# Patient Record
Sex: MALE | Race: WHITE | NOT HISPANIC OR LATINO | Employment: OTHER | URBAN - METROPOLITAN AREA
[De-identification: names, ages, dates, MRNs, and addresses within clinical notes are randomized per-mention and may not be internally consistent; named-entity substitution may affect disease eponyms.]

---

## 2017-03-10 ENCOUNTER — TELEPHONE (OUTPATIENT)
Dept: INTERNAL MEDICINE | Facility: CLINIC | Age: 52
End: 2017-03-10

## 2017-03-10 DIAGNOSIS — Z12.11 SPECIAL SCREENING FOR MALIGNANT NEOPLASMS, COLON: Primary | ICD-10-CM

## 2017-03-22 ENCOUNTER — OFFICE VISIT (OUTPATIENT)
Dept: DERMATOLOGY | Facility: CLINIC | Age: 52
End: 2017-03-22
Payer: COMMERCIAL

## 2017-03-22 DIAGNOSIS — L57.0 ACTINIC KERATOSIS: ICD-10-CM

## 2017-03-22 DIAGNOSIS — Q82.5 CONGENITAL NEVUS OF TRUNK: Chronic | ICD-10-CM

## 2017-03-22 DIAGNOSIS — D22.9 MULTIPLE NEVI: ICD-10-CM

## 2017-03-22 DIAGNOSIS — Z12.83 SKIN CANCER SCREENING: ICD-10-CM

## 2017-03-22 DIAGNOSIS — Q82.5 CONGENITAL NEVUS: ICD-10-CM

## 2017-03-22 DIAGNOSIS — R23.8 VENOUS LAKE: Chronic | ICD-10-CM

## 2017-03-22 DIAGNOSIS — L82.0 INFLAMED SEBORRHEIC KERATOSIS: ICD-10-CM

## 2017-03-22 DIAGNOSIS — D18.01 CHERRY ANGIOMA: Primary | ICD-10-CM

## 2017-03-22 DIAGNOSIS — D48.5 NEOPLASM OF UNCERTAIN BEHAVIOR OF SKIN OF TRUNK: ICD-10-CM

## 2017-03-22 PROCEDURE — 17000 DESTRUCT PREMALG LESION: CPT | Mod: 59 | Performed by: DERMATOLOGY

## 2017-03-22 PROCEDURE — 11300 SHAVE SKIN LESION 0.5 CM/<: CPT | Mod: 59 | Performed by: DERMATOLOGY

## 2017-03-22 PROCEDURE — 17003 DESTRUCT PREMALG LES 2-14: CPT | Performed by: DERMATOLOGY

## 2017-03-22 PROCEDURE — 17110 DESTRUCTION B9 LES UP TO 14: CPT | Performed by: DERMATOLOGY

## 2017-03-22 PROCEDURE — 99214 OFFICE O/P EST MOD 30 MIN: CPT | Mod: 25 | Performed by: DERMATOLOGY

## 2017-03-22 PROCEDURE — 88305 TISSUE EXAM BY PATHOLOGIST: CPT | Performed by: DERMATOLOGY

## 2017-03-22 RX ORDER — LIDOCAINE HYDROCHLORIDE AND EPINEPHRINE 10; 10 MG/ML; UG/ML
3 INJECTION, SOLUTION INFILTRATION; PERINEURAL ONCE
Qty: 3 ML | Refills: 0 | OUTPATIENT
Start: 2017-03-22 | End: 2017-03-22

## 2017-03-22 NOTE — MR AVS SNAPSHOT
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    Use of a very cold liquid, such as liquid nitrogen, to freeze and destroy abnormal skin cells that need to be removed    What should I expect?    Tenderness and redness    A small blister that might grow and fill with dark purple blood. There may be crusting.    More than one treatment may be needed if the lesions do not go away.    How do I care for the treated area?    Gently wash the area with your hands when bathing.    Use a thin layer of Vaseline to help with healing. You may use a Band-Aid.     The area should heal within 7-10 days and may leave behind a pink or lighter color.     Do not use an antibiotic or Neosporin ointment.     You may take acetaminophen (Tylenol) for pain.     Call your Doctor if you have:    Severe pain    Signs of infection (warmth, redness, cloudy yellow drainage, and or a bad smell)    Questions or concerns    Who should I call with questions?       [facility]: [phone]       [facility]: [phone]       For urgent needs outside of business hours call the [facility] at [phone]        and ask for the dermatology resident on call    Wound Care After a Biopsy    What is a skin biopsy?  A skin biopsy allows the doctor to examine a very small piece of tissue under the  microscope to determine the diagnosis and the best treatment for the skin condition. A local anesthetic (numbing medicine)  is injected with a very small needle into the skin area to be tested. A small piece of skin is taken from the area. Sometimes a suture (stitch) is used.     What are the risks of a skin biopsy?  I will experience scar, bleeding, swelling, pain, crusting and redness. I may experience incomplete removal or recurrence. Risks of this procedure are excessive bleeding, bruising, infection, nerve damage, numbness, thick (hypertrophic or keloidal) scar and non-diagnostic biopsy.    How should I care for my wound for the first 24 hours?    Keep the wound dry and covered for 24 hours    If it bleeds, hold direct pressure on the area for 15 minutes. If bleeding does not stop then go to the emergency room    Avoid strenuous exercise the first 1-2 days or as your doctor instructs you    How should I care for the wound after 24 hours?    After 24 hours, remove the bandage    You may bathe or shower as normal    If you had a scalp biopsy, you can shampoo as usual and can use shower water to clean the biopsy site daily    Clean the wound twice a day with gentle soap and water    Do not scrub, be gentle    Apply white petroleum/Vaseline after cleaning the wound with a cotton swab or a clean finger, and keep the site covered with a Bandaid /bandage. Bandages are not necessary with a scalp biopsy    If you are unable to cover the site with a Bandaid /bandage, re-apply ointment 2-3 times a day to keep the site moist. Moisture will help with healing    Avoid strenuous activity for first 1-2 days    Avoid lakes, rivers, pools, and oceans until the stitches are removed or the site is healed    How do I clean my wound?    Wash hands for 15 minutes with soap or use hand  before all wound care    Clean the wound with gentle soap and water    Apply white petroleum/Vaseline  to wound after it is clean    Replace  the Bandaid /bandage to keep the wound covered for the first few days or as instructed by your doctor    If you had a scalp biopsy, warm shower water to the area on a daily basis should suffice    What should I use to clean my wound?     Cotton-tipped applicators (Qtips )    White petroleum jelly (Vaseline ). Use a clean new container and use Q-tips to apply.    Bandaids   as needed    Gentle soap     How should I care for my wound long term?    Do not get your wound dirty    Keep up with wound care for one week or until the area is healed.    A small scab will form and fall off by itself when the area is completely healed. The area will be red and will become pink in color as it heals. Sun protection is very important for how your scar will turn out. Sunscreen with an SPF 30 or greater is recommended once the area is healed.    You should have some soreness but it should be mild and slowly go away over several days. Talk to your doctor about using tylenol for pain,    When should I call my doctor?  If you have increased:     Pain or swelling    Pus or drainage (clear or slightly yellow drainage is ok)    Temperature over 100F    Spreading redness or warmth around wound    When will I hear about my results?  The biopsy results can take 2-3 weeks to come back. The clinic will call you with the results, send you a [employer] message, or have you schedule a follow-up clinic or phone time to discuss the results. Contact our clinics if you do not hear from us in 3 weeks.     Who should I call with questions?    [facility]: [phone]     [facility]: [phone]    For urgent needs outside of business hours call the [facility] at [phone] and ask for the dermatology resident on call            Follow-ups after your visit        Who to contact     If you have questions or need follow up information about today's clinic visit or your schedule please  contact [facility] directly at [phone].  Normal or non-critical lab and imaging results will be communicated to you by MyChart, letter or phone within 4 business days after the clinic has received the results. If you do not hear from us within 7 days, please contact the clinic through MyChart or phone. If you have a critical or abnormal lab result, we will notify you by phone as soon as possible.  Submit refill requests through dough or call your pharmacy and they will forward the refill request to us. Please allow 3 business days for your refill to be completed.          Additional Information About Your Visit        dough Information     dough is an electronic gateway that provides easy, online access to your medical records. With dough, you can request a clinic appointment, read your test results, renew a prescription or communicate with your care team.     To sign up for dough visit the website at [email or web address]   You will be asked to enter the access code listed below, as well as some personal information. Please follow the directions to create your username and password.     Your access code is: [email or web address]  Expires: 2017  8:37 AM     Your access code will  in 90 days. If you need help or a new code, please contact your [facility] or call [phone] for assistance.        Care EveryWhere ID     This is your Care EveryWhere ID. This could be used by other organizations to access your [facility] medical records  [id number]         Blood Pressure from Last 3 Encounters:   16 118/84   02/24/15 129/70   14 118/86    Weight from Last 3 Encounters:   14 269 lb (122 kg)   10/01/12 292 lb (132.5 kg)   11 258 lb (117 kg)              We Performed the Following     DESTRUCT BENIGN LESION, UP TO 14     DESTRUCT PREMALIGNANT LESION, 2-14     DESTRUCT PREMALIGNANT LESION, FIRST     SHAV SKIN LESION  TRUNK/ARM/LEG <=0.5 CM     Surgical pathology exam          Today's Medication Changes          These changes are accurate as of: 3/22/17  8:37 AM.  If you have any questions, ask your nurse or doctor.               Start taking these medicines.        Dose/Directions    lidocaine 1% with EPINEPHrine 1:100,000 1 %-1:890555 injection   Used for:  Neoplasm of uncertain behavior of skin of trunk   Started by:  Rancho Ruano MD        Dose:  3 mL   Inject 3 mLs into the skin once for 1 dose   Quantity:  3 mL   Refills:  0            Where to get your medicines      Some of these will need a paper prescription and others can be bought over the counter.  Ask your nurse if you have questions.     You don't need a prescription for these medications     lidocaine 1% with EPINEPHrine 1:100,000 1 %-1:829079 injection                Primary Care Provider Office Phone # Fax #    [employer] [phone]       [facility] [address] [place] [address]        Thank you!     Thank you for choosing [facility]  for your care. Our goal is always to provide you with excellent care. Hearing back from our patients is one way we can continue to improve our services. Please take a few minutes to complete the written survey that you may receive in the mail after your visit with us. Thank you!             Your Updated Medication List - Protect others around you: Learn how to safely use, store and throw away your medicines at www.disposemymeds.org.          This list is accurate as of: 3/22/17  8:37 AM.  Always use your most recent med list.                   Brand Name Dispense Instructions for use    blood glucose monitoring test strip    no brand specified    1 Box    by In Vitro route. Test as directed       DIABETIC STERILE LANCETS device     1 Box    1 Device daily.       GLUCOMETER ELITE CLASSIC Kit     1 kit    1 Device daily.       ibuprofen 200 MG tablet    ADVIL/MOTRIN      Take 200 mg by mouth every 4 hours as needed.       lidocaine 1% with EPINEPHrine 1:100,000 1 %-1:366250 injection     3 mL    Inject 3 mLs into the skin once for 1 dose       metFORMIN 500 MG tablet    GLUCOPHAGE    180 tablet    Take 1 tablet (500 mg) by mouth 2 times daily (with meals)

## 2017-03-22 NOTE — NURSING NOTE
Dermatology Rooming Note    Luke Farias's goals for this visit include:   Chief Complaint   Patient presents with     Skin Check       Is a scribe okay for this visit:YES,     Are records needed for this visit(If yes, obtain release of information): No,      Vitals: There were no vitals taken for this visit.    Referring Provider:  Referred Self, MD  No address on file

## 2017-03-22 NOTE — LETTER
3/22/2017       RE: Luke Farias  [address] [address]     Dear Colleague,    Thank you for referring your patient, Luke Farias, to the [facility]. Please see a copy of my visit note below.    Derm problem list:  1. AK  2. 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 Venous lake on R chest  4. NUB L Flank, shave bx 3/22/17    Last FBSE: 3/22/17    Encounter date: 3/22/17    CHIEF COMPLAINT:     1.  Spot on the head.   2.  Spot on the left ear.   3.  Dark spot on the right chest.      HISTORY OF PRESENT ILLNESS:  Mr. Luke Farias is a 51-year-old healthy male with a past medical history of 1 mildly dysplastic nevus removed on the mid back by Dr. Noreen nelson in .  He reports the followin.  Spot on the head:  Lesion is intermittent and presented about 6 months ago.  It sometimes thickens up and then sometimes thin down.  It is rough.  It does not bleed.  It occasionally itches.   2.  Spot on the left ear.  Lesion has been around for about 6 months.  It is again rough.  It comes and goes.  He notes that sometimes it peels but it is asymptomatic.  It does not bleed spontaneously.  He notes that he does not sleep just on the left side, he goes back and forth between the left, on his back and on the right side.   3.  Spot on the right chest.  This lesion has been changing in history of present for the last 1-2 years.  It is asymptomatic and does not bleed spontaneously.        The patient has no other skin complaints.      PAST MEDICAL HISTORY:  Reviewed.  No history of skin cancer.        FAMILY HISTORY:  Family history of melanoma in his uncle.      SOCIAL HISTORY:  No tanning ramos use.  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      REVIEW OF SYSTEMS:   CONSTITUTIONAL:  No fevers or chills.   SKIN:  No other skin complaints.  No other lesions that are bleeding,  burning, ulcerating or not healing.      PHYSICAL EXAMINATION:   GENERAL:  Well-appearing gentleman, well-nourished, well-developed.   NEUROLOGIC:  Alert and oriented.   PSYCHIATRIC:  Pleasant affect.   SKIN:  Full skin examination of the head, neck, chest, back, abdomen, groin, buttocks all 4 extremities and nails reveals the followin.  Skin type 2.   2.  Scattered waxy, qsvfl-dl-fmlwkhlhy papule on examined surfaces.   3.  Scattered red, dome-shaped, flat papules on exam surfaces.   4.  On the left frontal scalp is a crusty stuck-on, waxy appearing papule that is elevated and red (site of concern.   5.  On the left superior helix is a red, gritty papule with an underlying thickened cartilage.   6.  Right chest with a venous lake.   7.  Numerous pigmented macules and papules.  Many are dark and there are a variety of sizes.  However, most lesions look similar to each other.   8.  On the left flank is a 4 mm pigmented macule with irregular butting out borders and some dark pigment globules on dermoscopy.   9.  On the right back is a half dollar sized congenital nevus.    10. There are 4 red, gritty papules on the scalp and the face.      ASSESSMENT AND PLAN:   1.  Actinic keratosis x4 on the scalp and face:  Cryo.   2.  Inflamed seborrheic keratoses on the left scalp:  Cryo.   3.  Benign neoplasms, including seborrheic keratoses and cherry angiomas:  No treatment necessary.   4.  Venous lake on the right chest:  No treatment necessary.   5.  Congenital nevus on the right upper back:  No treatment necessary.   6.  Multiple nevi.  There are greater than 100 nevi.  Most are similar to each other, except for 1 lesion that will be biopsied.   7.  Neoplasm of uncertain behavior on the left flank 4 mm in diameter.  Differential diagnosis includes a nevus a dysplastic nevus and melanoma.  Plan is for a shave removal.  8. Skin cancer screening, benign except for 1 lesion shave removed.     Cryotherapy procedure note:  After verbal consent and discussion of risks and benefits including but no limited to dyspigmentation/scar, blister, and pain, 5 (4 aks and 1 inflamed SK) were treated with 1-2mm freeze border for 2 cycles with liquid nitrogen. Post cryotherapy instructions were provided.     Shave removal:  After discussion of benefits and risks including but not limited to bleeding/bruising, pain/swelling, infection, scar, incomplete removal, nerve damage/numbness, recurrence, and non-diagnostic biopsy, written consent, verbal consent and photographs were obtained. Time-out was performed. The area was cleaned with isopropyl alcohol. 0.5mL of 1% lidocaine with epinephrine was injected to obtain adequate anesthesia of the lesion on the left flank. A shave biopsy was performed. Hemostasis was achieved with aluminium chloride. Vaseline and a sterile dressing were applied. The patient tolerated the procedure and no complications were noted. The patient was provided with verbal and written post care instructions.     The patient will return to clinic in 1 year for skin check or earlier for change in lesions or pending the biopsy results.      Care instructions for cryotherapy and skin biopsy has been provided in the after-visit summary.      Rancho Ruano MD, MS    Department of Dermatology  [facility]: Phone: [phone], Fax:[phone]  [facility]: Phone: [phone], Fax: [phone]             D: 2017 08:43   T: 2017 09:07   MT: BELKIS#[id number]      Name:     JAVIER GALVEZ   MRN:      [id number]        Account:      [id number]   :      1965           Visit Date:   2017      Document: [id number]

## 2017-03-22 NOTE — LETTER
3/22/2017      RE: Luek Farias  [address] [address] [address]       Derm problem list:  1. AK  2. 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 Venous lake on R chest  4. NUB L Flank, shave bx 3/22/17    Last FBSE: 3/22/17    Encounter date: 3/22/17    CHIEF COMPLAINT:     1.  Spot on the head.   2.  Spot on the left ear.   3.  Dark spot on the right chest.      HISTORY OF PRESENT ILLNESS:  Mr. Luke Farias is a 51-year-old healthy male with a past medical history of 1 mildly dysplastic nevus removed on the mid back by Dr. Noreen nelson in .  He reports the followin.  Spot on the head:  Lesion is intermittent and presented about 6 months ago.  It sometimes thickens up and then sometimes thin down.  It is rough.  It does not bleed.  It occasionally itches.   2.  Spot on the left ear.  Lesion has been around for about 6 months.  It is again rough.  It comes and goes.  He notes that sometimes it peels but it is asymptomatic.  It does not bleed spontaneously.  He notes that he does not sleep just on the left side, he goes back and forth between the left, on his back and on the right side.   3.  Spot on the right chest.  This lesion has been changing in history of present for the last 1-2 years.  It is asymptomatic and does not bleed spontaneously.        The patient has no other skin complaints.      PAST MEDICAL HISTORY:  Reviewed.  No history of skin cancer.        FAMILY HISTORY:  Family history of melanoma in his uncle.      SOCIAL HISTORY:  No tanning ramos use.  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      REVIEW OF SYSTEMS:   CONSTITUTIONAL:  No fevers or chills.   SKIN:  No other skin complaints.  No other lesions that are bleeding, burning, ulcerating or not healing.      PHYSICAL EXAMINATION:   GENERAL:  Well-appearing gentleman, well-nourished, well-developed.   NEUROLOGIC:  Alert and oriented.   PSYCHIATRIC:  Pleasant affect.   SKIN:   Full skin examination of the head, neck, chest, back, abdomen, groin, buttocks all 4 extremities and nails reveals the followin.  Skin type 2.   2.  Scattered waxy, ghlwn-oi-sjhkgplxy papule on examined surfaces.   3.  Scattered red, dome-shaped, flat papules on exam surfaces.   4.  On the left frontal scalp is a crusty stuck-on, waxy appearing papule that is elevated and red (site of concern.   5.  On the left superior helix is a red, gritty papule with an underlying thickened cartilage.   6.  Right chest with a venous lake.   7.  Numerous pigmented macules and papules.  Many are dark and there are a variety of sizes.  However, most lesions look similar to each other.   8.  On the left flank is a 4 mm pigmented macule with irregular butting out borders and some dark pigment globules on dermoscopy.   9.  On the right back is a half dollar sized congenital nevus.    10. There are 4 red, gritty papules on the scalp and the face.      ASSESSMENT AND PLAN:   1.  Actinic keratosis x4 on the scalp and face:  Cryo.   2.  Inflamed seborrheic keratoses on the left scalp:  Cryo.   3.  Benign neoplasms, including seborrheic keratoses and cherry angiomas:  No treatment necessary.   4.  Venous lake on the right chest:  No treatment necessary.   5.  Congenital nevus on the right upper back:  No treatment necessary.   6.  Multiple nevi.  There are greater than 100 nevi.  Most are similar to each other, except for 1 lesion that will be biopsied.   7.  Neoplasm of uncertain behavior on the left flank 4 mm in diameter.  Differential diagnosis includes a nevus a dysplastic nevus and melanoma.  Plan is for a shave removal.  8. Skin cancer screening, benign except for 1 lesion shave removed.     Cryotherapy procedure note: After verbal consent and discussion of risks and benefits including but no limited to dyspigmentation/scar, blister, and pain, 5 (4 aks and 1 inflamed SK) were treated with 1-2mm freeze border for 2 cycles with  liquid nitrogen. Post cryotherapy instructions were provided.     Shave removal:  After discussion of benefits and risks including but not limited to bleeding/bruising, pain/swelling, infection, scar, incomplete removal, nerve damage/numbness, recurrence, and non-diagnostic biopsy, written consent, verbal consent and photographs were obtained. Time-out was performed. The area was cleaned with isopropyl alcohol. 0.5mL of 1% lidocaine with epinephrine was injected to obtain adequate anesthesia of the lesion on the left flank. A shave biopsy was performed. Hemostasis was achieved with aluminium chloride. Vaseline and a sterile dressing were applied. The patient tolerated the procedure and no complications were noted. The patient was provided with verbal and written post care instructions.     The patient will return to clinic in 1 year for skin check or earlier for change in lesions or pending the biopsy results.      Care instructions for cryotherapy and skin biopsy has been provided in the after-visit summary.      Rancho Ruano MD, MS    Department of Dermatology  [facility]: Phone: [phone], Fax:[phone]  [facility]: Phone: [phone], Fax: [phone]             D: 2017 08:43   T: 2017 09:07   MT: BELKIS#[id number]      Name:     JAVIER GALVEZ   MRN:      [id number]        Account:      [id number]   :      1965           Visit Date:   2017      Document: [id number]

## 2017-03-22 NOTE — PATIENT INSTRUCTIONS
Cryotherapy    What is it?    Use of a very cold liquid, such as liquid nitrogen, to freeze and destroy abnormal skin cells that need to be removed    What should I expect?    Tenderness and redness    A small blister that might grow and fill with dark purple blood. There may be crusting.    More than one treatment may be needed if the lesions do not go away.    How do I care for the treated area?    Gently wash the area with your hands when bathing.    Use a thin layer of Vaseline to help with healing. You may use a Band-Aid.     The area should heal within 7-10 days and may leave behind a pink or lighter color.     Do not use an antibiotic or Neosporin ointment.     You may take acetaminophen (Tylenol) for pain.     Call your Doctor if you have:    Severe pain    Signs of infection (warmth, redness, cloudy yellow drainage, and or a bad smell)    Questions or concerns    Who should I call with questions?       [facility]: [phone]       [facility]: [phone]       For urgent needs outside of business hours call the [facility] at [phone]        and ask for the dermatology resident on call    Wound Care After a Biopsy    What is a skin biopsy?  A skin biopsy allows the doctor to examine a very small piece of tissue under the microscope to determine the diagnosis and the best treatment for the skin condition. A local anesthetic (numbing medicine)  is injected with a very small needle into the skin area to be tested. A small piece of skin is taken from the area. Sometimes a suture (stitch) is used.     What are the risks of a skin biopsy?  I will experience scar, bleeding, swelling, pain, crusting and redness. I may experience incomplete removal or recurrence. Risks of this procedure are excessive bleeding, bruising, infection, nerve damage, numbness, thick (hypertrophic or keloidal) scar and non-diagnostic biopsy.    How should I care  for my wound for the first 24 hours?    Keep the wound dry and covered for 24 hours    If it bleeds, hold direct pressure on the area for 15 minutes. If bleeding does not stop then go to the emergency room    Avoid strenuous exercise the first 1-2 days or as your doctor instructs you    How should I care for the wound after 24 hours?    After 24 hours, remove the bandage    You may bathe or shower as normal    If you had a scalp biopsy, you can shampoo as usual and can use shower water to clean the biopsy site daily    Clean the wound twice a day with gentle soap and water    Do not scrub, be gentle    Apply white petroleum/Vaseline after cleaning the wound with a cotton swab or a clean finger, and keep the site covered with a Bandaid /bandage. Bandages are not necessary with a scalp biopsy    If you are unable to cover the site with a Bandaid /bandage, re-apply ointment 2-3 times a day to keep the site moist. Moisture will help with healing    Avoid strenuous activity for first 1-2 days    Avoid lakes, rivers, pools, and oceans until the stitches are removed or the site is healed    How do I clean my wound?    Wash hands for 15 minutes with soap or use hand  before all wound care    Clean the wound with gentle soap and water    Apply white petroleum/Vaseline  to wound after it is clean    Replace the Bandaid /bandage to keep the wound covered for the first few days or as instructed by your doctor    If you had a scalp biopsy, warm shower water to the area on a daily basis should suffice    What should I use to clean my wound?     Cotton-tipped applicators (Qtips )    White petroleum jelly (Vaseline ). Use a clean new container and use Q-tips to apply.    Bandaids   as needed    Gentle soap     How should I care for my wound long term?    Do not get your wound dirty    Keep up with wound care for one week or until the area is healed.    A small scab will form and fall off by itself when the area is  completely healed. The area will be red and will become pink in color as it heals. Sun protection is very important for how your scar will turn out. Sunscreen with an SPF 30 or greater is recommended once the area is healed.    You should have some soreness but it should be mild and slowly go away over several days. Talk to your doctor about using tylenol for pain,    When should I call my doctor?  If you have increased:     Pain or swelling    Pus or drainage (clear or slightly yellow drainage is ok)    Temperature over 100F    Spreading redness or warmth around wound    When will I hear about my results?  The biopsy results can take 2-3 weeks to come back. The clinic will call you with the results, send you a Editlite message, or have you schedule a follow-up clinic or phone time to discuss the results. Contact our clinics if you do not hear from us in 3 weeks.     Who should I call with questions?    [facility]: [phone]     [facility]: [phone]    For urgent needs outside of business hours call the [facility] at [phone] and ask for the dermatology resident on call

## 2017-03-23 NOTE — PROGRESS NOTES
Derm problem list:  1. AK  2. 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 Venous lake on R chest  4. NUB L Flank, shave bx 3/22/17    Last FBSE: 3/22/17    Encounter date: 3/22/17    CHIEF COMPLAINT:     1.  Spot on the head.   2.  Spot on the left ear.   3.  Dark spot on the right chest.      HISTORY OF PRESENT ILLNESS:  Mr. Luke Farias is a 51-year-old healthy male with a past medical history of 1 mildly dysplastic nevus removed on the mid back by Dr. Noreen nelson in .  He reports the followin.  Spot on the head:  Lesion is intermittent and presented about 6 months ago.  It sometimes thickens up and then sometimes thin down.  It is rough.  It does not bleed.  It occasionally itches.   2.  Spot on the left ear.  Lesion has been around for about 6 months.  It is again rough.  It comes and goes.  He notes that sometimes it peels but it is asymptomatic.  It does not bleed spontaneously.  He notes that he does not sleep just on the left side, he goes back and forth between the left, on his back and on the right side.   3.  Spot on the right chest.  This lesion has been changing in history of present for the last 1-2 years.  It is asymptomatic and does not bleed spontaneously.        The patient has no other skin complaints.      PAST MEDICAL HISTORY:  Reviewed.  No history of skin cancer.        FAMILY HISTORY:  Family history of melanoma in his uncle.      SOCIAL HISTORY:  No tanning ramos use.  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      REVIEW OF SYSTEMS:   CONSTITUTIONAL:  No fevers or chills.   SKIN:  No other skin complaints.  No other lesions that are bleeding, burning, ulcerating or not healing.      PHYSICAL EXAMINATION:   GENERAL:  Well-appearing gentleman, well-nourished, well-developed.   NEUROLOGIC:  Alert and oriented.   PSYCHIATRIC:  Pleasant affect.   SKIN:  Full skin examination of the head, neck, chest, back, abdomen, groin, buttocks  all 4 extremities and nails reveals the followin.  Skin type 2.   2.  Scattered waxy, [id number]-[id number]-mbcxzuyyg papule on examined surfaces.   3.  Scattered red, dome-shaped, flat papules on exam surfaces.   4.  On the left frontal scalp is a crusty stuck-on, waxy appearing papule that is elevated and red (site of concern.   5.  On the left superior helix is a red, gritty papule with an underlying thickened cartilage.   6.  Right chest with a venous lake.   7.  Numerous pigmented macules and papules.  Many are dark and there are a variety of sizes.  However, most lesions look similar to each other.   8.  On the left flank is a 4 mm pigmented macule with irregular butting out borders and some dark pigment globules on dermoscopy.   9.  On the right back is a half dollar sized congenital nevus.    10. There are 4 red, gritty papules on the scalp and the face.      ASSESSMENT AND PLAN:   1.  Actinic keratosis x4 on the scalp and face:  Cryo.   2.  Inflamed seborrheic keratoses on the left scalp:  Cryo.   3.  Benign neoplasms, including seborrheic keratoses and cherry angiomas:  No treatment necessary.   4.  Venous lake on the right chest:  No treatment necessary.   5.  Congenital nevus on the right upper back:  No treatment necessary.   6.  Multiple nevi.  There are greater than 100 nevi.  Most are similar to each other, except for 1 lesion that will be biopsied.   7.  Neoplasm of uncertain behavior on the left flank 4 mm in diameter.  Differential diagnosis includes a nevus a dysplastic nevus and melanoma.  Plan is for a shave removal.  8. Skin cancer screening, benign except for 1 lesion shave removed.     Cryotherapy procedure note: After verbal consent and discussion of risks and benefits including but no limited to dyspigmentation/scar, blister, and pain, 5 (4 aks and 1 inflamed SK) were treated with 1-2mm freeze border for 2 cycles with liquid nitrogen. Post cryotherapy instructions were provided.     Shave  removal:  After discussion of benefits and risks including but not limited to bleeding/bruising, pain/swelling, infection, scar, incomplete removal, nerve damage/numbness, recurrence, and non-diagnostic biopsy, written consent, verbal consent and photographs were obtained. Time-out was performed. The area was cleaned with isopropyl alcohol. 0.5mL of 1% lidocaine with epinephrine was injected to obtain adequate anesthesia of the lesion on the left flank. A shave biopsy was performed. Hemostasis was achieved with aluminium chloride. Vaseline and a sterile dressing were applied. The patient tolerated the procedure and no complications were noted. The patient was provided with verbal and written post care instructions.     The patient will return to clinic in 1 year for skin check or earlier for change in lesions or pending the biopsy results.      Care instructions for cryotherapy and skin biopsy has been provided in the after-visit summary.      Rancho Ruano MD, MS    Department of Dermatology  [facility]: Phone: [phone], Fax:[phone]  [facility]: Phone: [phone], Fax: [phone]             D: 2017 08:43   T: 2017 09:07   MT: BELKIS#[id number]      Name:     JAVIER GALVEZ   MRN:      [id number]        Account:      [id number]   :      1965           Visit Date:   2017      Document: [id number]

## 2017-03-27 LAB — COPATH REPORT: NORMAL

## 2017-04-02 NOTE — PROGRESS NOTES
Please call patient and inform him that his left flank biopsy was a Moderately Dysplastic nevus, completely removed. There's nothing to truly worry about. I'd like to do another skin check in a year.    Rancho Ruano MD, MS    Department of Dermatology  [facility]: Phone: [phone], Fax:[phone]  [facility]: Phone: [phone], Fax: [phone]

## 2017-04-03 ENCOUNTER — TELEPHONE (OUTPATIENT)
Dept: DERMATOLOGY | Facility: CLINIC | Age: 52
End: 2017-04-03

## 2017-04-03 NOTE — TELEPHONE ENCOUNTER
"Writer called and informed patient of results below. Patient verbalized understanding and states that he will \"make sure to have another skin check\" in one year. No further questions or concerns per patient    Notes Recorded by Rancho Ruano MD on 4/2/2017 at 9:52 AM  Please call patient and inform him that his left flank biopsy was a Moderately Dysplastic nevus, completely removed. There's nothing to truly worry about. I'd like to do another skin check in a year.    Rancho Ruano MD, MS    Department of Dermatology  [facility]: Phone: [phone], Fax:[phone]  [facility]: Phone: [phone], Fax: [phone]    Vianney Carlisle LPN    "

## 2017-05-12 ENCOUNTER — HOSPITAL ENCOUNTER (OUTPATIENT)
Facility: AMBULATORY SURGERY CENTER | Age: 52
Discharge: HOME | End: 2017-05-12
Attending: INTERNAL MEDICINE | Admitting: INTERNAL MEDICINE
Payer: COMMERCIAL

## 2017-05-12 ENCOUNTER — SURGERY (OUTPATIENT)
Age: 52
End: 2017-05-12
Payer: COMMERCIAL

## 2017-05-12 ENCOUNTER — TELEPHONE (OUTPATIENT)
Dept: INTERNAL MEDICINE | Facility: CLINIC | Age: 52
End: 2017-05-12

## 2017-05-12 VITALS
RESPIRATION RATE: 18 BRPM | OXYGEN SATURATION: 97 % | DIASTOLIC BLOOD PRESSURE: 82 MMHG | TEMPERATURE: 97.6 F | SYSTOLIC BLOOD PRESSURE: 103 MMHG

## 2017-05-12 LAB — GLUCOSE BLDC GLUCOMTR-MCNC: 263 MG/DL (ref 70–99)

## 2017-05-12 PROCEDURE — G8918 PT W/O PREOP ORDER IV AB PRO: HCPCS

## 2017-05-12 PROCEDURE — 45378 DIAGNOSTIC COLONOSCOPY: CPT

## 2017-05-12 PROCEDURE — G8907 PT DOC NO EVENTS ON DISCHARG: HCPCS

## 2017-05-12 PROCEDURE — 99152 MOD SED SAME PHYS/QHP 5/>YRS: CPT | Performed by: INTERNAL MEDICINE

## 2017-05-12 PROCEDURE — 45378 DIAGNOSTIC COLONOSCOPY: CPT | Performed by: INTERNAL MEDICINE

## 2017-05-12 RX ORDER — FENTANYL CITRATE 50 UG/ML
INJECTION, SOLUTION INTRAMUSCULAR; INTRAVENOUS PRN
Status: DISCONTINUED | OUTPATIENT
Start: 2017-05-12 | End: 2017-05-12 | Stop reason: HOSPADM

## 2017-05-12 RX ORDER — LIDOCAINE 40 MG/G
CREAM TOPICAL
Status: DISCONTINUED | OUTPATIENT
Start: 2017-05-12 | End: 2017-05-13 | Stop reason: HOSPADM

## 2017-05-12 RX ORDER — ONDANSETRON 2 MG/ML
4 INJECTION INTRAMUSCULAR; INTRAVENOUS
Status: DISCONTINUED | OUTPATIENT
Start: 2017-05-12 | End: 2017-05-13 | Stop reason: HOSPADM

## 2017-05-12 RX ADMIN — FENTANYL CITRATE 100 MCG: 50 INJECTION, SOLUTION INTRAMUSCULAR; INTRAVENOUS at 08:13

## 2017-05-12 RX ADMIN — FENTANYL CITRATE 50 MCG: 50 INJECTION, SOLUTION INTRAMUSCULAR; INTRAVENOUS at 08:15

## 2017-05-12 NOTE — OR NURSING
Unable to remove cecal polyp due to poor prep. Pt instructed to return for colonoscopy at a later date.

## 2017-05-12 NOTE — TELEPHONE ENCOUNTER
Sent to [facility] [id number] to contact patient.  Per Family practice, patient wants [facility]

## 2017-05-12 NOTE — TELEPHONE ENCOUNTER
----- Message from [facility] Painting MD sent at 5/12/2017  2:11 PM CDT -----  Needs repeat colonoscopy and longer prep.

## 2017-05-23 ENCOUNTER — TELEPHONE (OUTPATIENT)
Dept: GASTROENTEROLOGY | Facility: CLINIC | Age: 52
End: 2017-05-23

## 2017-05-23 DIAGNOSIS — Z12.11 SCREENING FOR COLON CANCER: Primary | ICD-10-CM

## 2017-05-23 NOTE — TELEPHONE ENCOUNTER
----- Message from Alysha Robledo RN sent at 5/12/2017  9:47 AM CDT -----  Regarding: FW: Schedule repeat colonoscopy with me with extended prep      ----- Message -----     From: Krish Galloway MD     Sent: 5/12/2017   9:16 AM       To: Alysha Robledo RN  Subject: Schedule repeat colonoscopy with me with ext#    Alysha,    I just did a colonoscopy on this gentleman and he had a poor prep. He has diabetes so I think his colonic motility is decreased.    On Monday can you contact him to reschedule a colonoscopy with me in a couple months.    He will also need instructions for:  -7 days before procedure start a low residue diet  -2 days before procedure drink clear liquids only  -2 day before procedure do miralax gatorade  -1 day before procedure take 2L of golytely  -AM of procedure take remaining 2L of golytely    Thanks!  Krish

## 2017-05-23 NOTE — TELEPHONE ENCOUNTER
Nurse called patient and left a message on VM to return call to schedule.    Alysha Robledo RN, BSN  [facility]  GI/Gen Surg/Hepatology Care Coordinator

## 2017-05-25 NOTE — TELEPHONE ENCOUNTER
Nurse called patient today and left message to return call to schedule colonoscopy with Dr. Galloway.    Alysha Robledo RN, BSN  [facility]  GI/Gen Surg/Hepatology Care Coordinator

## 2017-05-31 LAB — COLONOSCOPY: NORMAL

## 2017-06-02 NOTE — TELEPHONE ENCOUNTER
The patient was contacted and informed of the need for a repeat colonoscopy. Patient declined scheduling at this time as he is unsure about his work schedule. Patient verbalized that he will call back to schedule.     Danika Hopkins CMA

## 2018-07-18 ENCOUNTER — APPOINTMENT (OUTPATIENT)
Dept: GENERAL RADIOLOGY | Facility: CLINIC | Age: 53
End: 2018-07-18
Attending: FAMILY MEDICINE
Payer: COMMERCIAL

## 2018-07-18 ENCOUNTER — OFFICE VISIT (OUTPATIENT)
Dept: FAMILY MEDICINE | Facility: CLINIC | Age: 53
End: 2018-07-18
Payer: COMMERCIAL

## 2018-07-18 ENCOUNTER — OFFICE VISIT (OUTPATIENT)
Dept: PODIATRY | Facility: CLINIC | Age: 53
End: 2018-07-18
Payer: COMMERCIAL

## 2018-07-18 ENCOUNTER — HOSPITAL ENCOUNTER (EMERGENCY)
Facility: CLINIC | Age: 53
Discharge: HOME | End: 2018-07-18
Attending: FAMILY MEDICINE | Admitting: FAMILY MEDICINE
Payer: COMMERCIAL

## 2018-07-18 VITALS
DIASTOLIC BLOOD PRESSURE: 82 MMHG | TEMPERATURE: 98.8 F | SYSTOLIC BLOOD PRESSURE: 124 MMHG | BODY MASS INDEX: 28.11 KG/M2 | HEIGHT: 74 IN | WEIGHT: 219 LBS

## 2018-07-18 VITALS
BODY MASS INDEX: 28.55 KG/M2 | OXYGEN SATURATION: 96 % | WEIGHT: 219.4 LBS | DIASTOLIC BLOOD PRESSURE: 78 MMHG | RESPIRATION RATE: 18 BRPM | HEART RATE: 88 BPM | SYSTOLIC BLOOD PRESSURE: 108 MMHG | TEMPERATURE: 97.8 F

## 2018-07-18 VITALS
OXYGEN SATURATION: 99 % | HEART RATE: 93 BPM | WEIGHT: 219 LBS | HEIGHT: 74 IN | TEMPERATURE: 97.9 F | RESPIRATION RATE: 14 BRPM | DIASTOLIC BLOOD PRESSURE: 94 MMHG | BODY MASS INDEX: 28.11 KG/M2 | SYSTOLIC BLOOD PRESSURE: 139 MMHG

## 2018-07-18 DIAGNOSIS — L03.116 CELLULITIS OF LEFT LOWER EXTREMITY: Primary | ICD-10-CM

## 2018-07-18 DIAGNOSIS — L02.619 CELLULITIS AND ABSCESS OF FOOT, EXCEPT TOES: ICD-10-CM

## 2018-07-18 DIAGNOSIS — L03.116 CELLULITIS OF LEFT FOOT: ICD-10-CM

## 2018-07-18 DIAGNOSIS — Z79.4 TYPE 2 DIABETES MELLITUS WITH HYPERGLYCEMIA, WITH LONG-TERM CURRENT USE OF INSULIN (H): ICD-10-CM

## 2018-07-18 DIAGNOSIS — E11.65 TYPE 2 DIABETES MELLITUS WITH HYPERGLYCEMIA, WITH LONG-TERM CURRENT USE OF INSULIN (H): ICD-10-CM

## 2018-07-18 DIAGNOSIS — L03.119 CELLULITIS AND ABSCESS OF FOOT, EXCEPT TOES: ICD-10-CM

## 2018-07-18 DIAGNOSIS — E11.65 TYPE 2 DIABETES MELLITUS WITH HYPERGLYCEMIA, WITHOUT LONG-TERM CURRENT USE OF INSULIN (H): ICD-10-CM

## 2018-07-18 DIAGNOSIS — R82.998 DARK URINE: ICD-10-CM

## 2018-07-18 LAB
ALBUMIN SERPL-MCNC: 3.3 G/DL (ref 3.4–5)
ALBUMIN UR-MCNC: NEGATIVE MG/DL
ALP SERPL-CCNC: 73 U/L (ref 40–150)
ALT SERPL W P-5'-P-CCNC: 17 U/L (ref 0–70)
ANION GAP SERPL CALCULATED.3IONS-SCNC: 8 MMOL/L (ref 3–14)
APPEARANCE UR: CLEAR
AST SERPL W P-5'-P-CCNC: 12 U/L (ref 0–45)
BASOPHILS # BLD AUTO: 0.1 10E9/L (ref 0–0.2)
BASOPHILS NFR BLD AUTO: 0.5 %
BILIRUB SERPL-MCNC: 0.6 MG/DL (ref 0.2–1.3)
BILIRUB UR QL STRIP: NEGATIVE
BUN SERPL-MCNC: 17 MG/DL (ref 7–30)
CALCIUM SERPL-MCNC: 9.3 MG/DL (ref 8.5–10.1)
CHLORIDE SERPL-SCNC: 97 MMOL/L (ref 94–109)
CO2 SERPL-SCNC: 29 MMOL/L (ref 20–32)
COLOR UR AUTO: YELLOW
CREAT SERPL-MCNC: 0.68 MG/DL (ref 0.66–1.25)
CRP SERPL-MCNC: 68 MG/L (ref 0–8)
DIFFERENTIAL METHOD BLD: NORMAL
EOSINOPHIL # BLD AUTO: 0.2 10E9/L (ref 0–0.7)
EOSINOPHIL NFR BLD AUTO: 2 %
ERYTHROCYTE [DISTWIDTH] IN BLOOD BY AUTOMATED COUNT: 13.1 % (ref 10–15)
ERYTHROCYTE [SEDIMENTATION RATE] IN BLOOD BY WESTERGREN METHOD: 30 MM/H (ref 0–20)
GFR SERPL CREATININE-BSD FRML MDRD: >90 ML/MIN/1.7M2
GLUCOSE SERPL-MCNC: 281 MG/DL (ref 70–99)
GLUCOSE UR STRIP-MCNC: 500 MG/DL
HBA1C MFR BLD: 11.3 % (ref 0–5.6)
HCT VFR BLD AUTO: 47.1 % (ref 40–53)
HGB BLD-MCNC: 16.8 G/DL (ref 13.3–17.7)
HGB UR QL STRIP: NEGATIVE
KETONES UR STRIP-MCNC: 15 MG/DL
LEUKOCYTE ESTERASE UR QL STRIP: NEGATIVE
LYMPHOCYTES # BLD AUTO: 1.6 10E9/L (ref 0.8–5.3)
LYMPHOCYTES NFR BLD AUTO: 14.7 %
MCH RBC QN AUTO: 28.6 PG (ref 26.5–33)
MCHC RBC AUTO-ENTMCNC: 35.7 G/DL (ref 31.5–36.5)
MCV RBC AUTO: 80 FL (ref 78–100)
MONOCYTES # BLD AUTO: 1.3 10E9/L (ref 0–1.3)
MONOCYTES NFR BLD AUTO: 12.4 %
NEUTROPHILS # BLD AUTO: 7.5 10E9/L (ref 1.6–8.3)
NEUTROPHILS NFR BLD AUTO: 70.4 %
NITRATE UR QL: NEGATIVE
PH UR STRIP: 6 PH (ref 5–7)
PLATELET # BLD AUTO: 281 10E9/L (ref 150–450)
POTASSIUM SERPL-SCNC: 4 MMOL/L (ref 3.4–5.3)
PROT SERPL-MCNC: 7.9 G/DL (ref 6.8–8.8)
RBC # BLD AUTO: 5.87 10E12/L (ref 4.4–5.9)
SODIUM SERPL-SCNC: 134 MMOL/L (ref 133–144)
SOURCE: ABNORMAL
SP GR UR STRIP: 1.02 (ref 1–1.03)
UROBILINOGEN UR STRIP-ACNC: >=8 EU/DL (ref 0.2–1)
WBC # BLD AUTO: 10.6 10E9/L (ref 4–11)

## 2018-07-18 PROCEDURE — 86140 C-REACTIVE PROTEIN: CPT | Performed by: FAMILY MEDICINE

## 2018-07-18 PROCEDURE — 81003 URINALYSIS AUTO W/O SCOPE: CPT | Performed by: PHYSICIAN ASSISTANT

## 2018-07-18 PROCEDURE — 85652 RBC SED RATE AUTOMATED: CPT | Performed by: FAMILY MEDICINE

## 2018-07-18 PROCEDURE — 83036 HEMOGLOBIN GLYCOSYLATED A1C: CPT | Performed by: PHYSICIAN ASSISTANT

## 2018-07-18 PROCEDURE — 36415 COLL VENOUS BLD VENIPUNCTURE: CPT | Performed by: PHYSICIAN ASSISTANT

## 2018-07-18 PROCEDURE — 99204 OFFICE O/P NEW MOD 45 MIN: CPT | Performed by: PODIATRIST

## 2018-07-18 PROCEDURE — 99284 EMERGENCY DEPT VISIT MOD MDM: CPT | Mod: Z6 | Performed by: FAMILY MEDICINE

## 2018-07-18 PROCEDURE — 73630 X-RAY EXAM OF FOOT: CPT | Mod: TC,LT

## 2018-07-18 PROCEDURE — 99284 EMERGENCY DEPT VISIT MOD MDM: CPT | Performed by: FAMILY MEDICINE

## 2018-07-18 PROCEDURE — 99214 OFFICE O/P EST MOD 30 MIN: CPT | Performed by: PHYSICIAN ASSISTANT

## 2018-07-18 PROCEDURE — 80053 COMPREHEN METABOLIC PANEL: CPT | Performed by: FAMILY MEDICINE

## 2018-07-18 PROCEDURE — 85025 COMPLETE CBC W/AUTO DIFF WBC: CPT | Performed by: PHYSICIAN ASSISTANT

## 2018-07-18 RX ORDER — SULFAMETHOXAZOLE/TRIMETHOPRIM 800-160 MG
TABLET ORAL 2 TIMES DAILY
COMMUNITY
Start: 2018-07-13 | End: 2018-07-20 | Stop reason: ALTCHOICE

## 2018-07-18 RX ORDER — CEPHALEXIN 500 MG/1
CAPSULE ORAL 3 TIMES DAILY
COMMUNITY
Start: 2018-07-13 | End: 2018-07-20 | Stop reason: ALTCHOICE

## 2018-07-18 RX ORDER — CLINDAMYCIN HCL 300 MG
300 CAPSULE ORAL 4 TIMES DAILY
Qty: 40 CAPSULE | Refills: 0 | Status: SHIPPED | OUTPATIENT
Start: 2018-07-18 | End: 2018-07-31

## 2018-07-18 RX ORDER — CIPROFLOXACIN 500 MG/1
500 TABLET, FILM COATED ORAL 2 TIMES DAILY
Qty: 20 TABLET | Refills: 0 | Status: SHIPPED | OUTPATIENT
Start: 2018-07-18 | End: 2018-07-31

## 2018-07-18 RX ORDER — CIPROFLOXACIN 500 MG/1
500 TABLET, FILM COATED ORAL ONCE
Status: DISCONTINUED | OUTPATIENT
Start: 2018-07-18 | End: 2018-07-18

## 2018-07-18 ASSESSMENT — PAIN SCALES - GENERAL
PAINLEVEL: MODERATE PAIN (5)
PAINLEVEL: MODERATE PAIN (5)

## 2018-07-18 ASSESSMENT — ENCOUNTER SYMPTOMS
WEAKNESS: 0
EYE REDNESS: 0
EYE PAIN: 0
WOUND: 1
FEVER: 0
LIGHT-HEADEDNESS: 0
DIZZINESS: 0
CHILLS: 0

## 2018-07-18 NOTE — ED TRIAGE NOTES
Pt here with infection to foot for the past week, has been on Sulfa and Bactrim for the past 5 days. It is some better, but still red and swollen. Sent from the clinic. Pt is a non compliant diabetic. Had his A1C returned today and it it 11.3

## 2018-07-18 NOTE — ED AVS SNAPSHOT
[facility] Emergency Department    [address] [place] [address]    Phone:  [phone]    Fax:  [phone]                                       Luke Farias   MRN: [id number]    Department:  [facility] Emergency Department   Date of Visit:  7/18/2018           Patient Information     Date Of Birth          1965        Your diagnoses for this visit were:     Cellulitis of left foot     Type 2 diabetes mellitus with hyperglycemia, with long-term current use of insulin (H)        You were seen by Nicholas, Brent TOVAR MD.      Follow-up Information     Follow up with Darryl Huff DPM Today.    Specialty:  Podiatry    Contact information:    [address]   [place] [address] [phone]          Follow up with [facility] Emergency Department.    Specialty:  EMERGENCY MEDICINE    Why:  If symptoms worsen    Contact information:    [address] [place] [address] [phone]    Additional information:    From [address]: Exit at [address] on south side of [place]. Turn right on [address]. Turn left at stoplight on [address]. [facility] will be in view two blocks ahead      Discharge References/Attachments     DIABETES: TREATING SEVERE FOOT INFECTIONS (ENGLISH)      Your next 10 appointments already scheduled     Jul 18, 2018  1:45 PM CDT   New Visit with Darryl Huff DPM   [facility]    [address] [place] [address] [phone]              24 Hour Appointment Hotline       To make an appointment at any [facility], call [phone] ([phone]). If you don't have a family doctor or clinic, we will help you find one. [facility] are conveniently located to serve the needs of you and your family.             Review of your medicines      START taking        Dose / Directions Last dose taken    ciprofloxacin 500 MG tablet   Commonly known  as:  CIPRO   Dose:  500 mg   Quantity:  20 tablet        Take 1 tablet (500 mg) by mouth 2 times daily for 10 days   Refills:  0          Our records show that you are taking the medicines listed below. If these are incorrect, please call your family doctor or clinic.        Dose / Directions Last dose taken    blood glucose monitoring test strip   Commonly known as:  no brand specified   Quantity:  1 Box        by In Vitro route. Test as directed   Refills:  12        cephALEXin 500 MG capsule   Commonly known as:  KEFLEX        3 times daily   Refills:  0        DIABETIC STERILE LANCETS device   Dose:  1 Device   Quantity:  1 Box        1 Device daily.   Refills:  12        GLUCOMETER ELITE CLASSIC Kit   Dose:  1 Device   Quantity:  1 kit        1 Device daily.   Refills:  0        ibuprofen 200 MG tablet   Commonly known as:  ADVIL/MOTRIN   Dose:  200 mg        Take 200 mg by mouth every 4 hours as needed.   Refills:  0        metFORMIN 500 MG tablet   Commonly known as:  GLUCOPHAGE   Dose:  500 mg   Quantity:  180 tablet        Take 1 tablet (500 mg) by mouth 2 times daily (with meals)   Refills:  3        sulfamethoxazole-trimethoprim 800-160 MG per tablet   Commonly known as:  BACTRIM DS/SEPTRA DS        2 times daily   Refills:  0                Prescriptions were sent or printed at these locations (1 Prescription)                   [facility] [place], [place] - [address]    [address] , [place] [address]    Telephone:  [phone]   Fax:  [phone]   Hours:                  E-Prescribed (1 of 1)         ciprofloxacin (CIPRO) 500 MG tablet                Procedures and tests performed during your visit     CRP inflammation    Comprehensive metabolic panel    Erythrocyte sedimentation rate auto    IV access    XR Foot Left G/E 3 Views      Orders Needing Specimen Collection     None      Pending Results     No orders found from 7/16/2018 to 7/19/2018.            Pending Culture  Results     No orders found from 7/16/2018 to 7/19/2018.            Pending Results Instructions     If you had any lab results that were not finalized at the time of your Discharge, you can call the ED Lab Result RN at [phone]. You will be contacted by this team for any positive Lab results or changes in treatment. The nurses are available 7 days a week from 10A to 6:30P.  You can leave a message 24 hours per day and they will return your call.        Thank you for choosing [facility]       Thank you for choosing [facility] for your care. Our goal is always to provide you with excellent care. Hearing back from our patients is one way we can continue to improve our services. Please take a few minutes to complete the written survey that you may receive in the mail after you visit with us. Thank you!        Care EveryWhere ID     This is your Care EveryWhere ID. This could be used by other organizations to access your [facility] medical records  [id number]        Equal Access to Services     ILANA ALANIZ : Benjamín Dorsey, juan gar, teresa murphy, duldey jade. So [facility] [phone].    ATENCIÓN: Si habla español, tiene a barksdale disposición servicios gratuitos de asistencia lingüística. Llame al [phone].    We comply with applicable federal civil rights laws and [place] laws. We do not discriminate on the basis of race, color, national origin, age, disability, sex, sexual orientation, or gender identity.            After Visit Summary       This is your record. Keep this with you and show to your community pharmacist(s) and doctor(s) at your next visit.

## 2018-07-18 NOTE — MR AVS SNAPSHOT
After Visit Summary   7/18/2018    Luke Farias    MRN: [id number]           Patient Information     Date Of Birth          1965        Visit Information        Provider Department      7/18/2018 8:20 AM Bella Zaldivar PA-C [facility]        Today's Diagnoses     Cellulitis of left lower extremity    -  1    Type 2 diabetes mellitus with hyperglycemia, without long-term current use of insulin (H)        Dark urine           Follow-ups after your visit        Who to contact     If you have questions or need follow up information about today's clinic visit or your schedule please contact [facility] directly at [phone].  Normal or non-critical lab and imaging results will be communicated to you by MyChart, letter or phone within 4 business days after the clinic has received the results. If you do not hear from us within 7 days, please contact the clinic through MyChart or phone. If you have a critical or abnormal lab result, we will notify you by phone as soon as possible.  Submit refill requests through [employer] or call your pharmacy and they will forward the refill request to us. Please allow 3 business days for your refill to be completed.          Additional Information About Your Visit        Care EveryWhere ID     This is your Care EveryWhere ID. This could be used by other organizations to access your [place] medical records  [id number]        Your Vitals Were     Pulse Temperature Respirations Pulse Oximetry BMI (Body Mass Index)       88 97.8  F (36.6  C) (Temporal) 18 96% 28.55 kg/m2        Blood Pressure from Last 3 Encounters:   07/18/18 (!) 139/94   07/18/18 108/78   05/12/17 103/82    Weight from Last 3 Encounters:   07/18/18 219 lb (99.3 kg)   07/18/18 219 lb 6.4 oz (99.5 kg)   06/18/14 269 lb (122 kg)              We Performed the Following     CBC with platelets and differential     Hemoglobin A1c     UA reflex to Microscopic        Primary  Care Provider Office Phone # Fax #    [facility] [phone]       [address] [place] [address]        Equal Access to Services     VICKYTITO KATTY : Benjamín jose j abbott margeo Sotrinaali, waaxda luqadaha, qaybta kaalmada katherine, dudley sylvester ernestinecameron mojica laarnoldonils jade. So [facility] [phone].    ATENCIÓN: Si habla español, tiene a barksdale disposición servicios gratuitos de asistencia lingüística. Llame al [phone].    We comply with applicable federal civil rights laws and [place] laws. We do not discriminate on the basis of race, color, national origin, age, disability, sex, sexual orientation, or gender identity.            Thank you!     Thank you for choosing [facility]  for your care. Our goal is always to provide you with excellent care. Hearing back from our patients is one way we can continue to improve our services. Please take a few minutes to complete the written survey that you may receive in the mail after your visit with us. Thank you!             Your Updated Medication List - Protect others around you: Learn how to safely use, store and throw away your medicines at www.disposemymeds.org.          This list is accurate as of 7/18/18 11:43 AM.  Always use your most recent med list.                   Brand Name Dispense Instructions for use Diagnosis    blood glucose monitoring test strip    no brand specified    1 Box    by In Vitro route. Test as directed    Type 2 diabetes, HbA1c goal < 7% (H)       cephALEXin 500 MG capsule    KEFLEX     3 times daily        DIABETIC STERILE LANCETS device     1 Box    1 Device daily.    Type 2 diabetes, HbA1c goal < 7% (H)       GLUCOMETER ELITE CLASSIC Kit     1 kit    1 Device daily.    Type 2 diabetes, HbA1c goal < 7% (H)       ibuprofen 200 MG tablet    ADVIL/MOTRIN     Take 200 mg by mouth every 4 hours as needed.        metFORMIN 500 MG tablet    GLUCOPHAGE    180 tablet    Take 1 tablet (500 mg) by mouth 2 times daily  (with meals)    Type 2 diabetes, HbA1c goal < 7% (H)       sulfamethoxazole-trimethoprim 800-160 MG per tablet    BACTRIM DS/SEPTRA DS     2 times daily

## 2018-07-18 NOTE — MR AVS SNAPSHOT
After Visit Summary   7/18/2018    Luke Farias    MRN: [id number]           Patient Information     Date Of Birth          1965        Visit Information        Provider Department      7/18/2018 1:45 PM Darryl Huff DPM [facility]        Today's Diagnoses     Uncontrolled type 2 diabetes mellitus with other skin complication, without long-term current use of insulin (H)    -  1      Care Instructions    DIABETES AND YOUR FEET    What effect does diabetes have on the feet?  Diabetes can result in several problems in the feet including contractures of the tendons leading to deformities and reduced function of the bones, skin ulcers or open sores on pressure points or prominent deformities, reduced sensation, reduced blood flow and thus reduced oxygen and immune cells to the tiny vessels in our feet. This all leads to higher risk of hospitalization, infections, and amputations.     What is neuropathy?  Neuropathy is a term used to describe a loss of nerve function.  Patients with diabetes are at risk of developing neuropathy if their sugars continue to run high and are above the normal value of 140.  The elevated blood sugar in the body enters the nerves causing it to swell and impair nerve function.  The higher the blood sugar and the longer it is elevated, the more damage is done to nerves.  This damage is permanent and irreversible.  These damaged sensory nerves can then cause reduced feeling or cause pain.  Damaged motor nerves can reduce blood flow and white blood cells into into your foot, skin and bones reducing your ability to heal a small problem. And neuropathy can cause tendons to become unbalanced and contribute to the formation of deformity and contractures in our feet. Often times, neuropathy can be prevented by controlling your blood sugar.  Your risk of developing neuropathy goes up dramatically as your hemoglobin A1C raises above 7.5.      How do I know if  "I have neuropathy?  When a person develops neuropathy, they usually begin to feel numbness or tingling in their feet and sometimes in their legs.  Other symptoms may include painful burning or hot feet, tingling, electrical sensations or feeling like insects or ants are crawling on your feet or legs.  If blood sugar remains above 140  for long periods of time, neuropathy can also occur in the hands.  When a person loses their \"protective threshold\" or ability to detect a 5.07 [place] monofilament is when they have elevated risk for developing foot deformity, contractures, foot infections, amputations, Charcot arthropathy, or other complications. Keep your hemoglobin A1C below 7.5 to reduce this risk.    What is vascular disease?  Peripheral vascular disease is a term used to describe a loss or decrease in circulation (blood flow).  There is a problem in getting blood, immune cells, and oxygen to areas that need it.  Similar to neuropathy, sugars can build up in the walls of the arteries (blood vessels) and cause them to become swollen, thickened and hardened.  This decreases the amount of blood that can go to an area that needs it.  Though this is common in the legs of diabetic patients, it can also affect other arteries (blood vessels) in the body such as in the heart, kidney, eyes, and the blood flow into bones.  It is often seen first in the small vessels of her body notably our feet and toes.    How do I know if I have vascular disease?  In the legs, vascular disease usually results in cramping.  Patients who develop leg cramps after walking the same distance every time (i.e. One block, half a mile, ect.) need to let their doctors know so that their circulation may be checked.  Cramps causing severe pain in the feet and/or legs while sleeping and the cramps go away when you stand or hang your legs off the side of the bed, may also be a sign of poor blood circulation.  Occasional cramping in cold weather " or on rare occasions with activity may not be due to poor circulation, but you should inform your doctor.    How can these problems be prevented?  The key to prevention is good blood sugar control all day every day.  Inadequate blood sugar control is the most common way patients experience these problems. Reducing, controlling and measuring your daily consumption of sugar or carbohydrates is essential to understanding and managing diabetes.  Physical activity (exercise) is a very good way to help decrease your blood sugars.  Exercise can lower your blood sugar, blood pressure, and cholesterol.  It also reduces your risk for heart disease and stroke, relieves stress, and strengthens your heart, muscles and bones. Physical activity also increases your balance and reduces development of contractures and foot deformities over time. In addition, regular activity helps insulin work better, improves your blood circulation, and keeps your joints flexible.  If you're trying to lose weight, a combination of exercise and wise food choices can help you reach your target weight and maintain it.  Activity and exercise alone can not make up for poor diet choices, eating too much, or eating too many sugars or carbohydrates.  Ask your doctor for help when you are not meeting your blood sugar goals. Changes or increases in medication are powerful tools in reducing your blood sugar.    Know your blood sugar and hemoglobin A1C trend.  Upon first diagnosis or during acute illness, checking your blood sugar 4 times a day can help you understand how your diet, activity, and lifestyle affect your blood sugar.  Monitoring your hemoglobin A1C can help you understand how well you are managing blood sugar over the long run.  Your hemoglobin A1C tells you what your blood sugar averages all day, every day, over the past 90 days.       To experience the lowest risk of complications associated with diabetes such as neuropathy, loss of blood flow,  bone or joint infection, charcot arthropathy, or amputation, the American Diabetes Association recommends a target hemoglobin A1C of less than 7.0%, while the American Association of Clinical Endocrinologists' recommendation is 6.5% or less.  Both organizations advise that the goals be individualized based on patient factors such as other health conditions, history of hypoglycemia, education, and life expectancy.  A patients risk of experiencing complications associated with diabetes is only slightly elevated with a hemoglobin A1C above 6.0.  However, this risk goes up exponentially when the hemoglobin A1C is above 7.5.  The longer the hemoglobin A1C is elevated, the more risk that patient will experience in their lifetime. The damage that occurs to nerves, blood vessels, tendons, bones and body organs, while their hemoglobin A1C is elevated is mostly irreversible and worsens with each additional time period of elevated hemoglobin A1C.     You must understand and manage your disease.  Your health insurance or medical team cannot manage this disease for you.  When you take responsibility for understanding and managing your disease, you can expect to experience fewer problems associated with diabetes in your lifetime.  You will  Also experience a higher quality of life and health and reduced cost of health care.              Diabetic Foot Care Recommendations  The following are recommendations for avoiding serious foot problems or injury    DO'S  1. Be aware of your hemoglobin A1C and continue to follow up with your medical team for adjustments in your lifestyle and medication until your reach your A1C goal.  Keep this below 7.5 to reduce your risk of developing complications associated with diabetes.    2.  Wash your feet with lukewarm water and a mild soap and then dry them thoroughly, especially between the toes.  Gently floss your towel or washcloth between each toe at every bath.  Soaking your feet in water  cannot clean dead skin, debris, and bacteria from your feet and is not necessary.   3. Examine your feet daily looking for cuts, corns, blisters, cracks, ect..., especially after wearing new shoes or increased or changed activities.  Make sure to look between your toes.  If you cannot see the bottom of your feet, set a mirror on the floor and hold your foot over it, or ask a family member to examine your feet for you daily.  Contact your doctor immediately if new problems are noted or if sores are not healing.  4.  Immediately apply moisturizer cream such as Cetaphil to the tops and bottoms of your feet, avoiding areas between the toes.  Apply sunscreen or cover your feet if they will be exposed to extended sunlight.  5.Use clean comfortable shoes.  Socks should not have thick seams or cut off the circulation around the leg.  Break in new shoes slowly and rotate with older shoes until broken in.  Check the inside of your shoes with your hand to look for areas of irritation or objects that may have fallen into your shoes.    6. Keep slippers by the side of your bed for use during the night.  7. Shoes should be fitted by a professional and should not cause areas of irritation.  Check your feet regularly when wearing a new pair of shoes and replace them as needed.  8.  Talk to your doctor about proper exercise.  Exercise and stretching stimulate blood flow to your feet and maintain proper glucose levels.  Use it or lose it!  9.  Monitor your blood glucose level and your hemoglobin A1C.  Notify your doctor immediately if your blood sugar is abnormally high or low.  10.  Cut your nails straight across, but then gently round any sharp edges with a nail file.  If you have neuropathy, peripheral vascular disease or cannot see that well to trim your own toenails, see a medical professional for care.    DONT'S  1.  Do not soak your feet if you have an open sore or your provider has informed you that you have neuropathy or  loss of protective threshold.  Use only lukewarm water and always check the temperature with your hand as hot water can easily burn your feet.    2.  Never use a hot water bottle or heating pad on your feet.  Also do not apply hot or cold compresses to your feet.  With decreased sensation, you could burn or freeze your feet.  Do not rest your feet near a heat source such as a heater or heat register.    3.  Do not apply any of these to your feet:    - over the counter medicine for corns or warts    -  Harsh chemicals like boric acid    -  Do not self-treat corns, cuts, blisters or infections.  Always consult your doctor.   4.  Do not wear sandals, slippers or walk barefoot, especially on harsh surfaces.  5.  If you smoke, stop!!!                  Follow-ups after your visit        Your next 10 appointments already scheduled     Jul 20, 2018  2:15 PM CDT   [facility] Rancho [facility]ting [facility]    [address] [place] [id number] [phone]           Bring a current list of meds and any records pertaining to this visit. For Physicals, please bring immunization records and any forms needing to be filled out. Please arrive 10 minutes early to complete paperwork.              Who to contact     If you have questions or need follow up information about today's clinic visit or your schedule please contact [facility] directly at [phone].  Normal or non-critical lab and imaging results will be communicated to you by MyChart, letter or phone within 4 business days after the clinic has received the results. If you do not hear from us within 7 days, please contact the clinic through MyChart or phone. If you have a critical or abnormal lab result, we will notify you by phone as soon as possible.  Submit refill requests through [employer] or call your pharmacy and they will forward the refill request to us. Please allow 3 business days for  "your refill to be completed.          Additional Information About Your Visit        Care EveryWhere ID     This is your Care EveryWhere ID. This could be used by other organizations to access your [facility] medical records  [id number]        Your Vitals Were     Temperature Height BMI (Body Mass Index)             98.8  F (37.1  C) (Temporal) 6' 2\" (1.88 m) 28.12 kg/m2          Blood Pressure from Last 3 Encounters:   07/18/18 124/82   07/18/18 (!) 139/94   07/18/18 108/78    Weight from Last 3 Encounters:   07/18/18 219 lb (99.3 kg)   07/18/18 219 lb (99.3 kg)   07/18/18 219 lb 6.4 oz (99.5 kg)              Today, you had the following     No orders found for display         Today's Medication Changes          These changes are accurate as of 7/18/18  4:53 PM.  If you have any questions, ask your nurse or doctor.               Start taking these medicines.        Dose/Directions    clindamycin 300 MG capsule   Commonly known as:  CLEOCIN   Used for:  Uncontrolled type 2 diabetes mellitus with other skin complication, without long-term current use of insulin (H)   Started by:  Darryl Huff DPM        Dose:  300 mg   Take 1 capsule (300 mg) by mouth 4 times daily   Quantity:  40 capsule   Refills:  0            Where to get your medicines      Some of these will need a paper prescription and others can be bought over the counter.  Ask your nurse if you have questions.     Bring a paper prescription for each of these medications     clindamycin 300 MG capsule                Primary Care Provider Office Phone # Fax #    Rancho Painting [phone]        [facility] [address]        Equal Access to Services     [facility] AH: Hadii jose j Dorsey, wacasey gar, qaybta kaaldudley garcia. So [facility] [phone].    ATENCIÓN: Si habla español, tiene a [place]dale disposición servicios gratuitos de asistencia lingüística. Llame al " [phone].    We comply with applicable federal civil rights laws and [place] laws. We do not discriminate on the basis of race, color, national origin, age, disability, sex, sexual orientation, or gender identity.            Thank you!     Thank you for choosing [facility]  for your care. Our goal is always to provide you with excellent care. Hearing back from our patients is one way we can continue to improve our services. Please take a few minutes to complete the written survey that you may receive in the mail after your visit with us. Thank you!             Your Updated Medication List - Protect others around you: Learn how to safely use, store and throw away your medicines at www.disposemymeds.org.          This list is accurate as of 7/18/18  4:53 PM.  Always use your most recent med list.                   Brand Name Dispense Instructions for use Diagnosis    blood glucose monitoring test strip    no brand specified    1 Box    by In Vitro route. Test as directed    Type 2 diabetes, HbA1c goal < 7% (H)       cephALEXin 500 MG capsule    KEFLEX     3 times daily        ciprofloxacin 500 MG tablet    CIPRO    20 tablet    Take 1 tablet (500 mg) by mouth 2 times daily for 10 days        clindamycin 300 MG capsule    CLEOCIN    40 capsule    Take 1 capsule (300 mg) by mouth 4 times daily    Uncontrolled type 2 diabetes mellitus with other skin complication, without long-term current use of insulin (H)       DIABETIC STERILE LANCETS device     1 Box    1 Device daily.    Type 2 diabetes, HbA1c goal < 7% (H)       GLUCOMETER ELITE CLASSIC Kit     1 kit    1 Device daily.    Type 2 diabetes, HbA1c goal < 7% (H)       ibuprofen 200 MG tablet    ADVIL/MOTRIN     Take 200 mg by mouth every 4 hours as needed.        metFORMIN 500 MG tablet    GLUCOPHAGE    180 tablet    Take 1 tablet (500 mg) by mouth 2 times daily (with meals)    Type 2 diabetes, HbA1c goal < 7% (H)        sulfamethoxazole-trimethoprim 800-160 MG per tablet    BACTRIM DS/SEPTRA DS     2 times daily           0

## 2018-07-18 NOTE — PROGRESS NOTES
"  SUBJECTIVE:   Luke Farias is a 52 year old male who presents to clinic today for the following health issues:      Musculoskeletal Problem     History of Present Illness     Diabetes:     Frequency of checking blood sugars::  Rarely    Diabetic concerns::  None    Hypoglycemia symptoms::  None    Paraesthesia present::  YES    Eye Exam in the last year::  NO    Diabetes Management Resources    Diet:  Diabetic  Frequency of exercise:  2-3 days/week  Duration of exercise:  15-30 minutes  Taking medications regularly:  Yes  Additional concerns today:  No    ED/UC Followup:    Facility:  [facility]   Date of visit: 7/13/2018  Reason for visit: cellulitis of left foot   Current Status: Patient's left foot shows edema , redness , and hurts to walk      At 1:00pm last Thursday- 7 [place] and had left foot pain, pain wiyh walking. Left [facility] and saw urgent care [facility] the next day.   Left foot was swollen. Had blister on bottom of foot from bad pair of shoes. He picked at and opened up the blister and tore into the skin, bled some. Told at urgent care where it may have started.   Foot has become more red.   Swelling into toes. Pain radiating into medial calf.   More red than it has been.  Over past weekend felt chilled one night. No fever  At urgent care started on cephalexin and bactrim for 7 days. Has take 5 full doses.   Not markedly better.   Had xrays at urgent care- no infection in bone or fracture.    Diabetes- checks blood sugars - not at all. Last time 6 months ago.   Dx - 6-7 yrs ago. Not on meds. Last visit 2015.   \"Not compliant with my diabetes\"    Problem list and histories reviewed & adjusted, as indicated.  Additional history: as documented    Patient Active Problem List   Diagnosis     Chronic rhinitis     Type 2 diabetes mellitus with hyperglycemia (H)     Hyperlipidemia LDL goal <100     Morbid obesity, unspecified obesity type (H)     Venous lake on Right chest "     Congenital nevus of Right upper back     Past Surgical History:   Procedure Laterality Date     COLONOSCOPY WITH CO2 INSUFFLATION N/A 5/12/2017    Procedure: COLONOSCOPY WITH CO2 INSUFFLATION;  Dr. Rancho Painting/BMI: 30.8/Special screening for malignant neoplasms, colon/colonoscopy/[facility]:  [phone];  Surgeon: Krish Galloway MD;  Location:  OR       Social History   Substance Use Topics     Smoking status: Never Smoker     Smokeless tobacco: Never Used     Alcohol use Yes      Comment: 1-2 beers a week / occ      Family History   Problem Relation Age of Onset     HEART DISEASE Mother      Diabetes Father          Current Outpatient Prescriptions   Medication Sig Dispense Refill     Blood Glucose Monitoring Suppl (GLUCOMETER ELITE CLASSIC) KIT 1 Device daily. 1 kit 0     DIABETIC STERILE LANCETS device 1 Device daily. 1 Box 12     glucose blood VI test strips (ASCENSIA AUTODISC VI,ONE TOUCH ULTRA TEST VI) strip by In Vitro route. Test as directed 1 Box 12     ibuprofen (ADVIL,MOTRIN) 200 MG tablet Take 200 mg by mouth every 4 hours as needed.       metFORMIN (GLUCOPHAGE) 500 MG tablet Take 1 tablet (500 mg) by mouth 2 times daily (with meals) 180 tablet 3     sulfamethoxazole-trimethoprim (BACTRIM DS/SEPTRA DS) 800-160 MG per tablet        cephALEXin (KEFLEX) 500 MG capsule        Allergies   Allergen Reactions     Seasonal Allergies      BP Readings from Last 3 Encounters:   07/18/18 108/78   05/12/17 103/82   04/23/16 118/84    Wt Readings from Last 3 Encounters:   07/18/18 219 lb 6.4 oz (99.5 kg)   06/18/14 269 lb (122 kg)   10/01/12 292 lb (132.5 kg)                  Labs reviewed in [facility]    ROS:  As above.  Denies headache, malaise, URI sx.   Has been thirsty and drinking more water. Urine has appeared darker.     OBJECTIVE:     /78  Pulse 88  Temp 97.8  F (36.6  C) (Temporal)  Resp 18  Wt 219 lb 6.4 oz (99.5 kg)  SpO2 96%  BMI 28.55 kg/m2  Body mass index is  28.55 kg/(m^2).  GENERAL: well appearing, alert and no distress, nontoxic  MSK:  LEFT lower extremity: erythema dorsum of mid- left foot extending to toes 3-5, warmth, tenderness w/palpation of this area. Plantar surface: ball of foot central area of callus layers of tissue peeled-unroofed; erythema, yellow, purplish discoloration deep into tissue. White maceration between toes. Swelling of midfoot foot into toes and and involving ankle/lower leg.   Tender to palpation medial lower leg. No posterior calf tenderness. Pulses palpable.   Sensation intact.     Diagnostic Test Results:  Results for orders placed or performed in visit on 07/18/18 (from the past 24 hour(s))   CBC with platelets and differential   Result Value Ref Range    WBC 10.6 4.0 - 11.0 10e9/L    RBC Count 5.87 4.4 - 5.9 10e12/L    Hemoglobin 16.8 13.3 - 17.7 g/dL    Hematocrit 47.1 40.0 - 53.0 %    MCV 80 78 - 100 fl    MCH 28.6 26.5 - 33.0 pg    MCHC 35.7 31.5 - 36.5 g/dL    RDW 13.1 10.0 - 15.0 %    Platelet Count 281 150 - 450 10e9/L    Diff Method Automated Method     % Neutrophils 70.4 %    % Lymphocytes 14.7 %    % Monocytes 12.4 %    % Eosinophils 2.0 %    % Basophils 0.5 %    Absolute Neutrophil 7.5 1.6 - 8.3 10e9/L    Absolute Lymphocytes 1.6 0.8 - 5.3 10e9/L    Absolute Monocytes 1.3 0.0 - 1.3 10e9/L    Absolute Eosinophils 0.2 0.0 - 0.7 10e9/L    Absolute Basophils 0.1 0.0 - 0.2 10e9/L   Hemoglobin A1c   Result Value Ref Range    Hemoglobin A1C 11.3 (H) 0 - 5.6 %   UA reflex to Microscopic   Result Value Ref Range    Color Urine Yellow     Appearance Urine Clear     Glucose Urine 500 (A) NEG^Negative mg/dL    Bilirubin Urine Negative NEG^Negative    Ketones Urine 15 (A) NEG^Negative mg/dL    Specific Gravity Urine 1.020 1.003 - 1.035    Blood Urine Negative NEG^Negative    pH Urine 6.0 5.0 - 7.0 pH    Protein Albumin Urine Negative NEG^Negative mg/dL    Urobilinogen Urine >=8.0 0.2 - 1.0 EU/dL    Nitrite Urine Negative NEG^Negative     Leukocyte Esterase Urine Negative NEG^Negative    Source Midstream Urine        ASSESSMENT/PLAN:     1. Cellulitis of left lower extremity  Cellulitis in setting of uncontrolled diabetes.   Pt not having improvement with keflex and bactrim   Afebrile and normal white count, but worsening redness and pain into medial calf with swelling of lower leg.   May need imaging beyond xray or surgical consult. Discussed w/.   Pt advised ER evaluation- pt preference [facility].   - CBC with platelets and differential    2. Type 2 diabetes mellitus with hyperglycemia, without long-term current use of insulin (H)  Discussed with pt his uncontrolled diabetes: Contributing to poor healing. Course/progression of disease. Risks of uncontrolled diabetes. Strongly advised need to see PCP for diabetes visit. Pt stated understanding.    Pt given copy of labs.   - Hemoglobin A1c  - UA reflex to Microscopic    3. Dark urine  Findings as above.   - UA reflex to Microscopic    Follow Up: For worsening or changing symptoms, non-improvement as expected/discussed, questions regarding your medications or treatment plan patient was instructed to contact the clinic. Discussed parameters for follow up and included in After Visit Summary given to patient.      Bella Zaldivar PA-C  [facility]

## 2018-07-18 NOTE — LETTER
7/18/2018         RE: Luke Farias  [address]  [place] [address]        Dear Colleague,    Thank you for referring your patient, Luke Farias, to the [facility]. Please see a copy of my visit note below.    HPI:  Luke Farias is a 52 year old male who is seen in consultation at the request of ED Dept - Brent Peterson MD.    Pt presents for eval of:   (Onset, Location, L/R, Character, Treatments, Injury if yes)    XR Left foot today, 7/18/2018  Labs today, 7/18/2018    1. DM Type 2     2. 7/7/2018 peeled away blood blister on bottom of left foot.   7/10/2018 plantar ball of foot pain.  7/13/2018 [facility] started   Keflex 500mg 1 tablet 3 times daily and  Bactrim DS/Septra -160mg, 1 tablet 2 times daily  Some improvement. No drainage  Redness, swelling, dull ache,  Intermittent, sharp, numbness, pain 5    He admits he has very poor control of DM but has recently lost 100lbs.  He admits not taking his oral medication metformin much over the last months.  Owner of [employer].  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  Patient denies any documented fever or chills.  His appetite remains normal.    Weight management plan: Patient was referred to their PCP to discuss a diet and exercise plan.     Review of Systems:  Patient denies fever, chills, rash, wound, stiffness, limping, numbness, weakness, heart burn, blood in stool, chest pain with activity, calf pain when walking, shortness of breath with activity, chronic cough, easy bleeding/bruising, swelling of ankles, excessive thirst, fatigue, depression, anxiety.  Patient admits only to symptoms noted in history.     Patient Active Problem List   Diagnosis     Chronic rhinitis     Type 2 diabetes mellitus with hyperglycemia (H)     Hyperlipidemia LDL goal <100     Morbid obesity, unspecified obesity type (H)     Venous lake on Right chest     Congenital nevus  of Right upper back     PAST MEDICAL HISTORY:   Past Medical History:   Diagnosis Date     Diabetes (H)     type 2     Seasonal allergies     Allergy inj as a child     PAST SURGICAL HISTORY:   Past Surgical History:   Procedure Laterality Date     COLONOSCOPY WITH CO2 INSUFFLATION N/A 5/12/2017    Procedure: COLONOSCOPY WITH CO2 INSUFFLATION;  Dr. Rancho Painting/BMI: 30.8/Special screening for malignant neoplasms, colon/colonoscopy/[facility]:  [phone];  Surgeon: Krish Galloway MD;  Location:  OR     MEDICATIONS:   Current Outpatient Prescriptions:      Blood Glucose Monitoring Suppl (GLUCOMETER ELITE CLASSIC) KIT, 1 Device daily., Disp: 1 kit, Rfl: 0     cephALEXin (KEFLEX) 500 MG capsule, 3 times daily , Disp: , Rfl:      clindamycin (CLEOCIN) 300 MG capsule, Take 1 capsule (300 mg) by mouth 4 times daily, Disp: 40 capsule, Rfl: 0     DIABETIC STERILE LANCETS device, 1 Device daily., Disp: 1 Box, Rfl: 12     glucose blood VI test strips (ASCENSIA AUTODISC VI,ONE TOUCH ULTRA TEST VI) strip, by In Vitro route. Test as directed, Disp: 1 Box, Rfl: 12     ibuprofen (ADVIL,MOTRIN) 200 MG tablet, Take 200 mg by mouth every 4 hours as needed., Disp: , Rfl:      metFORMIN (GLUCOPHAGE) 500 MG tablet, Take 1 tablet (500 mg) by mouth 2 times daily (with meals), Disp: 180 tablet, Rfl: 3     sulfamethoxazole-trimethoprim (BACTRIM DS/SEPTRA DS) 800-160 MG per tablet, 2 times daily , Disp: , Rfl:      ciprofloxacin (CIPRO) 500 MG tablet, Take 1 tablet (500 mg) by mouth 2 times daily for 10 days, Disp: 20 tablet, Rfl: 0  ALLERGIES:    Allergies   Allergen Reactions     Seasonal Allergies      SOCIAL HISTORY:   Social History     Social History     Marital status:      Spouse name: N/A     Number of children: N/A     Years of education: N/A     Occupational History     [employer]     Social History Main Topics     Smoking status: Never Smoker     Smokeless tobacco: Never Used      "Alcohol use Yes      Comment: 1-2 beers a week / occ      Drug use: No     Sexual activity: Yes     Partners: Female     Other Topics Concern     Not on file     Social History Narrative     FAMILY HISTORY:   Family History   Problem Relation Age of Onset     HEART DISEASE Mother      Diabetes Father      EXAM:Vitals: /82 (BP Location: Left arm, Cuff Size: Adult Regular)  Temp 98.8  F (37.1  C) (Temporal)  Ht 6' 2\" (1.88 m)  Wt 219 lb (99.3 kg)  BMI 28.12 kg/m2  BMI= Body mass index is 28.12 kg/(m^2).    General appearance: Patient is alert and fully cooperative with history & exam.  No sign of distress is noted during the visit.     Psychiatric: Affect is pleasant & appropriate.  Patient appears motivated to improve health.     Respiratory: Breathing is regular & unlabored while sitting.     HEENT: Hearing is intact to spoken word.  Speech is clear.  No gross evidence of visual impairment that would impact ambulation.     Vascular: DP 2/4 & PT 2/4 left & right.  CFT immediate, positive diminished digital hair growth bilateral.  Temperature is warm to warm proximal to distal however there is a very subtle increased temperature dorsal and plantar left foot with local erythema cellulitis for approximately 4-5 cm about the second through fourth toes dorsal and plantar.  No palpable fluctuance is noted.  No limited range of motion.  The outer layers of epidermis have sloughed from the skin from a previous blister from slip on shoes however no active bleeding or penetration of the skin is noted.  No laceration or puncture wounds noted.  No interdigital maceration.     Neurologic: Normal plantar response bilateral.  Diminished protective threshold plus 5/10 applications of a 5.07 monofilament.  Protective threshold is nearly absent about the toes but intact about the dorsal and plantar midfoot.  Pt admits burning and paraesthesias about the feet and toes with palpation.     Dermatologic: Mildly diminished " texture turgor tone about the integument consistent with autonomic neuropathy.     Musculoskeletal: Patient is ambulatory without assistive device or brace.  Adequate range of motion throughout the ankle subtalar mid tarsal metatarsal phalangeal joints.    Hemoglobin A1C (%)   Date Value   07/18/2018 11.3 (H)   10/01/2012 8.2 (H)   06/08/2011 6.3 (H)     Creatinine (mg/dL)   Date Value   07/18/2018 0.68   10/01/2012 0.83   01/21/2011 0.76        ASSESSMENT:       ICD-10-CM    1. Uncontrolled type 2 diabetes mellitus with other skin complication, without long-term current use of insulin (H) E11.628 clindamycin (CLEOCIN) 300 MG capsule    E11.65    2. Cellulitis and abscess of foot, except toes L03.119     L02.619         PLAN:    7/18/2018  Patient reports in my clinic from the emergency department.  Broad spectrum abx coverage for 10 days, to cover DM foot infections and uncontrolled DM.  If develops temp of 101 then go to ED and start broad spectrum IV vanc zosyn and obtain same day MRI for consideration of incision and drainage or debridement.  If this does not respond then get MRI for possible eval for deep abscess to drain  Return to PCP for rx of DM med and possible insulin.   Follow-up with me in 10-14 days.    Patient understands that this could develop a deep abscess and worsened in the next few days and this would be a medical emergency that he should report to the emergency department for.  However labs documented normal WBC and has no documented temperature therefore he was not admitted today in the emergency department.    Will attempt to discuss with PCP for acute management of blood glucose and more aggressive monitoring in next 30 days to improve education, compliance and outcome.  Patient admits that he is motivated to modify his lifestyle and medication use to manage diabetes.    We also discussed diabetic risk factors and provided written foot care instructions.  In addition to the above history  and physical exam, approximately 40 minutes of time was spent with the patient, greater than 50% directly with the patient, counseling, educating, motivational interviewing and coordinating care in regards to the above noted multiple diagnoses.       Darryl Huff DPM      Again, thank you for allowing me to participate in the care of your patient.        Sincerely,        Darryl Huff DPM

## 2018-07-18 NOTE — ED PROVIDER NOTES
History     Chief Complaint   Patient presents with     Wound Infection     HPI  Luke Farias is a 52 year old male who presents to emergency room with left foot redness swelling and pain. Patient recalls a blister on his left foot about a week ago. He healed the skin of the blister off accidentally tearing some healthy skin and bleeding. Shortly after that he noticed redness swelling and pain. He was seen at a local clinic and started on cephalexin and Septra. He has been on these antibiotics for 5 days. He thinks the swelling and pain is better but the redness and warmth is worse. He was in for a recheck at the clinic. His white count was 10.6 and his hemoglobin A1c was 11. He was sent to the ER for further evaluation and care. Patient states he feels okay. Denies any fever or chills nausea or vomiting. Admits he takes poor control of his diabetes. His wife is a nurse and is present.    Problem List:    Patient Active Problem List    Diagnosis Date Noted     Venous lake on Right chest 03/22/2017     Priority: Medium     Congenital nevus of Right upper back 03/22/2017     Priority: Medium     Morbid obesity, unspecified obesity type (H) 11/01/2015     Priority: Medium     Hyperlipidemia LDL goal <100 01/25/2011     Priority: Medium     Type 2 diabetes mellitus with hyperglycemia (H) 01/21/2011     Priority: Medium     Chronic rhinitis 03/20/2008     Priority: Medium        Past Medical History:    Past Medical History:   Diagnosis Date     Diabetes (H)      Seasonal allergies        Past Surgical History:    Past Surgical History:   Procedure Laterality Date     COLONOSCOPY WITH CO2 INSUFFLATION N/A 5/12/2017    Procedure: COLONOSCOPY WITH CO2 INSUFFLATION;  Dr. Rancho Painting/BMI: 30.8/Special screening for malignant neoplasms, colon/colonoscopy/[facility]:  [phone];  Surgeon: Krish Galloway MD;  Location:  OR       Family History:    Family History   Problem Relation Age of  "Onset     HEART DISEASE Mother      Diabetes Father        Social History:  Marital Status:   [2]  Social History   Substance Use Topics     Smoking status: Never Smoker     Smokeless tobacco: Never Used     Alcohol use Yes      Comment: 1-2 beers a week / occ         Medications:      cephALEXin (KEFLEX) 500 MG capsule   ciprofloxacin (CIPRO) 500 MG tablet   metFORMIN (GLUCOPHAGE) 500 MG tablet   sulfamethoxazole-trimethoprim (BACTRIM DS/SEPTRA DS) 800-160 MG per tablet   Blood Glucose Monitoring Suppl (GLUCOMETER ELITE CLASSIC) KIT   DIABETIC STERILE LANCETS device   glucose blood VI test strips (ASCENSIA AUTODISC VI,ONE TOUCH ULTRA TEST VI) strip   ibuprofen (ADVIL,MOTRIN) 200 MG tablet         Review of Systems   Constitutional: Negative for chills and fever.   HENT: Negative for congestion.    Eyes: Negative for pain and redness.   Skin: Positive for wound.   Neurological: Negative for dizziness, weakness and light-headedness.   All other systems reviewed and are negative.      Physical Exam   BP: (!) 139/94  Pulse: 93  Temp: 97.9  F (36.6  C)  Resp: 14  Height: 188 cm (6' 2\")  Weight: 99.3 kg (219 lb)  SpO2: 99 %      Physical Exam   Constitutional: He is oriented to person, place, and time. He appears well-developed and well-nourished. No distress.   HENT:   Head: Normocephalic.   Right Ear: External ear normal.   Left Ear: External ear normal.   Nose: Nose normal.   Mouth/Throat: Oropharynx is clear and moist.   Eyes: Conjunctivae are normal.   Neck: Normal range of motion. Neck supple.   Cardiovascular: Normal rate.    Pulmonary/Chest: Effort normal.   Musculoskeletal:        Left foot: There is tenderness and swelling. There is no bony tenderness, no crepitus and no deformity.        Feet:    There is a large callus at the third MTP. There are several areas of blistering that have been peeled off. There is no open wound. There is no bleeding or purulent drainage. There is minimal to no redness on " the plantar surface. The dorsum of the foot has erythema and warmth. It seems to emanate from the third MTP all across the dorsum. There is no ascending red streaks. The left foot ankle and calf are swollen as compared to the right. Most of the swelling is to the distal foot where the infection is. Pedal pulses present and capillary refill is immediate.   Neurological: He is alert and oriented to person, place, and time.   Skin: Skin is warm and dry.   Psychiatric: He has a normal mood and affect. His behavior is normal.   Nursing note and vitals reviewed.      ED Course     ED Course     Procedures               Critical Care time:  none     Results for orders placed or performed during the hospital encounter of 07/18/18 (from the past 48 hour(s))   XR Foot Left G/E 3 Views    Narrative    LEFT FOOT THREE OR MORE VIEWS   7/18/2018 10:56 AM     HISTORY: Diabetic with foot infection under 3rd metatarsophalangeal.     COMPARISON: None.    FINDINGS: No fracture, periosteal reaction, or erosion is identified.  Mild soft tissue swelling is present surrounding the forefoot. No soft  tissue gas or foreign body is identified.      Impression    IMPRESSION: No x-ray evidence of osteomyelitis.    SHITAL MCKEON MD   Erythrocyte sedimentation rate auto   Result Value Ref Range    Sed Rate 30 (H) 0 - 20 mm/h   CRP inflammation   Result Value Ref Range    CRP Inflammation 68.0 (H) 0.0 - 8.0 mg/L   Comprehensive metabolic panel   Result Value Ref Range    Sodium 134 133 - 144 mmol/L    Potassium 4.0 3.4 - 5.3 mmol/L    Chloride 97 94 - 109 mmol/L    Carbon Dioxide 29 20 - 32 mmol/L    Anion Gap 8 3 - 14 mmol/L    Glucose 281 (H) 70 - 99 mg/dL    Urea Nitrogen 17 7 - 30 mg/dL    Creatinine 0.68 0.66 - 1.25 mg/dL    GFR Estimate >90 >60 mL/min/1.7m2    GFR Estimate If Black >90 >60 mL/min/1.7m2    Calcium 9.3 8.5 - 10.1 mg/dL    Bilirubin Total 0.6 0.2 - 1.3 mg/dL    Albumin 3.3 (L) 3.4 - 5.0 g/dL    Protein Total 7.9 6.8 - 8.8  g/dL    Alkaline Phosphatase 73 40 - 150 U/L    ALT 17 0 - 70 U/L    AST 12 0 - 45 U/L                 Results for orders placed or performed during the hospital encounter of 07/18/18 (from the past 24 hour(s))   XR Foot Left G/E 3 Views    Narrative    LEFT FOOT THREE OR MORE VIEWS   7/18/2018 10:56 AM     HISTORY: Diabetic with foot infection under 3rd metatarsophalangeal.     COMPARISON: None.    FINDINGS: No fracture, periosteal reaction, or erosion is identified.  Mild soft tissue swelling is present surrounding the forefoot. No soft  tissue gas or foreign body is identified.      Impression    IMPRESSION: No x-ray evidence of osteomyelitis.    SHITAL MCKEON MD   Erythrocyte sedimentation rate auto   Result Value Ref Range    Sed Rate 30 (H) 0 - 20 mm/h   CRP inflammation   Result Value Ref Range    CRP Inflammation 68.0 (H) 0.0 - 8.0 mg/L   Comprehensive metabolic panel   Result Value Ref Range    Sodium 134 133 - 144 mmol/L    Potassium 4.0 3.4 - 5.3 mmol/L    Chloride 97 94 - 109 mmol/L    Carbon Dioxide 29 20 - 32 mmol/L    Anion Gap 8 3 - 14 mmol/L    Glucose 281 (H) 70 - 99 mg/dL    Urea Nitrogen 17 7 - 30 mg/dL    Creatinine 0.68 0.66 - 1.25 mg/dL    GFR Estimate >90 >60 mL/min/1.7m2    GFR Estimate If Black >90 >60 mL/min/1.7m2    Calcium 9.3 8.5 - 10.1 mg/dL    Bilirubin Total 0.6 0.2 - 1.3 mg/dL    Albumin 3.3 (L) 3.4 - 5.0 g/dL    Protein Total 7.9 6.8 - 8.8 g/dL    Alkaline Phosphatase 73 40 - 150 U/L    ALT 17 0 - 70 U/L    AST 12 0 - 45 U/L       Medications - No data to display    Assessments & Plan (with Medical Decision Making)   MDM--52-year-old type II diabetic who has poor control of his diabetes with an elevated hemoglobin A1c. He has had a left foot infection and has been on Septra and cephalexin for 5 days now. He and his wife have seen little improvement. Evaluation here shows no evidence of osteomyelitis. X-ray normal. Sedimentation rate only 30. CRP elevated at 68 as expected.  Glucose 281. Renal function good. I discussed a variety of options for the patient. Dr. Tan our podiatrist will actually see him this afternoon. I will defer antibiotic changes to his expertise. This all discussed with the patient is comfortable with this treatment plan. Will discharge him from the ER and he will drive to the clinic.  I have reviewed the nursing notes.    I have reviewed the findings, diagnosis, plan and need for follow up with the patient.           Final diagnoses:   Cellulitis of left foot   Type 2 diabetes mellitus with hyperglycemia, with long-term current use of insulin (H)       7/18/2018   [facility] EMERGENCY DEPARTMENT     Nicholas, Brent TOVAR MD  07/18/18 [id number]

## 2018-07-18 NOTE — ED AVS SNAPSHOT
[facility] Emergency Department    [address] [place] [address]    Phone:  [phone]    Fax:  [phone]                                       Luke Farias   MRN: [id number]    Department:  [facility] Emergency Department   Date of Visit:  7/18/2018           After Visit Summary Signature Page     I have received my discharge instructions, and my questions have been answered. I have discussed any challenges I see with this plan with the nurse or doctor.    ..........................................................................................................................................  Patient/Patient Representative Signature      ..........................................................................................................................................  Patient Representative Print Name and Relationship to Patient    ..................................................               ................................................  Date                                            Time    ..........................................................................................................................................  Reviewed by Signature/Title    ...................................................              ..............................................  Date                                                            Time

## 2018-07-18 NOTE — PATIENT INSTRUCTIONS
"DIABETES AND YOUR FEET    What effect does diabetes have on the feet?  Diabetes can result in several problems in the feet including contractures of the tendons leading to deformities and reduced function of the bones, skin ulcers or open sores on pressure points or prominent deformities, reduced sensation, reduced blood flow and thus reduced oxygen and immune cells to the tiny vessels in our feet. This all leads to higher risk of hospitalization, infections, and amputations.     What is neuropathy?  Neuropathy is a term used to describe a loss of nerve function.  Patients with diabetes are at risk of developing neuropathy if their sugars continue to run high and are above the normal value of 140.  The elevated blood sugar in the body enters the nerves causing it to swell and impair nerve function.  The higher the blood sugar and the longer it is elevated, the more damage is done to nerves.  This damage is permanent and irreversible.  These damaged sensory nerves can then cause reduced feeling or cause pain.  Damaged motor nerves can reduce blood flow and white blood cells into into your foot, skin and bones reducing your ability to heal a small problem. And neuropathy can cause tendons to become unbalanced and contribute to the formation of deformity and contractures in our feet. Often times, neuropathy can be prevented by controlling your blood sugar.  Your risk of developing neuropathy goes up dramatically as your hemoglobin A1C raises above 7.5.      How do I know if I have neuropathy?  When a person develops neuropathy, they usually begin to feel numbness or tingling in their feet and sometimes in their legs.  Other symptoms may include painful burning or hot feet, tingling, electrical sensations or feeling like insects or ants are crawling on your feet or legs.  If blood sugar remains above 140  for long periods of time, neuropathy can also occur in the hands.  When a person loses their \"protective threshold\" " or ability to detect a 5.07 Stephens Laura monofilament is when they have elevated risk for developing foot deformity, contractures, foot infections, amputations, Charcot arthropathy, or other complications. Keep your hemoglobin A1C below 7.5 to reduce this risk.    What is vascular disease?  Peripheral vascular disease is a term used to describe a loss or decrease in circulation (blood flow).  There is a problem in getting blood, immune cells, and oxygen to areas that need it.  Similar to neuropathy, sugars can build up in the walls of the arteries (blood vessels) and cause them to become swollen, thickened and hardened.  This decreases the amount of blood that can go to an area that needs it.  Though this is common in the legs of diabetic patients, it can also affect other arteries (blood vessels) in the body such as in the heart, kidney, eyes, and the blood flow into bones.  It is often seen first in the small vessels of her body notably our feet and toes.    How do I know if I have vascular disease?  In the legs, vascular disease usually results in cramping.  Patients who develop leg cramps after walking the same distance every time (i.e. One block, half a mile, ect.) need to let their doctors know so that their circulation may be checked.  Cramps causing severe pain in the feet and/or legs while sleeping and the cramps go away when you stand or hang your legs off the side of the bed, may also be a sign of poor blood circulation.  Occasional cramping in cold weather or on rare occasions with activity may not be due to poor circulation, but you should inform your doctor.    How can these problems be prevented?  The key to prevention is good blood sugar control all day every day.  Inadequate blood sugar control is the most common way patients experience these problems. Reducing, controlling and measuring your daily consumption of sugar or carbohydrates is essential to understanding and managing diabetes.   Physical activity (exercise) is a very good way to help decrease your blood sugars.  Exercise can lower your blood sugar, blood pressure, and cholesterol.  It also reduces your risk for heart disease and stroke, relieves stress, and strengthens your heart, muscles and bones. Physical activity also increases your balance and reduces development of contractures and foot deformities over time. In addition, regular activity helps insulin work better, improves your blood circulation, and keeps your joints flexible.  If you're trying to lose weight, a combination of exercise and wise food choices can help you reach your target weight and maintain it.  Activity and exercise alone can not make up for poor diet choices, eating too much, or eating too many sugars or carbohydrates.  Ask your doctor for help when you are not meeting your blood sugar goals. Changes or increases in medication are powerful tools in reducing your blood sugar.    Know your blood sugar and hemoglobin A1C trend.  Upon first diagnosis or during acute illness, checking your blood sugar 4 times a day can help you understand how your diet, activity, and lifestyle affect your blood sugar.  Monitoring your hemoglobin A1C can help you understand how well you are managing blood sugar over the long run.  Your hemoglobin A1C tells you what your blood sugar averages all day, every day, over the past 90 days.       To experience the lowest risk of complications associated with diabetes such as neuropathy, loss of blood flow, bone or joint infection, charcot arthropathy, or amputation, the American Diabetes Association recommends a target hemoglobin A1C of less than 7.0%, while the American Association of Clinical Endocrinologists' recommendation is 6.5% or less.  Both organizations advise that the goals be individualized based on patient factors such as other health conditions, history of hypoglycemia, education, and life expectancy.  A patients risk of  experiencing complications associated with diabetes is only slightly elevated with a hemoglobin A1C above 6.0.  However, this risk goes up exponentially when the hemoglobin A1C is above 7.5.  The longer the hemoglobin A1C is elevated, the more risk that patient will experience in their lifetime. The damage that occurs to nerves, blood vessels, tendons, bones and body organs, while their hemoglobin A1C is elevated is mostly irreversible and worsens with each additional time period of elevated hemoglobin A1C.     You must understand and manage your disease.  Your health insurance or medical team cannot manage this disease for you.  When you take responsibility for understanding and managing your disease, you can expect to experience fewer problems associated with diabetes in your lifetime.  You will  Also experience a higher quality of life and health and reduced cost of health care.              Diabetic Foot Care Recommendations  The following are recommendations for avoiding serious foot problems or injury    DO'S  1. Be aware of your hemoglobin A1C and continue to follow up with your medical team for adjustments in your lifestyle and medication until your reach your A1C goal.  Keep this below 7.5 to reduce your risk of developing complications associated with diabetes.    2.  Wash your feet with lukewarm water and a mild soap and then dry them thoroughly, especially between the toes.  Gently floss your towel or washcloth between each toe at every bath.  Soaking your feet in water cannot clean dead skin, debris, and bacteria from your feet and is not necessary.   3. Examine your feet daily looking for cuts, corns, blisters, cracks, ect..., especially after wearing new shoes or increased or changed activities.  Make sure to look between your toes.  If you cannot see the bottom of your feet, set a mirror on the floor and hold your foot over it, or ask a family member to examine your feet for you daily.  Contact your  doctor immediately if new problems are noted or if sores are not healing.  4.  Immediately apply moisturizer cream such as Cetaphil to the tops and bottoms of your feet, avoiding areas between the toes.  Apply sunscreen or cover your feet if they will be exposed to extended sunlight.  5.Use clean comfortable shoes.  Socks should not have thick seams or cut off the circulation around the leg.  Break in new shoes slowly and rotate with older shoes until broken in.  Check the inside of your shoes with your hand to look for areas of irritation or objects that may have fallen into your shoes.    6. Keep slippers by the side of your bed for use during the night.  7. Shoes should be fitted by a professional and should not cause areas of irritation.  Check your feet regularly when wearing a new pair of shoes and replace them as needed.  8.  Talk to your doctor about proper exercise.  Exercise and stretching stimulate blood flow to your feet and maintain proper glucose levels.  Use it or lose it!  9.  Monitor your blood glucose level and your hemoglobin A1C.  Notify your doctor immediately if your blood sugar is abnormally high or low.  10.  Cut your nails straight across, but then gently round any sharp edges with a nail file.  If you have neuropathy, peripheral vascular disease or cannot see that well to trim your own toenails, see a medical professional for care.    DONT'S  1.  Do not soak your feet if you have an open sore or your provider has informed you that you have neuropathy or loss of protective threshold.  Use only lukewarm water and always check the temperature with your hand as hot water can easily burn your feet.    2.  Never use a hot water bottle or heating pad on your feet.  Also do not apply hot or cold compresses to your feet.  With decreased sensation, you could burn or freeze your feet.  Do not rest your feet near a heat source such as a heater or heat register.    3.  Do not apply any of these to your  feet:    - over the counter medicine for corns or warts    -  Harsh chemicals like boric acid    -  Do not self-treat corns, cuts, blisters or infections.  Always consult your doctor.   4.  Do not wear sandals, slippers or walk barefoot, especially on harsh surfaces.  5.  If you smoke, stop!!!

## 2018-07-18 NOTE — PROGRESS NOTES
HPI:  Luke Farias is a 52 year old male who is seen in consultation at the request of ED Dept - Brent Peterson MD.    Pt presents for eval of:   (Onset, Location, L/R, Character, Treatments, Injury if yes)    XR Left foot today, 7/18/2018  Labs today, 7/18/2018    1. DM Type 2     2. 7/7/2018 peeled away blood blister on bottom of left foot.   7/10/2018 plantar ball of foot pain.  7/13/2018 [facility] started   Keflex 500mg 1 tablet 3 times daily and  Bactrim DS/Septra -160mg, 1 tablet 2 times daily  Some improvement. No drainage  Redness, swelling, dull ache,  Intermittent, sharp, numbness, pain 5    He admits he has very poor control of DM but has recently lost 100lbs.  He admits not taking his oral medication metformin much over the last months.  Owner of [employer].  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  Patient denies any documented fever or chills.  His appetite remains normal.    Weight management plan: Patient was referred to their PCP to discuss a diet and exercise plan.     Review of Systems:  Patient denies fever, chills, rash, wound, stiffness, limping, numbness, weakness, heart burn, blood in stool, chest pain with activity, calf pain when walking, shortness of breath with activity, chronic cough, easy bleeding/bruising, swelling of ankles, excessive thirst, fatigue, depression, anxiety.  Patient admits only to symptoms noted in history.     Patient Active Problem List   Diagnosis     Chronic rhinitis     Type 2 diabetes mellitus with hyperglycemia (H)     Hyperlipidemia LDL goal <100     Morbid obesity, unspecified obesity type (H)     Venous lake on Right chest     Congenital nevus of Right upper back     PAST MEDICAL HISTORY:   Past Medical History:   Diagnosis Date     Diabetes (H)     type 2     Seasonal allergies     Allergy inj as a child     PAST SURGICAL HISTORY:   Past Surgical History:   Procedure Laterality  Date     COLONOSCOPY WITH CO2 INSUFFLATION N/A 5/12/2017    Procedure: COLONOSCOPY WITH CO2 INSUFFLATION;  Dr. Rancho Painting/BMI: 30.8/Special screening for malignant neoplasms, colon/colonoscopy/[facility]:  [phone];  Surgeon: Krish Galloway MD;  Location: [facility] OR     MEDICATIONS:   Current Outpatient Prescriptions:      Blood Glucose Monitoring Suppl (GLUCOMETER ELITE CLASSIC) KIT, 1 Device daily., Disp: 1 kit, Rfl: 0     cephALEXin (KEFLEX) 500 MG capsule, 3 times daily , Disp: , Rfl:      clindamycin (CLEOCIN) 300 MG capsule, Take 1 capsule (300 mg) by mouth 4 times daily, Disp: 40 capsule, Rfl: 0     DIABETIC STERILE LANCETS device, 1 Device daily., Disp: 1 Box, Rfl: 12     glucose blood VI test strips (ASCENSIA AUTODISC VI,ONE TOUCH ULTRA TEST VI) strip, by In Vitro route. Test as directed, Disp: 1 Box, Rfl: 12     ibuprofen (ADVIL,MOTRIN) 200 MG tablet, Take 200 mg by mouth every 4 hours as needed., Disp: , Rfl:      metFORMIN (GLUCOPHAGE) 500 MG tablet, Take 1 tablet (500 mg) by mouth 2 times daily (with meals), Disp: 180 tablet, Rfl: 3     sulfamethoxazole-trimethoprim (BACTRIM DS/SEPTRA DS) 800-160 MG per tablet, 2 times daily , Disp: , Rfl:      ciprofloxacin (CIPRO) 500 MG tablet, Take 1 tablet (500 mg) by mouth 2 times daily for 10 days, Disp: 20 tablet, Rfl: 0  ALLERGIES:    Allergies   Allergen Reactions     Seasonal Allergies      SOCIAL HISTORY:   Social History     Social History     Marital status:      Spouse name: N/A     Number of children: N/A     Years of education: N/A     Occupational History     Salesmen [employer]     Social History Main Topics     Smoking status: Never Smoker     Smokeless tobacco: Never Used     Alcohol use Yes      Comment: 1-2 beers a week / occ      Drug use: No     Sexual activity: Yes     Partners: Female     Other Topics Concern     Not on file     Social History Narrative     FAMILY HISTORY:   Family History   Problem Relation  "Age of Onset     HEART DISEASE Mother      Diabetes Father      EXAM:Vitals: /82 (BP Location: Left arm, Cuff Size: Adult Regular)  Temp 98.8  F (37.1  C) (Temporal)  Ht 6' 2\" (1.88 m)  Wt 219 lb (99.3 kg)  BMI 28.12 kg/m2  BMI= Body mass index is 28.12 kg/(m^2).    General appearance: Patient is alert and fully cooperative with history & exam.  No sign of distress is noted during the visit.     Psychiatric: Affect is pleasant & appropriate.  Patient appears motivated to improve health.     Respiratory: Breathing is regular & unlabored while sitting.     HEENT: Hearing is intact to spoken word.  Speech is clear.  No gross evidence of visual impairment that would impact ambulation.     Vascular: DP 2/4 & PT 2/4 left & right.  CFT immediate, positive diminished digital hair growth bilateral.  Temperature is warm to warm proximal to distal however there is a very subtle increased temperature dorsal and plantar left foot with local erythema cellulitis for approximately 4-5 cm about the second through fourth toes dorsal and plantar.  No palpable fluctuance is noted.  No limited range of motion.  The outer layers of epidermis have sloughed from the skin from a previous blister from slip on shoes however no active bleeding or penetration of the skin is noted.  No laceration or puncture wounds noted.  No interdigital maceration.     Neurologic: Normal plantar response bilateral.  Diminished protective threshold plus 5/10 applications of a 5.07 monofilament.  Protective threshold is nearly absent about the toes but intact about the dorsal and plantar midfoot.  Pt admits burning and paraesthesias about the feet and toes with palpation.     Dermatologic: Mildly diminished texture turgor tone about the integument consistent with autonomic neuropathy.     Musculoskeletal: Patient is ambulatory without assistive device or brace.  Adequate range of motion throughout the ankle subtalar mid tarsal metatarsal phalangeal " joints.    Hemoglobin A1C (%)   Date Value   07/18/2018 11.3 (H)   10/01/2012 8.2 (H)   06/08/2011 6.3 (H)     Creatinine (mg/dL)   Date Value   07/18/2018 0.68   10/01/2012 0.83   01/21/2011 0.76        ASSESSMENT:       ICD-10-CM    1. Uncontrolled type 2 diabetes mellitus with other skin complication, without long-term current use of insulin (H) E11.628 clindamycin (CLEOCIN) 300 MG capsule    E11.65    2. Cellulitis and abscess of foot, except toes L03.119     L02.619         PLAN:    7/18/2018  Patient reports in my clinic from the emergency department.  Broad spectrum abx coverage for 10 days, to cover DM foot infections and uncontrolled DM.  If develops temp of 101 then go to ED and start broad spectrum IV vanc zosyn and obtain same day MRI for consideration of incision and drainage or debridement.  If this does not respond then get MRI for possible eval for deep abscess to drain  Return to PCP for rx of DM med and possible insulin.   Follow-up with me in 10-14 days.    Patient understands that this could develop a deep abscess and worsened in the next few days and this would be a medical emergency that he should report to the emergency department for.  However labs documented normal WBC and has no documented temperature therefore he was not admitted today in the emergency department.    Will attempt to discuss with PCP for acute management of blood glucose and more aggressive monitoring in next 30 days to improve education, compliance and outcome.  Patient admits that he is motivated to modify his lifestyle and medication use to manage diabetes.    Made appointment with PCP within 1 week.    We also discussed diabetic risk factors and provided written foot care instructions.  In addition to the above history and physical exam, approximately 40 minutes of time was spent with the patient, greater than 50% directly with the patient, counseling, educating, motivational interviewing and coordinating care in  regards to the above noted multiple diagnoses.       Darryl Huff DPM

## 2018-07-20 ENCOUNTER — OFFICE VISIT (OUTPATIENT)
Dept: INTERNAL MEDICINE | Facility: CLINIC | Age: 53
End: 2018-07-20
Payer: COMMERCIAL

## 2018-07-20 VITALS
HEART RATE: 96 BPM | WEIGHT: 215 LBS | BODY MASS INDEX: 27.6 KG/M2 | SYSTOLIC BLOOD PRESSURE: 110 MMHG | RESPIRATION RATE: 16 BRPM | DIASTOLIC BLOOD PRESSURE: 74 MMHG | OXYGEN SATURATION: 97 % | TEMPERATURE: 98.5 F

## 2018-07-20 DIAGNOSIS — E11.628 TYPE 2 DIABETES MELLITUS WITH DIABETIC FOOT INFECTION (H): ICD-10-CM

## 2018-07-20 DIAGNOSIS — E11.65 TYPE 2 DIABETES MELLITUS WITH HYPERGLYCEMIA, WITHOUT LONG-TERM CURRENT USE OF INSULIN (H): Primary | ICD-10-CM

## 2018-07-20 DIAGNOSIS — L08.9 TYPE 2 DIABETES MELLITUS WITH DIABETIC FOOT INFECTION (H): ICD-10-CM

## 2018-07-20 PROCEDURE — 99214 OFFICE O/P EST MOD 30 MIN: CPT | Performed by: INTERNAL MEDICINE

## 2018-07-20 RX ORDER — METFORMIN HCL 500 MG
1000 TABLET, EXTENDED RELEASE 24 HR ORAL 2 TIMES DAILY WITH MEALS
Qty: 360 TABLET | Refills: 1 | Status: SHIPPED | OUTPATIENT
Start: 2018-07-20 | End: 2018-10-17

## 2018-07-20 ASSESSMENT — PAIN SCALES - GENERAL: PAINLEVEL: MODERATE PAIN (4)

## 2018-07-20 NOTE — PROGRESS NOTES
SUBJECTIVE:   Luke Farias is a 52 year old male who presents to clinic today for the following health issues:    ED/UC Followup:    Facility:  [facility]  Date of visit: 7/18/18  Reason for visit: Cellulitis left foot, Diabetes  Current Status: Follow up     Last week left foot starting hurting and got on medication   Then this Tuesday was here at the ER and then podiatry.      Diabetes hasn't been controlled. a1c was 11.3.  Wasn't on any medications, eating sugar and drinking pop.  Got some metformin and back on it for 4 days.      Past Medical History:   Diagnosis Date     Diabetes (H)     type 2     Seasonal allergies     Allergy inj as a child     Current Outpatient Prescriptions   Medication     blood glucose monitoring (NO BRAND SPECIFIED) test strip     ciprofloxacin (CIPRO) 500 MG tablet     clindamycin (CLEOCIN) 300 MG capsule     ibuprofen (ADVIL,MOTRIN) 200 MG tablet     metFORMIN (GLUCOPHAGE-XR) 500 MG 24 hr tablet     Blood Glucose Monitoring Suppl (GLUCOMETER ELITE CLASSIC) KIT     DIABETIC STERILE LANCETS device     glucose blood VI test strips (ASCENSIA AUTODISC VI,ONE TOUCH ULTRA TEST VI) strip     [DISCONTINUED] metFORMIN (GLUCOPHAGE) 500 MG tablet     No current facility-administered medications for this visit.      Physical Exam  /74  Pulse 96  Temp 98.5  F (36.9  C) (Temporal)  Resp 16  Wt 215 lb (97.5 kg)  SpO2 97%  BMI 27.6 kg/m2  General Appearance-healthy, alert, no distress  Left foot with a foot infection around the second and third toe, erythema, swelling, some emaciated tissue but no ulceration seen    ASSESSMENT:  This is a type II diabetic who is well controlled a few years ago when I saw him.  He has been lost to follow-up and is now been taking care of his diabetes, A1c came back at 11.3.  Unfortunately he developed a left foot infection.  With this infection he is at high risk for Charcot joint or amputation.  I stressed to him highly that he needs to get his  sugars down in the next day to really treat this infection is already on Cipro and clindamycin.  We will increase his metformin to 1500 mg daily extended release to try to avoid diarrhea.  He will eat better.  I also told he needs to elevate his foot all day long that the swelling will also cause him problems.  He will try to do this.  Tried to stress that he needs to take care of this foot or he can lose it with amputation.  In the he will let me know in 3 days what his sugars are.  He should be checking his sugars 4 times a day.  If his sugars are still high on Monday over the 150 range we will add another medication of glipizide if they continue to be high we will add insulin for him.    I spent greater than 50% of this 30 minute visit in counseling and coordination of care of diabetes.      Electronically signed by Rancho Painting MD

## 2018-07-20 NOTE — MR AVS SNAPSHOT
After Visit Summary   7/20/2018    Luke Farias    MRN: [id number]           Patient Information     Date Of Birth          1965        Visit Information        Provider Department      7/20/2018 2:15 PM Rancho Painting MD [facility]        Today's Diagnoses     Type 2 diabetes mellitus with hyperglycemia, without long-term current use of insulin (H)    -  1    Type 2 diabetes mellitus with diabetic foot infection (H)           Follow-ups after your visit        Additional Services     DIABETES EDUCATOR REFERRAL       DIABETES SELF MANAGEMENT TRAINING (DSMT)      Your provider has referred you to Diabetes Education: FMG: Diabetes Education - All [facility] [phone]   [email or web address]     If an urgent visit is needed or A1C is above 12, Care Team to call the Diabetes  Education Team at [phone] or send an In Basket message to the Diabetes Education Pool (P DIAB ED-PATIENT CARE).    A  will call you to make your appointment. If it has been more than 3 business days since your referral was placed, please call the above phone number to schedule.    Type of training and number of hours: Previous Diagnosis: Follow-up DSMT - 2 hours.    Diabetes Type: Type 2 - On Oral Medication   Medicare covers: 10 hours of initial DSMT in 12 month period from the time of first visit, plus 2 hours of follow-up DSMT annually, and additional hours as requested for insulin training.         Diabetes Co-Morbidities: none               A1C Goal:  <8.0       A1C is: Lab Results       Component                Value               Date                       A1C                      11.3                07/18/2018              MEDICAL NUTRITION THERAPY (MNT) for Diabetes    Medical Nutrition Therapy with a Registered Dietitian can be provided in coordination with Diabetes Self-Management Training to assist in achieving optimal diabetes  management.    MNT Type and Hours: Do not initiate MNT at this time.                       Medicare will cover: 3 hours initial MNT in 12 month period after first visit, plus 2 hours of follow-up MNT annually        Diabetes Education Topics: Comprehensive Knowledge Assessment and Instruction and Knowledge: Healthy Eating, Being Active and Monitoring Blood Sugar    Special Educational Needs Requiring Individual DSMT:\    Please be aware that coverage of these services is subject to the terms and limitations of your health insurance plan.  Call member services at your health plan to determine Diabetes Self-Management Training (Codes  and ) and Medical Nutrition Therapy (Codes 21708 and 95735) benefits and ask which blood glucose monitor brands are covered by your plan.  Please bring the following with you to your appointment:    (1)  List of current medications   (2)  List of Blood Glucose Monitor brands that are covered by your insurance plan  (3)  Blood Glucose Monitor and log book  (4)   Food records for the 3 days prior to your visit    The Certified Diabetes Educator may make diabetes medication adjustments per the CDE Protocol and Collaborative Practice Agreement.                  Your next 10 appointments already scheduled     Jul 31, 2018 11:00 AM CDT   Return Visit with Darryl Huff DPM   [facility]    [address] [facility] [address] [phone]              Who to contact     If you have questions or need follow up information about today's clinic visit or your schedule please contact [facility] directly at [phone].  Normal or non-critical lab and imaging results will be communicated to you by MyChart, letter or phone within 4 business days after the clinic has received the results. If you do not hear from us within 7 days, please contact the clinic through MyChart or phone. If you have a critical or abnormal lab  result, we will notify you by phone as soon as possible.  Submit refill requests through [employer] or call your pharmacy and they will forward the refill request to us. Please allow 3 business days for your refill to be completed.          Additional Information About Your Visit        Care EveryWhere ID     This is your Care EveryWhere ID. This could be used by other organizations to access your [facility] medical records  [id number]        Your Vitals Were     Pulse Temperature Respirations Pulse Oximetry BMI (Body Mass Index)       96 98.5  F (36.9  C) (Temporal) 16 97% 27.6 kg/m2        Blood Pressure from Last 3 Encounters:   07/20/18 110/74   07/18/18 124/82   07/18/18 (!) 139/94    Weight from Last 3 Encounters:   07/20/18 215 lb (97.5 kg)   07/18/18 219 lb (99.3 kg)   07/18/18 219 lb (99.3 kg)              We Performed the Following     DIABETES EDUCATOR REFERRAL          Today's Medication Changes          These changes are accurate as of 7/20/18  3:10 PM.  If you have any questions, ask your nurse or doctor.               Start taking these medicines.        Dose/Directions    metFORMIN 500 MG 24 hr tablet   Commonly known as:  GLUCOPHAGE-XR   Used for:  Type 2 diabetes mellitus with hyperglycemia, without long-term current use of insulin (H)   Replaces:  metFORMIN 500 MG tablet   Started by:  Rancho Painting MD        Dose:  1000 mg   Take 2 tablets (1,000 mg) by mouth 2 times daily (with meals)   Quantity:  360 tablet   Refills:  1         These medicines have changed or have updated prescriptions.        Dose/Directions    * blood glucose monitoring test strip   Commonly known as:  no brand specified   This may have changed:  Another medication with the same name was added. Make sure you understand how and when to take each.   Used for:  Type 2 diabetes, HbA1c goal < 7% (H)   Changed by:  Rancho Painting MD        by In Vitro route. Test as directed   Quantity:  1 Box   Refills:  12       * blood glucose  monitoring test strip   Commonly known as:  no brand specified   This may have changed:  You were already taking a medication with the same name, and this prescription was added. Make sure you understand how and when to take each.   Used for:  Type 2 diabetes mellitus with hyperglycemia, without long-term current use of insulin (H)   Changed by:  Rancho Painting MD        Use to test blood sugars 4 times daily or as directed due to infection and hyperglycemia   Quantity:  200 strip   Refills:  1       * Notice:  This list has 2 medication(s) that are the same as other medications prescribed for you. Read the directions carefully, and ask your doctor or other care provider to review them with you.      Stop taking these medicines if you haven't already. Please contact your care team if you have questions.     cephALEXin 500 MG capsule   Commonly known as:  KEFLEX   Stopped by:  Rancho Painting MD           metFORMIN 500 MG tablet   Commonly known as:  GLUCOPHAGE   Replaced by:  metFORMIN 500 MG 24 hr tablet   Stopped by:  Rancho Painting MD           sulfamethoxazole-trimethoprim 800-160 MG per tablet   Commonly known as:  BACTRIM DS/SEPTRA DS   Stopped by:  Rancho Painting MD                Where to get your medicines      These medications were sent to [address] [address]     Phone:  [phone]     blood glucose monitoring test strip    metFORMIN 500 MG 24 hr tablet                Primary Care Provider Office Phone # Fax #    Rancho Painting [phone]       [address] [facility] [address]        Equal Access to Services     [facility] AH: Hadii jose j abbott hadasho Soomaali, waaxda luqadaha, qaybta kaalmada adeegyada, dudley jade. So [facility] [phone].    ATENCIÓN: Si habla español, tiene a barksdale disposición servicios gratuitos de asistencia lingüística. Llame al [phone].    We  comply with applicable federal civil rights laws and [place] laws. We do not discriminate on the basis of race, color, national origin, age, disability, sex, sexual orientation, or gender identity.            Thank you!     Thank you for choosing [facility]  for your care. Our goal is always to provide you with excellent care. Hearing back from our patients is one way we can continue to improve our services. Please take a few minutes to complete the written survey that you may receive in the mail after your visit with us. Thank you!             Your Updated Medication List - Protect others around you: Learn how to safely use, store and throw away your medicines at www.disposemymeds.org.          This list is accurate as of 7/20/18  3:10 PM.  Always use your most recent med list.                   Brand Name Dispense Instructions for use Diagnosis    * blood glucose monitoring test strip    no brand specified    1 Box    by In Vitro route. Test as directed    Type 2 diabetes, HbA1c goal < 7% (H)       * blood glucose monitoring test strip    no brand specified    200 strip    Use to test blood sugars 4 times daily or as directed due to infection and hyperglycemia    Type 2 diabetes mellitus with hyperglycemia, without long-term current use of insulin (H)       ciprofloxacin 500 MG tablet    CIPRO    20 tablet    Take 1 tablet (500 mg) by mouth 2 times daily for 10 days        clindamycin 300 MG capsule    CLEOCIN    40 capsule    Take 1 capsule (300 mg) by mouth 4 times daily    Uncontrolled type 2 diabetes mellitus with other skin complication, without long-term current use of insulin (H)       DIABETIC STERILE LANCETS device     1 Box    1 Device daily.    Type 2 diabetes, HbA1c goal < 7% (H)       GLUCOMETER ELITE CLASSIC Kit     1 kit    1 Device daily.    Type 2 diabetes, HbA1c goal < 7% (H)       ibuprofen 200 MG tablet    ADVIL/MOTRIN     Take 200 mg by mouth every 4 hours as needed.         metFORMIN 500 MG 24 hr tablet    GLUCOPHAGE-XR    360 tablet    Take 2 tablets (1,000 mg) by mouth 2 times daily (with meals)    Type 2 diabetes mellitus with hyperglycemia, without long-term current use of insulin (H)       * Notice:  This list has 2 medication(s) that are the same as other medications prescribed for you. Read the directions carefully, and ask your doctor or other care provider to review them with you.

## 2018-07-23 ENCOUNTER — MYC MEDICAL ADVICE (OUTPATIENT)
Dept: INTERNAL MEDICINE | Facility: CLINIC | Age: 53
End: 2018-07-23

## 2018-07-23 DIAGNOSIS — E11.65 TYPE 2 DIABETES MELLITUS WITH HYPERGLYCEMIA, WITHOUT LONG-TERM CURRENT USE OF INSULIN (H): Primary | ICD-10-CM

## 2018-07-23 RX ORDER — GLIPIZIDE 5 MG/1
5 TABLET, FILM COATED, EXTENDED RELEASE ORAL DAILY
Qty: 30 TABLET | Refills: 1 | Status: SHIPPED | OUTPATIENT
Start: 2018-07-23 | End: 2018-09-15

## 2018-07-24 ENCOUNTER — MYC MEDICAL ADVICE (OUTPATIENT)
Dept: INTERNAL MEDICINE | Facility: CLINIC | Age: 53
End: 2018-07-24

## 2018-07-26 ENCOUNTER — MYC MEDICAL ADVICE (OUTPATIENT)
Dept: INTERNAL MEDICINE | Facility: CLINIC | Age: 53
End: 2018-07-26

## 2018-07-27 ENCOUNTER — MYC MEDICAL ADVICE (OUTPATIENT)
Dept: INTERNAL MEDICINE | Facility: CLINIC | Age: 53
End: 2018-07-27

## 2018-07-31 ENCOUNTER — OFFICE VISIT (OUTPATIENT)
Dept: PODIATRY | Facility: OTHER | Age: 53
End: 2018-07-31
Payer: COMMERCIAL

## 2018-07-31 VITALS
BODY MASS INDEX: 27.98 KG/M2 | TEMPERATURE: 97.9 F | DIASTOLIC BLOOD PRESSURE: 78 MMHG | HEIGHT: 74 IN | WEIGHT: 218 LBS | SYSTOLIC BLOOD PRESSURE: 108 MMHG

## 2018-07-31 DIAGNOSIS — L02.619 CELLULITIS AND ABSCESS OF FOOT, EXCEPT TOES: Primary | ICD-10-CM

## 2018-07-31 DIAGNOSIS — L03.119 CELLULITIS AND ABSCESS OF FOOT, EXCEPT TOES: Primary | ICD-10-CM

## 2018-07-31 PROCEDURE — 99213 OFFICE O/P EST LOW 20 MIN: CPT | Performed by: PODIATRIST

## 2018-07-31 RX ORDER — CLINDAMYCIN HCL 300 MG
300 CAPSULE ORAL 4 TIMES DAILY
Qty: 40 CAPSULE | Refills: 0 | Status: SHIPPED | OUTPATIENT
Start: 2018-07-31 | End: 2018-10-15

## 2018-07-31 RX ORDER — CIPROFLOXACIN 500 MG/1
500 TABLET, FILM COATED ORAL 2 TIMES DAILY
Qty: 20 TABLET | Refills: 0 | Status: SHIPPED | OUTPATIENT
Start: 2018-07-31 | End: 2018-10-15

## 2018-07-31 ASSESSMENT — PAIN SCALES - GENERAL: PAINLEVEL: NO PAIN (1)

## 2018-07-31 NOTE — MR AVS SNAPSHOT
After Visit Summary   7/31/2018    Luke Farias    MRN: [id number]           Patient Information     Date Of Birth          1965        Visit Information        Provider Department      7/31/2018 11:00 AM Darryl Huff DPM [facility]        Today's Diagnoses     Cellulitis and abscess of foot, except toes    -  1    Uncontrolled type 2 diabetes mellitus with other skin complication, without long-term current use of insulin (H)          Care Instructions    Follow-up with me in about 3 weeks.          Follow-ups after your visit        Who to contact     If you have questions or need follow up information about today's clinic visit or your schedule please contact [facility] directly at [phone].  Normal or non-critical lab and imaging results will be communicated to you by [employer], letter or phone within 4 business days after the clinic has received the results. If you do not hear from us within 7 days, please contact the clinic through [employer] or phone. If you have a critical or abnormal lab result, we will notify you by phone as soon as possible.  Submit refill requests through [employer] or call your pharmacy and they will forward the refill request to us. Please allow 3 business days for your refill to be completed.          Additional Information About Your Visit        MyChart Information     [employer] gives you secure access to your electronic health record. If you see a primary care provider, you can also send messages to your care team and make appointments. If you have questions, please call your primary care clinic.  If you do not have a primary care provider, please call [phone] and they will assist you.        Care EveryWhere ID     This is your Care EveryWhere ID. This could be used by other organizations to access your [place] medical records  [id number]        Your Vitals Were     Temperature Height BMI (Body Mass Index)              "97.9  F (36.6  C) (Temporal) 6' 2\" (1.88 m) 27.99 kg/m2          Blood Pressure from Last 3 Encounters:   07/31/18 108/78   07/20/18 110/74   07/18/18 124/82    Weight from Last 3 Encounters:   07/31/18 218 lb (98.9 kg)   07/20/18 215 lb (97.5 kg)   07/18/18 219 lb (99.3 kg)              Today, you had the following     No orders found for display         Today's Medication Changes          These changes are accurate as of 7/31/18 11:35 AM.  If you have any questions, ask your nurse or doctor.               Start taking these medicines.        Dose/Directions    ciprofloxacin 500 MG tablet   Commonly known as:  CIPRO   Used for:  Uncontrolled type 2 diabetes mellitus with other skin complication, without long-term current use of insulin (H)   Started by:  Darryl Huff DPM        Dose:  500 mg   Take 1 tablet (500 mg) by mouth 2 times daily   Quantity:  20 tablet   Refills:  0            Where to get your medicines      These medications were sent to [address] [address]     Phone:  [phone]     ciprofloxacin 500 MG tablet    clindamycin 300 MG capsule                Primary Care Provider Office Phone # Fax #    Rancho Painting [phone]       [address] [facility] [address]        Equal Access to Services     [facility] AH: Hadii jose j abbott hadasho Sobrock, waaxda luqadaha, qaybta kaalmada talhayabrad, dudley herrera . So [facility] [phone].    ATENCIÓN: Si habla español, tiene a barksdale disposición servicios gratuitos de asistencia lingüística. Llame al [phone].    We comply with applicable federal civil rights laws and [place] laws. We do not discriminate on the basis of race, color, national origin, age, disability, sex, sexual orientation, or gender identity.            Thank you!     Thank you for choosing [facility]  for your care. Our goal is always to provide " you with excellent care. Hearing back from our patients is one way we can continue to improve our services. Please take a few minutes to complete the written survey that you may receive in the mail after your visit with us. Thank you!             Your Updated Medication List - Protect others around you: Learn how to safely use, store and throw away your medicines at www.disposemymeds.org.          This list is accurate as of 7/31/18 11:35 AM.  Always use your most recent med list.                   Brand Name Dispense Instructions for use Diagnosis    * blood glucose monitoring test strip    no brand specified    1 Box    by In Vitro route. Test as directed    Type 2 diabetes, HbA1c goal < 7% (H)       * blood glucose monitoring test strip    no brand specified    200 strip    Use to test blood sugars 4 times daily or as directed due to infection and hyperglycemia    Type 2 diabetes mellitus with hyperglycemia, without long-term current use of insulin (H)       ciprofloxacin 500 MG tablet    CIPRO    20 tablet    Take 1 tablet (500 mg) by mouth 2 times daily    Uncontrolled type 2 diabetes mellitus with other skin complication, without long-term current use of insulin (H)       clindamycin 300 MG capsule    CLEOCIN    40 capsule    Take 1 capsule (300 mg) by mouth 4 times daily    Uncontrolled type 2 diabetes mellitus with other skin complication, without long-term current use of insulin (H)       DIABETIC STERILE LANCETS device     1 Box    1 Device daily.    Type 2 diabetes, HbA1c goal < 7% (H)       glipiZIDE 5 MG 24 hr tablet    glipiZIDE XL    30 tablet    Take 1 tablet (5 mg) by mouth daily    Type 2 diabetes mellitus with hyperglycemia, without long-term current use of insulin (H)       GLUCOMETER ELITE CLASSIC Kit     1 kit    1 Device daily.    Type 2 diabetes, HbA1c goal < 7% (H)       ibuprofen 200 MG tablet    ADVIL/MOTRIN     Take 200 mg by mouth every 4 hours as needed.        metFORMIN 500 MG 24 hr  tablet    GLUCOPHAGE-XR    360 tablet    Take 2 tablets (1,000 mg) by mouth 2 times daily (with meals)    Type 2 diabetes mellitus with hyperglycemia, without long-term current use of insulin (H)       * Notice:  This list has 2 medication(s) that are the same as other medications prescribed for you. Read the directions carefully, and ask your doctor or other care provider to review them with you.

## 2018-07-31 NOTE — LETTER
7/31/2018         RE: Luke Farias  [address]  [place] [address]        Dear Colleague,    Thank you for referring your patient, Luke Farias, to the [facility]. Please see a copy of my visit note below.    Chief Complaint   Patient presents with     WOUND CARE     improvement, pain 1, no drainage - plantar Left foot wound, onset 7/7/2018; LOV 7/18/2018     Diabetes     type 2       Weight management plan: Patient was referred to their PCP to discuss a diet and exercise plan.     HPI:  Luke Farias is a 52 year old male who is seen in consultation at the request of ED Dept - Brent Nicholas, MD.    Pt presents for eval of:   (Onset, Location, L/R, Character, Treatments, Injury if yes)    XR Left foot today, 7/18/2018  Labs today, 7/18/2018    1. DM Type 2     2. 7/7/2018 peeled away blood blister on bottom of left foot.   7/10/2018 plantar ball of foot pain.  7/13/2018 [place] [facility] started   Keflex 500mg 1 tablet 3 times daily and  Bactrim DS/Septra -160mg, 1 tablet 2 times daily  Some improvement. No drainage  Redness, swelling, dull ache,  Intermittent, sharp, numbness, pain 5    He admits he has very poor control of DM but has recently lost 100lbs.  He admits not taking his oral medication metformin much over the last months.  Owner of [employer].  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  Patient denies any documented fever or chills.  His appetite remains normal.    Review of Systems:  Patient denies fever, chills, rash, wound, stiffness, limping, numbness, weakness, heart burn, blood in stool, chest pain with activity, calf pain when walking, shortness of breath with activity, chronic cough, easy bleeding/bruising, swelling of ankles, excessive thirst, fatigue, depression, anxiety.  Patient admits only to symptoms noted in history.     Patient Active Problem List   Diagnosis     Chronic rhinitis     Type  2 diabetes mellitus with hyperglycemia (H)     Hyperlipidemia LDL goal <100     Morbid obesity, unspecified obesity type (H)     Venous lake on Right chest     Congenital nevus of Right upper back     PAST MEDICAL HISTORY:   Past Medical History:   Diagnosis Date     Diabetes (H)     type 2     Seasonal allergies     Allergy inj as a child     PAST SURGICAL HISTORY:   Past Surgical History:   Procedure Laterality Date     COLONOSCOPY WITH CO2 INSUFFLATION N/A 5/12/2017    Procedure: COLONOSCOPY WITH CO2 INSUFFLATION;  Dr. Rancho Painting/BMI: 30.8/Special screening for malignant neoplasms, colon/colonoscopy/[facility]:  [phone];  Surgeon: Krish Galloway MD;  Location:  OR     MEDICATIONS:   Current Outpatient Prescriptions:      blood glucose monitoring (NO BRAND SPECIFIED) test strip, Use to test blood sugars 4 times daily or as directed due to infection and hyperglycemia, Disp: 200 strip, Rfl: 1     Blood Glucose Monitoring Suppl (GLUCOMETER ELITE CLASSIC) KIT, 1 Device daily., Disp: 1 kit, Rfl: 0     ciprofloxacin (CIPRO) 500 MG tablet, Take 1 tablet (500 mg) by mouth 2 times daily, Disp: 20 tablet, Rfl: 0     clindamycin (CLEOCIN) 300 MG capsule, Take 1 capsule (300 mg) by mouth 4 times daily, Disp: 40 capsule, Rfl: 0     DIABETIC STERILE LANCETS device, 1 Device daily., Disp: 1 Box, Rfl: 12     glipiZIDE (GLIPIZIDE XL) 5 MG 24 hr tablet, Take 1 tablet (5 mg) by mouth daily, Disp: 30 tablet, Rfl: 1     glucose blood VI test strips (ASCENSIA AUTODISC VI,ONE TOUCH ULTRA TEST VI) strip, by In Vitro route. Test as directed, Disp: 1 Box, Rfl: 12     ibuprofen (ADVIL,MOTRIN) 200 MG tablet, Take 200 mg by mouth every 4 hours as needed., Disp: , Rfl:      metFORMIN (GLUCOPHAGE-XR) 500 MG 24 hr tablet, Take 2 tablets (1,000 mg) by mouth 2 times daily (with meals), Disp: 360 tablet, Rfl: 1  ALLERGIES:    Allergies   Allergen Reactions     Seasonal Allergies      SOCIAL HISTORY:   Social  "History     Social History     Marital status:      Spouse name: N/A     Number of children: N/A     Years of education: N/A     Occupational History     Salesmen [employer]     Social History Main Topics     Smoking status: Never Smoker     Smokeless tobacco: Never Used     Alcohol use Yes      Comment: 1-2 beers a week / occ      Drug use: No     Sexual activity: Yes     Partners: Female     Other Topics Concern     Not on file     Social History Narrative     FAMILY HISTORY:   Family History   Problem Relation Age of Onset     HEART DISEASE Mother      Diabetes Father      EXAM:Vitals: /78 (BP Location: Right arm, Cuff Size: Adult Regular)  Temp 97.9  F (36.6  C) (Temporal)  Ht 6' 2\" (1.88 m)  Wt 218 lb (98.9 kg)  BMI 27.99 kg/m2  BMI= Body mass index is 27.99 kg/(m^2).    General appearance: Patient is alert and fully cooperative with history & exam.  No sign of distress is noted during the visit.     Psychiatric: Affect is pleasant & appropriate.  Patient appears motivated to improve health.     Respiratory: Breathing is regular & unlabored while sitting.     HEENT: Hearing is intact to spoken word.  Speech is clear.  No gross evidence of visual impairment that would impact ambulation.     Vascular: DP 2/4 & PT 2/4 left & right.  CFT immediate, positive diminished digital hair growth bilateral.  Temperature is warm to warm proximal to distal however there is a very subtle increased temperature dorsal and plantar left foot with local erythema cellulitis for approximately 4-5 cm about the second through fourth toes dorsal and plantar.  No palpable fluctuance is noted.  No limited range of motion.  The outer layers of epidermis have sloughed from the skin from a previous blister from slip on shoes however no active bleeding or penetration of the skin is noted.  No laceration or puncture wounds noted.  No interdigital maceration.     Neurologic: Normal plantar response bilateral.  Diminished " protective threshold plus 5/10 applications of a 5.07 monofilament.  Protective threshold is nearly absent about the toes but intact about the dorsal and plantar midfoot.  Pt admits burning and paraesthesias about the feet and toes with palpation.     Dermatologic: Mildly diminished texture turgor tone about the integument consistent with autonomic neuropathy.  Erythema about the dorsal foot is resolved however there is still subtle erythema about the second and third toes and clearly some heat about the second and third toes.  80% of the edema is resolved.  Shaggy hyperkeratosis noted interdigitally and plantarly is nearly resolved.  No open ulceration or fissure that is still draining today even after debridement of previous hyperkeratosis.  No palpable fluctuance or abscess and near full range of motion about the metatarsal phalangeal joints.     Musculoskeletal: Patient is ambulatory without assistive device or brace.  Adequate range of motion throughout the ankle subtalar mid tarsal metatarsal phalangeal joints.    Hemoglobin A1C (%)   Date Value   07/18/2018 11.3 (H)   10/01/2012 8.2 (H)   06/08/2011 6.3 (H)     Creatinine (mg/dL)   Date Value   07/18/2018 0.68   10/01/2012 0.83   01/21/2011 0.76        ASSESSMENT:       ICD-10-CM    1. Cellulitis and abscess of foot, except toes L03.119     L02.619    2. Uncontrolled type 2 diabetes mellitus with other skin complication, without long-term current use of insulin (H) E11.628 ciprofloxacin (CIPRO) 500 MG tablet    E11.65 clindamycin (CLEOCIN) 300 MG capsule        PLAN:    7/18/2018  Patient reports in my clinic from the emergency department.  Broad spectrum abx coverage for 10 days, to cover DM foot infections and uncontrolled DM.  If develops temp of 101 then go to ED and start broad spectrum IV vanc zosyn and obtain same day MRI for consideration of incision and drainage or debridement.  If this does not respond then get MRI for possible eval for deep abscess  to drain  Return to PCP for rx of DM med and possible insulin.   Follow-up with me in 10-14 days.    Patient understands that this could develop a deep abscess and worsened in the next few days and this would be a medical emergency that he should report to the emergency department for.  However labs documented normal WBC and has no documented temperature therefore he was not admitted today in the emergency department.    Will attempt to discuss with PCP for acute management of blood glucose and more aggressive monitoring in next 30 days to improve education, compliance and outcome.  Patient admits that he is motivated to modify his lifestyle and medication use to manage diabetes.    Made appointment with PCP within 1 week.    We also discussed diabetic risk factors and provided written foot care instructions.  In addition to the above history and physical exam, approximately 40 minutes of time was spent with the patient, greater than 50% directly with the patient, counseling, educating, motivational interviewing and coordinating care in regards to the above noted multiple diagnoses.     7/31/2018  since last visit:  BG range   upon arising, usu below 120.    increased metformin and added glipizide.     Done with abx a couple days and toe remains red and hot.     Refilled abx with still red hot toe but edema resolved.  This appears to be 70% resolved but no known deep abscess or point of drainage that can be cultured.  Therefore resume another course of broad-spectrum antibiotics Cipro Clinda.  Will consider advanced imaging if this worsens or unresolved, RTC 2-3 weeks.    Increased medication is much appreciated.  Education and motivation is much improved since last visit.  He is able to teach back his current risk factors and appropriate A1c goal.  He is able to identify barriers to control and has modified them.  He is following closely with PCP with daily blood glucose and has follow-up scheduled.  He is  able to repeat the value of following up with his IM provider over the next several months to continue to monitor closely and titrate adjustments to medication and lifestyle.  Patient remains motivated today.    Darryl Huff DPM    Again, thank you for allowing me to participate in the care of your patient.        Sincerely,        Darryl Huff DPM

## 2018-07-31 NOTE — PROGRESS NOTES
Chief Complaint   Patient presents with     WOUND CARE     improvement, pain 1, no drainage - plantar Left foot wound, onset 7/7/2018; LOV 7/18/2018     Diabetes     type 2       Weight management plan: Patient was referred to their PCP to discuss a diet and exercise plan.     HPI:  Luke Farias is a 52 year old male who is seen in consultation at the request of ED Dept - Brent Nicholas, MD.    Pt presents for eval of:   (Onset, Location, L/R, Character, Treatments, Injury if yes)    XR Left foot today, 7/18/2018  Labs today, 7/18/2018    1. DM Type 2     2. 7/7/2018 peeled away blood blister on bottom of left foot.   7/10/2018 plantar ball of foot pain.  7/13/2018 [place] [facility] started   Keflex 500mg 1 tablet 3 times daily and  Bactrim DS/Septra -160mg, 1 tablet 2 times daily  Some improvement. No drainage  Redness, swelling, dull ache,  Intermittent, sharp, numbness, pain 5    He admits he has very poor control of DM but has recently lost 100lbs.  He admits not taking his oral medication metformin much over the last months.  Owner of [employer].  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  Patient denies any documented fever or chills.  His appetite remains normal.    Review of Systems:  Patient denies fever, chills, rash, wound, stiffness, limping, numbness, weakness, heart burn, blood in stool, chest pain with activity, calf pain when walking, shortness of breath with activity, chronic cough, easy bleeding/bruising, swelling of ankles, excessive thirst, fatigue, depression, anxiety.  Patient admits only to symptoms noted in history.     Patient Active Problem List   Diagnosis     Chronic rhinitis     Type 2 diabetes mellitus with hyperglycemia (H)     Hyperlipidemia LDL goal <100     Morbid obesity, unspecified obesity type (H)     Venous lake on Right chest     Congenital nevus of Right upper back     PAST MEDICAL HISTORY:   Past Medical  History:   Diagnosis Date     Diabetes (H)     type 2     Seasonal allergies     Allergy inj as a child     PAST SURGICAL HISTORY:   Past Surgical History:   Procedure Laterality Date     COLONOSCOPY WITH CO2 INSUFFLATION N/A 5/12/2017    Procedure: COLONOSCOPY WITH CO2 INSUFFLATION;  Dr. Rancho Painting/BMI: 30.8/Special screening for malignant neoplasms, colon/colonoscopy/[facility]:  [phone];  Surgeon: Krish Galloway MD;  Location: [facility] OR     MEDICATIONS:   Current Outpatient Prescriptions:      blood glucose monitoring (NO BRAND SPECIFIED) test strip, Use to test blood sugars 4 times daily or as directed due to infection and hyperglycemia, Disp: 200 strip, Rfl: 1     Blood Glucose Monitoring Suppl (GLUCOMETER ELITE CLASSIC) KIT, 1 Device daily., Disp: 1 kit, Rfl: 0     ciprofloxacin (CIPRO) 500 MG tablet, Take 1 tablet (500 mg) by mouth 2 times daily, Disp: 20 tablet, Rfl: 0     clindamycin (CLEOCIN) 300 MG capsule, Take 1 capsule (300 mg) by mouth 4 times daily, Disp: 40 capsule, Rfl: 0     DIABETIC STERILE LANCETS device, 1 Device daily., Disp: 1 Box, Rfl: 12     glipiZIDE (GLIPIZIDE XL) 5 MG 24 hr tablet, Take 1 tablet (5 mg) by mouth daily, Disp: 30 tablet, Rfl: 1     glucose blood VI test strips (ASCENSIA AUTODISC VI,ONE TOUCH ULTRA TEST VI) strip, by In Vitro route. 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[employer]     Social History Main Topics     Smoking status: Never  "Smoker     Smokeless tobacco: Never Used     Alcohol use Yes      Comment: 1-2 beers a week / occ      Drug use: No     Sexual activity: Yes     Partners: Female     Other Topics Concern     Not on file     Social History Narrative     FAMILY HISTORY:   Family History   Problem Relation Age of Onset     HEART DISEASE Mother      Diabetes Father      EXAM:Vitals: /78 (BP Location: Right arm, Cuff Size: Adult Regular)  Temp 97.9  F (36.6  C) (Temporal)  Ht 6' 2\" (1.88 m)  Wt 218 lb (98.9 kg)  BMI 27.99 kg/m2  BMI= Body mass index is 27.99 kg/(m^2).    General appearance: Patient is alert and fully cooperative with history & exam.  No sign of distress is noted during the visit.     Psychiatric: Affect is pleasant & appropriate.  Patient appears motivated to improve health.     Respiratory: Breathing is regular & unlabored while sitting.     HEENT: Hearing is intact to spoken word.  Speech is clear.  No gross evidence of visual impairment that would impact ambulation.     Vascular: DP 2/4 & PT 2/4 left & right.  CFT immediate, positive diminished digital hair growth bilateral.  Temperature is warm to warm proximal to distal however there is a very subtle increased temperature dorsal and plantar left foot with local erythema cellulitis for approximately 4-5 cm about the second through fourth toes dorsal and plantar.  No palpable fluctuance is noted.  No limited range of motion.  The outer layers of epidermis have sloughed from the skin from a previous blister from slip on shoes however no active bleeding or penetration of the skin is noted.  No laceration or puncture wounds noted.  No interdigital maceration.     Neurologic: Normal plantar response bilateral.  Diminished protective threshold plus 5/10 applications of a 5.07 monofilament.  Protective threshold is nearly absent about the toes but intact about the dorsal and plantar midfoot.  Pt admits burning and paraesthesias about the feet and toes with " palpation.     Dermatologic: Mildly diminished texture turgor tone about the integument consistent with autonomic neuropathy.  Erythema about the dorsal foot is resolved however there is still subtle erythema about the second and third toes and clearly some heat about the second and third toes.  80% of the edema is resolved.  Shaggy hyperkeratosis noted interdigitally and plantarly is nearly resolved.  No open ulceration or fissure that is still draining today even after debridement of previous hyperkeratosis.  No palpable fluctuance or abscess and near full range of motion about the metatarsal phalangeal joints.     Musculoskeletal: Patient is ambulatory without assistive device or brace.  Adequate range of motion throughout the ankle subtalar mid tarsal metatarsal phalangeal joints.    Hemoglobin A1C (%)   Date Value   07/18/2018 11.3 (H)   10/01/2012 8.2 (H)   06/08/2011 6.3 (H)     Creatinine (mg/dL)   Date Value   07/18/2018 0.68   10/01/2012 0.83   01/21/2011 0.76        ASSESSMENT:       ICD-10-CM    1. Cellulitis and abscess of foot, except toes L03.119     L02.619    2. Uncontrolled type 2 diabetes mellitus with other skin complication, without long-term current use of insulin (H) E11.628 ciprofloxacin (CIPRO) 500 MG tablet    E11.65 clindamycin (CLEOCIN) 300 MG capsule        PLAN:    7/18/2018  Patient reports in my clinic from the emergency department.  Broad spectrum abx coverage for 10 days, to cover DM foot infections and uncontrolled DM.  If develops temp of 101 then go to ED and start broad spectrum IV vanc zosyn and obtain same day MRI for consideration of incision and drainage or debridement.  If this does not respond then get MRI for possible eval for deep abscess to drain  Return to PCP for rx of DM med and possible insulin.   Follow-up with me in 10-14 days.    Patient understands that this could develop a deep abscess and worsened in the next few days and this would be a medical emergency that  he should report to the emergency department for.  However labs documented normal WBC and has no documented temperature therefore he was not admitted today in the emergency department.    Will attempt to discuss with PCP for acute management of blood glucose and more aggressive monitoring in next 30 days to improve education, compliance and outcome.  Patient admits that he is motivated to modify his lifestyle and medication use to manage diabetes.    Made appointment with PCP within 1 week.    We also discussed diabetic risk factors and provided written foot care instructions.  In addition to the above history and physical exam, approximately 40 minutes of time was spent with the patient, greater than 50% directly with the patient, counseling, educating, motivational interviewing and coordinating care in regards to the above noted multiple diagnoses.     7/31/2018  since last visit:  BG range   upon arising, usu below 120.    increased metformin and added glipizide.     Done with abx a couple days and toe remains red and hot.     Refilled abx with still red hot toe but edema resolved.  This appears to be 70% resolved but no known deep abscess or point of drainage that can be cultured.  Therefore resume another course of broad-spectrum antibiotics Cipro Clinda.  Will consider advanced imaging if this worsens or unresolved, RTC 2-3 weeks.    Increased medication is much appreciated.  Education and motivation is much improved since last visit.  He is able to teach back his current risk factors and appropriate A1c goal.  He is able to identify barriers to control and has modified them.  He is following closely with PCP with daily blood glucose and has follow-up scheduled.  He is able to repeat the value of following up with his IM provider over the next several months to continue to monitor closely and titrate adjustments to medication and lifestyle.  Patient remains motivated today.    Darryl Huff DPM

## 2018-08-20 ENCOUNTER — OFFICE VISIT (OUTPATIENT)
Dept: PODIATRY | Facility: CLINIC | Age: 53
End: 2018-08-20
Payer: COMMERCIAL

## 2018-08-20 VITALS
WEIGHT: 224 LBS | TEMPERATURE: 97.1 F | SYSTOLIC BLOOD PRESSURE: 104 MMHG | BODY MASS INDEX: 28.75 KG/M2 | HEIGHT: 74 IN | DIASTOLIC BLOOD PRESSURE: 76 MMHG

## 2018-08-20 DIAGNOSIS — L03.119 CELLULITIS AND ABSCESS OF FOOT, EXCEPT TOES: ICD-10-CM

## 2018-08-20 DIAGNOSIS — L02.619 CELLULITIS AND ABSCESS OF FOOT, EXCEPT TOES: ICD-10-CM

## 2018-08-20 PROCEDURE — 99213 OFFICE O/P EST LOW 20 MIN: CPT | Performed by: PODIATRIST

## 2018-08-20 ASSESSMENT — PAIN SCALES - GENERAL: PAINLEVEL: NO PAIN (0)

## 2018-08-20 NOTE — MR AVS SNAPSHOT
After Visit Summary   8/20/2018    Luke Farias    MRN: [id number]           Patient Information     Date Of Birth          1965        Visit Information        Provider Department      8/20/2018 7:30 AM Darryl Huff DPM [facility]        Today's Diagnoses     Uncontrolled type 2 diabetes mellitus with other skin complication, without long-term current use of insulin (H)    -  1    Cellulitis and abscess of foot, except toes          Care Instructions    Follow-up in 2 months          Follow-ups after your visit        Your next 10 appointments already scheduled     Oct 15, 2018  7:30 AM CDT   Return Visit with Darryl Huff DPM   [facility]    [address] [facility] [address] [phone]              Who to contact     If you have questions or need follow up information about today's clinic visit or your schedule please contact [facility] directly at [phone].  Normal or non-critical lab and imaging results will be communicated to you by appCREARhart, letter or phone within 4 business days after the clinic has received the results. If you do not hear from us within 7 days, please contact the clinic through RooTt or phone. If you have a critical or abnormal lab result, we will notify you by phone as soon as possible.  Submit refill requests through Bloglovin or call your pharmacy and they will forward the refill request to us. Please allow 3 business days for your refill to be completed.          Additional Information About Your Visit        MyChart Information     Bloglovin gives you secure access to your electronic health record. If you see a primary care provider, you can also send messages to your care team and make appointments. If you have questions, please call your primary care clinic.  If you do not have a primary care provider, please call [phone] and they will assist you.       "  Care EveryWhere ID     This is your Care EveryWhere ID. This could be used by other organizations to access your [place] medical records  [id number]        Your Vitals Were     Temperature Height BMI (Body Mass Index)             97.1  F (36.2  C) (Temporal) 6' 2\" (1.88 m) 28.76 kg/m2          Blood Pressure from Last 3 Encounters:   08/20/18 104/76   07/31/18 108/78   07/20/18 110/74    Weight from Last 3 Encounters:   08/20/18 224 lb (101.6 kg)   07/31/18 218 lb (98.9 kg)   07/20/18 215 lb (97.5 kg)              Today, you had the following     No orders found for display       Primary Care Provider Office Phone # Fax #    [facility] [phone]        [facility] [address]        Equal Access to Services     [facility] : Hadii jose j abbott hadasho Sobrock, waaxda luqadaha, qaybta kaalmada adeegyada, dudley jj haynikki herrera . So [facility] [phone].    ATENCIÓN: Si habla español, tiene a barksdale disposición servicios gratuitos de asistencia lingüística. Cristina al [phone].    We comply with applicable federal civil rights laws and [place] laws. We do not discriminate on the basis of race, color, national origin, age, disability, sex, sexual orientation, or gender identity.            Thank you!     Thank you for choosing [facility]  for your care. Our goal is always to provide you with excellent care. Hearing back from our patients is one way we can continue to improve our services. Please take a few minutes to complete the written survey that you may receive in the mail after your visit with us. Thank you!             Your Updated Medication List - Protect others around you: Learn how to safely use, store and throw away your medicines at www.disposemymeds.org.          This list is accurate as of 8/20/18  8:02 AM.  Always use your most recent med list.                   Brand Name Dispense Instructions for use Diagnosis    * blood glucose " monitoring test strip    no brand specified    1 Box    by In Vitro route. Test as directed    Type 2 diabetes, HbA1c goal < 7% (H)       * blood glucose monitoring test strip    no brand specified    200 strip    Use to test blood sugars 4 times daily or as directed due to infection and hyperglycemia    Type 2 diabetes mellitus with hyperglycemia, without long-term current use of insulin (H)       ciprofloxacin 500 MG tablet    CIPRO    20 tablet    Take 1 tablet (500 mg) by mouth 2 times daily    Uncontrolled type 2 diabetes mellitus with other skin complication, without long-term current use of insulin (H)       clindamycin 300 MG capsule    CLEOCIN    40 capsule    Take 1 capsule (300 mg) by mouth 4 times daily    Uncontrolled type 2 diabetes mellitus with other skin complication, without long-term current use of insulin (H)       DIABETIC STERILE LANCETS device     1 Box    1 Device daily.    Type 2 diabetes, HbA1c goal < 7% (H)       glipiZIDE 5 MG 24 hr tablet    glipiZIDE XL    30 tablet    Take 1 tablet (5 mg) by mouth daily    Type 2 diabetes mellitus with hyperglycemia, without long-term current use of insulin (H)       GLUCOMETER ELITE CLASSIC Kit     1 kit    1 Device daily.    Type 2 diabetes, HbA1c goal < 7% (H)       ibuprofen 200 MG tablet    ADVIL/MOTRIN     Take 200 mg by mouth every 4 hours as needed.        metFORMIN 500 MG 24 hr tablet    GLUCOPHAGE-XR    360 tablet    Take 2 tablets (1,000 mg) by mouth 2 times daily (with meals)    Type 2 diabetes mellitus with hyperglycemia, without long-term current use of insulin (H)       * Notice:  This list has 2 medication(s) that are the same as other medications prescribed for you. Read the directions carefully, and ask your doctor or other care provider to review them with you.

## 2018-08-20 NOTE — PROGRESS NOTES
"Chief Complaint   Patient presents with     WOUND CARE     (6w2d) improvement, no pain, no drainage, completed abx - plantar Left foot wound, onset 7/7/2018; LOV 7/31/2018     Diabetes     type 2       Weight management plan: Patient was referred to their PCP to discuss a diet and exercise plan.     HPI:  Luke Farias is a 52 year old male who is seen in consultation at the request of ED Dept - Brent Peterson MD.    Pt presents for eval of:   (Onset, Location, L/R, Character, Treatments, Injury if yes)    XR Left foot today, 7/18/2018  Labs today, 7/18/2018    1. DM Type 2     2. 7/7/2018 peeled away blood blister on bottom of left foot.   7/10/2018 plantar ball of foot pain.  7/13/2018 [facility] started   Keflex 500mg 1 tablet 3 times daily and  Bactrim DS/Septra -160mg, 1 tablet 2 times daily  Some improvement. No drainage  Redness, swelling, dull ache,  Intermittent, sharp, numbness, pain 5    He admits he has very poor control of DM but has recently lost 100lbs.  He admits not taking his oral medication metformin much over the last months.  Owner of [employer].  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  Patient denies any documented fever or chills.  His appetite remains normal.    EXAM:Vitals: /76 (BP Location: Left arm, Cuff Size: Adult Regular)  Temp 97.1  F (36.2  C) (Temporal)  Ht 6' 2\" (1.88 m)  Wt 224 lb (101.6 kg)  BMI 28.76 kg/m2  BMI= Body mass index is 28.76 kg/(m^2).    General appearance: Patient is alert and fully cooperative with history & exam.  No sign of distress is noted during the visit.     Psychiatric: Affect is pleasant & appropriate.  Patient appears motivated to improve health.     Respiratory: Breathing is regular & unlabored while sitting.     HEENT: Hearing is intact to spoken word.  Speech is clear.  No gross evidence of visual impairment that would impact ambulation.     Vascular: DP 2/4 & PT " 2/4 left & right.  CFT immediate, positive diminished digital hair growth bilateral.  Temperature is warm to warm proximal to distal however there is a very subtle increased temperature dorsal and plantar left foot with local erythema cellulitis for approximately 4-5 cm about the second through fourth toes dorsal and plantar.  No palpable fluctuance is noted.  No limited range of motion.  The outer layers of epidermis have sloughed from the skin from a previous blister from slip on shoes however no active bleeding or penetration of the skin is noted.  No laceration or puncture wounds noted.  No interdigital maceration.     Neurologic: Normal plantar response bilateral.  Diminished protective threshold plus 5/10 applications of a 5.07 monofilament.  Protective threshold is nearly absent about the toes but intact about the dorsal and plantar midfoot.  Pt admits burning and paraesthesias about the feet and toes with palpation.     Dermatologic: Mildly diminished texture turgor tone about the integument consistent with autonomic neuropathy.  Erythema about the dorsal foot is resolved however there is still subtle erythema about the second and third toes and clearly some heat about the second and third toes.  80% of the edema is resolved.  Shaggy hyperkeratosis noted interdigitally and plantarly is nearly resolved.  No open ulceration or fissure that is still draining today even after debridement of previous hyperkeratosis.  No palpable fluctuance or abscess and near full range of motion about the metatarsal phalangeal joints.     Musculoskeletal: Patient is ambulatory without assistive device or brace.  Adequate range of motion throughout the ankle subtalar mid tarsal metatarsal phalangeal joints.      Hemoglobin A1C (%)   Date Value   07/18/2018 11.3 (H)   10/01/2012 8.2 (H)   06/08/2011 6.3 (H)     Creatinine (mg/dL)   Date Value   07/18/2018 0.68   10/01/2012 0.83   01/21/2011 0.76        ASSESSMENT:       ICD-10-CM     1. Uncontrolled type 2 diabetes mellitus with other skin complication, without long-term current use of insulin (H) E11.628     E11.65    2. Cellulitis and abscess of foot, except toes L03.119     L02.619         PLAN:    7/18/2018  Patient reports in my clinic from the emergency department.  Broad spectrum abx coverage for 10 days, to cover DM foot infections and uncontrolled DM.  If develops temp of 101 then go to ED and start broad spectrum IV vanc zosyn and obtain same day MRI for consideration of incision and drainage or debridement.  If this does not respond then get MRI for possible eval for deep abscess to drain  Return to PCP for rx of DM med and possible insulin.   Follow-up with me in 10-14 days.    Patient understands that this could develop a deep abscess and worsened in the next few days and this would be a medical emergency that he should report to the emergency department for.  However labs documented normal WBC and has no documented temperature therefore he was not admitted today in the emergency department.    Will attempt to discuss with PCP for acute management of blood glucose and more aggressive monitoring in next 30 days to improve education, compliance and outcome.  Patient admits that he is motivated to modify his lifestyle and medication use to manage diabetes.    Made appointment with PCP within 1 week.    We also discussed diabetic risk factors and provided written foot care instructions.  In addition to the above history and physical exam, approximately 40 minutes of time was spent with the patient, greater than 50% directly with the patient, counseling, educating, motivational interviewing and coordinating care in regards to the above noted multiple diagnoses.     7/31/2018  since last visit:  BG range   upon arising, usu below 120.    increased metformin and added glipizide.     Done with abx a couple days and toe remains red and hot.     Refilled abx with still red hot toe but  edema resolved.  This appears to be 70% resolved but no known deep abscess or point of drainage that can be cultured.  Therefore resume another course of broad-spectrum antibiotics Cipro Clinda.  Will consider advanced imaging if this worsens or unresolved, RTC 2-3 weeks.    Increased medication is much appreciated.  Education and motivation is much improved since last visit.  He is able to teach back his current risk factors and appropriate A1c goal.  He is able to identify barriers to control and has modified them.  He is following closely with PCP with daily blood glucose and has follow-up scheduled.  He is able to repeat the value of following up with his IM provider over the next several months to continue to monitor closely and titrate adjustments to medication and lifestyle.  Patient remains motivated today.    8/20/2018  The left third toe remains slightly edematous but no heat or erythema no pain and normal function is noted.  No fissuring or wounds at this time.  It appears to be resolved and his blood sugars remain stable.  He does not yet have an appointment with PCP but plans to follow-up to repeat A1c 90 days after it was last done.  Motivational interviewing today regarding the importance of this and what could be at risk for him.  Follow-up with me in 2 months or about the same time.    If she calls anytime in the next days or weeks with any red hot swollen or draining the left foot I would recommend and order MRI with contrast of the left foot then follow-up in clinic.       Darryl Huff DPM

## 2018-09-15 DIAGNOSIS — E11.65 TYPE 2 DIABETES MELLITUS WITH HYPERGLYCEMIA, WITHOUT LONG-TERM CURRENT USE OF INSULIN (H): ICD-10-CM

## 2018-09-18 ENCOUNTER — MYC REFILL (OUTPATIENT)
Dept: INTERNAL MEDICINE | Facility: CLINIC | Age: 53
End: 2018-09-18

## 2018-09-18 DIAGNOSIS — E11.65 TYPE 2 DIABETES MELLITUS WITH HYPERGLYCEMIA, WITHOUT LONG-TERM CURRENT USE OF INSULIN (H): ICD-10-CM

## 2018-09-18 RX ORDER — GLIPIZIDE 5 MG/1
TABLET, FILM COATED, EXTENDED RELEASE ORAL
Qty: 30 TABLET | Refills: 0 | Status: SHIPPED | OUTPATIENT
Start: 2018-09-18 | End: 2018-10-15

## 2018-09-18 NOTE — TELEPHONE ENCOUNTER
Requested Prescriptions   Pending Prescriptions Disp Refills     glipiZIDE (GLUCOTROL XL) 5 MG 24 hr tablet [Pharmacy Med Name: GLIPIZIDE ER 5MG TABLETS] 30 tablet 0    Last Written Prescription Date:  7/23/2018  Last Fill Quantity: 30,  # refills: 1   Last office visit: 7/20/2018 with prescribing provider:  Dr. Painting   Future Office Visit:   Next 5 appointments (look out 90 days)     Oct 15, 2018  7:30 AM CDT   Return Visit with Darryl Huff DPM   [address] [address]   [phone]            Oct 15, 2018  9:00 AM CDT   MyChart Long with Rancho Painting MD   [address] [address]   [phone]                  Sig: TAKE 1 TABLET BY MOUTH DAILY    Sulfonylurea Agents Failed    9/15/2018 10:22 AM       Failed - Patient has documented LDL within the past 12 mos.    Recent Labs   Lab Test  10/01/12   0950   LDL  80            Failed - Patient has had a Microalbumin in the past 12 mos.    Recent Labs   Lab Test  06/08/11   1143   MICROL  6   UMALCR  3.55            Passed - Blood pressure less than 140/90 in past 6 months    BP Readings from Last 3 Encounters:   08/20/18 104/76   07/31/18 108/78   07/20/18 110/74                Passed - Patient has documented A1c within the specified period of time.    If HgbA1C is 8 or greater, it needs to be on file within the past 3 months.  If less than 8, must be on file within the past 6 months.     Recent Labs   Lab Test  07/18/18   0900   A1C  11.3*            Passed - Patient is age 18 or older       Passed - Patient has a recent creatinine (normal) within the past 12 mos.    Recent Labs   Lab Test  07/18/18   1105   CR  0.68            Passed - Recent (6 mo) or future (30 days) visit within the authorizing provider's specialty    Patient had office visit in the last 6 months or has a visit in the next 30  "days with authorizing provider or within the authorizing provider's specialty.  See \"Patient Info\" tab in inbasket, or \"Choose Columns\" in Meds & Orders section of the refill encounter.              "

## 2018-09-18 NOTE — TELEPHONE ENCOUNTER
Routing refill request to provider for review/approval because:  Labs out of range:  A1C 11.8  Labs not current:  FLP, microalbumin   Radha Hernandez, RN, BSN

## 2018-09-18 NOTE — TELEPHONE ENCOUNTER
Message from Propagenixhart:  Original authorizing provider: MD Luke Mckenzie would like a refill of the following medications:  glipiZIDE (GLIPIZIDE XL) 5 MG 24 hr tablet [Rancho Painting MD]    Preferred pharmacy: [facility] [address] [address] [address]    Comment:

## 2018-09-18 NOTE — TELEPHONE ENCOUNTER
Requested Prescriptions   Pending Prescriptions Disp Refills     glipiZIDE (GLIPIZIDE XL) 5 MG 24 hr tablet 30 tablet 1    Last Written Prescription Date:  7/23/18  Last Fill Quantity: 30,  # refills: 1   Last office visit: 7/20/2018 with prescribing provider:  7/20/18   Future Office Visit:   Next 5 appointments (look out 90 days)     Oct 15, 2018  7:30 AM CDT   [facility] Visit with Darryl Huff DPM   [facility]    [address] [place] [address]   [phone]            Oct 15, 2018  9:00 AM CDT   Yolandat Long with Rancho Painting MD   [facility] ([address] [address]   [phone]                  Sig: Take 1 tablet (5 mg) by mouth daily    Sulfonylurea Agents Failed    9/18/2018  8:52 AM       Failed - Patient has documented LDL within the past 12 mos.    Recent Labs   Lab Test  10/01/12   0950   LDL  80            Failed - Patient has had a Microalbumin in the past 12 mos.    Recent Labs   Lab Test  06/08/11   1143   MICROL  6   UMALCR  3.55            Passed - Blood pressure less than 140/90 in past 6 months    BP Readings from Last 3 Encounters:   08/20/18 104/76   07/31/18 108/78   07/20/18 110/74                Passed - Patient has documented A1c within the specified period of time.    If HgbA1C is 8 or greater, it needs to be on file within the past 3 months.  If less than 8, must be on file within the past 6 months.     Recent Labs   Lab Test  07/18/18   0900   A1C  11.3*            Passed - Patient is age 18 or older       Passed - Patient has a recent creatinine (normal) within the past 12 mos.    Recent Labs   Lab Test  07/18/18   1105   CR  0.68            Passed - Recent (6 mo) or future (30 days) visit within the authorizing provider's specialty    Patient had office visit in the last 6 months or has a visit in the next 30 days with authorizing provider or within the  "authorizing provider's specialty.  See \"Patient Info\" tab in inbasket, or \"Choose Columns\" in Meds & Orders section of the refill encounter.              "

## 2018-09-20 RX ORDER — GLIPIZIDE 5 MG/1
5 TABLET, FILM COATED, EXTENDED RELEASE ORAL DAILY
Qty: 30 TABLET | Refills: 1 | Status: SHIPPED | OUTPATIENT
Start: 2018-09-20 | End: 2018-10-17

## 2018-09-20 NOTE — TELEPHONE ENCOUNTER
Routing refill request to provider for review/approval because:  Labs out of range:  A1C  Labs not current:  LDL, Micro albumin    Lab Results   Component Value Date    A1C 11.3 07/18/2018    A1C 8.2 10/01/2012    A1C 6.3 06/08/2011     LDL Cholesterol Calculated   Date Value Ref Range Status   10/01/2012 80 0 - 129 mg/dL Final     Comment:     LDL Cholesterol is the primary guide to therapy: LDL-cholesterol goal in high   risk patients is <100 mg/dL and in very high risk patients is <70 mg/dL.     Lab Results   Component Value Date    MICROL 6 06/08/2011     Moon Hawkins RN

## 2018-09-26 ENCOUNTER — ALLIED HEALTH/NURSE VISIT (OUTPATIENT)
Dept: EDUCATION SERVICES | Facility: CLINIC | Age: 53
End: 2018-09-26
Payer: COMMERCIAL

## 2018-09-26 DIAGNOSIS — E11.65 TYPE 2 DIABETES MELLITUS WITH HYPERGLYCEMIA, WITHOUT LONG-TERM CURRENT USE OF INSULIN (H): Primary | ICD-10-CM

## 2018-09-26 PROCEDURE — G0108 DIAB MANAGE TRN  PER INDIV: HCPCS

## 2018-09-26 NOTE — PROGRESS NOTES
"Diabetes Self-Management Education & Support    Diabetes Education Self Management & Training    SUBJECTIVE/OBJECTIVE:  Diabetes education in the past 24mo: No  Diabetes type: Type 2  Disease course: Stable  Diabetes management related comments/concerns: Food Choices  Cultural Influences/Ethnic Background:  American      Diabetes Symptoms & Complications  Blurred vision: No  Fatigue: Yes  Foot ulcerations: No  Polydipsia: No  Polyphagia: No  Polyuria: No  Visual change: No  Weakness: Yes  Weight loss: No  Slow healing wounds: Yes  Weight trend: Stable  Autonomic neuropathy: No  CVA: No  Heart disease: No  Nephropathy: No  Peripheral neuropathy: Yes  Peripheral Vascular Disease: No  Retinopathy: No  Sexual dysfunction: No    Patient Problem List and Family Medical History reviewed for relevant medical history, current medical status, and diabetes risk factors.    Vitals:  There were no vitals taken for this visit.  Estimated body mass index is 28.76 kg/(m^2) as calculated from the following:    Height as of 8/20/18: 1.88 m (6' 2\").    Weight as of 8/20/18: 101.6 kg (224 lb).   Last 3 BP:   BP Readings from Last 3 Encounters:   08/20/18 104/76   07/31/18 108/78   07/20/18 110/74       History   Smoking Status     Never Smoker   Smokeless Tobacco     Never Used       Labs:  Lab Results   Component Value Date    A1C 11.3 07/18/2018     Lab Results   Component Value Date     07/18/2018     Lab Results   Component Value Date    LDL 80 10/01/2012     HDL Cholesterol   Date Value Ref Range Status   10/01/2012 33 (L) 40 - 110 mg/dL Final   ]  GFR Estimate   Date Value Ref Range Status   07/18/2018 >90 >60 mL/min/1.7m2 Final     Comment:     Non  GFR Calc     GFR Estimate If Black   Date Value Ref Range Status   07/18/2018 >90 >60 mL/min/1.7m2 Final     Comment:      GFR Calc     Lab Results   Component Value Date    CR 0.68 07/18/2018     No results found for: MICROALBUMIN    Healthy " Eating  Cultural/Yazidism diet restrictions?: (P) No  Meal planning: (P) Avoiding sweets, Carbohydrate counting  Meals include: (P) Breakfast, Lunch, Dinner, Snacks  Beverages: (P) Water, Milk  Has patient met with a dietitian in the past?: (P) No    Being Active  How intense was your typical exercise? : (P) Moderate (like brisk walking)  Barrier to exercise: (P) None, Time    Monitoring  Blood Glucose Meter: (P) Accu-check diaz  Home Glucose (Sugar) Monitoring: (P) 1-2 times per day  Blood glucose trend: (P) Fluctuating minimally  Low Glucose Range (mg/dL): (P)   High Glucose Range (mg/dL): (P) 140-180  Overall Range (mg/dL): (P) 110-130      Taking Medications  Diabetes Medication(s)     Biguanides Sig    metFORMIN (GLUCOPHAGE-XR) 500 MG 24 hr tablet Take 2 tablets (1,000 mg) by mouth 2 times daily (with meals)    Sulfonylureas Sig    glipiZIDE (GLIPIZIDE XL) 5 MG 24 hr tablet Take 1 tablet (5 mg) by mouth daily    glipiZIDE (GLUCOTROL XL) 5 MG 24 hr tablet TAKE 1 TABLET BY MOUTH DAILY          Current Treatments: (P) Oral Agent (monotherapy)    Problem Solving  Hypoglycemia Frequency: (P) Never  Hypoglycemia Treatment: (P) Glucose (tablets or gel), Juice  Patient carries a carbohydrate source: (P) Yes  Medical alert: (P) No  Severe weather/disaster plan for diabetes management?: (P) No  DKA prevention plan?: (P) No  Sick day plan for diabetes management?: (P) No          Reducing Risks  CAD Risks: (P) Family history  Has dilated eye exam at least once a year?: (P) No  Sees dentist every 6 months?: (P) Yes  Sees podiatrist (foot doctor)?: (P) Yes    Healthy Coping  Informal Support system:: (P) Spouse  Difficulty affording diabetes management supplies?: (P) No  Patient Activation Measure Survey Score:  JAYLEN Score (Last Two) 1/21/2011   JAYLEN Raw Score 46   Activation Score 75.3   JAYLEN Level 4       ASSESSMENT:  Diagnosed 2011. Has cut out 3 20-oz bottles Pepsi/day + sweets. Now has Pepsi only once a week.  Blood sugars have come down to target most of time.       Patient's most recent   Lab Results   Component Value Date    A1C 11.3 07/18/2018    is not meeting goal of <7.0    INTERVENTION:   Diabetes knowledge and skills assessment:     Patient is knowledgeable in diabetes management concepts related to: Healthy Eating, Being Active, Monitoring, Taking Medication, Problem Solving and Healthy Coping    Patient needs further education on the following diabetes management concepts: Healthy Eating, Monitoring, Problem Solving and Reducing Risks    Based on learning assessment above, most appropriate setting for further diabetes education would be: Individual setting.    Education provided today on:  AADE Self-Care Behaviors:  Healthy Eating: carbohydrate counting, portion control, plate planning method and label reading  Monitoring: individual blood glucose targets, frequency of monitoring and CGM option  Problem Solving: low blood glucose - causes, signs/symptoms, treatment and prevention, carrying a carbohydrate source at all times and when to call health care provider  Reducing Risks: A1C - goals, relating to blood glucose levels, how often to check    Opportunities for ongoing education and support in diabetes-self management were discussed.    Pt verbalized understanding of concepts discussed and recommendations provided today.       Education Materials Provided:  Sue Taking Charge of Your Diabetes Book, BG Log Sheet and PLanning HEalthy Meals    PLAN:  No new goals. Recommend patient watch for lows and consider CGM.   See Follow-Up section for recommended follow-up.    Suma Wooten RDN, NENITA, CDE    Time Spent: 40 minutes  Encounter Type: Individual    Any diabetes medication dose changes were made via the CDE Protocol and Collaborative Practice Agreement with the patient's primary care provider. A copy of this encounter was shared with the provider.

## 2018-09-26 NOTE — MR AVS SNAPSHOT
After Visit Summary   9/26/2018    Luke Farias    MRN: [id number]           Patient Information     Date Of Birth          1965        Visit Information        Provider Department      9/26/2018 2:00 PM [facility] DIABETIC ED RESOURCE [facility]        Today's Diagnoses     Type 2 diabetes mellitus with hyperglycemia, without long-term current use of insulin (H)    -  1       Follow-ups after your visit        Follow-up notes from your care team     Return if symptoms worsen or fail to improve, for Annual Diabetes Education Review.      Your next 10 appointments already scheduled     Oct 15, 2018  7:30 AM CDT   [facility]    [address] [place] [address] [id number] [phone]            Oct 15, 2018  9:00 AM CDT   MyCpatriciat Long with Rancho Painting MD   [facility]    [address] [place] [address] [id number] [phone]              Who to contact     If you have questions or need follow up information about today's clinic visit or your schedule please contact [facility] directly at [phone].  Normal or non-critical lab and imaging results will be communicated to you by MyChart, letter or phone within 4 business days after the clinic has received the results. If you do not hear from us within 7 days, please contact the clinic through [facility] or phone. If you have a critical or abnormal lab result, we will notify you by phone as soon as possible.  Submit refill requests through Goodreads or call your pharmacy and they will forward the refill request to us. Please allow 3 business days for your refill to be completed.          Additional Information About Your Visit        MyChart Information     Goodreads gives you secure access to your electronic health record. If you see a primary care provider, you can  also send messages to your care team and make appointments. If you have questions, please call your primary care clinic.  If you do not have a primary care provider, please call [phone] and they will assist you.        Care EveryWhere ID     This is your Care EveryWhere ID. This could be used by other organizations to access your [place] medical records  [id number]         Blood Pressure from Last 3 Encounters:   08/20/18 104/76   07/31/18 108/78   07/20/18 110/74    Weight from Last 3 Encounters:   08/20/18 101.6 kg (224 lb)   07/31/18 98.9 kg (218 lb)   07/20/18 97.5 kg (215 lb)              We Performed the Following     DIABETES EDUCATION - Individual  []        Primary Care Provider Office Phone # Fax #    Rancho Painting [phone]       [address] [facility] [address]        Equal Access to Services     ILANA ALANIZ : Hadii aad ku hadasho Soomaali, waaxda luqadaha, qaybta kaalmada adeegyada, waxay chemain jasonn talha herrera . So [facility] [phone].    ATENCIÓN: Si habla español, tiene a barksdale disposición servicios gratuitos de asistencia lingüística. Llame al [phone].    We comply with applicable federal civil rights laws and [place] laws. We do not discriminate on the basis of race, color, national origin, age, disability, sex, sexual orientation, or gender identity.            Thank you!     Thank you for choosing [facility] [place]  for your care. Our goal is always to provide you with excellent care. Hearing back from our patients is one way we can continue to improve our services. Please take a few minutes to complete the written survey that you may receive in the mail after your visit with us. Thank you!             Your Updated Medication List - Protect others around you: Learn how to safely use, store and throw away your medicines at www.disposemymeds.org.          This list is accurate as of 9/26/18  4:16 PM.  Always use your most  recent med list.                   Brand Name Dispense Instructions for use Diagnosis    * blood glucose monitoring test strip    no brand specified    1 Box    by In Vitro route. Test as directed    Type 2 diabetes, HbA1c goal < 7% (H)       * blood glucose monitoring test strip    no brand specified    200 strip    Use to test blood sugars 4 times daily or as directed due to infection and hyperglycemia    Type 2 diabetes mellitus with hyperglycemia, without long-term current use of insulin (H)       ciprofloxacin 500 MG tablet    CIPRO    20 tablet    Take 1 tablet (500 mg) by mouth 2 times daily    Uncontrolled type 2 diabetes mellitus with other skin complication, without long-term current use of insulin (H)       clindamycin 300 MG capsule    CLEOCIN    40 capsule    Take 1 capsule (300 mg) by mouth 4 times daily    Uncontrolled type 2 diabetes mellitus with other skin complication, without long-term current use of insulin (H)       DIABETIC STERILE LANCETS device     1 Box    1 Device daily.    Type 2 diabetes, HbA1c goal < 7% (H)       * glipiZIDE 5 MG 24 hr tablet    GLUCOTROL XL    30 tablet    TAKE 1 TABLET BY MOUTH DAILY    Type 2 diabetes mellitus with hyperglycemia, without long-term current use of insulin (H)       * glipiZIDE 5 MG 24 hr tablet    glipiZIDE XL    30 tablet    Take 1 tablet (5 mg) by mouth daily    Type 2 diabetes mellitus with hyperglycemia, without long-term current use of insulin (H)       GLUCOMETER ELITE CLASSIC Kit     1 kit    1 Device daily.    Type 2 diabetes, HbA1c goal < 7% (H)       ibuprofen 200 MG tablet    ADVIL/MOTRIN     Take 200 mg by mouth every 4 hours as needed.        metFORMIN 500 MG 24 hr tablet    GLUCOPHAGE-XR    360 tablet    Take 2 tablets (1,000 mg) by mouth 2 times daily (with meals)    Type 2 diabetes mellitus with hyperglycemia, without long-term current use of insulin (H)       * Notice:  This list has 4 medication(s) that are the same as other  medications prescribed for you. Read the directions carefully, and ask your doctor or other care provider to review them with you.

## 2018-10-15 ENCOUNTER — OFFICE VISIT (OUTPATIENT)
Dept: INTERNAL MEDICINE | Facility: CLINIC | Age: 53
End: 2018-10-15
Payer: COMMERCIAL

## 2018-10-15 VITALS
DIASTOLIC BLOOD PRESSURE: 84 MMHG | OXYGEN SATURATION: 98 % | BODY MASS INDEX: 29.27 KG/M2 | SYSTOLIC BLOOD PRESSURE: 114 MMHG | RESPIRATION RATE: 16 BRPM | TEMPERATURE: 97.1 F | HEART RATE: 88 BPM | WEIGHT: 228 LBS

## 2018-10-15 DIAGNOSIS — E11.65 TYPE 2 DIABETES MELLITUS WITH HYPERGLYCEMIA, WITHOUT LONG-TERM CURRENT USE OF INSULIN (H): Primary | ICD-10-CM

## 2018-10-15 DIAGNOSIS — Z12.11 SPECIAL SCREENING FOR MALIGNANT NEOPLASM OF COLON: ICD-10-CM

## 2018-10-15 DIAGNOSIS — Z23 NEED FOR PROPHYLACTIC VACCINATION AND INOCULATION AGAINST INFLUENZA: ICD-10-CM

## 2018-10-15 PROCEDURE — 90471 IMMUNIZATION ADMIN: CPT | Performed by: INTERNAL MEDICINE

## 2018-10-15 PROCEDURE — 90682 RIV4 VACC RECOMBINANT DNA IM: CPT | Performed by: INTERNAL MEDICINE

## 2018-10-15 PROCEDURE — 99214 OFFICE O/P EST MOD 30 MIN: CPT | Mod: 25 | Performed by: INTERNAL MEDICINE

## 2018-10-15 ASSESSMENT — PAIN SCALES - GENERAL: PAINLEVEL: NO PAIN (0)

## 2018-10-15 NOTE — MR AVS SNAPSHOT
After Visit Summary   10/15/2018    Luke Farias    MRN: [id number]           Patient Information     Date Of Birth          1965        Visit Information        Provider Department      10/15/2018 9:00 AM Rancho Painting MD [facility]         Follow-ups after your visit        Your next 10 appointments already scheduled     Oct 15, 2018  9:00 AM CDT   Hector Long with Rancho Painting MD   [facility]    [address] [place] [address] [phone] [phone]              Who to contact     If you have questions or need follow up information about today's clinic visit or your schedule please contact [facility] directly at [phone].  Normal or non-critical lab and imaging results will be communicated to you by MyChart, letter or phone within 4 business days after the clinic has received the results. If you do not hear from us within 7 days, please contact the clinic through [facility] or phone. If you have a critical or abnormal lab result, we will notify you by phone as soon as possible.  Submit refill requests through [employer] or call your pharmacy and they will forward the refill request to us. Please allow 3 business days for your refill to be completed.          Additional Information About Your Visit        MyChart Information     [employer]Live gives you secure access to your electronic health record. If you see a primary care provider, you can also send messages to your care team and make appointments. If you have questions, please call your primary care clinic.  If you do not have a primary care provider, please call [phone] and they will assist you.        Care EveryWhere ID     This is your Care EveryWhere ID. This could be used by other organizations to access your [place] medical records  [id number]        Your Vitals Were     Pulse Temperature Respirations Pulse Oximetry BMI (Body Mass Index)       88  97.1  F (36.2  C) (Temporal) 16 98% 29.27 kg/m2        Blood Pressure from Last 3 Encounters:   10/15/18 114/84   08/20/18 104/76   07/31/18 108/78    Weight from Last 3 Encounters:   10/15/18 228 lb (103.4 kg)   08/20/18 224 lb (101.6 kg)   07/31/18 218 lb (98.9 kg)              Today, you had the following     No orders found for display       Primary Care Provider Office Phone # Fax #    Rancho Painting [phone] [address] [facility] [address]        Equal Access to Services     TITO ALANIZ : Hadii jose j abbott hadasho Soomaali, waaxda luqadaha, qaybta kaalmada adeegyada, dudley herrera . So [facility] [phone].    ATENCIÓN: Si habla español, tiene a barksdale disposición servicios gratuitos de asistencia lingüística. Llame al [phone].    We comply with applicable federal civil rights laws and [place] laws. We do not discriminate on the basis of race, color, national origin, age, disability, sex, sexual orientation, or gender identity.            Thank you!     Thank you for choosing [facility]  for your care. Our goal is always to provide you with excellent care. Hearing back from our patients is one way we can continue to improve our services. Please take a few minutes to complete the written survey that you may receive in the mail after your visit with us. Thank you!             Your Updated Medication List - Protect others around you: Learn how to safely use, store and throw away your medicines at www.disposemymeds.org.          This list is accurate as of 10/15/18  8:53 AM.  Always use your most recent med list.                   Brand Name Dispense Instructions for use Diagnosis    * blood glucose monitoring test strip    no brand specified    1 Box    by In Vitro route. Test as directed    Type 2 diabetes, HbA1c goal < 7% (H)       * blood glucose monitoring test strip    no brand specified    200 strip    Use to test blood sugars 4 times  daily or as directed due to infection and hyperglycemia    Type 2 diabetes mellitus with hyperglycemia, without long-term current use of insulin (H)       DIABETIC STERILE LANCETS device     1 Box    1 Device daily.    Type 2 diabetes, HbA1c goal < 7% (H)       glipiZIDE 5 MG 24 hr tablet    glipiZIDE XL    30 tablet    Take 1 tablet (5 mg) by mouth daily    Type 2 diabetes mellitus with hyperglycemia, without long-term current use of insulin (H)       GLUCOMETER ELITE CLASSIC Kit     1 kit    1 Device daily.    Type 2 diabetes, HbA1c goal < 7% (H)       ibuprofen 200 MG tablet    ADVIL/MOTRIN     Take 200 mg by mouth every 4 hours as needed.        metFORMIN 500 MG 24 hr tablet    GLUCOPHAGE-XR    360 tablet    Take 2 tablets (1,000 mg) by mouth 2 times daily (with meals)    Type 2 diabetes mellitus with hyperglycemia, without long-term current use of insulin (H)       * Notice:  This list has 2 medication(s) that are the same as other medications prescribed for you. Read the directions carefully, and ask your doctor or other care provider to review them with you.

## 2018-10-15 NOTE — PROGRESS NOTES
SUBJECTIVE:   Luke Farias is a 52 year old male who presents to clinic today for the following health issues:    Chief Complaint   Patient presents with     Diabetes     follow up       Foot is good, healed up now.  Toes are slightly swollen still.  No pain, some nerve irritation and numbness.      Taking metformin 500mg AM and 1000mg PM  Glipizide 5mg daily.      Sugar today was 135, had some chips last night. Usually lower sugar then 135, occasional highs.       Past Medical History:   Diagnosis Date     Diabetes (H)     type 2     Seasonal allergies     Allergy inj as a child     Current Outpatient Prescriptions   Medication     glipiZIDE (GLIPIZIDE XL) 5 MG 24 hr tablet     metFORMIN (GLUCOPHAGE-XR) 500 MG 24 hr tablet     blood glucose monitoring (NO BRAND SPECIFIED) test strip     Blood Glucose Monitoring Suppl (GLUCOMETER ELITE CLASSIC) KIT     DIABETIC STERILE LANCETS device     glucose blood VI test strips (ASCENSIA AUTODISC VI,ONE TOUCH ULTRA TEST VI) strip     ibuprofen (ADVIL,MOTRIN) 200 MG tablet     [DISCONTINUED] glipiZIDE (GLUCOTROL XL) 5 MG 24 hr tablet     No current facility-administered medications for this visit.      Physical Exam  /84  Pulse 88  Temp 97.1  F (36.2  C) (Temporal)  Resp 16  Wt 228 lb (103.4 kg)  SpO2 98%  BMI 29.27 kg/m2  General Appearance-healthy, alert, no distress    ASSESSMENT:  52-year-old gentleman who has a history of type 2 diabetes which is uncontrolled with an A1c of 11.3 in July.  He did develop a large foot ulceration thankfully he got better with podiatry, antibiotics, and much better glucose control.  He has been on Metformin extended release 500 in the morning thousand in the evening, and glipizide 5 mg extended release in the morning.  His sugars are better found in the 130 range still may need to get better.  He is got a little bit of numbness and neuropathy.  We will check his A1c has been 3 months.  We will also wait to do this when he is  fasting to check his fasting cholesterol.  He has a family history of heart disease and may need a statin.  Hopefully his A1c is down in the low 7-6 range.    His family history of colon cancer in his father at 65 he does need a colonoscopy last year he had issues with the prep not being complete we will have him do a 2-day prep and get this done as soon as possible.    I spent greater than 50% of this 35 minute visit in counseling and coordination of care of diabetes.    Electronically signed by Rancho Painting MD

## 2018-10-15 NOTE — PROGRESS NOTES
Screening Questionnaire for Adult Immunization    Are you sick today?   No   Do you have allergies to medications, food, a vaccine component or latex?   No   Have you ever had a serious reaction after receiving a vaccination?   No   Do you have a long-term health problem with heart disease, lung disease, asthma, kidney disease, metabolic disease (e.g. diabetes), anemia, or other blood disorder?   Yes   Do you have cancer, leukemia, HIV/AIDS, or any other immune system problem?   No   In the past 3 months, have you taken medications that affect  your immune system, such as prednisone, other steroids, or anticancer drugs; drugs for the treatment of rheumatoid arthritis, Crohn s disease, or psoriasis; or have you had radiation treatments?   No   Have you had a seizure, or a brain or other nervous system problem?   No   During the past year, have you received a transfusion of blood or blood     products, or been given immune (gamma) globulin or antiviral drug?   No   For women: Are you pregnant or is there a chance you could become        pregnant during the next month?   No   Have you received any vaccinations in the past 4 weeks?   No     Immunization questionnaire was positive for at least one answer.  Notified Yes.        Per orders of Dr. Rancho Painting, injection of Flublok given by Latonia Rasmussen. Patient instructed to remain in clinic for 15 minutes afterwards, and to report any adverse reaction to me immediately.       Screening performed by Laotnia Rasmussen on 10/15/2018 at 9:42 AM.      Injectable Influenza Immunization Documentation    1.  Is the person to be vaccinated sick today?   No    2. Does the person to be vaccinated have an allergy to a component   of the vaccine?   No  Egg Allergy Algorithm Link    3. Has the person to be vaccinated ever had a serious reaction   to influenza vaccine in the past?   No    4. Has the person to be vaccinated ever had Guillain-Barré syndrome?   No    Form completed by  Latonia Rasmussen MA

## 2018-10-17 ENCOUNTER — MYC MEDICAL ADVICE (OUTPATIENT)
Dept: INTERNAL MEDICINE | Facility: CLINIC | Age: 53
End: 2018-10-17

## 2018-10-17 ENCOUNTER — MYC REFILL (OUTPATIENT)
Dept: INTERNAL MEDICINE | Facility: CLINIC | Age: 53
End: 2018-10-17

## 2018-10-17 DIAGNOSIS — E11.65 TYPE 2 DIABETES MELLITUS WITH HYPERGLYCEMIA, WITHOUT LONG-TERM CURRENT USE OF INSULIN (H): ICD-10-CM

## 2018-10-17 NOTE — TELEPHONE ENCOUNTER
Message from [email or web address]:  Original authorizing provider: MD Luke Mckenzie would like a refill of the following medications:  metFORMIN (GLUCOPHAGE-XR) 500 MG 24 hr tablet [Rancho Painting MD]  glipiZIDE (GLIPIZIDE XL) 5 MG 24 hr tablet [Rancho Painting MD]    Preferred pharmacy: [facility] [address] [address]    Comment:

## 2018-10-18 RX ORDER — METFORMIN HCL 500 MG
1000 TABLET, EXTENDED RELEASE 24 HR ORAL 2 TIMES DAILY WITH MEALS
Qty: 360 TABLET | Refills: 1 | Status: SHIPPED | OUTPATIENT
Start: 2018-10-18 | End: 2019-02-17

## 2018-10-18 RX ORDER — GLIPIZIDE 5 MG/1
5 TABLET, FILM COATED, EXTENDED RELEASE ORAL DAILY
Qty: 30 TABLET | Refills: 1 | Status: SHIPPED | OUTPATIENT
Start: 2018-10-18 | End: 2019-01-01

## 2018-10-18 NOTE — TELEPHONE ENCOUNTER
"Glipizide Last Written Prescription Date:  9/20/18  Last Fill Quantity: 30,  # refills: 1   Last office visit: 10/15/2018 with prescribing provider:  Rancho Painting   Future Office Visit:      Metformin Last Written Prescription Date:  7/20/18  Last Fill Quantity: 360,  # refills: 1   Last office visit: 10/15/2018 with prescribing provider:  Rancho Painting   Future Office Visit:      Requested Prescriptions   Pending Prescriptions Disp Refills     metFORMIN (GLUCOPHAGE-XR) 500 MG 24 hr tablet 360 tablet 1     Sig: Take 2 tablets (1,000 mg) by mouth 2 times daily (with meals)    Biguanide Agents Failed    10/17/2018 10:15 AM       Failed - Patient has documented LDL within the past 12 mos.    Recent Labs   Lab Test  10/01/12   0950   LDL  80            Failed - Patient has had a Microalbumin in the past 15 mos.    Recent Labs   Lab Test  06/08/11   1143   MICROL  6   UMALCR  3.55            Failed - Patient has documented A1c within the specified period of time.    If HgbA1C is 8 or greater, it needs to be on file within the past 3 months.  If less than 8, must be on file within the past 6 months.     Recent Labs   Lab Test  07/18/18   0900   A1C  11.3*            Passed - Blood pressure less than 140/90 in past 6 months    BP Readings from Last 3 Encounters:   10/15/18 114/84   08/20/18 104/76   07/31/18 108/78                Passed - Patient is age 10 or older       Passed - Patient's CR is NOT>1.4 OR Patient's EGFR is NOT<45 within past 12 mos.    Recent Labs   Lab Test  07/18/18   1105   GFRESTIMATED  >90   GFRESTBLACK  >90       Recent Labs   Lab Test  07/18/18   1105   CR  0.68            Passed - Patient does NOT have a diagnosis of CHF.       Passed - Recent (6 mo) or future (30 days) visit within the authorizing provider's specialty    Patient had office visit in the last 6 months or has a visit in the next 30 days with authorizing provider or within the authorizing provider's specialty.  See \"Patient Info\" tab in " "inbasket, or \"Choose Columns\" in Meds & Orders section of the refill encounter.            glipiZIDE (GLIPIZIDE XL) 5 MG 24 hr tablet 30 tablet 1     Sig: Take 1 tablet (5 mg) by mouth daily    Sulfonylurea Agents Failed    10/17/2018 10:15 AM       Failed - Patient has documented LDL within the past 12 mos.    Recent Labs   Lab Test  10/01/12   0950   LDL  80            Failed - Patient has had a Microalbumin in the past 12 mos.    Recent Labs   Lab Test  06/08/11   1143   MICROL  6   UMALCR  3.55            Failed - Patient has documented A1c within the specified period of time.    If HgbA1C is 8 or greater, it needs to be on file within the past 3 months.  If less than 8, must be on file within the past 6 months.     Recent Labs   Lab Test  07/18/18   0900   A1C  11.3*            Passed - Blood pressure less than 140/90 in past 6 months    BP Readings from Last 3 Encounters:   10/15/18 114/84   08/20/18 104/76   07/31/18 108/78                Passed - Patient is age 18 or older       Passed - Patient has a recent creatinine (normal) within the past 12 mos.    Recent Labs   Lab Test  07/18/18   1105   CR  0.68            Passed - Recent (6 mo) or future (30 days) visit within the authorizing provider's specialty    Patient had office visit in the last 6 months or has a visit in the next 30 days with authorizing provider or within the authorizing provider's specialty.  See \"Patient Info\" tab in inbasket, or \"Choose Columns\" in Meds & Orders section of the refill encounter.            Metformin Routing refill request to provider for review/approval because:  Labs out of range:  A1C  Labs not current:  LDL, Micralbumin     Glipizide Routing refill request to provider for review/approval because:  Labs out of range: A1C  Labs not current:   LDL, Microalbumin    Moon Hawkins RN              "

## 2018-11-02 DIAGNOSIS — E11.65 TYPE 2 DIABETES MELLITUS WITH HYPERGLYCEMIA, WITHOUT LONG-TERM CURRENT USE OF INSULIN (H): ICD-10-CM

## 2018-11-02 LAB
ALBUMIN SERPL-MCNC: 4 G/DL (ref 3.4–5)
ALP SERPL-CCNC: 46 U/L (ref 40–150)
ALT SERPL W P-5'-P-CCNC: 35 U/L (ref 0–70)
ANION GAP SERPL CALCULATED.3IONS-SCNC: 3 MMOL/L (ref 3–14)
AST SERPL W P-5'-P-CCNC: 18 U/L (ref 0–45)
BILIRUB SERPL-MCNC: 0.7 MG/DL (ref 0.2–1.3)
BUN SERPL-MCNC: 11 MG/DL (ref 7–30)
CALCIUM SERPL-MCNC: 9.1 MG/DL (ref 8.5–10.1)
CHLORIDE SERPL-SCNC: 103 MMOL/L (ref 94–109)
CO2 SERPL-SCNC: 33 MMOL/L (ref 20–32)
CREAT SERPL-MCNC: 0.72 MG/DL (ref 0.66–1.25)
GFR SERPL CREATININE-BSD FRML MDRD: >90 ML/MIN/1.7M2
GLUCOSE SERPL-MCNC: 158 MG/DL (ref 70–99)
HBA1C MFR BLD: 6.1 % (ref 0–5.6)
POTASSIUM SERPL-SCNC: 4.2 MMOL/L (ref 3.4–5.3)
PROT SERPL-MCNC: 7.5 G/DL (ref 6.8–8.8)
SODIUM SERPL-SCNC: 139 MMOL/L (ref 133–144)

## 2018-11-02 PROCEDURE — 36415 COLL VENOUS BLD VENIPUNCTURE: CPT | Performed by: FAMILY MEDICINE

## 2018-11-02 PROCEDURE — 80053 COMPREHEN METABOLIC PANEL: CPT | Performed by: FAMILY MEDICINE

## 2018-11-02 PROCEDURE — 83036 HEMOGLOBIN GLYCOSYLATED A1C: CPT | Performed by: FAMILY MEDICINE

## 2019-01-01 ENCOUNTER — MYC REFILL (OUTPATIENT)
Dept: INTERNAL MEDICINE | Facility: CLINIC | Age: 54
End: 2019-01-01

## 2019-01-01 DIAGNOSIS — E11.65 TYPE 2 DIABETES MELLITUS WITH HYPERGLYCEMIA, WITHOUT LONG-TERM CURRENT USE OF INSULIN (H): ICD-10-CM

## 2019-01-02 ENCOUNTER — TELEPHONE (OUTPATIENT)
Dept: SURGERY | Facility: CLINIC | Age: 54
End: 2019-01-02

## 2019-01-02 RX ORDER — GLIPIZIDE 5 MG/1
5 TABLET, FILM COATED, EXTENDED RELEASE ORAL DAILY
Qty: 30 TABLET | Refills: 1 | Status: SHIPPED | OUTPATIENT
Start: 2019-01-02 | End: 2019-06-19

## 2019-01-02 NOTE — TELEPHONE ENCOUNTER
Location: [facility]  The name of the procedure: Colonoscopy  Provider scheduled with: Dr. Templeton  Date 01/21/2019, Time 10:45am  [facility]: [facility], and Fax #: [phone]  Prep Questions:

## 2019-01-02 NOTE — TELEPHONE ENCOUNTER
"Last Written Prescription Date:  10/18/18  Last Fill Quantity: 30,  # refills: 1   Last office visit: 10/15/2018 with prescribing provider:  Sanchez Rasmussen   Future Office Visit:      Requested Prescriptions   Pending Prescriptions Disp Refills     glipiZIDE (GLIPIZIDE XL) 5 MG 24 hr tablet 30 tablet 1     Sig: Take 1 tablet (5 mg) by mouth daily    Sulfonylurea Agents Failed - 1/2/2019  8:15 AM       Failed - Patient has documented LDL within the past 12 mos.    Recent Labs   Lab Test 10/01/12  0950   LDL 80            Failed - Patient has had a Microalbumin in the past 12 mos.    Recent Labs   Lab Test 06/08/11  1143   MICROL 6   UMALCR 3.55            Passed - Blood pressure less than 140/90 in past 6 months    BP Readings from Last 3 Encounters:   10/15/18 114/84   08/20/18 104/76   07/31/18 108/78                Passed - Patient has documented A1c within the specified period of time.    If HgbA1C is 8 or greater, it needs to be on file within the past 3 months.  If less than 8, must be on file within the past 6 months.     Recent Labs   Lab Test 11/02/18  0839   A1C 6.1*            Passed - Patient is age 18 or older       Passed - Patient has a recent creatinine (normal) within the past 12 mos.    Recent Labs   Lab Test 11/02/18  0839   CR 0.72            Passed - Recent (6 mo) or future (30 days) visit within the authorizing provider's specialty    Patient had office visit in the last 6 months or has a visit in the next 30 days with authorizing provider or within the authorizing provider's specialty.  See \"Patient Info\" tab in inbasket, or \"Choose Columns\" in Meds & Orders section of the refill encounter.            Routing refill request to provider for review/approval because:  Labs not current:  LDL, Micro albumin     Moon Hawkins RN         "

## 2019-01-14 ASSESSMENT — MIFFLIN-ST. JEOR: SCORE: 2001.58

## 2019-01-21 ENCOUNTER — HOSPITAL ENCOUNTER (OUTPATIENT)
Facility: AMBULATORY SURGERY CENTER | Age: 54
Discharge: HOME | End: 2019-01-21
Attending: SURGERY | Admitting: SURGERY
Payer: COMMERCIAL

## 2019-01-21 VITALS
WEIGHT: 235 LBS | DIASTOLIC BLOOD PRESSURE: 81 MMHG | HEIGHT: 75 IN | SYSTOLIC BLOOD PRESSURE: 120 MMHG | RESPIRATION RATE: 16 BRPM | TEMPERATURE: 97.7 F | OXYGEN SATURATION: 96 % | BODY MASS INDEX: 29.22 KG/M2 | HEART RATE: 83 BPM

## 2019-01-21 LAB — GLUCOSE BLDC GLUCOMTR-MCNC: 143 MG/DL (ref 70–99)

## 2019-01-21 PROCEDURE — 88305 TISSUE EXAM BY PATHOLOGIST: CPT | Performed by: PATHOLOGY

## 2019-01-21 PROCEDURE — 45385 COLONOSCOPY W/LESION REMOVAL: CPT | Mod: PT | Performed by: SURGERY

## 2019-01-21 PROCEDURE — 45385 COLONOSCOPY W/LESION REMOVAL: CPT

## 2019-01-21 PROCEDURE — 45381 COLONOSCOPY SUBMUCOUS NJX: CPT | Mod: PT | Performed by: SURGERY

## 2019-01-21 PROCEDURE — 45381 COLONOSCOPY SUBMUCOUS NJX: CPT

## 2019-01-21 PROCEDURE — 99152 MOD SED SAME PHYS/QHP 5/>YRS: CPT | Mod: 59 | Performed by: SURGERY

## 2019-01-21 PROCEDURE — G8907 PT DOC NO EVENTS ON DISCHARG: HCPCS

## 2019-01-21 PROCEDURE — G8918 PT W/O PREOP ORDER IV AB PRO: HCPCS

## 2019-01-21 PROCEDURE — 99153 MOD SED SAME PHYS/QHP EA: CPT | Mod: 59 | Performed by: SURGERY

## 2019-01-21 RX ORDER — FENTANYL CITRATE 50 UG/ML
INJECTION, SOLUTION INTRAMUSCULAR; INTRAVENOUS PRN
Status: DISCONTINUED | OUTPATIENT
Start: 2019-01-21 | End: 2019-01-21 | Stop reason: HOSPADM

## 2019-01-21 RX ORDER — LIDOCAINE 40 MG/G
CREAM TOPICAL
Status: DISCONTINUED | OUTPATIENT
Start: 2019-01-21 | End: 2019-01-22 | Stop reason: HOSPADM

## 2019-01-21 NOTE — LETTER
[facility]           [address] [place] [address]           Tel [phone]  Luke NATALIE Farias  [address]  [place] [address]      April 4, 2019    Dear Luke,  This letter is to inform you of the results of your pathology report on your colonoscopy.  You need to be set up for another colonoscopy.   If you have questions please feel free to call my assistant  At [phone] .    Your pathology report was:  Showed an advanced Adenomatous polyp.   No cancer.  But we know that I may not have removed it all and your prep was poor, so will redo the scope in 2 months from last colonoscopy with MAC.  This is a benign (not cancerous) growth; however these can become cancer over time. This polyp is usually removed completely at the time of the biopsy. Because it is an Adenomatous polyp you do have a slight higher risk for colon cancer. This is why you will need a repeat colonoscopy in approximately 2 months after your last colonoscopy.  So you are past due now.    If you do have further questions please don t hesitate to call my assistant at  .  We do not have someone answering the phone all the time at my assistants number so if leave a message may take a day or so to get back to you.  So if more urgent then call the below number.    To make an appointment call [phone]: .  Please make sure you follow up for another colonoscopy.   Sincerely,     Bartolome Templeton M.D.    ___

## 2019-01-21 NOTE — LETTER
[facility]           [address]           [place] [address]           Tel [phone]  Lukedee Farias  [address] [address]      January 24, 2019    Dear Luke,  This letter is to inform you of the results of your pathology report on your colonoscopy.  If you have questions please feel free to call my assistant  At [phone] .    Your pathology report was:    Showed an advanced Adenomatous polyp.   No cancer.  But we know that I may not have removed it all and your prep was poor, so will redo the scope in 2 months with MAC.  This is a benign (not cancerous) growth; however these can become cancer over time. This polyp is usually removed completely at the time of the biopsy. Because it is an Adenomatous polyp you do have a slight higher risk for colon cancer. This is why you will need a repeat colonoscopy in approximately 2 months.  If you do have further questions please don t hesitate to call my assistant at  .  We do not have someone answering the phone all the time at my assistants number so if leave a message may take a day or so to get back to you.  So if more urgent then call the below number.    To make an appointment call [phone]: .   Sincerely,     Bartolome Templeton M.D.  ___

## 2019-01-21 NOTE — DISCHARGE INSTRUCTIONS
I took out multiple large polyps and it was difficult as your prep was poor.  I was able to remove 3 completley, but the one at the mid ascending colon was hard to see if completely removed.  I did tattoo  just proximal to that spot so will need to look at this area again to make sure it is gone.  Your polyps were large so there is a risk of cancer in them. I would like to see you in clinic to go over the polyps and to discuss the next step with you.  So will need to see you back sooner due to your prep and not being able to make sure there was no other  polyps.    For your next colonoscopy two days and then the day before you take the regular prep,  I would recommend that you take magnesium citrate one bottle and drink it cold.  Take 1/2 the bottle and then 3 hours later drink the rest.  Drink plenty of water or clear juice with it as it will steal fluids from you.  You can also take some milk of magnesia 1-2 table spoons several hours before taking the magnesium citrate.  You will usually have results in 4-6 hours so do not plan on going anywhere for 8  Or more hours after starting the prep.  Then take the regular prep and follow the instructions.  Make sure you stay on clear liquids after you take the magnesium citrate and until after your colonoscopy.  If we do not find any polyps next time we may be able to go 10 years before your next colonoscopy.   So make sure you do your prep well.  I also think you would benefit from MAC  You will benefit from MAC.  Make sure your doctor orders it with MAC.  This is just added to the orders and is just typed into the area where they ask questions about your taking blood thinners.  This is administered by anesthesia to make you more comfortable than I can do safely during your colonoscopy.  This is done with anesthesia.   You have a lot of sharp turns in your colon and ansesthesia may be able to keep you more comfortable.  You will need and History and physical for this.   Just remind your primary doctor about this when ordering your next colonoscopy.  Next colonoscopy will probably be in 2-3 months.  Please do not allow a year to go by to do this.     Thanks  Bartolome Templeton MD

## 2019-01-23 LAB — COPATH REPORT: NORMAL

## 2019-01-24 DIAGNOSIS — Z86.0100 HISTORY OF COLONIC POLYPS: Primary | ICD-10-CM

## 2019-01-24 LAB — COLONOSCOPY: NORMAL

## 2019-02-17 ENCOUNTER — MYC REFILL (OUTPATIENT)
Dept: INTERNAL MEDICINE | Facility: CLINIC | Age: 54
End: 2019-02-17

## 2019-02-17 DIAGNOSIS — E11.65 TYPE 2 DIABETES MELLITUS WITH HYPERGLYCEMIA, WITHOUT LONG-TERM CURRENT USE OF INSULIN (H): ICD-10-CM

## 2019-02-19 RX ORDER — METFORMIN HCL 500 MG
1000 TABLET, EXTENDED RELEASE 24 HR ORAL 2 TIMES DAILY WITH MEALS
Qty: 360 TABLET | Refills: 1 | Status: SHIPPED | OUTPATIENT
Start: 2019-02-19 | End: 2020-01-14

## 2019-02-19 NOTE — TELEPHONE ENCOUNTER
I spoke with patient and information was given. [place] and will call and schedule when he returns.  Thank you,  Monica Mcgowan   for [facility]

## 2019-02-19 NOTE — TELEPHONE ENCOUNTER
"Last Written Prescription Date:  10/18/18  Last Fill Quantity: 360,  # refills: 1   Last office visit: 10/15/2018 with prescribing provider:  Sanchez Rasmussen   Future Office Visit:      Requested Prescriptions   Pending Prescriptions Disp Refills     metFORMIN (GLUCOPHAGE-XR) 500 MG 24 hr tablet 360 tablet 1     Sig: Take 2 tablets (1,000 mg) by mouth 2 times daily (with meals)    Biguanide Agents Failed - 2/18/2019  8:18 AM       Failed - Patient has documented LDL within the past 12 mos.    Recent Labs   Lab Test 10/01/12  0950   LDL 80            Failed - Patient has had a Microalbumin in the past 15 mos.    Recent Labs   Lab Test 06/08/11  1143   MICROL 6   UMALCR 3.55            Passed - Blood pressure less than 140/90 in past 6 months    BP Readings from Last 3 Encounters:   01/21/19 120/81   10/15/18 114/84   08/20/18 104/76                Passed - Patient is age 10 or older       Passed - Patient has documented A1c within the specified period of time.    If HgbA1C is 8 or greater, it needs to be on file within the past 3 months.  If less than 8, must be on file within the past 6 months.     Recent Labs   Lab Test 11/02/18  0839   A1C 6.1*            Passed - Patient's CR is NOT>1.4 OR Patient's EGFR is NOT<45 within past 12 mos.    Recent Labs   Lab Test 11/02/18  0839   GFRESTIMATED >90   GFRESTBLACK >90       Recent Labs   Lab Test 11/02/18  0839   CR 0.72            Passed - Patient does NOT have a diagnosis of CHF.       Passed - Medication is active on med list       Passed - Recent (6 mo) or future (30 days) visit within the authorizing provider's specialty    Patient had office visit in the last 6 months or has a visit in the next 30 days with authorizing provider or within the authorizing provider's specialty.  See \"Patient Info\" tab in inbasket, or \"Choose Columns\" in Meds & Orders section of the refill encounter.            Routing refill request to provider for review/approval because:  Labs out of " range:  A1C  Labs not current:  Microalbumin, LDL    Routing to scheduling to contact pt for lab visit.  Pt should be fasting for LDL.     Moon Hawkins RN on 2/18/2019 at 6:59 PM

## 2019-05-31 ENCOUNTER — OFFICE VISIT (OUTPATIENT)
Dept: URGENT CARE | Facility: RETAIL CLINIC | Age: 54
End: 2019-05-31
Payer: COMMERCIAL

## 2019-05-31 VITALS — HEART RATE: 80 BPM | DIASTOLIC BLOOD PRESSURE: 82 MMHG | SYSTOLIC BLOOD PRESSURE: 132 MMHG | TEMPERATURE: 98.6 F

## 2019-05-31 DIAGNOSIS — L03.114 CELLULITIS OF HAND, LEFT: Primary | ICD-10-CM

## 2019-05-31 PROCEDURE — 99213 OFFICE O/P EST LOW 20 MIN: CPT | Performed by: PHYSICIAN ASSISTANT

## 2019-05-31 RX ORDER — DOXYCYCLINE 100 MG/1
100 CAPSULE ORAL 2 TIMES DAILY
Qty: 20 CAPSULE | Refills: 0 | Status: SHIPPED | OUTPATIENT
Start: 2019-05-31 | End: 2019-06-10

## 2019-05-31 NOTE — PROGRESS NOTES
SUBJECTIVE:  Luke Farias is a 53 year old male with history of DM who presents to the clinic today for a rash. He had some cuts on left hand from work. Owns a lillie business. He says the healing is not happening. Noticed some heat with some redness and swelling.   Onset of rash was 3 day(s) ago.   Rash is sudden onset and worsening.  Location of the rash: hand left.  Quality/symptoms of rash: painful and red   Symptoms are moderate and rash seems to be worsening.  Previous history of a similar rash? Yes: on foot.   Recent exposure history: none known    Associated symptoms include: nothing. But glucoses running high; this am fasting at 190. He does not take metformin regularly due to upset stomach. Takes 5 mg of glipizide daily.     Past Medical History:   Diagnosis Date     Diabetes (H)     type 2     Seasonal allergies     Allergy inj as a child     Current Outpatient Medications   Medication Sig Dispense Refill     glipiZIDE (GLIPIZIDE XL) 5 MG 24 hr tablet Take 1 tablet (5 mg) by mouth daily 30 tablet 1     metFORMIN (GLUCOPHAGE-XR) 500 MG 24 hr tablet Take 2 tablets (1,000 mg) by mouth 2 times daily (with meals) 360 tablet 1     blood glucose monitoring (NO BRAND SPECIFIED) test strip Use to test blood sugars 4 times daily or as directed due to infection and hyperglycemia 200 strip 1     Blood Glucose Monitoring Suppl (GLUCOMETER ELITE CLASSIC) KIT 1 Device daily. 1 kit 0     DIABETIC STERILE LANCETS device 1 Device daily. 1 Box 12     glucose blood VI test strips (ASCENSIA AUTODISC VI,ONE TOUCH ULTRA TEST VI) strip by In Vitro route. Test as directed 1 Box 12     ibuprofen (ADVIL,MOTRIN) 200 MG tablet Take 200 mg by mouth every 4 hours as needed.       STATIN NOT PRESCRIBED, INTENTIONAL, Please choose reason not prescribed, below       Social History     Tobacco Use     Smoking status: Never Smoker     Smokeless tobacco: Never Used   Substance Use Topics     Alcohol use: Yes     Comment: 1-2 beers a week  / occ        ROS:  CONSTITUTIONAL:NEGATIVE for fever, chills, change in weight  INTEGUMENTARY/SKIN: rash hands left    EXAM:   /82 (BP Location: Left arm)   Pulse 80   Temp 98.6  F (37  C) (Oral)   GENERAL: alert, no acute distress.  SKIN: Rash description:    Distribution: localized  Location: hand left dorsum with skin scap at left 5th knuckle and proximal to scab at mid hand moderate erythema and mild edema.   Color: red,  Lesion type: macular, confluent with warmth, tenderness and swelling  GENERAL APPEARANCE: healthy, alert and no distress    ASSESSMENT:    1. Cellulitis of hand, left    - doxycycline hyclate (VIBRAMYCIN) 100 MG capsule; Take 1 capsule (100 mg) by mouth 2 times daily for 10 days  Dispense: 20 capsule; Refill: 0      PLAN: he will try to see if he can tolerate metformin and will take daily.   1) See today's orders.  2) Follow-up with the ED if not improving or worsening over the next 48 hrs.

## 2019-06-19 ENCOUNTER — MYC REFILL (OUTPATIENT)
Dept: INTERNAL MEDICINE | Facility: CLINIC | Age: 54
End: 2019-06-19

## 2019-06-19 DIAGNOSIS — E11.65 TYPE 2 DIABETES MELLITUS WITH HYPERGLYCEMIA, WITHOUT LONG-TERM CURRENT USE OF INSULIN (H): ICD-10-CM

## 2019-06-19 RX ORDER — METFORMIN HCL 500 MG
1000 TABLET, EXTENDED RELEASE 24 HR ORAL 2 TIMES DAILY WITH MEALS
Qty: 360 TABLET | Refills: 1 | Status: CANCELLED | OUTPATIENT
Start: 2019-06-19

## 2019-06-20 RX ORDER — GLIPIZIDE 5 MG/1
5 TABLET, FILM COATED, EXTENDED RELEASE ORAL DAILY
Qty: 30 TABLET | Refills: 0 | Status: SHIPPED | OUTPATIENT
Start: 2019-06-20 | End: 2021-04-21

## 2019-06-20 NOTE — TELEPHONE ENCOUNTER
"Requested Prescriptions   Pending Prescriptions Disp Refills     glipiZIDE (GLIPIZIDE XL) 5 MG 24 hr tablet 30 tablet 1     Sig: Take 1 tablet (5 mg) by mouth daily   Last Written Prescription Date:  1/2/2019  Last Fill Quantity: 30,  # refills: 1   Last office visit: 10/15/2018 with prescribing provider:  Mert   Future Office Visit:        Sulfonylurea Agents Failed - 6/19/2019  8:07 AM        Failed - Patient has documented LDL within the past 12 mos.     Recent Labs   Lab Test 10/01/12  0950   LDL 80           Failed - Patient has had a Microalbumin in the past 15 mos.     Recent Labs   Lab Test 06/08/11  1143   MICROL 6   UMALCR 3.55           Failed - Patient has documented A1c within the specified period of time.     If HgbA1C is 8 or greater, it needs to be on file within the past 3 months.  If less than 8, must be on file within the past 6 months.     Recent Labs   Lab Test 11/02/18  0839   A1C 6.1*           Failed - Recent (6 mo) or future (30 days) visit within the authorizing provider's specialty     Patient had office visit in the last 6 months or has a visit in the next 30 days with authorizing provider or within the authorizing provider's specialty.  See \"Patient Info\" tab in inbasket, or \"Choose Columns\" in Meds & Orders section of the refill encounter.            Passed - Blood pressure less than 140/90 in past 6 months     BP Readings from Last 3 Encounters:   05/31/19 132/82   01/21/19 120/81   10/15/18 114/84           Passed - Medication is active on med list        Passed - Patient is age 18 or older        Passed - Patient has a recent creatinine (normal) within the past 12 mos.     Recent Labs   Lab Test 11/02/18  0839   CR 0.72         Routing refill request to provider for review/approval because:  Labs not current:  A1C, LDL, Micoralbumin    T'd up 1 month for provider review.    Isatu Jones RN            "

## 2019-08-16 ENCOUNTER — ANESTHESIA EVENT (OUTPATIENT)
Dept: SURGERY | Facility: AMBULATORY SURGERY CENTER | Age: 54
End: 2019-08-16

## 2019-08-16 RX ORDER — FENTANYL CITRATE 50 UG/ML
25-50 INJECTION, SOLUTION INTRAMUSCULAR; INTRAVENOUS
Status: CANCELLED | OUTPATIENT
Start: 2019-08-16

## 2019-08-16 RX ORDER — MEPERIDINE HYDROCHLORIDE 25 MG/ML
12.5 INJECTION INTRAMUSCULAR; INTRAVENOUS; SUBCUTANEOUS
Status: CANCELLED | OUTPATIENT
Start: 2019-08-16

## 2019-08-16 RX ORDER — HYDROMORPHONE HYDROCHLORIDE 1 MG/ML
.3-.5 INJECTION, SOLUTION INTRAMUSCULAR; INTRAVENOUS; SUBCUTANEOUS EVERY 10 MIN PRN
Status: CANCELLED | OUTPATIENT
Start: 2019-08-16

## 2019-08-16 RX ORDER — ONDANSETRON 2 MG/ML
4 INJECTION INTRAMUSCULAR; INTRAVENOUS EVERY 30 MIN PRN
Status: CANCELLED | OUTPATIENT
Start: 2019-08-16

## 2019-08-16 RX ORDER — KETOROLAC TROMETHAMINE 30 MG/ML
30 INJECTION, SOLUTION INTRAMUSCULAR; INTRAVENOUS EVERY 6 HOURS PRN
Status: CANCELLED | OUTPATIENT
Start: 2019-08-16 | End: 2019-08-21

## 2019-08-16 RX ORDER — ALBUTEROL SULFATE 0.83 MG/ML
2.5 SOLUTION RESPIRATORY (INHALATION) EVERY 4 HOURS PRN
Status: CANCELLED | OUTPATIENT
Start: 2019-08-16

## 2019-08-16 RX ORDER — OXYCODONE HYDROCHLORIDE 5 MG/1
5-10 TABLET ORAL EVERY 4 HOURS PRN
Status: CANCELLED | OUTPATIENT
Start: 2019-08-16

## 2019-08-16 RX ORDER — SODIUM CHLORIDE, SODIUM LACTATE, POTASSIUM CHLORIDE, CALCIUM CHLORIDE 600; 310; 30; 20 MG/100ML; MG/100ML; MG/100ML; MG/100ML
INJECTION, SOLUTION INTRAVENOUS CONTINUOUS
Status: CANCELLED | OUTPATIENT
Start: 2019-08-16

## 2019-08-16 RX ORDER — DEXAMETHASONE SODIUM PHOSPHATE 4 MG/ML
4 INJECTION, SOLUTION INTRA-ARTICULAR; INTRALESIONAL; INTRAMUSCULAR; INTRAVENOUS; SOFT TISSUE EVERY 10 MIN PRN
Status: CANCELLED | OUTPATIENT
Start: 2019-08-16

## 2019-08-16 RX ORDER — NALOXONE HYDROCHLORIDE 0.4 MG/ML
.1-.4 INJECTION, SOLUTION INTRAMUSCULAR; INTRAVENOUS; SUBCUTANEOUS
Status: CANCELLED | OUTPATIENT
Start: 2019-08-16 | End: 2019-08-17

## 2019-08-16 RX ORDER — ONDANSETRON 4 MG/1
4 TABLET, ORALLY DISINTEGRATING ORAL EVERY 30 MIN PRN
Status: CANCELLED | OUTPATIENT
Start: 2019-08-16

## 2019-08-19 ENCOUNTER — HOSPITAL ENCOUNTER (OUTPATIENT)
Facility: AMBULATORY SURGERY CENTER | Age: 54
Discharge: HOME | End: 2019-08-19
Attending: SURGERY | Admitting: SURGERY
Payer: COMMERCIAL

## 2019-08-19 ENCOUNTER — ANESTHESIA (OUTPATIENT)
Dept: SURGERY | Facility: AMBULATORY SURGERY CENTER | Age: 54
End: 2019-08-19
Payer: COMMERCIAL

## 2019-08-19 VITALS — HEART RATE: 72 BPM

## 2019-08-19 VITALS
OXYGEN SATURATION: 98 % | TEMPERATURE: 96.9 F | DIASTOLIC BLOOD PRESSURE: 80 MMHG | SYSTOLIC BLOOD PRESSURE: 111 MMHG | RESPIRATION RATE: 16 BRPM

## 2019-08-19 LAB
COLONOSCOPY: NORMAL
GLUCOSE BLDC GLUCOMTR-MCNC: 142 MG/DL (ref 70–99)

## 2019-08-19 PROCEDURE — 45380 COLONOSCOPY AND BIOPSY: CPT | Mod: XS

## 2019-08-19 PROCEDURE — 45385 COLONOSCOPY W/LESION REMOVAL: CPT | Mod: PT | Performed by: SURGERY

## 2019-08-19 PROCEDURE — 45385 COLONOSCOPY W/LESION REMOVAL: CPT

## 2019-08-19 PROCEDURE — G8918 PT W/O PREOP ORDER IV AB PRO: HCPCS

## 2019-08-19 PROCEDURE — 88305 TISSUE EXAM BY PATHOLOGIST: CPT | Performed by: SURGERY

## 2019-08-19 PROCEDURE — 45380 COLONOSCOPY AND BIOPSY: CPT | Mod: 59 | Performed by: SURGERY

## 2019-08-19 PROCEDURE — G8907 PT DOC NO EVENTS ON DISCHARG: HCPCS

## 2019-08-19 RX ORDER — PROPOFOL 10 MG/ML
INJECTION, EMULSION INTRAVENOUS PRN
Status: DISCONTINUED | OUTPATIENT
Start: 2019-08-19 | End: 2019-08-19

## 2019-08-19 RX ORDER — SODIUM CHLORIDE, SODIUM LACTATE, POTASSIUM CHLORIDE, CALCIUM CHLORIDE 600; 310; 30; 20 MG/100ML; MG/100ML; MG/100ML; MG/100ML
INJECTION, SOLUTION INTRAVENOUS CONTINUOUS
Status: DISCONTINUED | OUTPATIENT
Start: 2019-08-19 | End: 2019-08-20 | Stop reason: HOSPADM

## 2019-08-19 RX ORDER — LIDOCAINE 40 MG/G
CREAM TOPICAL
Status: DISCONTINUED | OUTPATIENT
Start: 2019-08-19 | End: 2019-08-20 | Stop reason: HOSPADM

## 2019-08-19 RX ORDER — LIDOCAINE HYDROCHLORIDE 20 MG/ML
INJECTION, SOLUTION INFILTRATION; PERINEURAL PRN
Status: DISCONTINUED | OUTPATIENT
Start: 2019-08-19 | End: 2019-08-19

## 2019-08-19 RX ORDER — PROPOFOL 10 MG/ML
INJECTION, EMULSION INTRAVENOUS CONTINUOUS PRN
Status: DISCONTINUED | OUTPATIENT
Start: 2019-08-19 | End: 2019-08-19

## 2019-08-19 RX ADMIN — PROPOFOL 120 MG: 10 INJECTION, EMULSION INTRAVENOUS at 07:36

## 2019-08-19 RX ADMIN — SODIUM CHLORIDE, SODIUM LACTATE, POTASSIUM CHLORIDE, CALCIUM CHLORIDE: 600; 310; 30; 20 INJECTION, SOLUTION INTRAVENOUS at 07:29

## 2019-08-19 RX ADMIN — PROPOFOL 150 MCG/KG/MIN: 10 INJECTION, EMULSION INTRAVENOUS at 07:36

## 2019-08-19 RX ADMIN — LIDOCAINE HYDROCHLORIDE 60 MG: 20 INJECTION, SOLUTION INFILTRATION; PERINEURAL at 07:36

## 2019-08-19 NOTE — ANESTHESIA CARE TRANSFER NOTE
Patient: Luke Farias    Procedure(s):  COLONOSCOPY, WITH CO2 INSUFFLATION  Colonoscopy, Flexible, With Lesion Removal Using Snare  Colonoscopy, With Polypectomy And Biopsy    Diagnosis: Colon/MAC/History of colonic polyps Pharmacy- [facility] from previous colonoscopy patient needs extra prep to be better cleared up.  Diagnosis Additional Information: No value filed.    Anesthesia Type:   MAC     Note:  Airway :Room Air  Patient transferred to:Phase II  Comments: To Phase II. Report to RN.  VSS Resp status stable.Handoff Report: Identifed the Patient, Identified the Reponsible Provider, Reviewed the pertinent medical history, Discussed the surgical course, Reviewed Intra-OP anesthesia mangement and issues during anesthesia, Set expectations for post-procedure period and Allowed opportunity for questions and acknowledgement of understanding      Vitals: (Last set prior to Anesthesia Care Transfer)    CRNA VITALS  8/19/2019 0753 - 8/19/2019 0824      8/19/2019             Pulse:  72    SpO2:  97 %                Electronically Signed By: STEVENSON Laguna CRNA  August 19, 2019  8:24 AM

## 2019-08-19 NOTE — H&P
Patient is here for colonoscopy     4 Point review of systems all others are negative.   /87   Temp 97.8  F (36.6  C) (Temporal)   Resp 16   SpO2 97%     Past Medical History:   Diagnosis Date     Diabetes (H)     type 2     Seasonal allergies     Allergy inj as a child       Past Surgical History:   Procedure Laterality Date     COLONOSCOPY WITH CO2 INSUFFLATION N/A 5/12/2017    Procedure: COLONOSCOPY WITH CO2 INSUFFLATION;  Dr. Rancho Painting/BMI: 30.8/Special screening for malignant neoplasms, colon/colonoscopy/[facility]:  [phone];  Surgeon: Krish Galloway MD;  Location: [facility] OR     COLONOSCOPY WITH CO2 INSUFFLATION N/A 1/21/2019    Procedure: COLONOSCOPY WITH CO2 INSUFFLATION;  Surgeon: Bartolome Templeton MD;  Location: [facility] OR       Social History     Socioeconomic History     Marital status:      Spouse name: Not on file     Number of children: Not on file     Years of education: Not on file     Highest education level: Not on file   Occupational History     Occupation: Salesmen     Employer: [employer]   Social Needs     Financial resource strain: Not on file     Food insecurity:     Worry: Not on file     Inability: Not on file     Transportation needs:     Medical: Not on file     Non-medical: Not on file   Tobacco Use     Smoking status: Never Smoker     Smokeless tobacco: Never Used   Substance and Sexual Activity     Alcohol use: Yes     Comment: 1-2 beers a week / occ      Drug use: No     Sexual activity: Yes     Partners: Female   Lifestyle     Physical activity:     Days per week: Not on file     Minutes per session: Not on file     Stress: Not on file   Relationships     Social connections:     Talks on phone: Not on file     Gets together: Not on file     Attends Restoration service: Not on file     Active member of club or organization: Not on file     Attends meetings of clubs or organizations: Not on file     Relationship status: Not on file      Intimate partner violence:     Fear of current or ex partner: Not on file     Emotionally abused: Not on file     Physically abused: Not on file     Forced sexual activity: Not on file   Other Topics Concern     Parent/sibling w/ CABG, MI or angioplasty before 65F 55M? Not Asked   Social History Narrative     Not on file       Current Outpatient Medications   Medication Sig Dispense Refill     glipiZIDE (GLIPIZIDE XL) 5 MG 24 hr tablet Take 1 tablet (5 mg) by mouth daily 30 tablet 0     metFORMIN (GLUCOPHAGE-XR) 500 MG 24 hr tablet Take 2 tablets (1,000 mg) by mouth 2 times daily (with meals) 360 tablet 1     blood glucose monitoring (NO BRAND SPECIFIED) test strip Use to test blood sugars 4 times daily or as directed due to infection and hyperglycemia 200 strip 1     Blood Glucose Monitoring Suppl (GLUCOMETER ELITE CLASSIC) KIT 1 Device daily. 1 kit 0     DIABETIC STERILE LANCETS device 1 Device daily. 1 Box 12     glucose blood VI test strips (ASCENSIA AUTODISC VI,ONE TOUCH ULTRA TEST VI) strip by In Vitro route. Test as directed 1 Box 12     ibuprofen (ADVIL,MOTRIN) 200 MG tablet Take 200 mg by mouth every 4 hours as needed.       STATIN NOT PRESCRIBED, INTENTIONAL, Please choose reason not prescribed, below         Above was reviewed  Physical exam: /87   Temp 97.8  F (36.6  C) (Temporal)   Resp 16   SpO2 97%     Patient is alert and orientated.   Head eyes, nose and mouth within normal limits.    Lungs are clear to auscultation  Heart is regular rate and rhythm with no murmur or thrills noted.    Abdomen is abdomen is soft without significant tenderness, masses, organomegaly or guarding  bowel sounds are positive and no caput medusa noted.    Skin was warm and pink  Normal Affect      Assessment: here for colonosocpy   Plan to do colonoscopy with MAC.  Bartolome Templeton MD

## 2019-08-19 NOTE — LETTER
[facility]           [address] [facility]           [place] [address]           Tel [phone]  Luke NATALIE Farias  [address] [address]      August 22, 2019    Dear Luke,  This letter is to inform you of the results of your pathology report on your colonoscopy.  If you have questions please feel free to call my assistant  At [phone] .    Your pathology report was:  Showed an Adenomatous polyp. This is a benign (not cancerous) growth; however these can become cancer over time. This polyp is usually removed completely at the time of the biopsy. Because it is an Adenomatous polyp you do have a slight higher risk for colon cancer. This is why you will need a repeat colonoscopy in approximately 1 year.  If you do have further questions please don t hesitate to call my assistant at  .  We do not have someone answering the phone all the time at my assistants number so if leave a message may take a day or so to get back to you.  So if more urgent then call the below number.    To make an appointment call [phone]: .   Sincerely,     Bartolome Templeton M.D.  ___                  
24-Dec-2017 00:48

## 2019-08-19 NOTE — ANESTHESIA POSTPROCEDURE EVALUATION
Anesthesia POST Procedure Evaluation    Patient: Luke Farias   MRN:     [id number] Gender:   male   Age:    53 year old :      1965        Preoperative Diagnosis: Colon/MAC/History of colonic polyps [facility] from previous colonoscopy patient needs extra prep to be better cleared up.   Procedure(s):  COLONOSCOPY, WITH CO2 INSUFFLATION  Colonoscopy, Flexible, With Lesion Removal Using Snare  Colonoscopy, With Polypectomy And Biopsy   Postop Comments: No value filed.       Anesthesia Type:  Not documented  MAC    Reportable Event: NO     PAIN: Uncomplicated   Sign Out status: Comfortable, Well controlled pain     PONV: No PONV   Sign Out status:  No Nausea or Vomiting     Neuro/Psych: Uneventful perioperative course   Sign Out Status: Preoperative baseline; Age appropriate mentation     Airway/Resp.: Uneventful perioperative course   Sign Out Status: Non labored breathing, age appropriate RR; Resp. Status within EXPECTED Parameters     CV: Uneventful perioperative course   Sign Out status: Appropriate BP and perfusion indices; Appropriate HR/Rhythm     Disposition:   Sign Out in:  PACU  Disposition:  Phase II; Home  Recovery Course: Uneventful  Follow-Up: Not required     Comments/Narrative:  Patient doing well post-operatively.  No significant issues.  Hemodynamically stable, pain well controlled, nausea well controlled.  Stable for discharge from the PACU             Last Anesthesia Record Vitals:  CRNA VITALS  2019 0753 - 2019 0853      2019             Pulse:  72    SpO2:  97 %          Last PACU Vitals:  Vitals Value Taken Time   BP 97/75 2019  8:28 AM   Temp 36.1  C (96.9  F) 2019  8:28 AM   Pulse 72 2019  8:20 AM   Resp 16 2019  8:28 AM   SpO2 95 % 2019  8:28 AM   Temp src Skin 2019  8:15 AM   NIBP 90/68 2019  8:20 AM   Pulse 72 2019  8:22 AM   SpO2 97 % 2019  8:22 AM   Resp     Temp 36  C (96.8  F) 2019  8:15 AM   Ht  Rate 76 8/19/2019  8:14 AM   Temp 2           Electronically Signed By: Manuel Hoover MD, August 19, 2019, 11:42 AM

## 2019-08-19 NOTE — ANESTHESIA PREPROCEDURE EVALUATION
Anesthesia Pre-Procedure Evaluation    Patient: uLke Farias   MRN:     [id number] Gender:   male   Age:    53 year old :      1965        Preoperative Diagnosis: Colon/MAC/History of colonic polyps Pharmacy- [facility] from previous colonoscopy patient needs extra prep to be better cleared up.   Procedure(s):  COLONOSCOPY, WITH CO2 INSUFFLATION     Past Medical History:   Diagnosis Date     Diabetes (H)     type 2     Seasonal allergies     Allergy inj as a child      Past Surgical History:   Procedure Laterality Date     COLONOSCOPY WITH CO2 INSUFFLATION N/A 2017    Procedure: COLONOSCOPY WITH CO2 INSUFFLATION;  Dr. Rancho Painting/BMI: 30.8/Special screening for malignant neoplasms, colon/colonoscopy/[facility]:  [phone];  Surgeon: Krish Galloway MD;  Location: [facility] OR     COLONOSCOPY WITH CO2 INSUFFLATION N/A 2019    Procedure: COLONOSCOPY WITH CO2 INSUFFLATION;  Surgeon: Bartolome Templeton MD;  Location: [facility] OR          Anesthesia Evaluation     .             ROS/MED HX    ENT/Pulmonary:     (+)allergic rhinitis, , . .    Neurologic:  - neg neurologic ROS     Cardiovascular:     (+) Dyslipidemia, ----. : . . . :. .       METS/Exercise Tolerance:  4 - Raking leaves, gardening   Hematologic:  - neg hematologic  ROS       Musculoskeletal:         GI/Hepatic:  - neg GI/hepatic ROS       Renal/Genitourinary:  - ROS Renal section negative       Endo:     (+) type II DM Obesity, .      Psychiatric:         Infectious Disease:  - neg infectious disease ROS       Malignancy:      - no malignancy   Other:    - neg other ROS                     PHYSICAL EXAM:   Mental Status/Neuro: A/A/O   Airway: Facies: Feasible  Mallampati: III  Mouth/Opening: Full  TM distance: > 6 cm  Neck ROM: Full   Respiratory: Auscultation: CTAB     Resp. Rate: Normal     Resp. Effort: Normal      CV: Rhythm: Regular  Rate: Age appropriate  Heart: Normal Sounds  Edema: None   Comments:   "    Dental: Normal Dentition                LABS:  CBC:   Lab Results   Component Value Date    WBC 10.6 07/18/2018    HGB 16.8 07/18/2018    HCT 47.1 07/18/2018     07/18/2018     BMP:   Lab Results   Component Value Date     11/02/2018     07/18/2018    POTASSIUM 4.2 11/02/2018    POTASSIUM 4.0 07/18/2018    CHLORIDE 103 11/02/2018    CHLORIDE 97 07/18/2018    CO2 33 (H) 11/02/2018    CO2 29 07/18/2018    BUN 11 11/02/2018    BUN 17 07/18/2018    CR 0.72 11/02/2018    CR 0.68 07/18/2018     (H) 11/02/2018     (H) 07/18/2018     COAGS: No results found for: PTT, INR, FIBR  POC:   Lab Results   Component Value Date     (H) 01/21/2019     OTHER:   Lab Results   Component Value Date    A1C 6.1 (H) 11/02/2018    EARNEST 9.1 11/02/2018    ALBUMIN 4.0 11/02/2018    PROTTOTAL 7.5 11/02/2018    ALT 35 11/02/2018    AST 18 11/02/2018    ALKPHOS 46 11/02/2018    BILITOTAL 0.7 11/02/2018    TSH 4.83 10/01/2012    T4 0.79 09/28/2011    CRP 68.0 (H) 07/18/2018    SED 30 (H) 07/18/2018        Preop Vitals    BP Readings from Last 3 Encounters:   05/31/19 132/82   01/21/19 120/81   10/15/18 114/84    Pulse Readings from Last 3 Encounters:   05/31/19 80   01/21/19 83   10/15/18 88      Resp Readings from Last 3 Encounters:   01/21/19 16   10/15/18 16   07/20/18 16    SpO2 Readings from Last 3 Encounters:   01/21/19 96%   10/15/18 98%   07/20/18 97%      Temp Readings from Last 1 Encounters:   05/31/19 37  C (98.6  F) (Oral)    Ht Readings from Last 1 Encounters:   01/14/19 1.905 m (6' 3\")      Wt Readings from Last 1 Encounters:   01/14/19 106.6 kg (235 lb)    Estimated body mass index is 29.37 kg/m  as calculated from the following:    Height as of 1/14/19: 1.905 m (6' 3\").    Weight as of 1/14/19: 106.6 kg (235 lb).     LDA:        Assessment:   ASA SCORE: 2    H&P: History and physical reviewed and following examination; no interval change.    NPO Status: NPO Appropriate     Plan:   Anes. " Type:  MAC   Pre-Medication: None   Induction:  IV (Standard)   Airway: Native Airway   Access/Monitoring: PIV   Maintenance: Propofol Sedation     Postop Plan:   Postop Pain: None  Postop Sedation/Airway: Not planned     PONV Management:    Prevention: Ondansetron, Propofol     CONSENT: Direct conversation   Plan and risks discussed with: Patient   Blood Products: Consent Deferred (Minimal Blood Loss)                   Manuel Hoover MD

## 2019-08-21 LAB — COPATH REPORT: NORMAL

## 2019-12-26 ENCOUNTER — OFFICE VISIT (OUTPATIENT)
Dept: URGENT CARE | Facility: RETAIL CLINIC | Age: 54
End: 2019-12-26
Payer: COMMERCIAL

## 2019-12-26 VITALS
SYSTOLIC BLOOD PRESSURE: 151 MMHG | HEART RATE: 85 BPM | TEMPERATURE: 98.5 F | DIASTOLIC BLOOD PRESSURE: 90 MMHG | OXYGEN SATURATION: 97 %

## 2019-12-26 DIAGNOSIS — L02.619 CELLULITIS AND ABSCESS OF FOOT EXCLUDING TOE: Primary | ICD-10-CM

## 2019-12-26 DIAGNOSIS — L03.119 CELLULITIS AND ABSCESS OF FOOT EXCLUDING TOE: Primary | ICD-10-CM

## 2019-12-26 PROCEDURE — 99213 OFFICE O/P EST LOW 20 MIN: CPT | Performed by: PHYSICIAN ASSISTANT

## 2019-12-26 RX ORDER — SULFAMETHOXAZOLE/TRIMETHOPRIM 800-160 MG
1 TABLET ORAL 2 TIMES DAILY
Qty: 14 TABLET | Refills: 0 | Status: SHIPPED | OUTPATIENT
Start: 2019-12-26 | End: 2020-01-14

## 2019-12-26 RX ORDER — CEPHALEXIN 500 MG/1
500 CAPSULE ORAL 3 TIMES DAILY
Qty: 21 CAPSULE | Refills: 0 | Status: SHIPPED | OUTPATIENT
Start: 2019-12-26 | End: 2020-01-14

## 2019-12-26 ASSESSMENT — ENCOUNTER SYMPTOMS
FEVER: 0
ARTHRALGIAS: 0
WOUND: 1
ADENOPATHY: 0
COUGH: 0
WHEEZING: 0
MYALGIAS: 0
CHILLS: 0
EYE REDNESS: 0
EYE DISCHARGE: 0
JOINT SWELLING: 0
FATIGUE: 0
EYE ITCHING: 0
SHORTNESS OF BREATH: 0
DIAPHORESIS: 0
HEADACHES: 0

## 2019-12-26 NOTE — PROGRESS NOTES
Chief Complaint   Patient presents with     Infection     Foot R     SUBJECTIVE:  Luke Farias is a 54 year old male who presents to the clinic today for an infected wound on his foot. He had a similar issue about 18 months ago and was initially treated with Bactrim and Keflex with some improvement but followed up with Podiatry and was changed to Cipro and Clindamycin which resulted in resolution of symptoms.  Onset- symptoms were noticed last night.  Location: bottom of right foot, plantar surface with a crack, redness and swelling on dorsal surface  Quality/symptoms: red and tender.   Associated symptoms include: nothing. Denies fever.  Symptoms are moderate and seem to be worsening.  Previous history of a similar lesion? Yes: about 18 months ago.  Treatment measures tried include: none  Recent exposure history: none known  Diabetic and had decreased sensation in feet.    Past Medical History:   Diagnosis Date     Diabetes (H)     type 2     Seasonal allergies     Allergy inj as a child     blood glucose monitoring (NO BRAND SPECIFIED) test strip, Use to test blood sugars 4 times daily or as directed due to infection and hyperglycemia  Blood Glucose Monitoring Suppl (GLUCOMETER ELITE CLASSIC) KIT, 1 Device daily.  glipiZIDE (GLIPIZIDE XL) 5 MG 24 hr tablet, Take 1 tablet (5 mg) by mouth daily  glipiZIDE (GLUCOTROL XL) 5 MG 24 hr tablet, TAKE 1 TABLET(5 MG) BY MOUTH DAILY  glucose blood VI test strips (ASCENSIA AUTODISC VI,ONE TOUCH ULTRA TEST VI) strip, by In Vitro route. Test as directed  metFORMIN (GLUCOPHAGE-XR) 500 MG 24 hr tablet, Take 2 tablets (1,000 mg) by mouth 2 times daily (with meals)  DIABETIC STERILE LANCETS device, 1 Device daily.  ibuprofen (ADVIL,MOTRIN) 200 MG tablet, Take 200 mg by mouth every 4 hours as needed.  STATIN NOT PRESCRIBED, INTENTIONAL,, Please choose reason not prescribed, below    No current facility-administered medications on file prior to visit.     Social History     Tobacco  Use     Smoking status: Never Smoker     Smokeless tobacco: Never Used   Substance Use Topics     Alcohol use: Yes     Comment: 1-2 beers a week / occ      Allergies   Allergen Reactions     Seasonal Allergies      Review of Systems   Constitutional: Negative for chills, diaphoresis, fatigue and fever.   Eyes: Negative for discharge, redness and itching.   Respiratory: Negative for cough, shortness of breath and wheezing.    Musculoskeletal: Negative for arthralgias, joint swelling and myalgias.   Skin: Positive for wound. Negative for pallor and rash.   Neurological: Negative for headaches.   Hematological: Negative for adenopathy.     EXAM:   BP (!) 151/90   Pulse 85   Temp 98.5  F (36.9  C) (Temporal)   SpO2 97%   GENERAL APPEARANCE: healthy, alert and no distress  EYES: EOMI,  PERRL, conjunctiva clear  NECK: supple, non-tender to palpation, no adenopathy noted  RESP: lungs clear to auscultation - no rales, rhonchi or wheezes  CV: regular rates and rhythm, normal S1 S2, no murmur noted  SKIN: Right foot with dry calloused skin on plantar surface, a fissure is noted between the 2-3 metatarsals about 1 inch in length. Dorsal surface is erythematous and swollen, warm to the touch. No streaking.    ASSESSMENT:    ICD-10-CM    1. Cellulitis and abscess of foot excluding toe L03.119 sulfamethoxazole-trimethoprim (BACTRIM DS/SEPTRA DS) 800-160 MG tablet    L02.619 cephALEXin (KEFLEX) 500 MG capsule     PLAN:  Patient Instructions   Keflex 500 mg 3 times daily for 7 days.  Bactrim twice daily for 7 days.    Tylenol as needed for pain- may take 2-3 tablets up to 3 times daily with food.  Apply a warm wet compress or soak in warm water with epsom salt for 10 minutes, 3-4 times a day to help area to heal.  Apply a thin layer of antibiotic ointment and cover with a bandage.  Elevate leg as able to reduce swelling.    Monitor for signs of infection including fever, thick yellow/white discharge, a hard or soft lump under  the skin or increasing pain, redness, warmth and/or swelling. If you have any of these or persistent symptoms that are not healing, follow up with your primary care provider.  Follow up with primary care provider with any problems, questions or concerns or if symptoms worsen or fail to improve in the next 3-4 days.     Follow up with primary care provider with any problems, questions or concerns or if symptoms worsen or fail to improve. Patient agreed to plan and verbalized understanding.    Nayla Verdugo PA-C  [facility]

## 2019-12-26 NOTE — PATIENT INSTRUCTIONS
Keflex 500 mg 3 times daily for 7 days.  Bactrim twice daily for 7 days.    Tylenol as needed for pain- may take 2-3 tablets up to 3 times daily with food.  Apply a warm wet compress or soak in warm water with epsom salt for 10 minutes, 3-4 times a day to help area to heal.  Apply a thin layer of antibiotic ointment and cover with a bandage.  Elevate leg as able to reduce swelling.    Monitor for signs of infection including fever, thick yellow/white discharge, a hard or soft lump under the skin or increasing pain, redness, warmth and/or swelling. If you have any of these or persistent symptoms that are not healing, follow up with your primary care provider.  Follow up with primary care provider with any problems, questions or concerns or if symptoms worsen or fail to improve in the next 3-4 days.

## 2020-01-06 ENCOUNTER — ANCILLARY PROCEDURE (OUTPATIENT)
Dept: GENERAL RADIOLOGY | Facility: CLINIC | Age: 55
End: 2020-01-06
Attending: PODIATRIST
Payer: COMMERCIAL

## 2020-01-06 ENCOUNTER — OFFICE VISIT (OUTPATIENT)
Dept: PODIATRY | Facility: CLINIC | Age: 55
End: 2020-01-06
Payer: COMMERCIAL

## 2020-01-06 VITALS
SYSTOLIC BLOOD PRESSURE: 124 MMHG | DIASTOLIC BLOOD PRESSURE: 82 MMHG | HEIGHT: 73 IN | WEIGHT: 238 LBS | BODY MASS INDEX: 31.54 KG/M2 | TEMPERATURE: 97.4 F

## 2020-01-06 DIAGNOSIS — L03.119 CELLULITIS AND ABSCESS OF FOOT, EXCEPT TOES: Primary | ICD-10-CM

## 2020-01-06 DIAGNOSIS — L02.619 CELLULITIS AND ABSCESS OF FOOT, EXCEPT TOES: Primary | ICD-10-CM

## 2020-01-06 DIAGNOSIS — E11.621 DIABETIC ULCER OF RIGHT MIDFOOT ASSOCIATED WITH TYPE 2 DIABETES MELLITUS, LIMITED TO BREAKDOWN OF SKIN (H): ICD-10-CM

## 2020-01-06 DIAGNOSIS — L03.119 CELLULITIS AND ABSCESS OF FOOT, EXCEPT TOES: ICD-10-CM

## 2020-01-06 DIAGNOSIS — L97.411 DIABETIC ULCER OF RIGHT MIDFOOT ASSOCIATED WITH TYPE 2 DIABETES MELLITUS, LIMITED TO BREAKDOWN OF SKIN (H): ICD-10-CM

## 2020-01-06 DIAGNOSIS — L02.619 CELLULITIS AND ABSCESS OF FOOT, EXCEPT TOES: ICD-10-CM

## 2020-01-06 DIAGNOSIS — L20.82 FLEXURAL ECZEMA: ICD-10-CM

## 2020-01-06 PROCEDURE — 11720 DEBRIDE NAIL 1-5: CPT | Mod: 59 | Performed by: PODIATRIST

## 2020-01-06 PROCEDURE — 99214 OFFICE O/P EST MOD 30 MIN: CPT | Mod: 25 | Performed by: PODIATRIST

## 2020-01-06 PROCEDURE — 11042 DBRDMT SUBQ TIS 1ST 20SQCM/<: CPT | Performed by: PODIATRIST

## 2020-01-06 PROCEDURE — 73630 X-RAY EXAM OF FOOT: CPT | Mod: TC

## 2020-01-06 RX ORDER — CEPHALEXIN 500 MG/1
500 CAPSULE ORAL 4 TIMES DAILY
Qty: 40 CAPSULE | Refills: 0 | Status: SHIPPED | OUTPATIENT
Start: 2020-01-06 | End: 2020-01-14

## 2020-01-06 RX ORDER — CLOBETASOL PROPIONATE 0.5 MG/G
OINTMENT TOPICAL
Qty: 30 G | Refills: 2 | Status: SHIPPED | OUTPATIENT
Start: 2020-01-06 | End: 2023-03-08

## 2020-01-06 ASSESSMENT — PAIN SCALES - GENERAL: PAINLEVEL: NO PAIN (0)

## 2020-01-06 ASSESSMENT — MIFFLIN-ST. JEOR: SCORE: 1985.18

## 2020-01-06 NOTE — LETTER
1/6/2020         RE: Luke Farias  [address] [address]        Dear Colleague,    Thank you for referring your patient, Luke Farias, to the [facility]. Please see a copy of my visit note below.    HPI:  Luke Farias is a 54 year old male who is seen in consultation at the request of Gabi Verdugo PA-C.    Pt presents for eval of:   (Onset, Location, L/R, Character, Treatments, Injury if yes)    XR Right foot today 1/6/2020    DM Type 2    LOV 8/20/2018 - Plantar Left foot wound     Onset 12/25/2019, drainage plantar Right foot 4th metatarsal head, redness, swelling, dull ache, pain 1. Seen by [facility]. Improvement with finishing abx. No drainage or pain today, redness and minimal swelling.   12/26/2019 - Keflex 500 mg, 1 capsule 3 times daily for 7 days and   Bactrim DS/Septra 800-160 mg, 1 tablet 2 times daily for 7 days.    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      Weight management plan: Patient was referred to their PCP to discuss a diet and exercise plan.       Review of Systems:  Patient denies fever, chills, rash, wound, stiffness, limping, numbness, weakness, heart burn, blood in stool, chest pain with activity, calf pain when walking, shortness of breath with activity, chronic cough, easy bleeding/bruising, swelling of ankles, excessive thirst, fatigue, depression, anxiety.  Patient admits only to symptoms noted in history.     Patient Active Problem List   Diagnosis     Chronic rhinitis     Type 2 diabetes mellitus with hyperglycemia (H)     Hyperlipidemia LDL goal <100     Morbid obesity, unspecified obesity type (H)     Venous lake on Right chest     Congenital nevus of Right upper back     PAST MEDICAL HISTORY:   Past Medical History:   Diagnosis Date     Diabetes (H)     type 2     Seasonal allergies     Allergy inj as a child     PAST SURGICAL HISTORY:   Past Surgical History:   Procedure Laterality Date     COLONOSCOPY N/A 8/19/2019     Procedure: Colonoscopy, With Polypectomy And Biopsy;  Surgeon: Bartolome Templeton MD;  Location: [facility] OR     COLONOSCOPY WITH CO2 INSUFFLATION N/A 5/12/2017    Procedure: COLONOSCOPY WITH CO2 INSUFFLATION;  Dr. Rancho Painting/BMI: 30.8/Special screening for malignant neoplasms, colon/colonoscopy/[facility]:  [phone];  Surgeon: Krish Galolway MD;  Location: [facility] OR     COLONOSCOPY WITH CO2 INSUFFLATION N/A 1/21/2019    Procedure: COLONOSCOPY WITH CO2 INSUFFLATION;  Surgeon: Bartolome Templeton MD;  Location: [facility] OR     COLONOSCOPY WITH CO2 INSUFFLATION N/A 8/19/2019    Procedure: COLONOSCOPY, WITH CO2 INSUFFLATION;  Surgeon: Bartolome Templeton MD;  Location: [facility] OR     MEDICATIONS:   Current Outpatient Medications:      blood glucose monitoring (NO BRAND SPECIFIED) test strip, Use to test blood sugars 4 times daily or as directed due to infection and hyperglycemia, Disp: 200 strip, Rfl: 1     Blood Glucose Monitoring Suppl (GLUCOMETER ELITE CLASSIC) KIT, 1 Device daily., Disp: 1 kit, Rfl: 0     cephALEXin (KEFLEX) 500 MG capsule, Take 1 capsule (500 mg) by mouth 4 times daily, Disp: 40 capsule, Rfl: 0     clobetasol (TEMOVATE) 0.05 % external ointment, Apply sparingly to affected area twice daily for 14 days.  Then off for 14 days.  Do not apply to face or mucous membranes., Disp: 30 g, Rfl: 2     DIABETIC STERILE LANCETS device, 1 Device daily., Disp: 1 Box, Rfl: 12     glipiZIDE (GLIPIZIDE XL) 5 MG 24 hr tablet, Take 1 tablet (5 mg) by mouth daily, Disp: 30 tablet, Rfl: 0     glipiZIDE (GLUCOTROL XL) 5 MG 24 hr tablet, TAKE 1 TABLET(5 MG) BY MOUTH DAILY, Disp: 30 tablet, Rfl: 0     glucose blood VI test strips (ASCENSIA AUTODISC VI,ONE TOUCH ULTRA TEST VI) strip, by In Vitro route. Test as directed, Disp: 1 Box, Rfl: 12     ibuprofen (ADVIL,MOTRIN) 200 MG tablet, Take 200 mg by mouth every 4 hours as needed., Disp: , Rfl:      metFORMIN (GLUCOPHAGE-XR) 500 MG 24 hr tablet,  Take 2 tablets (1,000 mg) by mouth 2 times daily (with meals), Disp: 360 tablet, Rfl: 1     STATIN NOT PRESCRIBED, INTENTIONAL,, Please choose reason not prescribed, below, Disp: , Rfl:   ALLERGIES:    Allergies   Allergen Reactions     Seasonal Allergies      SOCIAL HISTORY:   Social History     Socioeconomic History     Marital status:      Spouse name: Not on file     Number of children: Not on file     Years of education: Not on file     Highest education level: Not on file   Occupational History     Occupation: [employer]     Employer: [employer]   Social Needs     Financial resource strain: Not on file     Food insecurity:     Worry: Not on file     Inability: Not on file     Transportation needs:     Medical: Not on file     Non-medical: Not on file   Tobacco Use     Smoking status: Never Smoker     Smokeless tobacco: Never Used   Substance and Sexual Activity     Alcohol use: Yes     Comment: 1-2 beers a week / occ      Drug use: No     Sexual activity: Yes     Partners: Female   Lifestyle     Physical activity:     Days per week: Not on file     Minutes per session: Not on file     Stress: Not on file   Relationships     Social connections:     Talks on phone: Not on file     Gets together: Not on file     Attends Anabaptist service: Not on file     Active member of club or organization: Not on file     Attends meetings of clubs or organizations: Not on file     Relationship status: Not on file     Intimate partner violence:     Fear of current or ex partner: Not on file     Emotionally abused: Not on file     Physically abused: Not on file     Forced sexual activity: Not on file   Other Topics Concern     Parent/sibling w/ CABG, MI or angioplasty before 65F 55M? Not Asked   Social History Narrative     Not on file     FAMILY HISTORY:   Family History   Problem Relation Age of Onset     Heart Disease Mother      Diabetes Father      EXAM:Vitals: /82 (BP Location: Left arm, Cuff Size: Adult Large)   " Temp 97.4  F (36.3  C) (Temporal)   Ht 1.865 m (6' 1.43\")   Wt 108 kg (238 lb)   BMI 31.04 kg/m     BMI= Body mass index is 31.04 kg/m .    General appearance: Patient is alert and fully cooperative with history & exam.  No sign of distress is noted during the visit.     Psychiatric: Affect is pleasant & appropriate.  Patient appears motivated to improve health.     Respiratory: Breathing is regular & unlabored while sitting.     HEENT: Hearing is intact to spoken word.  Speech is clear.  No gross evidence of visual impairment that would impact ambulation.     Vascular: DP 1/4 & PT 1/4 left & right.  CFT delayed with dependent rubor noted about the digits.  Diminished hair growth distal to mid tibia and no hair about the foot and toes.  Temperature changes noted, warm to cool proximal to distal.  Hemosiderin pigmentation noted with multiple varicosities legs and feet bilateral. Generalized edema bilateral legs and feet.  Pt denies claudication history.     Neurologic: Normal plantar response bilateral.  Diminished protective threshold plus 7/10 applications of a 5.07 monofilament.  Pt admits paraesthesias about the feet and toes with palpation.     Dermatologic: Mildly diminished texture turgor tone about the integument.  There is a subtle rubor in a moccasin type fashion across the plantar foot consistent with flexural eczema.  Dry excoriated epidermis noted on the plantar feet but no interdigital changes.  On the distal aspect of both hallux nails and hallux IPJ and distal nail fold there is hyperkeratosis however this has fissured on the right foot a subtle fissure is noted on the plantar lateral hyperkeratosis of the fifth metatarsal head as well left foot.  Upon full-thickness debridement to a bleeding base the future extends through the dermis into the subcutaneous tissue but not tendon muscle or bone.  Both hallux nails are very dystrophic and bulky.     Musculoskeletal: Patient is ambulatory without " assistive device or brace.  There is semi reducible contracture of the lesser digits.    Hemoglobin A1C (%)   Date Value   11/02/2018 6.1 (H)   07/18/2018 11.3 (H)   10/01/2012 8.2 (H)   06/08/2011 6.3 (H)     Creatinine (mg/dL)   Date Value   11/02/2018 0.72   07/18/2018 0.68   10/01/2012 0.83   01/21/2011 0.76       ASSESSMENT:       ICD-10-CM    1. Cellulitis and abscess of foot, except toes L03.119 XR Foot Right G/E 3 Views    L02.619 cephALEXin (KEFLEX) 500 MG capsule     clobetasol (TEMOVATE) 0.05 % external ointment   2. Flexural eczema L20.82    3. Diabetic ulcer of right midfoot associated with type 2 diabetes mellitus, limited to breakdown of skin (H) E11.621 cephALEXin (KEFLEX) 500 MG capsule    L97.411 clobetasol (TEMOVATE) 0.05 % external ointment        PLAN:    1/6/2020  Both hallux nails were debrided  I sharply debrided the ulceration to a bleeding base and a sterile dressing applied.  This was done with a 15 blade scalpel to subcutaneous tissue.  Low up in 2 weeks to confirm this is resolved.  Dispensed Rx for topical steroid to be utilized for 10 days.  Written instructions regarding utilizing barrier cream daily.  Discussed difference between lotion cream and ointment.  Must utilize barrier cream daily permanently forever not a temporary treatment.  Discussed etiology and treatment options regarding eczema    We discussed risk factors and preventive measures.    We discussed appropriate hygiene, shoe gear, daily foot exam, and reinforced management of weight, diet, activity goals and HA1C goal for diabetic patients.    Dispensed written foot care instructions.    All questions were answered to their satisfaction.        Darryl Huff DPM      Again, thank you for allowing me to participate in the care of your patient.        Sincerely,        Darryl Huff DPM

## 2020-01-06 NOTE — PROGRESS NOTES
HPI:  Luke Farias is a 54 year old male who is seen in consultation at the request of Gabi Verdugo PA-C.    Pt presents for eval of:   (Onset, Location, L/R, Character, Treatments, Injury if yes)    XR Right foot today 1/6/2020    DM Type 2    LOV 8/20/2018 - Plantar Left foot wound     Onset 12/25/2019, drainage plantar Right foot 4th metatarsal head, redness, swelling, dull ache, pain 1. Seen by [facility]. Improvement with finishing abx. No drainage or pain today, redness and minimal swelling.   12/26/2019 - Keflex 500 mg, 1 capsule 3 times daily for 7 days and   Bactrim DS/Septra 800-160 mg, 1 tablet 2 times daily for 7 days.    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      Weight management plan: Patient was referred to their PCP to discuss a diet and exercise plan.       Review of Systems:  Patient denies fever, chills, rash, wound, stiffness, limping, numbness, weakness, heart burn, blood in stool, chest pain with activity, calf pain when walking, shortness of breath with activity, chronic cough, easy bleeding/bruising, swelling of ankles, excessive thirst, fatigue, depression, anxiety.  Patient admits only to symptoms noted in history.     Patient Active Problem List   Diagnosis     Chronic rhinitis     Type 2 diabetes mellitus with hyperglycemia (H)     Hyperlipidemia LDL goal <100     Morbid obesity, unspecified obesity type (H)     Venous lake on Right chest     Congenital nevus of Right upper back     PAST MEDICAL HISTORY:   Past Medical History:   Diagnosis Date     Diabetes (H)     type 2     Seasonal allergies     Allergy inj as a child     PAST SURGICAL HISTORY:   Past Surgical History:   Procedure Laterality Date     COLONOSCOPY N/A 8/19/2019    Procedure: Colonoscopy, With Polypectomy And Biopsy;  Surgeon: Bartolome Templeton MD;  Location: [facility] OR     COLONOSCOPY WITH CO2 INSUFFLATION N/A 5/12/2017    Procedure: COLONOSCOPY WITH CO2 INSUFFLATION;  Dr. Rancho Painting/BMI:  30.8/Special screening for malignant neoplasms, colon/colonoscopy/[facility]:  [phone];  Surgeon: Krish Galloway MD;  Location: [facility] OR     COLONOSCOPY WITH CO2 INSUFFLATION N/A 1/21/2019    Procedure: COLONOSCOPY WITH CO2 INSUFFLATION;  Surgeon: Bartolome Templeton MD;  Location: [facility]     COLONOSCOPY WITH CO2 INSUFFLATION N/A 8/19/2019    Procedure: COLONOSCOPY, WITH CO2 INSUFFLATION;  Surgeon: Bartolome Templeton MD;  Location: [facility] OR     MEDICATIONS:   Current Outpatient Medications:      blood glucose monitoring (NO BRAND SPECIFIED) test strip, Use to test blood sugars 4 times daily or as directed due to infection and hyperglycemia, Disp: 200 strip, Rfl: 1     Blood Glucose Monitoring Suppl (GLUCOMETER ELITE CLASSIC) KIT, 1 Device daily., Disp: 1 kit, Rfl: 0     cephALEXin (KEFLEX) 500 MG capsule, Take 1 capsule (500 mg) by mouth 4 times daily, Disp: 40 capsule, Rfl: 0     clobetasol (TEMOVATE) 0.05 % external ointment, Apply sparingly to affected area twice daily for 14 days.  Then off for 14 days.  Do not apply to face or mucous membranes., Disp: 30 g, Rfl: 2     DIABETIC STERILE LANCETS device, 1 Device daily., Disp: 1 Box, Rfl: 12     glipiZIDE (GLIPIZIDE XL) 5 MG 24 hr tablet, Take 1 tablet (5 mg) by mouth daily, Disp: 30 tablet, Rfl: 0     glipiZIDE (GLUCOTROL XL) 5 MG 24 hr tablet, TAKE 1 TABLET(5 MG) BY MOUTH DAILY, Disp: 30 tablet, Rfl: 0     glucose blood VI test strips (ASCENSIA AUTODISC VI,ONE TOUCH ULTRA TEST VI) strip, by In Vitro route. Test as directed, Disp: 1 Box, Rfl: 12     ibuprofen (ADVIL,MOTRIN) 200 MG tablet, Take 200 mg by mouth every 4 hours as needed., Disp: , Rfl:      metFORMIN (GLUCOPHAGE-XR) 500 MG 24 hr tablet, Take 2 tablets (1,000 mg) by mouth 2 times daily (with meals), Disp: 360 tablet, Rfl: 1     STATIN NOT PRESCRIBED, INTENTIONAL,, Please choose reason not prescribed, below, Disp: , Rfl:   ALLERGIES:    Allergies   Allergen Reactions  "    Seasonal Allergies      SOCIAL HISTORY:   Social History     Socioeconomic History     Marital status:      Spouse name: Not on file     Number of children: Not on file     Years of education: Not on file     Highest education level: Not on file   Occupational History     Occupation: Salesmen     Employer: [employer]   Social Needs     Financial resource strain: Not on file     Food insecurity:     Worry: Not on file     Inability: Not on file     Transportation needs:     Medical: Not on file     Non-medical: Not on file   Tobacco Use     Smoking status: Never Smoker     Smokeless tobacco: Never Used   Substance and Sexual Activity     Alcohol use: Yes     Comment: 1-2 beers a week / occ      Drug use: No     Sexual activity: Yes     Partners: Female   Lifestyle     Physical activity:     Days per week: Not on file     Minutes per session: Not on file     Stress: Not on file   Relationships     Social connections:     Talks on phone: Not on file     Gets together: Not on file     Attends Jain service: Not on file     Active member of club or organization: Not on file     Attends meetings of clubs or organizations: Not on file     Relationship status: Not on file     Intimate partner violence:     Fear of current or ex partner: Not on file     Emotionally abused: Not on file     Physically abused: Not on file     Forced sexual activity: Not on file   Other Topics Concern     Parent/sibling w/ CABG, MI or angioplasty before 65F 55M? Not Asked   Social History Narrative     Not on file     FAMILY HISTORY:   Family History   Problem Relation Age of Onset     Heart Disease Mother      Diabetes Father      EXAM:Vitals: /82 (BP Location: Left arm, Cuff Size: Adult Large)   Temp 97.4  F (36.3  C) (Temporal)   Ht 1.865 m (6' 1.43\")   Wt 108 kg (238 lb)   BMI 31.04 kg/m    BMI= Body mass index is 31.04 kg/m .    General appearance: Patient is alert and fully cooperative with history & exam.  No sign " of distress is noted during the visit.     Psychiatric: Affect is pleasant & appropriate.  Patient appears motivated to improve health.     Respiratory: Breathing is regular & unlabored while sitting.     HEENT: Hearing is intact to spoken word.  Speech is clear.  No gross evidence of visual impairment that would impact ambulation.     Vascular: DP 1/4 & PT 1/4 left & right.  CFT delayed with dependent rubor noted about the digits.  Diminished hair growth distal to mid tibia and no hair about the foot and toes.  Temperature changes noted, warm to cool proximal to distal.  Hemosiderin pigmentation noted with multiple varicosities legs and feet bilateral. Generalized edema bilateral legs and feet.  Pt denies claudication history.     Neurologic: Normal plantar response bilateral.  Diminished protective threshold plus 7/10 applications of a 5.07 monofilament.  Pt admits paraesthesias about the feet and toes with palpation.     Dermatologic: Mildly diminished texture turgor tone about the integument.  There is a subtle rubor in a moccasin type fashion across the plantar foot consistent with flexural eczema.  Dry excoriated epidermis noted on the plantar feet but no interdigital changes.  On the distal aspect of both hallux nails and hallux IPJ and distal nail fold there is hyperkeratosis however this has fissured on the right foot a subtle fissure is noted on the plantar lateral hyperkeratosis of the fifth metatarsal head as well left foot.  Upon full-thickness debridement to a bleeding base the future extends through the dermis into the subcutaneous tissue but not tendon muscle or bone.  Both hallux nails are very dystrophic and bulky.     Musculoskeletal: Patient is ambulatory without assistive device or brace.  There is semi reducible contracture of the lesser digits.    Hemoglobin A1C (%)   Date Value   11/02/2018 6.1 (H)   07/18/2018 11.3 (H)   10/01/2012 8.2 (H)   06/08/2011 6.3 (H)     Creatinine (mg/dL)   Date  Value   11/02/2018 0.72   07/18/2018 0.68   10/01/2012 0.83   01/21/2011 0.76       ASSESSMENT:       ICD-10-CM    1. Cellulitis and abscess of foot, except toes L03.119 XR Foot Right G/E 3 Views    L02.619 cephALEXin (KEFLEX) 500 MG capsule     clobetasol (TEMOVATE) 0.05 % external ointment   2. Flexural eczema L20.82    3. Diabetic ulcer of right midfoot associated with type 2 diabetes mellitus, limited to breakdown of skin (H) E11.621 cephALEXin (KEFLEX) 500 MG capsule    L97.411 clobetasol (TEMOVATE) 0.05 % external ointment        PLAN:    1/6/2020  Both hallux nails were debrided  I sharply debrided the ulceration to a bleeding base and a sterile dressing applied.  This was done with a 15 blade scalpel to subcutaneous tissue.  Low up in 2 weeks to confirm this is resolved.  Dispensed Rx for topical steroid to be utilized for 10 days.  Written instructions regarding utilizing barrier cream daily.  Discussed difference between lotion cream and ointment.  Must utilize barrier cream daily permanently forever not a temporary treatment.  Discussed etiology and treatment options regarding eczema    We discussed risk factors and preventive measures.    We discussed appropriate hygiene, shoe gear, daily foot exam, and reinforced management of weight, diet, activity goals and HA1C goal for diabetic patients.    Dispensed written foot care instructions.    All questions were answered to their satisfaction.        Darryl Huff DPM

## 2020-01-14 ENCOUNTER — OFFICE VISIT (OUTPATIENT)
Dept: INTERNAL MEDICINE | Facility: CLINIC | Age: 55
End: 2020-01-14
Payer: COMMERCIAL

## 2020-01-14 VITALS
HEART RATE: 90 BPM | OXYGEN SATURATION: 94 % | BODY MASS INDEX: 31.43 KG/M2 | SYSTOLIC BLOOD PRESSURE: 128 MMHG | WEIGHT: 241 LBS | DIASTOLIC BLOOD PRESSURE: 84 MMHG | RESPIRATION RATE: 20 BRPM | TEMPERATURE: 97.8 F

## 2020-01-14 DIAGNOSIS — E11.65 TYPE 2 DIABETES MELLITUS WITH HYPERGLYCEMIA, WITHOUT LONG-TERM CURRENT USE OF INSULIN (H): ICD-10-CM

## 2020-01-14 DIAGNOSIS — R03.0 ELEVATED BLOOD PRESSURE READING WITHOUT DIAGNOSIS OF HYPERTENSION: ICD-10-CM

## 2020-01-14 DIAGNOSIS — Z23 NEED FOR PROPHYLACTIC VACCINATION AND INOCULATION AGAINST INFLUENZA: Primary | ICD-10-CM

## 2020-01-14 LAB
CHOLEST SERPL-MCNC: 135 MG/DL
CREAT UR-MCNC: 118 MG/DL
HBA1C MFR BLD: 10.4 % (ref 0–5.6)
HDLC SERPL-MCNC: 34 MG/DL
LDLC SERPL CALC-MCNC: 60 MG/DL
MICROALBUMIN UR-MCNC: 16 MG/L
MICROALBUMIN/CREAT UR: 13.39 MG/G CR (ref 0–17)
NONHDLC SERPL-MCNC: 101 MG/DL
TRIGL SERPL-MCNC: 206 MG/DL

## 2020-01-14 PROCEDURE — 90471 IMMUNIZATION ADMIN: CPT | Performed by: INTERNAL MEDICINE

## 2020-01-14 PROCEDURE — 90682 RIV4 VACC RECOMBINANT DNA IM: CPT | Performed by: INTERNAL MEDICINE

## 2020-01-14 PROCEDURE — 83036 HEMOGLOBIN GLYCOSYLATED A1C: CPT | Performed by: INTERNAL MEDICINE

## 2020-01-14 PROCEDURE — 99214 OFFICE O/P EST MOD 30 MIN: CPT | Mod: 25 | Performed by: INTERNAL MEDICINE

## 2020-01-14 PROCEDURE — 36415 COLL VENOUS BLD VENIPUNCTURE: CPT | Performed by: INTERNAL MEDICINE

## 2020-01-14 PROCEDURE — 82043 UR ALBUMIN QUANTITATIVE: CPT | Performed by: INTERNAL MEDICINE

## 2020-01-14 PROCEDURE — 80061 LIPID PANEL: CPT | Performed by: INTERNAL MEDICINE

## 2020-01-14 RX ORDER — METFORMIN HCL 500 MG
1000 TABLET, EXTENDED RELEASE 24 HR ORAL 2 TIMES DAILY WITH MEALS
Qty: 360 TABLET | Refills: 1 | Status: SHIPPED | OUTPATIENT
Start: 2020-01-14 | End: 2020-07-30

## 2020-01-14 ASSESSMENT — PAIN SCALES - GENERAL: PAINLEVEL: NO PAIN (0)

## 2020-01-14 NOTE — PROGRESS NOTES
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  155/94 there   BP at urgent care was 151/90  Saw podiatry had a foot infection and getting better now. /82    hgba1c of 10.4    He has been on metformin and glipizide, out of glipizide for a month.     Past Medical History:   Diagnosis Date     Diabetes (H)     type 2     Seasonal allergies     Allergy inj as a child     Current Outpatient Medications   Medication     clobetasol (TEMOVATE) 0.05 % external ointment     DIABETIC STERILE LANCETS device     glucose blood VI test strips (ASCENSIA AUTODISC VI,ONE TOUCH ULTRA TEST VI) strip     metFORMIN (GLUCOPHAGE-XR) 500 MG 24 hr tablet     blood glucose monitoring (NO BRAND SPECIFIED) test strip     Blood Glucose Monitoring Suppl (GLUCOMETER ELITE CLASSIC) KIT     glipiZIDE (GLIPIZIDE XL) 5 MG 24 hr tablet     No current facility-administered medications for this visit.      Social History     Tobacco Use     Smoking status: Never Smoker     Smokeless tobacco: Never Used   Substance Use Topics     Alcohol use: Yes     Comment: 1-2 beers a week / occ      Drug use: No     Review of Systems  Constitutional-Weight gain.  Cardiac-No chest pain or palpitations.  Respiratory-No cough, sob, or hemoptysis.  GI-No nausea, vomitting, diarrhea, constipation, or blood in the stool.  Endocrine-not checking sugars.    Physical Exam  /84   Pulse 90   Temp 97.8  F (36.6  C) (Temporal)   Resp 20   Wt 109.3 kg (241 lb)   SpO2 94%   BMI 31.43 kg/m    General Appearance-healthy, alert, no distress  Cardiac-regular rate and rhythm  with normal S1, S2 ; no murmur, rub or gallops  Lungs-clear to auscultation  Extremities-no peripheral edema, peripheral pulses  normal    ASSESSMENT:  54-year-old gentleman who has a history of type 2 diabetes.  He was first diagnosed in July 2018.  He did well metformin and glipizide get his A1c down to 6.1.  Unfortunately is not been in for his diabetes for over a year.  He comes in with a foot ulceration last week that is getting better now.  His A1c today is 10.4.  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    He will exercise try to lose weight, check his sugars and take metformin but add Jardiance instead of glipizide.  We will repeat his A1c in 2 to 3 weeks hoping that he will get under 10 so we can get his 's license back.  Otherwise repeat in 3 months for a true A1c.    Blood pressure is normal today.  He can watch his blood pressure at home and should be fine for his DOT.    Hyperlipidemia his cholesterol is very well controlled with a total cholesterol of 135.  I would hold off on adding a statin right now.    Follow-up in 4 months.    Electronically signed by Rancho Painting MD

## 2020-02-23 ENCOUNTER — HEALTH MAINTENANCE LETTER (OUTPATIENT)
Age: 55
End: 2020-02-23

## 2020-05-25 DIAGNOSIS — E11.65 TYPE 2 DIABETES MELLITUS WITH HYPERGLYCEMIA, WITHOUT LONG-TERM CURRENT USE OF INSULIN (H): ICD-10-CM

## 2020-05-27 RX ORDER — EMPAGLIFLOZIN 10 MG/1
TABLET, FILM COATED ORAL
Qty: 30 TABLET | Refills: 0 | Status: SHIPPED | OUTPATIENT
Start: 2020-05-27 | End: 2020-06-30

## 2020-05-27 NOTE — TELEPHONE ENCOUNTER
Pending Prescriptions:                       Disp   Refills    JARDIANCE 10 MG TABS tablet [Pharmacy Med *30 tab*0        Sig: TAKE 1 TABLET(10 MG) BY MOUTH DAILY      Routing refill request to provider for review/approval because:  Labs out of range:  A1C  Labs not current:  Potassium, creatinine.      Last Written Prescription Date:  01/2020  Last Fill Quantity: 30,  # refills: 3   Last office visit: 1/14/2020 with prescribing provider:  0   Future Office Visit:          Radha Jeronimo RN BSN

## 2020-06-29 DIAGNOSIS — E11.65 TYPE 2 DIABETES MELLITUS WITH HYPERGLYCEMIA, WITHOUT LONG-TERM CURRENT USE OF INSULIN (H): ICD-10-CM

## 2020-06-29 NOTE — TELEPHONE ENCOUNTER
Routing to schedulers to set up [facility] appointment for patient for diabetes recheck.  Please also ask patient how much medication she has left and schedule appropriately.   If patient needs a refill before their appointment, please route to RN for completion of refill.  Upon routing message, write WHEN appointment is scheduled and if they have enough medication to last to appointment.  Thank you!

## 2020-06-29 NOTE — LETTER
[facility]  [address] [address]  Phone: [phone]        June 30, 2020      Luke ESTRADA Jarrett                                                                                                                                [address] [address]            Dear Mr. Farias,    We are concerned about your health care.  We recently provided you with a medication refill.  Many medications require routine follow-up with your Doctor.      At this time we ask that: You schedule a routine office visit with your physician to follow your care.     Your prescription: No further refills will be given until your follow up care is completed.      Thank you,      [facility]

## 2020-06-30 RX ORDER — EMPAGLIFLOZIN 10 MG/1
TABLET, FILM COATED ORAL
Qty: 30 TABLET | Refills: 0 | Status: SHIPPED | OUTPATIENT
Start: 2020-06-30 | End: 2020-07-30

## 2020-07-29 DIAGNOSIS — E11.65 TYPE 2 DIABETES MELLITUS WITH HYPERGLYCEMIA, WITHOUT LONG-TERM CURRENT USE OF INSULIN (H): ICD-10-CM

## 2020-07-29 NOTE — LETTER
[place] [id number] [address]  Phone: [phone]        July 31, 2020      Luke Farias                                                                                                                                [address] [address]            Dear Mr. Farias,    We are concerned about your health care.  We recently provided you with a medication refill.  Many medications require routine follow-up with your Doctor.      At this time we ask that: You schedule a routine office visit with your physician to follow your care.     Your prescription: Has been refilled for 1 month so you may have time for the above noted follow-up.      Thank you,      Rancho aPinting MD

## 2020-07-30 RX ORDER — EMPAGLIFLOZIN 10 MG/1
TABLET, FILM COATED ORAL
Qty: 30 TABLET | Refills: 2 | Status: SHIPPED | OUTPATIENT
Start: 2020-07-30 | End: 2020-11-30

## 2020-07-30 RX ORDER — METFORMIN HCL 500 MG
TABLET, EXTENDED RELEASE 24 HR ORAL
Qty: 360 TABLET | Refills: 0 | Status: SHIPPED | OUTPATIENT
Start: 2020-07-30 | End: 2020-10-27

## 2020-07-30 NOTE — TELEPHONE ENCOUNTER
Jardiance and Metformin Prescription approved per [facility] Refill Protocol. Pt needs to be scheduled for a diabetes follow-up appt with Dr. Painting in clinic. Will forward to the schedulers to set this up. ...................HECTOR Mendez

## 2020-10-15 ENCOUNTER — TELEPHONE (OUTPATIENT)
Dept: INTERNAL MEDICINE | Facility: CLINIC | Age: 55
End: 2020-10-15

## 2020-10-15 DIAGNOSIS — R05.9 COUGH: Primary | ICD-10-CM

## 2020-10-15 DIAGNOSIS — Z20.822 EXPOSURE TO COVID-19 VIRUS: ICD-10-CM

## 2020-10-15 NOTE — TELEPHONE ENCOUNTER
Wife calls requesting Covid testing for patient.  Wife received positive Covid results today.  Patient is starting to show symptoms of cough, unknown fever at this time.  Wife is concerned as patient is diabetic.     PCP is currently out of office, will route to covering provider for Covid Order if appropriate.     Kristy Deshpande RN

## 2020-10-16 DIAGNOSIS — Z20.822 EXPOSURE TO COVID-19 VIRUS: ICD-10-CM

## 2020-10-16 DIAGNOSIS — R05.9 COUGH: ICD-10-CM

## 2020-10-16 PROCEDURE — U0003 INFECTIOUS AGENT DETECTION BY NUCLEIC ACID (DNA OR RNA); SEVERE ACUTE RESPIRATORY SYNDROME CORONAVIRUS 2 (SARS-COV-2) (CORONAVIRUS DISEASE [COVID-19]), AMPLIFIED PROBE TECHNIQUE, MAKING USE OF HIGH THROUGHPUT TECHNOLOGIES AS DESCRIBED BY CMS-2020-01-R: HCPCS | Mod: 90 | Performed by: PATHOLOGY

## 2020-10-16 PROCEDURE — 99000 SPECIMEN HANDLING OFFICE-LAB: CPT | Performed by: PATHOLOGY

## 2020-10-17 LAB
SARS-COV-2 RNA SPEC QL NAA+PROBE: ABNORMAL
SPECIMEN SOURCE: ABNORMAL

## 2020-10-27 DIAGNOSIS — E11.65 TYPE 2 DIABETES MELLITUS WITH HYPERGLYCEMIA, WITHOUT LONG-TERM CURRENT USE OF INSULIN (H): ICD-10-CM

## 2020-10-27 RX ORDER — METFORMIN HCL 500 MG
TABLET, EXTENDED RELEASE 24 HR ORAL
Qty: 360 TABLET | Refills: 0 | Status: SHIPPED | OUTPATIENT
Start: 2020-10-27 | End: 2021-01-26

## 2020-10-27 NOTE — TELEPHONE ENCOUNTER
"Routing refill request to provider for review/approval because:  Labs not current:  A1C  T'dup for 3 months per provider request for review      Requested Prescriptions   Pending Prescriptions Disp Refills     metFORMIN (GLUCOPHAGE-XR) 500 MG 24 hr tablet 360 tablet 0     Sig: TAKE 2 TABLETS(1000 MG) BY MOUTH TWICE DAILY WITH MEALS   Last Written Prescription Date:  7/30/2020  Last Fill Quantity: 360,  # refills: 0   Last office visit: 1/14/2020 with prescribing provider:     Future Office Visit:      Biguanide Agents Failed - 10/27/2020 10:23 AM        Failed - Patient has documented A1c within the specified period of time.     If HgbA1C is 8 or greater, it needs to be on file within the past 3 months.  If less than 8, must be on file within the past 6 months.     Recent Labs   Lab Test 01/14/20  0957   A1C 10.4*           Failed - Patient's CR is NOT>1.4 OR Patient's EGFR is NOT<45 within past 12 mos.     Recent Labs   Lab Test 11/02/18  0839   GFRESTIMATED >90   GFRESTBLACK >90     Recent Labs   Lab Test 11/02/18  0839   CR 0.72           Failed - Recent (6 mo) or future (30 days) visit within the authorizing provider's specialty     Patient had office visit in the last 6 months or has a visit in the next 30 days with authorizing provider or within the authorizing provider's specialty.  See \"Patient Info\" tab in inbasket, or \"Choose Columns\" in Meds & Orders section of the refill encounter.            Passed - Patient is age 10 or older        Passed - Patient does NOT have a diagnosis of CHF.        Passed - Medication is active on med list         Isatu Jones RN      "

## 2020-11-30 DIAGNOSIS — E11.65 TYPE 2 DIABETES MELLITUS WITH HYPERGLYCEMIA, WITHOUT LONG-TERM CURRENT USE OF INSULIN (H): ICD-10-CM

## 2020-12-01 NOTE — TELEPHONE ENCOUNTER
Routing to schedulers to set up F2F appointment for patient for yearly.    Routing refill request to provider for review/approval because:  Labs not current:  (2018) GFR, Cr, K  A1c  Lab Results   Component Value Date    A1C 10.4 01/14/2020    A1C 6.1 11/02/2018    A1C 11.3 07/18/2018    A1C 8.2 10/01/2012    A1C 6.3 06/08/2011     Last Written Prescription Date:  7/30/2020  Last Fill Quantity: 30,  # refills: 2   Last office visit: 1/14/2020 with prescribing provider:     Future Office Visit:      NOY OneillN, RN

## 2020-12-06 ENCOUNTER — HEALTH MAINTENANCE LETTER (OUTPATIENT)
Age: 55
End: 2020-12-06

## 2020-12-26 DIAGNOSIS — E11.65 TYPE 2 DIABETES MELLITUS WITH HYPERGLYCEMIA, WITHOUT LONG-TERM CURRENT USE OF INSULIN (H): ICD-10-CM

## 2020-12-28 RX ORDER — EMPAGLIFLOZIN 10 MG/1
TABLET, FILM COATED ORAL
Qty: 30 TABLET | Refills: 0 | Status: SHIPPED | OUTPATIENT
Start: 2020-12-28 | End: 2021-01-26

## 2020-12-28 NOTE — TELEPHONE ENCOUNTER
Routing refill request to provider for review/approval because:  Silva given x1 and patient did not follow up, please advise  Patient needs to be seen because:  Overdue for diabetic follow up    NOY WhitmanN, RN  [facility]

## 2021-01-25 DIAGNOSIS — E11.65 TYPE 2 DIABETES MELLITUS WITH HYPERGLYCEMIA, WITHOUT LONG-TERM CURRENT USE OF INSULIN (H): ICD-10-CM

## 2021-01-26 RX ORDER — METFORMIN HCL 500 MG
TABLET, EXTENDED RELEASE 24 HR ORAL
Qty: 360 TABLET | Refills: 0 | Status: SHIPPED | OUTPATIENT
Start: 2021-01-26 | End: 2021-04-21

## 2021-01-26 RX ORDER — EMPAGLIFLOZIN 10 MG/1
TABLET, FILM COATED ORAL
Qty: 30 TABLET | Refills: 0 | Status: SHIPPED | OUTPATIENT
Start: 2021-01-26 | End: 2021-02-25

## 2021-01-26 NOTE — TELEPHONE ENCOUNTER
Routing refill request to provider for review/approval because:  Labs not current:  Creatinine, A1C  Patient needs to be seen because it has been more than 1 year since last office visit.    Kristy Deshpande RN

## 2021-02-24 DIAGNOSIS — E11.65 TYPE 2 DIABETES MELLITUS WITH HYPERGLYCEMIA, WITHOUT LONG-TERM CURRENT USE OF INSULIN (H): ICD-10-CM

## 2021-02-25 RX ORDER — EMPAGLIFLOZIN 10 MG/1
TABLET, FILM COATED ORAL
Qty: 30 TABLET | Refills: 0 | Status: SHIPPED | OUTPATIENT
Start: 2021-02-25 | End: 2021-03-26

## 2021-02-25 NOTE — TELEPHONE ENCOUNTER
Routing refill request to provider for review/approval because:  Patient needs to be seen because it has been more than 1 year since last office visit.  Tessa Rasmussen RN

## 2021-03-26 ENCOUNTER — TELEPHONE (OUTPATIENT)
Dept: INTERNAL MEDICINE | Facility: CLINIC | Age: 56
End: 2021-03-26

## 2021-03-26 DIAGNOSIS — E11.65 TYPE 2 DIABETES MELLITUS WITH HYPERGLYCEMIA, WITHOUT LONG-TERM CURRENT USE OF INSULIN (H): ICD-10-CM

## 2021-03-26 RX ORDER — EMPAGLIFLOZIN 10 MG/1
TABLET, FILM COATED ORAL
Qty: 14 TABLET | Refills: 0 | Status: SHIPPED | OUTPATIENT
Start: 2021-03-26 | End: 2021-03-26

## 2021-03-26 NOTE — TELEPHONE ENCOUNTER
Routing refill request to provider for review/approval because:  Labs not current:  A1C, GFR, Potassium  Patient needs to be seen because it has been more than 1 year since last office visit.    Kristy Deshpande RN

## 2021-03-26 NOTE — TELEPHONE ENCOUNTER
Pharmacy is request ok to change Jardiance 14 tablets to 21 tablets because he has a coupon for 21 tablets.

## 2021-04-08 ENCOUNTER — MYC MEDICAL ADVICE (OUTPATIENT)
Dept: INTERNAL MEDICINE | Facility: CLINIC | Age: 56
End: 2021-04-08

## 2021-04-08 NOTE — TELEPHONE ENCOUNTER
My Chart message sent to patient.  Will forward to  Dr. Painting to review and then may close unless he has recommendation for the patient...............HECTOR Mendez

## 2021-04-11 ENCOUNTER — HEALTH MAINTENANCE LETTER (OUTPATIENT)
Age: 56
End: 2021-04-11

## 2021-04-21 ENCOUNTER — OFFICE VISIT (OUTPATIENT)
Dept: INTERNAL MEDICINE | Facility: CLINIC | Age: 56
End: 2021-04-21
Payer: COMMERCIAL

## 2021-04-21 VITALS
HEIGHT: 73 IN | TEMPERATURE: 97.6 F | DIASTOLIC BLOOD PRESSURE: 76 MMHG | WEIGHT: 230 LBS | SYSTOLIC BLOOD PRESSURE: 110 MMHG | HEART RATE: 84 BPM | OXYGEN SATURATION: 98 % | BODY MASS INDEX: 30.48 KG/M2 | RESPIRATION RATE: 16 BRPM

## 2021-04-21 DIAGNOSIS — Z80.0 FAMILY HISTORY OF COLON CANCER: ICD-10-CM

## 2021-04-21 DIAGNOSIS — Z12.5 SCREENING FOR PROSTATE CANCER: ICD-10-CM

## 2021-04-21 DIAGNOSIS — Z00.00 ENCOUNTER FOR ROUTINE ADULT HEALTH EXAMINATION WITHOUT ABNORMAL FINDINGS: Primary | ICD-10-CM

## 2021-04-21 DIAGNOSIS — E66.01 MORBID OBESITY, UNSPECIFIED OBESITY TYPE (H): ICD-10-CM

## 2021-04-21 DIAGNOSIS — E11.65 TYPE 2 DIABETES MELLITUS WITH HYPERGLYCEMIA, WITHOUT LONG-TERM CURRENT USE OF INSULIN (H): ICD-10-CM

## 2021-04-21 LAB
ALBUMIN SERPL-MCNC: 4.1 G/DL (ref 3.4–5)
ALP SERPL-CCNC: 56 U/L (ref 40–150)
ALT SERPL W P-5'-P-CCNC: 23 U/L (ref 0–70)
ANION GAP SERPL CALCULATED.3IONS-SCNC: 6 MMOL/L (ref 3–14)
AST SERPL W P-5'-P-CCNC: 11 U/L (ref 0–45)
BILIRUB SERPL-MCNC: 0.7 MG/DL (ref 0.2–1.3)
BUN SERPL-MCNC: 13 MG/DL (ref 7–30)
CALCIUM SERPL-MCNC: 8.7 MG/DL (ref 8.5–10.1)
CHLORIDE SERPL-SCNC: 105 MMOL/L (ref 94–109)
CHOLEST SERPL-MCNC: 169 MG/DL
CO2 SERPL-SCNC: 28 MMOL/L (ref 20–32)
CREAT SERPL-MCNC: 0.7 MG/DL (ref 0.66–1.25)
CREAT UR-MCNC: 76 MG/DL
GFR SERPL CREATININE-BSD FRML MDRD: >90 ML/MIN/{1.73_M2}
GLUCOSE SERPL-MCNC: 157 MG/DL (ref 70–99)
HBA1C MFR BLD: 8.9 % (ref 0–5.6)
HDLC SERPL-MCNC: 39 MG/DL
LDLC SERPL CALC-MCNC: 68 MG/DL
MICROALBUMIN UR-MCNC: 8 MG/L
MICROALBUMIN/CREAT UR: 10.1 MG/G CR (ref 0–17)
NONHDLC SERPL-MCNC: 130 MG/DL
POTASSIUM SERPL-SCNC: 4.1 MMOL/L (ref 3.4–5.3)
PROT SERPL-MCNC: 7.3 G/DL (ref 6.8–8.8)
PSA SERPL-ACNC: 0.8 UG/L (ref 0–4)
SODIUM SERPL-SCNC: 139 MMOL/L (ref 133–144)
TRIGL SERPL-MCNC: 311 MG/DL

## 2021-04-21 PROCEDURE — 99213 OFFICE O/P EST LOW 20 MIN: CPT | Mod: 25 | Performed by: INTERNAL MEDICINE

## 2021-04-21 PROCEDURE — G0103 PSA SCREENING: HCPCS | Performed by: INTERNAL MEDICINE

## 2021-04-21 PROCEDURE — 80053 COMPREHEN METABOLIC PANEL: CPT | Performed by: INTERNAL MEDICINE

## 2021-04-21 PROCEDURE — 36415 COLL VENOUS BLD VENIPUNCTURE: CPT | Performed by: INTERNAL MEDICINE

## 2021-04-21 PROCEDURE — 99396 PREV VISIT EST AGE 40-64: CPT | Performed by: INTERNAL MEDICINE

## 2021-04-21 PROCEDURE — 82043 UR ALBUMIN QUANTITATIVE: CPT | Performed by: INTERNAL MEDICINE

## 2021-04-21 PROCEDURE — 83036 HEMOGLOBIN GLYCOSYLATED A1C: CPT | Performed by: INTERNAL MEDICINE

## 2021-04-21 PROCEDURE — 80061 LIPID PANEL: CPT | Performed by: INTERNAL MEDICINE

## 2021-04-21 RX ORDER — METFORMIN HCL 500 MG
TABLET, EXTENDED RELEASE 24 HR ORAL
Qty: 360 TABLET | Refills: 3 | Status: SHIPPED | OUTPATIENT
Start: 2021-04-21 | End: 2022-06-20

## 2021-04-21 RX ORDER — ATORVASTATIN CALCIUM 20 MG/1
20 TABLET, FILM COATED ORAL DAILY
Qty: 90 TABLET | Refills: 3 | Status: SHIPPED | OUTPATIENT
Start: 2021-04-21 | End: 2022-06-20

## 2021-04-21 ASSESSMENT — ENCOUNTER SYMPTOMS
HEADACHES: 0
ARTHRALGIAS: 0
SHORTNESS OF BREATH: 0
ABDOMINAL PAIN: 0
NERVOUS/ANXIOUS: 0
PARESTHESIAS: 0
WEAKNESS: 0
EYE PAIN: 0
CHILLS: 0
NAUSEA: 0
CONSTIPATION: 0
HEARTBURN: 0
FREQUENCY: 0
MYALGIAS: 0
HEMATURIA: 0
SORE THROAT: 0
JOINT SWELLING: 0
FEVER: 0
DYSURIA: 0
DIZZINESS: 0
PALPITATIONS: 0
DIARRHEA: 0
COUGH: 0
HEMATOCHEZIA: 0

## 2021-04-21 ASSESSMENT — MIFFLIN-ST. JEOR: SCORE: 1937.15

## 2021-04-21 ASSESSMENT — PAIN SCALES - GENERAL: PAINLEVEL: NO PAIN (0)

## 2021-04-21 NOTE — PROGRESS NOTES
SUBJECTIVE:   CC: Luke Farias is an 55 year old male who presents for preventative health visit.     Patient has been advised of split billing requirements and indicates understanding: Yes  Healthy Habits:     Getting at least 3 servings of Calcium per day:  Yes    Bi-annual eye exam:  NO    Dental care twice a year:  Yes    Sleep apnea or symptoms of sleep apnea:  None    Diet:  Diabetic    Frequency of exercise:  2-3 days/week    Duration of exercise:  15-30 minutes    Taking medications regularly:  Yes    Medication side effects:  None    PHQ-2 Total Score: 0    Additional concerns today:  No    Had covid infection in October, working to get his vaccine.      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 Watches his diet and then not as much. Cut out soda.  Exercising more, pickle ball.      Not always checking sugars.  Needs new strips and lancets.         Today's PHQ-2 Score:   PHQ-2 ( 1999 Pfizer) 4/21/2021   Q1: Little interest or pleasure in doing things 0   Q2: Feeling down, depressed or hopeless 0   PHQ-2 Score 0   Q1: Little interest or pleasure in doing things Not at all   Q2: Feeling down, depressed or hopeless Not at all   PHQ-2 Score 0       Abuse: Current or Past(Physical, Sexual or Emotional)- No  Do you feel safe in your environment? Yes    Have you ever done Advance Care Planning? (For example, a Health Directive, POLST, or a discussion with a medical provider or your loved ones about your wishes): No, advance care planning information given to patient to review.  Advanced care planning was discussed at today's visit.    Social History     Tobacco Use     Smoking status: Never Smoker     Smokeless tobacco: Never Used   Substance Use Topics     Alcohol use: Yes     Comment: 1-2 beers a week / occ      If you drink alcohol do you typically have >3 drinks per day or >7 drinks per week? No    Alcohol Use 4/21/2021   Prescreen: >3 drinks/day or >7 drinks/week? No       Last PSA: No results found for: PSA    Reviewed  "orders with patient. Reviewed health maintenance and updated orders accordingly - Yes  Lab work is in process    Reviewed and updated as needed this visit by clinical staff  Tobacco  Allergies  Meds              Reviewed and updated as needed this visit by Provider                    Review of Systems   Constitutional: Negative for chills and fever.   HENT: Negative for congestion, ear pain, hearing loss and sore throat.    Eyes: Negative for pain and visual disturbance.   Respiratory: Negative for cough and shortness of breath.    Cardiovascular: Negative for chest pain, palpitations and peripheral edema.   Gastrointestinal: Negative for abdominal pain, constipation, diarrhea, heartburn, hematochezia and nausea.   Genitourinary: Negative for discharge, dysuria, frequency, genital sores, hematuria, impotence and urgency.   Musculoskeletal: Negative for arthralgias, joint swelling and myalgias.   Skin: Negative for rash.   Neurological: Negative for dizziness, weakness, headaches and paresthesias.   Psychiatric/Behavioral: Negative for mood changes. The patient is not nervous/anxious.        OBJECTIVE:   /76   Pulse 84   Temp 97.6  F (36.4  C) (Temporal)   Resp 16   Ht 1.854 m (6' 1\")   Wt 104.3 kg (230 lb)   SpO2 98%   BMI 30.34 kg/m      Physical Exam  GENERAL: healthy, alert and no distress  EYES: Eyes grossly normal to inspection, PERRL and conjunctivae and sclerae normal  HENT: ear canals and TM's normal, nose and mouth without ulcers or lesions  NECK: no adenopathy, no asymmetry, masses, or scars and thyroid normal to palpation  RESP: lungs clear to auscultation - no rales, rhonchi or wheezes  CV: regular rate and rhythm, normal S1 S2, no S3 or S4, no murmur, click or rub, no peripheral edema and peripheral pulses strong  ABDOMEN: soft, nontender, no hepatosplenomegaly, no masses and bowel sounds normal  MS: no gross musculoskeletal defects noted, no edema  SKIN: no suspicious lesions or " rashes  NEURO: Normal strength and tone, mentation intact and speech normal  PSYCH: mentation appears normal, affect normal/bright    Diagnostic Test Results:  Labs reviewed in Epic    ASSESSMENT/PLAN:       ICD-10-CM    1. Encounter for routine adult health examination without abnormal findings  Z00.00    2. Screening for prostate cancer  Z12.5 PSA, screen   3. Type 2 diabetes mellitus with hyperglycemia, without long-term current use of insulin (H)  E11.65 Comprehensive metabolic panel     Hemoglobin A1c     Lipid Profile     Albumin Random Urine Quantitative with Creat Ratio     blood glucose (NO BRAND SPECIFIED) test strip     metFORMIN (GLUCOPHAGE-XR) 500 MG 24 hr tablet     atorvastatin (LIPITOR) 20 MG tablet     empagliflozin (JARDIANCE) 25 MG TABS tablet   4. Family history of colon cancer  Z80.0 GASTROENTEROLOGY ADULT REF PROCEDURE ONLY     Patient here for yearly physical.  He has a family history of colon cancer needs a colonoscopy set up.  His tetanus shot is due next year  We will try to get him a Covid vaccine as he was assigned up to get the [employer] but now needs changes that is on hold.    Diabetes is a separate entity is not been seen for a while his last A1c was 10.  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  He does need to get his eye exam done.  Is taking Metformin.  Not on glipizide at this time taking Jardiance 10 mg.  He feels he can get better if he does more with his diet.  I would increase his Jardiance from 10 to 25 mg.  Check an A1c today needs to repeat an A1c in 3 months.  He has not had good follow-up we stressed this to him.    He is on aspirin.  We discussed being on a statin due to his diabetes and will start him on atorvastatin 20 mg a day.  Patient has been advised of split billing requirements and indicates understanding: Yes  COUNSELING:   Reviewed preventive health counseling, as reflected in patient instructions       Regular exercise       Healthy diet/nutrition    Estimated body  "mass index is 30.34 kg/m  as calculated from the following:    Height as of this encounter: 1.854 m (6' 1\").    Weight as of this encounter: 104.3 kg (230 lb).     Weight management plan: Discussed healthy diet and exercise guidelines    He reports that he has never smoked. He has never used smokeless tobacco.      Counseling Resources:  ATP IV Guidelines  Pooled Cohorts Equation Calculator  FRAX Risk Assessment  ICSI Preventive Guidelines  Dietary Guidelines for Americans, 2010  USDA's MyPlate  ASA Prophylaxis  Lung CA Screening    Rancho Painting MD  [employer]  "

## 2021-04-22 ENCOUNTER — IMMUNIZATION (OUTPATIENT)
Dept: FAMILY MEDICINE | Facility: CLINIC | Age: 56
End: 2021-04-22
Payer: COMMERCIAL

## 2021-04-22 PROCEDURE — 0011A PR COVID VAC MODERNA 100 MCG/0.5 ML IM: CPT

## 2021-04-22 PROCEDURE — 91301 PR COVID VAC MODERNA 100 MCG/0.5 ML IM: CPT

## 2021-04-23 ENCOUNTER — TELEPHONE (OUTPATIENT)
Dept: INTERNAL MEDICINE | Facility: CLINIC | Age: 56
End: 2021-04-23

## 2021-04-23 NOTE — TELEPHONE ENCOUNTER
Prior Authorization Retail Medication Request    Medication/Dose: empagliflozin (JARDIANCE) 25 MG TABS tablet  ICD code (if different than what is on RX):    Previously Tried and Failed:    Rationale:      Insurance Name:  [employer]  Insurance ID:  [id number]       Pharmacy Information (if different than what is on RX)  Name:    Phone:

## 2021-04-27 DIAGNOSIS — E11.65 TYPE 2 DIABETES MELLITUS WITH HYPERGLYCEMIA, WITHOUT LONG-TERM CURRENT USE OF INSULIN (H): ICD-10-CM

## 2021-04-27 NOTE — TELEPHONE ENCOUNTER
Central Prior Authorization Team  Phone: [phone]    PA Initiation    Medication: empagliflozin (JARDIANCE) 25 MG TABS tablet  Insurance Company: [employer] - Phone [phone] Fax [phone]  Pharmacy Filling the Rx: [id number] - [place], [place] - [address]  Filling Pharmacy Phone: [phone]  Filling Pharmacy Fax:    Start Date: 4/27/2021

## 2021-04-27 NOTE — TELEPHONE ENCOUNTER
No pa needed- this medication is covered without needing a pa and the pharmacy already received a paid claim.

## 2021-04-27 NOTE — TELEPHONE ENCOUNTER
Prescription was sent 4/21/2021 for #200 with 1 refill.  Pharmacy notified via E-Prescribe refusal.     NOY WhitmanN, RN  [facility]

## 2021-04-29 ENCOUNTER — TELEPHONE (OUTPATIENT)
Dept: INTERNAL MEDICINE | Facility: CLINIC | Age: 56
End: 2021-04-29

## 2021-04-29 DIAGNOSIS — E11.65 TYPE 2 DIABETES MELLITUS WITH HYPERGLYCEMIA, WITHOUT LONG-TERM CURRENT USE OF INSULIN (H): Primary | ICD-10-CM

## 2021-04-29 NOTE — TELEPHONE ENCOUNTER
pharmacy called stated that patients insurance will cover the freestyle nini kit     [place] one time  and sensors 14 so every 14 days needs fill.    Most popular one is libre2 kit.    Pharmacy did run insurance claim and is actually cheaper for the patient.     Tamanna NOWAK

## 2021-04-30 NOTE — TELEPHONE ENCOUNTER
Routing to RN to pend correct order.     Brittani Salter CMA ([facility]) 4/30/2021

## 2021-05-20 ENCOUNTER — IMMUNIZATION (OUTPATIENT)
Dept: FAMILY MEDICINE | Facility: CLINIC | Age: 56
End: 2021-05-20
Attending: FAMILY MEDICINE
Payer: COMMERCIAL

## 2021-05-20 PROCEDURE — 0012A PR COVID VAC MODERNA 100 MCG/0.5 ML IM: CPT

## 2021-05-20 PROCEDURE — 91301 PR COVID VAC MODERNA 100 MCG/0.5 ML IM: CPT

## 2021-07-29 ENCOUNTER — OFFICE VISIT (OUTPATIENT)
Dept: PODIATRY | Facility: CLINIC | Age: 56
End: 2021-07-29
Payer: COMMERCIAL

## 2021-07-29 ENCOUNTER — ANCILLARY PROCEDURE (OUTPATIENT)
Dept: GENERAL RADIOLOGY | Facility: CLINIC | Age: 56
End: 2021-07-29
Attending: PODIATRIST
Payer: COMMERCIAL

## 2021-07-29 VITALS
BODY MASS INDEX: 29.77 KG/M2 | WEIGHT: 232 LBS | DIASTOLIC BLOOD PRESSURE: 79 MMHG | HEIGHT: 74 IN | SYSTOLIC BLOOD PRESSURE: 123 MMHG | HEART RATE: 84 BPM

## 2021-07-29 DIAGNOSIS — S90.31XA TRAUMATIC ECCHYMOSIS OF FOOT, RIGHT, INITIAL ENCOUNTER: Primary | ICD-10-CM

## 2021-07-29 DIAGNOSIS — S90.31XA TRAUMATIC ECCHYMOSIS OF FOOT, RIGHT, INITIAL ENCOUNTER: ICD-10-CM

## 2021-07-29 DIAGNOSIS — E11.65 TYPE 2 DIABETES MELLITUS WITH HYPERGLYCEMIA, WITHOUT LONG-TERM CURRENT USE OF INSULIN (H): ICD-10-CM

## 2021-07-29 PROCEDURE — 73630 X-RAY EXAM OF FOOT: CPT | Mod: RT | Performed by: RADIOLOGY

## 2021-07-29 PROCEDURE — 99203 OFFICE O/P NEW LOW 30 MIN: CPT | Performed by: PODIATRIST

## 2021-07-29 ASSESSMENT — MIFFLIN-ST. JEOR: SCORE: 1962.1

## 2021-07-29 NOTE — PROGRESS NOTES
S:  Patient complains of swelling and bruising right fifth toe.  This started about a week ago.  The bruising has now gone over to his fourth third and second toes at the MTPJ's.  Never had any pain.  Denies any blunt trauma.  He was on vacation at the time.  He was in [place] and it was very hot and humid.  He went golfing a number of times and was on his feet more.  He has diabetes with peripheral neuropathy.  Does have a history of foot wound.  Denies erythema ecchymosis fever chills blistering or drainage    ROS:  A 10-point review of systems was performed and is positive for that noted in the HPI and as seen above.  All other areas are negative.          Allergies   Allergen Reactions     Seasonal Allergies        Current Outpatient Medications   Medication Sig Dispense Refill     metFORMIN (GLUCOPHAGE-XR) 500 MG 24 hr tablet TAKE 2 TABLETS(1000 MG) BY MOUTH TWICE DAILY WITH MEALS 360 tablet 3     aspirin (ASA) 81 MG tablet Take 1 tablet (81 mg) by mouth daily       atorvastatin (LIPITOR) 20 MG tablet Take 1 tablet (20 mg) by mouth daily 90 tablet 3     blood glucose (NO BRAND SPECIFIED) test strip Use to test blood sugars 4 times daily or as directed due to infection and hyperglycemia 200 strip 1     Blood Glucose Monitoring Suppl (GLUCOMETER ELITE CLASSIC) KIT 1 Device daily. 1 kit 0     clobetasol (TEMOVATE) 0.05 % external ointment Apply sparingly to affected area twice daily for 14 days.  Then off for 14 days.  Do not apply to face or mucous membranes. (Patient not taking: Reported on 4/21/2021) 30 g 2     Continuous Blood Gluc Sensor (FREESTYLE PIERRE 2 SENSOR) MISC 1 each every 14 days 1 each every 14 days. Change every 14 days. 2 each 5     DIABETIC STERILE LANCETS device 1 Device daily. 1 Box 12     empagliflozin (JARDIANCE) 25 MG TABS tablet Take 1 tablet (25 mg) by mouth daily 90 tablet 3     glucose blood VI test strips (ASCENSIA AUTODISC VI,ONE TOUCH ULTRA TEST VI) strip by In Vitro route.  "Test as directed 1 Box 12       Patient Active Problem List   Diagnosis     Chronic rhinitis     Type 2 diabetes mellitus with hyperglycemia (H)     Hyperlipidemia LDL goal <100     Morbid obesity, unspecified obesity type (H)     Venous lake on Right chest     Congenital nevus of Right upper back       Past Medical History:   Diagnosis Date     Diabetes (H)     type 2     Seasonal allergies     Allergy inj as a child       Past Surgical History:   Procedure Laterality Date     COLONOSCOPY N/A 8/19/2019    Procedure: Colonoscopy, With Polypectomy And Biopsy;  Surgeon: Bartolome Templeton MD;  Location: [facility] OR     COLONOSCOPY WITH CO2 INSUFFLATION N/A 5/12/2017    Procedure: COLONOSCOPY WITH CO2 INSUFFLATION;  Dr. Rancho Painting/BMI: 30.8/Special screening for malignant neoplasms, colon/colonoscopy/[facility]:  [phone];  Surgeon: Krish Galloway MD;  Location: [facility] OR     COLONOSCOPY WITH CO2 INSUFFLATION N/A 1/21/2019    Procedure: COLONOSCOPY WITH CO2 INSUFFLATION;  Surgeon: Bartolome Templeton MD;  Location: [facility] OR     COLONOSCOPY WITH CO2 INSUFFLATION N/A 8/19/2019    Procedure: COLONOSCOPY, WITH CO2 INSUFFLATION;  Surgeon: Bartolome Templeton MD;  Location: [facility] OR       Family History   Problem Relation Age of Onset     Heart Disease Mother      Diabetes Father        Social History     Tobacco Use     Smoking status: Never Smoker     Smokeless tobacco: Never Used   Substance Use Topics     Alcohol use: Yes     Comment: 1-2 beers a week / occ          Exam:    Vitals: /79   Pulse 84   Ht 1.88 m (6' 2\")   Wt 105.2 kg (232 lb)   BMI 29.79 kg/m    BMI: Body mass index is 29.79 kg/m .  Height: 6' 2\"    Constitutional/ general:  Pt is in no apparent distress, appears well-nourished.  Cooperative with history and physical exam.     Psych:  The patient answered questions appropriately.  Normal affect.  Seems to have reasonable expectations, in terms of treatment.     Eyes:  " Visual scanning/ tracking without deficit.     Ears:  Response to auditory stimuli is normal.  negative hearing aid devices.  Auricles in proper alignment.     Lymphatic:  Popliteal lymph nodes not enlarged.     Lungs:  Non labored breathing, non labored speech. No cough.  No audible wheezing. Even, quiet breathing.       Vascular:  positive pedal pulses bilaterally for both the DP and PT arteries.  CFT < 3 sec.  positive ankle edema.  positive pedal hair growth.    Neuro:  Alert and oriented x 3. Coordinated gait.  Light touch sensation is intact to the L4, L5, S1 distributions. No obvious deficits.  No evidence of neurological-based weakness, spasticity, or contracture in the lower extremities.  Monofilament absent to midfoot bilaterally    Derm: Normal texture and turgor.  No erythema, ecchymosis, or cyanosis.      Musculoskeletal:     Patient is ambulatory without an assistive device or brace.     Normal arch with weightbearing.  No forefoot or rear foot deformities noted.  MS 5/5 all compartments.  Normal ROM all fore foot and rearfoot joints.  No equinus.  right fifth toe is edematous.  No ecchymosis here.  No erythema.  Extensor flexor tendons intact.  Ecchymosis noted on the third fourth and second MTPJ's and appears to be resolving.  There is no edema here.  No crepitus or pain with palpation anywhere.    Radiographic Exam:  X-Ray Findings:  I personally reviewed the films.  Unremarkable    A:   Contusion right foot    P: Xrays 3-views of affected foot.  Discussed with patient that he has lost his protective sensation in his feet.  Discussed with the hot humid weather this could cause increased swelling in his feet and if he is walking a lot could cause a contusion.  He will make sure shoes are wide enough.  He will watch this and give it time to resolve.  Discussed that he is lost his protective sensation in his feet.  Will observe his feet daily for any signs of infection and we discussed what to look  for.  If he sees us he will call someone right away.  Discussed importance of good blood sugar control at all times.  Return to clinic prn.    Chris Caruso, GHANSHYAM, FACFAS

## 2021-07-29 NOTE — LETTER
7/29/2021         RE: Luke Farias  [id number] [id number]        Dear Colleague,    Thank you for referring your patient, Luke Farias, to the [facility]. Please see a copy of my visit note below.    S:  Patient complains of swelling and bruising right fifth toe.  This started about a week ago.  The bruising has now gone over to his fourth third and second toes at the MTPJ's.  Never had any pain.  Denies any blunt trauma.  He was on vacation at the time.  He was in [place] and it was very hot and humid.  He went golfing a number of times and was on his feet more.  He has diabetes with peripheral neuropathy.  Does have a history of foot wound.  Denies erythema ecchymosis fever chills blistering or drainage    ROS:  A 10-point review of systems was performed and is positive for that noted in the HPI and as seen above.  All other areas are negative.          Allergies   Allergen Reactions     Seasonal Allergies        Current Outpatient Medications   Medication Sig Dispense Refill     metFORMIN (GLUCOPHAGE-XR) 500 MG 24 hr tablet TAKE 2 TABLETS(1000 MG) BY MOUTH TWICE DAILY WITH MEALS 360 tablet 3     aspirin (ASA) 81 MG tablet Take 1 tablet (81 mg) by mouth daily       atorvastatin (LIPITOR) 20 MG tablet Take 1 tablet (20 mg) by mouth daily 90 tablet 3     blood glucose (NO BRAND SPECIFIED) test strip Use to test blood sugars 4 times daily or as directed due to infection and hyperglycemia 200 strip 1     Blood Glucose Monitoring Suppl (GLUCOMETER ELITE CLASSIC) KIT 1 Device daily. 1 kit 0     clobetasol (TEMOVATE) 0.05 % external ointment Apply sparingly to affected area twice daily for 14 days.  Then off for 14 days.  Do not apply to face or mucous membranes. (Patient not taking: Reported on 4/21/2021) 30 g 2     Continuous Blood Gluc Sensor (FREESTYLE PIERRE 2 SENSOR) MISC 1 each every 14 days 1 each every 14 days. Change every 14 days. 2 each 5     DIABETIC  "STERILE LANCETS device 1 Device daily. 1 Box 12     empagliflozin (JARDIANCE) 25 MG TABS tablet Take 1 tablet (25 mg) by mouth daily 90 tablet 3     glucose blood VI test strips (ASCENSIA AUTODISC VI,ONE TOUCH ULTRA TEST VI) strip by In Vitro route. Test as directed 1 Box 12       Patient Active Problem List   Diagnosis     Chronic rhinitis     Type 2 diabetes mellitus with hyperglycemia (H)     Hyperlipidemia LDL goal <100     Morbid obesity, unspecified obesity type (H)     Venous lake on Right chest     Congenital nevus of Right upper back       Past Medical History:   Diagnosis Date     Diabetes (H)     type 2     Seasonal allergies     Allergy inj as a child       Past Surgical History:   Procedure Laterality Date     COLONOSCOPY N/A 8/19/2019    Procedure: Colonoscopy, With Polypectomy And Biopsy;  Surgeon: Bartolome Templeton MD;  Location: [facility] OR     COLONOSCOPY WITH CO2 INSUFFLATION N/A 5/12/2017    Procedure: COLONOSCOPY WITH CO2 INSUFFLATION;  Dr. Rancho Painting/BMI: 30.8/Special screening for malignant neoplasms, colon/colonoscopy/[facility]:  [phone];  Surgeon: Krish Galloway MD;  Location: [facility] OR     COLONOSCOPY WITH CO2 INSUFFLATION N/A 1/21/2019    Procedure: COLONOSCOPY WITH CO2 INSUFFLATION;  Surgeon: Bartolome Templeton MD;  [facility]: [facility] OR     COLONOSCOPY WITH CO2 INSUFFLATION N/A 8/19/2019    Procedure: COLONOSCOPY, WITH CO2 INSUFFLATION;  Surgeon: Bartolome Templeton MD;  Location: [facility] OR       Family History   Problem Relation Age of Onset     Heart Disease Mother      Diabetes Father        Social History     Tobacco Use     Smoking status: Never Smoker     Smokeless tobacco: Never Used   Substance Use Topics     Alcohol use: Yes     Comment: 1-2 beers a week / occ          Exam:    Vitals: /79   Pulse 84   Ht 1.88 m (6' 2\")   Wt 105.2 kg (232 lb)   BMI 29.79 kg/m    BMI: Body mass index is 29.79 kg/m .  Height: 6' 2\"    Constitutional/ " general:  Pt is in no apparent distress, appears well-nourished.  Cooperative with history and physical exam.     Psych:  The patient answered questions appropriately.  Normal affect.  Seems to have reasonable expectations, in terms of treatment.     Eyes:  Visual scanning/ tracking without deficit.     Ears:  Response to auditory stimuli is normal.  negative hearing aid devices.  Auricles in proper alignment.     Lymphatic:  Popliteal lymph nodes not enlarged.     Lungs:  Non labored breathing, non labored speech. No cough.  No audible wheezing. Even, quiet breathing.       Vascular:  positive pedal pulses bilaterally for both the DP and PT arteries.  CFT < 3 sec.  positive ankle edema.  positive pedal hair growth.    Neuro:  Alert and oriented x 3. Coordinated gait.  Light touch sensation is intact to the L4, L5, S1 distributions. No obvious deficits.  No evidence of neurological-based weakness, spasticity, or contracture in the lower extremities.  Monofilament absent to midfoot bilaterally    Derm: Normal texture and turgor.  No erythema, ecchymosis, or cyanosis.      Musculoskeletal:     Patient is ambulatory without an assistive device or brace.     Normal arch with weightbearing.  No forefoot or rear foot deformities noted.  MS 5/5 all compartments.  Normal ROM all fore foot and rearfoot joints.  No equinus.  right fifth toe is edematous.  No ecchymosis here.  No erythema.  Extensor flexor tendons intact.  Ecchymosis noted on the third fourth and second MTPJ's and appears to be resolving.  There is no edema here.  No crepitus or pain with palpation anywhere.    Radiographic Exam:  X-Ray Findings:  I personally reviewed the films.  Unremarkable    A:   Contusion right foot    P: Xrays 3-views of affected foot.  Discussed with patient that he has lost his protective sensation in his feet.  Discussed with the hot humid weather this could cause increased swelling in his feet and if he is walking a lot could cause  a contusion.  He will make sure shoes are wide enough.  He will watch this and give it time to resolve.  Discussed that he is lost his protective sensation in his feet.  Will observe his feet daily for any signs of infection and we discussed what to look for.  If he sees us he will call someone right away.  Discussed importance of good blood sugar control at all times.  Return to clinic prn.    Chris Caruso DPM, FACFAS             Again, thank you for allowing me to participate in the care of your patient.        Sincerely,        Chris Caruso DPM

## 2021-09-25 ENCOUNTER — HEALTH MAINTENANCE LETTER (OUTPATIENT)
Age: 56
End: 2021-09-25

## 2021-10-15 ENCOUNTER — TELEPHONE (OUTPATIENT)
Dept: GASTROENTEROLOGY | Facility: CLINIC | Age: 56
End: 2021-10-15

## 2021-10-15 DIAGNOSIS — Z11.59 ENCOUNTER FOR SCREENING FOR OTHER VIRAL DISEASES: ICD-10-CM

## 2021-10-15 NOTE — TELEPHONE ENCOUNTER
Screening Questions  1. Are you active on mychart? YES     2. What insurance is in the chart? [employer]     2.  Ordering/Referring Provider: Racnho Painting MD in  INTERNAL MED    3. BMI 31.5    4. Do you have any Lung issues?  NO If yes continue:   Do you use daily home oxygen? NO   Do you have Pulmonary Hypertension? NO   Do you have SEVERE asthma?NO    5. Have you had a heart, lung, or liver transplant? NO    6. Are you currently on dialysis or have chronic kidney disease? NO    7. Have you had a stroke or Transient ischemic attack (TIA) within 6 months? NO    8. In the past 6 months, have you had any heart related issues including cardiomyopathy or heart attack? NO      If yes, did it require cardiac stenting or other implantable device?NO      9. Do you have any implantable devices in your body (pacemaker, defib, LVAD)? NO    10. Do you take nitroglycerin? If yes, how often? NO    11. Are you currently taking any blood thinners?NO    12. Are you a diabetic? YES    13. (Females) Are you currently pregnant?   If yes, how many weeks?      15. Are you taking any prescription pain medications on a routine schedule? NO If yes, MAC sedation.    16. Do you have any chemical dependencies such as alcohol, street drugs, or methadone? NOIf yes, MAC sedation.    17. Do you have any history of post-traumatic stress syndrome, severe anxiety or history of psychosis? NO    18. Do you transfer independently? YES    19.  Do you have any issues with constipation? NO    20. Preferred Pharmacy for Pre Prescription [facility] [place]     Scheduling Details    Which Colonoscopy Prep was Sent?: EXTENDED PREP   Procedure Scheduled: COLONOSCOPY   Provider/Surgeon: DR. MIRANDA  Date of Procedure: 11/05/2021  Location:    Caller (Please ask for phone number if not scheduled by patient): [facility]      Sedation Type: CS  Conscious Sedation- Needs  for 6 hours after the procedure  MAC/General-Needs  for 24 hours after  procedure    Pre-op Required at [facility] [place], [place] and OR for MAC sedation:   (if yes advise patient they will need a pre-op prior to procedure)      Is patient on blood thinners? -NO (If yes- inform patient to follow up with PCP or provider for follow up instructions)     Informed patient they will need an adult  YES  Cannot take any type of public or medical transportation alone    [facility]: EXTERNALLY    Confirmed Nurse will call to complete assessment YES    Additional comments:PT MAY NEED EXTENDED PREP. HE WAS NOT PREPPED WELL FOR HIS LAST COLONOSCOPY PER HIS WIFE.

## 2021-11-01 RX ORDER — BISACODYL 5 MG
5 TABLET, DELAYED RELEASE (ENTERIC COATED) ORAL SEE ADMIN INSTRUCTIONS
Qty: 2 TABLET | Refills: 0 | Status: SHIPPED | OUTPATIENT
Start: 2021-11-01 | End: 2023-03-08

## 2021-11-02 ENCOUNTER — LAB (OUTPATIENT)
Dept: LAB | Facility: CLINIC | Age: 56
End: 2021-11-02
Attending: INTERNAL MEDICINE
Payer: COMMERCIAL

## 2021-11-02 DIAGNOSIS — Z11.59 ENCOUNTER FOR SCREENING FOR OTHER VIRAL DISEASES: ICD-10-CM

## 2021-11-02 PROCEDURE — U0003 INFECTIOUS AGENT DETECTION BY NUCLEIC ACID (DNA OR RNA); SEVERE ACUTE RESPIRATORY SYNDROME CORONAVIRUS 2 (SARS-COV-2) (CORONAVIRUS DISEASE [COVID-19]), AMPLIFIED PROBE TECHNIQUE, MAKING USE OF HIGH THROUGHPUT TECHNOLOGIES AS DESCRIBED BY CMS-2020-01-R: HCPCS

## 2021-11-02 PROCEDURE — U0005 INFEC AGEN DETEC AMPLI PROBE: HCPCS

## 2021-11-03 LAB — SARS-COV-2 RNA RESP QL NAA+PROBE: NEGATIVE

## 2021-11-05 ENCOUNTER — HOSPITAL ENCOUNTER (OUTPATIENT)
Facility: AMBULATORY SURGERY CENTER | Age: 56
Discharge: HOME | End: 2021-11-05
Attending: INTERNAL MEDICINE | Admitting: INTERNAL MEDICINE
Payer: COMMERCIAL

## 2021-11-05 VITALS
TEMPERATURE: 96.7 F | DIASTOLIC BLOOD PRESSURE: 77 MMHG | RESPIRATION RATE: 18 BRPM | SYSTOLIC BLOOD PRESSURE: 116 MMHG | OXYGEN SATURATION: 94 %

## 2021-11-05 DIAGNOSIS — Z12.11 SPECIAL SCREENING FOR MALIGNANT NEOPLASMS, COLON: Primary | ICD-10-CM

## 2021-11-05 LAB
COLONOSCOPY: NORMAL
GLUCOSE BLDC GLUCOMTR-MCNC: 116 MG/DL (ref 70–99)

## 2021-11-05 PROCEDURE — G8918 PT W/O PREOP ORDER IV AB PRO: HCPCS

## 2021-11-05 PROCEDURE — 45385 COLONOSCOPY W/LESION REMOVAL: CPT

## 2021-11-05 PROCEDURE — 88305 TISSUE EXAM BY PATHOLOGIST: CPT | Performed by: PATHOLOGY

## 2021-11-05 PROCEDURE — G8907 PT DOC NO EVENTS ON DISCHARG: HCPCS

## 2021-11-05 PROCEDURE — 82962 GLUCOSE BLOOD TEST: CPT | Performed by: INTERNAL MEDICINE

## 2021-11-05 PROCEDURE — 45380 COLONOSCOPY AND BIOPSY: CPT | Mod: XS

## 2021-11-05 RX ORDER — PROCHLORPERAZINE MALEATE 10 MG
10 TABLET ORAL EVERY 6 HOURS PRN
Status: DISCONTINUED | OUTPATIENT
Start: 2021-11-05 | End: 2021-11-06 | Stop reason: HOSPADM

## 2021-11-05 RX ORDER — LIDOCAINE 40 MG/G
CREAM TOPICAL
Status: DISCONTINUED | OUTPATIENT
Start: 2021-11-05 | End: 2021-11-06 | Stop reason: HOSPADM

## 2021-11-05 RX ORDER — NALOXONE HYDROCHLORIDE 0.4 MG/ML
0.4 INJECTION, SOLUTION INTRAMUSCULAR; INTRAVENOUS; SUBCUTANEOUS
Status: DISCONTINUED | OUTPATIENT
Start: 2021-11-05 | End: 2021-11-06 | Stop reason: HOSPADM

## 2021-11-05 RX ORDER — ONDANSETRON 4 MG/1
4 TABLET, ORALLY DISINTEGRATING ORAL EVERY 6 HOURS PRN
Status: DISCONTINUED | OUTPATIENT
Start: 2021-11-05 | End: 2021-11-06 | Stop reason: HOSPADM

## 2021-11-05 RX ORDER — ONDANSETRON 2 MG/ML
4 INJECTION INTRAMUSCULAR; INTRAVENOUS
Status: DISCONTINUED | OUTPATIENT
Start: 2021-11-05 | End: 2021-11-06 | Stop reason: HOSPADM

## 2021-11-05 RX ORDER — NALOXONE HYDROCHLORIDE 0.4 MG/ML
0.2 INJECTION, SOLUTION INTRAMUSCULAR; INTRAVENOUS; SUBCUTANEOUS
Status: DISCONTINUED | OUTPATIENT
Start: 2021-11-05 | End: 2021-11-06 | Stop reason: HOSPADM

## 2021-11-05 RX ORDER — FLUMAZENIL 0.1 MG/ML
0.2 INJECTION, SOLUTION INTRAVENOUS
Status: ACTIVE | OUTPATIENT
Start: 2021-11-05 | End: 2021-11-05

## 2021-11-05 RX ORDER — FENTANYL CITRATE 50 UG/ML
INJECTION, SOLUTION INTRAMUSCULAR; INTRAVENOUS PRN
Status: DISCONTINUED | OUTPATIENT
Start: 2021-11-05 | End: 2021-11-05 | Stop reason: HOSPADM

## 2021-11-05 RX ORDER — ONDANSETRON 2 MG/ML
4 INJECTION INTRAMUSCULAR; INTRAVENOUS EVERY 6 HOURS PRN
Status: DISCONTINUED | OUTPATIENT
Start: 2021-11-05 | End: 2021-11-06 | Stop reason: HOSPADM

## 2021-11-05 NOTE — H&P
[facility]esthesia Pre-op History and Physical    Luke Farias MRN# [id number]   Age: 56 year old YOB: 1965      Date of Surgery: 11/5/2021     Date of Exam 11/5/2021         Primary care provider: [facility]         Chief Complaint and/or Reason for Procedure:   History of colon polyps         Active problem list:     Patient Active Problem List    Diagnosis Date Noted     Venous lake on Right chest 03/22/2017     Priority: Medium     Congenital nevus of Right upper back 03/22/2017     Priority: Medium     Morbid obesity, unspecified obesity type (H) 11/01/2015     Priority: Medium     Hyperlipidemia LDL goal <100 01/25/2011     Priority: Medium     Type 2 diabetes mellitus with hyperglycemia (H) 01/21/2011     Priority: Medium     Chronic rhinitis 03/20/2008     Priority: Medium            Medications (include herbals and vitamins):   Any Plavix use in the last 7 days? No     Current Outpatient Medications   Medication Sig     aspirin (ASA) 81 MG tablet Take 1 tablet (81 mg) by mouth daily     atorvastatin (LIPITOR) 20 MG tablet Take 1 tablet (20 mg) by mouth daily     bisacodyl (DULCOLAX) 5 MG EC tablet Take 1 tablet (5 mg) by mouth See Admin Instructions Take as directed in my chart instructions     bisacodyl (DULCOLAX) 5 MG EC tablet Take 1 tablet (5 mg) by mouth See Admin Instructions Take 2 Ducolax tabletsat 10:00 am 11/4/2021     empagliflozin (JARDIANCE) 25 MG TABS tablet Take 1 tablet (25 mg) by mouth daily     metFORMIN (GLUCOPHAGE-XR) 500 MG 24 hr tablet TAKE 2 TABLETS(1000 MG) BY MOUTH TWICE DAILY WITH MEALS     blood glucose (NO BRAND SPECIFIED) test strip Use to test blood sugars 4 times daily or as directed due to infection and hyperglycemia     Blood Glucose Monitoring Suppl (GLUCOMETER ELITE CLASSIC) KIT 1 Device daily.     clobetasol (TEMOVATE) 0.05 % external ointment Apply sparingly to affected area twice daily for 14 days.  Then off for 14 days.  Do not apply to  face or mucous membranes. (Patient not taking: Reported on 4/21/2021)     Continuous Blood Gluc Sensor (FREESTYLE PIERRE 2 SENSOR) MISC 1 each every 14 days 1 each every 14 days. Change every 14 days.     DIABETIC STERILE LANCETS device 1 Device daily.     glucose blood VI test strips (ASCENSIA AUTODISC VI,ONE TOUCH ULTRA TEST VI) strip by In Vitro route. Test as directed     Current Facility-Administered Medications   Medication     lactated ringers BOLUS     lidocaine (LMX4) kit     lidocaine 1 % 0.1-1 mL     ondansetron (ZOFRAN) injection 4 mg     sodium chloride (PF) 0.9% PF flush 3 mL     sodium chloride (PF) 0.9% PF flush 3 mL             Allergies:      Allergies   Allergen Reactions     Seasonal Allergies      Allergy to Latex? No  Allergy to tape?   No  Intolerances:             Physical Exam:   All vitals have been reviewed  Patient Vitals for the past 8 hrs:   BP Temp Temp src Resp SpO2   11/05/21 0718 100/69 97.1  F (36.2  C) Temporal 16 96 %     No intake/output data recorded.  Lungs:   No increased work of breathing, good air exchange, clear to auscultation bilaterally, no crackles or wheezing     Cardiovascular:   normal S1 and S2             Lab / Radiology Results:            Anesthetic risk and/or ASA classification:     2  Lisette Vickers DO

## 2021-11-09 LAB
PATH REPORT.COMMENTS IMP SPEC: NORMAL
PATH REPORT.COMMENTS IMP SPEC: NORMAL
PATH REPORT.FINAL DX SPEC: NORMAL
PATH REPORT.GROSS SPEC: NORMAL
PATH REPORT.MICROSCOPIC SPEC OTHER STN: NORMAL
PATH REPORT.RELEVANT HX SPEC: NORMAL
PHOTO IMAGE: NORMAL

## 2021-11-20 ENCOUNTER — HEALTH MAINTENANCE LETTER (OUTPATIENT)
Age: 56
End: 2021-11-20

## 2022-06-17 DIAGNOSIS — E11.65 TYPE 2 DIABETES MELLITUS WITH HYPERGLYCEMIA, WITHOUT LONG-TERM CURRENT USE OF INSULIN (H): ICD-10-CM

## 2022-06-20 RX ORDER — METFORMIN HCL 500 MG
TABLET, EXTENDED RELEASE 24 HR ORAL
Qty: 360 TABLET | Refills: 0 | Status: SHIPPED | OUTPATIENT
Start: 2022-06-20 | End: 2023-01-18

## 2022-06-20 RX ORDER — ATORVASTATIN CALCIUM 20 MG/1
TABLET, FILM COATED ORAL
Qty: 90 TABLET | Refills: 0 | Status: SHIPPED | OUTPATIENT
Start: 2022-06-20 | End: 2023-01-18

## 2022-06-20 RX ORDER — EMPAGLIFLOZIN 25 MG/1
TABLET, FILM COATED ORAL
Qty: 90 TABLET | Refills: 0 | Status: SHIPPED | OUTPATIENT
Start: 2022-06-20 | End: 2023-01-18

## 2022-06-20 NOTE — TELEPHONE ENCOUNTER
Metformin  lipitor  jardiance  Routing refill request to provider for review/approval because:  Labs not current:  A1C, CR, Potassium, LDL  Patient needs to be seen because it has been more than 1 year since last office visit.    Sending to scheduling for yearly office visit due    Isatu Jones RN

## 2022-07-02 ENCOUNTER — HEALTH MAINTENANCE LETTER (OUTPATIENT)
Age: 57
End: 2022-07-02

## 2023-01-07 ENCOUNTER — HEALTH MAINTENANCE LETTER (OUTPATIENT)
Age: 58
End: 2023-01-07

## 2023-01-12 ENCOUNTER — TRANSFERRED RECORDS (OUTPATIENT)
Dept: HEALTH INFORMATION MANAGEMENT | Facility: CLINIC | Age: 58
End: 2023-01-12

## 2023-01-12 LAB — HBA1C MFR BLD: 11.2 %

## 2023-01-17 NOTE — PROGRESS NOTES
Luke is a 57 year old who is being evaluated via a billable video visit.      How would you like to obtain your AVS? MyChart  If the video visit is dropped, the invitation should be resent by: Text to cell phone: [phone]  Will anyone else be joining your video visit? No        Assessment & Plan     Type 2 diabetes mellitus with hyperglycemia, without long-term current use of insulin (H)  Diabetes which is uncontrolled with hyperglycemia.  He has had this before.  Is been on metformin and Jardiance.  He ran out of [employer].  He is not checking his sugars more he has the freestyle nini system.  He is working on his diet, rowing for exercise.  He did have some Jardiance he is back on.  His sugars are still elevated A1c of 11 we will add Trulicity to his Jardiance and metformin continue to exercise watch his diet and recheck an A1c in 2 to 3 months.  He will follow-up in clinic at that time.    I recommended he see the ophthalmologist due to his very high risk for diabetic retinopathy and some changes in his vision.    He does have diabetic neuropathy and needs to watch his feet and take care of them closely.      - empagliflozin (JARDIANCE) 25 MG TABS tablet; Take 1 tablet (25 mg) by mouth daily  - metFORMIN (GLUCOPHAGE XR) 500 MG 24 hr tablet; Take 2 tablets (1,000 mg) by mouth 2 times daily (with meals)  - atorvastatin (LIPITOR) 20 MG tablet; Take 1 tablet (20 mg) by mouth daily  - dulaglutide (TRULICITY) 0.75 MG/0.5ML pen; Inject 0.75 mg Subcutaneous every 7 days  - Hemoglobin A1c; Future  - Comprehensive metabolic panel (BMP + Alb, Alk Phos, ALT, AST, Total. Bili, TP); Future  - Lipid panel reflex to direct LDL Fasting; Future  - Albumin Random Urine Quantitative with Creat Ratio; Future                 Return in about 3 months (around 4/18/2023) for Routine Visit, Lab Work.    Rancho Painting MD  [facility]    Darlene Butler is a 57 year old, presenting for the following health  issues:  Recheck Medication and Diabetes      History of Present Illness       Diabetes:   He presents for follow up of diabetes.  He is checking home blood glucose a few times a month. He checks blood glucose before meals.  Blood glucose is sometimes over 200 and never under 70. When his blood glucose is low, the patient is asymptomatic for confusion, blurred vision, lethargy and reports not feeling dizzy, shaky, or weak.  He is concerned about blood sugar frequently over 200.  He is having numbness in feet. The patient has not had a diabetic eye exam in the last 12 months.         He eats 0-1 servings of fruits and vegetables daily.He consumes 0 sweetened beverage(s) daily.He exercises with enough effort to increase his heart rate 30 to 60 minutes per day.  He exercises with enough effort to increase his heart rate 4 days per week. He is missing 1 dose(s) of medications per week.  He is not taking prescribed medications regularly due to remembering to take.     Blood sugars are getting high. hgba1c at home was 11.2, had been 12.3.      Taking metformin and had a little Jardiance.  Been pretty good the last month, misses a few days.     Metformin 2 twice a day.     Sugars 170 after lunch, normally checks in the mornings and was high. Now working on diet and exercise.     Some neuropathy in his legs that has been there before.       No chest pains, no sob.  Playing pickelball and exercising regularly.      Past Medical History:   Diagnosis Date     Diabetes (H)     type 2     Seasonal allergies     Allergy inj as a child     Current Outpatient Medications   Medication     atorvastatin (LIPITOR) 20 MG tablet     blood glucose (NO BRAND SPECIFIED) test strip     Blood Glucose Monitoring Suppl (GLUCOMETER ELITE CLASSIC) KIT     Continuous Blood Gluc Sensor (FREESTYLE PIERRE 2 SENSOR) [facility]     DIABETIC STERILE LANCETS device     glucose blood VI test strips (ASCENSIA AUTODISC VI,ONE TOUCH ULTRA TEST VI) strip      JARDIANCE 25 MG TABS tablet     metFORMIN (GLUCOPHAGE XR) 500 MG 24 hr tablet     aspirin (ASA) 81 MG tablet     bisacodyl (DULCOLAX) 5 MG EC tablet     bisacodyl (DULCOLAX) 5 MG EC tablet     clobetasol (TEMOVATE) 0.05 % external ointment     No current facility-administered medications for this visit.     Social History     Tobacco Use     Smoking status: Never     Smokeless tobacco: Never   Substance Use Topics     Alcohol use: Yes     Comment: 1-2 beers a week / occ      Drug use: No       Review of Systems         Objective           Vitals:  No vitals were obtained today due to virtual visit.    Physical Exam   GENERAL: Healthy, alert and no distress  EYES: Eyes grossly normal to inspection.  No discharge or erythema, or obvious scleral/conjunctival abnormalities.  RESP: No audible wheeze, cough, or visible cyanosis.  No visible retractions or increased work of breathing.    SKIN: Visible skin clear. No significant rash, abnormal pigmentation or lesions.  NEURO: Cranial nerves grossly intact.  Mentation and speech appropriate for age.  PSYCH: Mentation appears normal, affect normal/bright, judgement and insight intact, normal speech and appearance well-groomed.                Video-Visit Details    Type of service:  Video Visit     Originating Location (pt. Location): Home  Distant Location (provider location):  On-site  Platform used for Video Visit: [employer]  15 minutes

## 2023-01-18 ENCOUNTER — VIRTUAL VISIT (OUTPATIENT)
Dept: INTERNAL MEDICINE | Facility: CLINIC | Age: 58
End: 2023-01-18

## 2023-01-18 DIAGNOSIS — E11.65 TYPE 2 DIABETES MELLITUS WITH HYPERGLYCEMIA, WITHOUT LONG-TERM CURRENT USE OF INSULIN (H): Primary | ICD-10-CM

## 2023-01-18 PROCEDURE — 99214 OFFICE O/P EST MOD 30 MIN: CPT | Mod: 95 | Performed by: INTERNAL MEDICINE

## 2023-01-18 RX ORDER — ATORVASTATIN CALCIUM 20 MG/1
20 TABLET, FILM COATED ORAL DAILY
Qty: 90 TABLET | Refills: 3 | Status: SHIPPED | OUTPATIENT
Start: 2023-01-18 | End: 2024-03-04

## 2023-01-18 RX ORDER — METFORMIN HCL 500 MG
1000 TABLET, EXTENDED RELEASE 24 HR ORAL 2 TIMES DAILY WITH MEALS
Qty: 360 TABLET | Refills: 3 | Status: SHIPPED | OUTPATIENT
Start: 2023-01-18 | End: 2024-03-04

## 2023-01-18 RX ORDER — DULAGLUTIDE 0.75 MG/.5ML
0.75 INJECTION, SOLUTION SUBCUTANEOUS
Qty: 2 ML | Refills: 11 | Status: SHIPPED | OUTPATIENT
Start: 2023-01-18 | End: 2023-12-18

## 2023-01-23 ENCOUNTER — MYC MEDICAL ADVICE (OUTPATIENT)
Dept: INTERNAL MEDICINE | Facility: CLINIC | Age: 58
End: 2023-01-23

## 2023-02-15 ENCOUNTER — MYC MEDICAL ADVICE (OUTPATIENT)
Dept: INTERNAL MEDICINE | Facility: CLINIC | Age: 58
End: 2023-02-15

## 2023-02-15 DIAGNOSIS — E11.65 TYPE 2 DIABETES MELLITUS WITH HYPERGLYCEMIA, WITHOUT LONG-TERM CURRENT USE OF INSULIN (H): ICD-10-CM

## 2023-02-15 NOTE — TELEPHONE ENCOUNTER
Requested Prescriptions   Pending Prescriptions Disp Refills    Continuous Blood Gluc Sensor (FREESTYLE PIERRE 2 SENSOR) MISC 2 each 5     Si each every 14 days 1 each every 14 days. Change every 14 days.       There is no refill protocol information for this order           Doris Del Rio RN

## 2023-03-08 ENCOUNTER — OFFICE VISIT (OUTPATIENT)
Dept: INTERNAL MEDICINE | Facility: CLINIC | Age: 58
End: 2023-03-08
Payer: COMMERCIAL

## 2023-03-08 VITALS
TEMPERATURE: 97.9 F | DIASTOLIC BLOOD PRESSURE: 76 MMHG | SYSTOLIC BLOOD PRESSURE: 118 MMHG | BODY MASS INDEX: 29.26 KG/M2 | HEART RATE: 82 BPM | WEIGHT: 228 LBS | HEIGHT: 74 IN | OXYGEN SATURATION: 98 % | RESPIRATION RATE: 16 BRPM

## 2023-03-08 DIAGNOSIS — E11.65 TYPE 2 DIABETES MELLITUS WITH HYPERGLYCEMIA, WITHOUT LONG-TERM CURRENT USE OF INSULIN (H): ICD-10-CM

## 2023-03-08 PROCEDURE — 99214 OFFICE O/P EST MOD 30 MIN: CPT | Performed by: INTERNAL MEDICINE

## 2023-03-08 NOTE — PROGRESS NOTES
"  Assessment & Plan   Problem List Items Addressed This Visit     Type 2 diabetes mellitus with hyperglycemia (H)      Patient who has uncontrolled diabetes with some neuropathy in his feet.  He is getting better.  We did a virtual in January and his A1c was 11.2.  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  He is on Trulicity, Jardiance, metformin.  He does have the freestyle nini system.  I recommended he check his sugars at least 10 times a day.  Continue to work on his diet.  If his sugars stay high he may just need insulin.  He is trying to work out playing pickle ball and says his sugars go higher when he exercises.  May need the insulin to get the sugar actually into his cells.  We will follow and repeat an A1c with his fasting lipids and comprehensive panel in 1 month.           BMI:   Estimated body mass index is 29.27 kg/m  as calculated from the following:    Height as of this encounter: 1.88 m (6' 2\").    Weight as of this encounter: 103.4 kg (228 lb).         No follow-ups on file.    Rancho Painting MD  [facility]    Darlene Butler is a 57 year old, presenting for the following health issues:  Diabetes    HPI     Chief Complaint   Patient presents with     Diabetes     Diabetes has been better, freestyle nini system.  Check 3 times a day, today was at 150. Highest after eating 250.  Low still at 130.     jardiance and metformin,  Start Trulucity and lost his voice some the first week,      hgba1c was done and down to 9.5 at DOT site.      Review of Systems         Objective    /76   Pulse 82   Temp 97.9  F (36.6  C) (Temporal)   Resp 16   Ht 1.88 m (6' 2\")   Wt 103.4 kg (228 lb)   SpO2 98%   BMI 29.27 kg/m    Body mass index is 29.27 kg/m .  Physical Exam   GENERAL: healthy, alert and no distress  NECK: no adenopathy, no asymmetry, masses, or scars and thyroid normal to palpation  RESP: lungs clear to auscultation - no rales, rhonchi or wheezes  CV: regular rate and " rhythm, normal S1 S2, no S3 or S4, no murmur, click or rub, no peripheral edema and peripheral pulses strong  ABDOMEN: soft, nontender, no hepatosplenomegaly, no masses and bowel sounds normal  MS: no gross musculoskeletal defects noted, no edema

## 2023-03-26 ENCOUNTER — MYC MEDICAL ADVICE (OUTPATIENT)
Dept: INTERNAL MEDICINE | Facility: CLINIC | Age: 58
End: 2023-03-26
Payer: COMMERCIAL

## 2023-03-26 DIAGNOSIS — H53.2 DOUBLE VISION: Primary | ICD-10-CM

## 2023-03-27 ENCOUNTER — HOSPITAL ENCOUNTER (OUTPATIENT)
Dept: MRI IMAGING | Facility: CLINIC | Age: 58
Discharge: HOME | End: 2023-03-27
Attending: INTERNAL MEDICINE | Admitting: INTERNAL MEDICINE
Payer: COMMERCIAL

## 2023-03-27 ENCOUNTER — OFFICE VISIT (OUTPATIENT)
Dept: INTERNAL MEDICINE | Facility: CLINIC | Age: 58
End: 2023-03-27
Payer: COMMERCIAL

## 2023-03-27 VITALS
OXYGEN SATURATION: 98 % | SYSTOLIC BLOOD PRESSURE: 126 MMHG | DIASTOLIC BLOOD PRESSURE: 84 MMHG | BODY MASS INDEX: 29.15 KG/M2 | HEART RATE: 80 BPM | WEIGHT: 227 LBS | RESPIRATION RATE: 16 BRPM | TEMPERATURE: 98 F

## 2023-03-27 DIAGNOSIS — H49.21 RIGHT ABDUCENS NERVE PALSY: Primary | ICD-10-CM

## 2023-03-27 DIAGNOSIS — H53.2 DOUBLE VISION: ICD-10-CM

## 2023-03-27 DIAGNOSIS — E11.65 TYPE 2 DIABETES MELLITUS WITH HYPERGLYCEMIA, UNSPECIFIED WHETHER LONG TERM INSULIN USE (H): ICD-10-CM

## 2023-03-27 PROCEDURE — 70551 MRI BRAIN STEM W/O DYE: CPT

## 2023-03-27 PROCEDURE — 99214 OFFICE O/P EST MOD 30 MIN: CPT | Performed by: INTERNAL MEDICINE

## 2023-03-27 NOTE — PROGRESS NOTES
"  Assessment & Plan   Problem List Items Addressed This Visit     Type 2 diabetes mellitus with hyperglycemia (H)   Other Visit Diagnoses     Right abducens nerve palsy    -  Primary           Patient was poorly controlled diabetes last A1c was 11.2.  He is trying to get this better.  Now has had a week to 10 days of some double vision.  On examination he has a cranial nerve VI palsy with limited movement of the eye to the right side and really having double vision at that time.  He does not have any other findings on physical examination.  He has no concerns for inflammation.  Today we will get a brain MRI and set him up with follow-up depending on those results.    No findings for stroke, no other problems on examination normal vital signs.    Diabetes recommended continue to work on diabetic control to help any nerve treatment.        BMI:   Estimated body mass index is 29.15 kg/m  as calculated from the following:    Height as of 3/8/23: 1.88 m (6' 2\").    Weight as of this encounter: 103 kg (227 lb).           Rancho Painting MD  [facility] DIANA Butler is a 57 year old, presenting for the following health issues:  Eye Problem and Headache    Additional Questions 3/27/2023   Roomed by Latonia Rasmussen     History of Present Illness       Reason for visit:  Vision  Symptom onset:  1-2 weeks ago    He eats 2-3 servings of fruits and vegetables daily.He consumes 1 sweetened beverage(s) daily.He exercises with enough effort to increase his heart rate 20 to 29 minutes per day.  He exercises with enough effort to increase his heart rate 5 days per week. He is missing 1 dose(s) of medications per week.       10 days ago woke up with a headache, thought maybe he was sick.  Got better through the week, computer work vision was off some.      4 days ago didn't play pickleball. 3 days ago was playing and seeing double.      Saw eye doctor, at [facility] and eyes were ok, but had double " vision.  Worse with distance.      Little congestion on right side but not bad.      Headaches are gone,     No eye pain.              Review of Systems         Objective    /84   Pulse 80   Temp 98  F (36.7  C) (Temporal)   Resp 16   Wt 103 kg (227 lb)   SpO2 98%   BMI 29.15 kg/m    There is no height or weight on file to calculate BMI.  Physical Exam   No acute distress  Ears are clear  Pupils are quite pinpoint already  Right eye does not track well to the right side.  He does have double vision when looking to the right and some double vision throughout.  Neck is supple  Rest of the cranial nerves appear intact good facial muscle strength.  Heart is regular  Neurologically is intact throughout his arms and legs

## 2023-03-27 NOTE — TELEPHONE ENCOUNTER
Wife, Lisa calling, patient still having double vision, did see eye doctor over the weekend.    Do you want to see him or order a scan first?      Anuradha Hawkins XRO/

## 2023-03-27 NOTE — TELEPHONE ENCOUNTER
I ordered an MRI for him would like this scheduled as soon as possible.    I could see him at 1040 or 11 today if he can come in.

## 2023-03-28 ENCOUNTER — TELEPHONE (OUTPATIENT)
Dept: NEUROLOGY | Facility: CLINIC | Age: 58
End: 2023-03-28
Payer: COMMERCIAL

## 2023-03-28 ENCOUNTER — TELEPHONE (OUTPATIENT)
Dept: OPHTHALMOLOGY | Facility: CLINIC | Age: 58
End: 2023-03-28
Payer: COMMERCIAL

## 2023-03-28 NOTE — TELEPHONE ENCOUNTER
This encounter is being sent to inform the clinic that this patient has a referral from [facility] for the diagnoses of Right abducens nerve palsy [H49.21] and has requested that this patient be seen within 3-5 days and/or with Neurology.  Based on the availability of our provider(s), we are unable to accommodate this request.      Were all sites offered this patient?  Yes      Does scheduling algorithm request to schedule next available?  Patient appointment has not been scheduled.  Please review the referral request for accommodation and contact the patient.  If unable to accommodate, please resubmit a referral and indicate a preferred partner or affiliate location using Provider Finder or Scheduling Instructions field. Patient is willing to go to any location but prefer  Neurology

## 2023-03-28 NOTE — TELEPHONE ENCOUNTER
Ok to add to Dr. Carroll's schedule on April 20th    Note to patient communicator to reach out for scheduling    Brent Carlos RN 11:13 AM 03/28/23

## 2023-03-28 NOTE — TELEPHONE ENCOUNTER
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    Brent Carlos RN 8:56 AM 03/28/23

## 2023-03-28 NOTE — TELEPHONE ENCOUNTER
[facility] Call Center    Phone Message    May a detailed message be left on voicemail: yes     Reason for Call: Appointment Intake    Referring Provider Name: Rancho Painting MD in  INTERNAL MED    Diagnosis and/or Symptoms: Right abducens nerve palsy [H49.21]    Referral was sent Urgent: 3-5 Days    Thank you,    Talked to patient and awaiting a call      Action Taken: Message routed to:  [facility] eye    Travel Screening: Not Applicable

## 2023-03-28 NOTE — TELEPHONE ENCOUNTER
Called and spoke to Luke     Made him an appointment for 4/20 @ 1245 pm with Dr. Glen Kaufman pt packet mailed     Parking . Wait time discussed     Debra Padron Communication Facilitator on 3/28/2023 at 12:13 PM

## 2023-04-04 NOTE — TELEPHONE ENCOUNTER
Writer discussed with general neurologist who can see for this dx. However, it may be best for pt to see neurophthalmology as they can see for this dx and neurology appt would not be needed. Pt already scheduled with neuropthalmologist at this time.       Elsi Ryan, RNCC  Neurology/Neurosurgery

## 2023-04-05 NOTE — TELEPHONE ENCOUNTER
I called patient and explained to him that a Neurology appt is not needs and to keep his appt with Dr. Carroll in Neurophthalmology.  He can discuss his concerns with Dr. Carroll-see note below    Gracie Bhatt LPN

## 2023-04-20 ENCOUNTER — OFFICE VISIT (OUTPATIENT)
Dept: OPHTHALMOLOGY | Facility: CLINIC | Age: 58
End: 2023-04-20
Attending: OPHTHALMOLOGY
Payer: COMMERCIAL

## 2023-04-20 DIAGNOSIS — H49.21 PALSY OF RIGHT SIXTH CRANIAL NERVE ON EXAMINATION: Primary | ICD-10-CM

## 2023-04-20 DIAGNOSIS — H53.10 SUBJECTIVE VISUAL DISTURBANCE: Primary | ICD-10-CM

## 2023-04-20 DIAGNOSIS — H50.00 MONOCULAR ESOTROPIA: ICD-10-CM

## 2023-04-20 PROCEDURE — 99204 OFFICE O/P NEW MOD 45 MIN: CPT | Performed by: OPHTHALMOLOGY

## 2023-04-20 PROCEDURE — G0463 HOSPITAL OUTPT CLINIC VISIT: HCPCS | Performed by: OPHTHALMOLOGY

## 2023-04-20 PROCEDURE — 92060 SENSORIMOTOR EXAMINATION: CPT | Performed by: OPHTHALMOLOGY

## 2023-04-20 PROCEDURE — 92060 SENSORIMOTOR EXAMINATION: CPT | Mod: 26 | Performed by: OPHTHALMOLOGY

## 2023-04-20 ASSESSMENT — VISUAL ACUITY
OS_SC+: -1
OS_SC: 20/20
OD_SC: 20/40
OD_SC+: +2
OD_PH_SC+: +3
METHOD: SNELLEN - LINEAR
OD_PH_SC: 20/25

## 2023-04-20 ASSESSMENT — EXTERNAL EXAM - LEFT EYE: OS_EXAM: NORMAL

## 2023-04-20 ASSESSMENT — CUP TO DISC RATIO
OS_RATIO: 0.2
OD_RATIO: 0.2

## 2023-04-20 ASSESSMENT — SLIT LAMP EXAM - LIDS
COMMENTS: NORMAL
COMMENTS: NORMAL

## 2023-04-20 ASSESSMENT — CONF VISUAL FIELD
OD_SUPERIOR_TEMPORAL_RESTRICTION: 0
OD_INFERIOR_NASAL_RESTRICTION: 0
OD_NORMAL: 1
OS_INFERIOR_TEMPORAL_RESTRICTION: 0
METHOD: COUNTING FINGERS
OS_NORMAL: 1
OS_INFERIOR_NASAL_RESTRICTION: 0
OD_INFERIOR_TEMPORAL_RESTRICTION: 0
OS_SUPERIOR_NASAL_RESTRICTION: 0
OD_SUPERIOR_NASAL_RESTRICTION: 0
OS_SUPERIOR_TEMPORAL_RESTRICTION: 0

## 2023-04-20 ASSESSMENT — TONOMETRY
OS_IOP_MMHG: 13
OD_IOP_MMHG: 10
IOP_METHOD: ICARE

## 2023-04-20 ASSESSMENT — EXTERNAL EXAM - RIGHT EYE: OD_EXAM: NORMAL

## 2023-04-20 NOTE — PROGRESS NOTES
"     Luke Farias is a 57 year old male with the following diagnoses:   1. 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 Painting (internal medicine) for evaluation of new double vision and headache.    HPI:    Mr. Farias is a 57 year old man with a PMH of poorly controlled diabetes (Dx 10+ years ago) and chronic rhinitis presenting for evaluation of double vision. On 3/18 he had a sudden severe headache that eventually went away; he notes this was unusual because he is not typically a headache person.  About 2 days later he started to notice vision felt off when working on the computer.  Later that same week, he noticed he was seeing double when he tried to play pickleball. He went to see an optometrist on 3/25 who told him he had double vision and recommended neurology evaluation with an MRI. He contacted his internist via [employer] on 3/26 who ordered an MRI and saw him the next day and confirmed a right 6th nerve palsy and requested neuroophthalmology evaluation.    He notes that his horizontal double vision has not improved since onset.  He notices it only in right gaze, but comments that he does not have to look very far right to see double.  He denies headaches, jaw claudication, anorexia, fever/chills, or scalp tenderness.    His blood sugar has been well controlled recently ranging from 125-150 mg/dL.  His most recent HbA1c was in the 8 range per patient (improved from 11.2).    Independent historians:  Patient    Review of outside testing:  MRI brain on 3/27/23  \"MRI OF THE BRAIN WITHOUT CONTRAST  3/27/2023 12:25 PM      HISTORY:  Double vision.      COMPARISON: None.      TECHNIQUE: Sagittal T1-weighted, axial T2-weighted, axial FLAIR, and  axial diffusion-weighted MR images of the brain were acquired without  intravenous contrast.     FINDINGS:   INTRACRANIAL CONTENTS: No acute or subacute infarct. No mass, acute  hemorrhage, or extra-axial fluid " collections. There are a few  scattered nonspecific T2 FLAIR hyperintensities within the cerebral  white matter. Mild generalized parenchymal volume loss. No  hydrocephalus. Normal position of the cerebellar tonsils.     SELLA: No significant abnormality accounting for technique.     OSSEOUS STRUCTURES/SOFT TISSUES: No aggressive osseous lesion  involving the calvarium, skull base, or visualized upper cervical  spine. The major intracranial vascular flow voids are maintained.     ORBITS: No significant abnormality accounting for technique.     SINUSES/MASTOIDS: Mild mucosal thickening within the paranasal  sinuses. No significant middle ear or mastoid effusion.                                                                      IMPRESSION:  1.  Unremarkable brain MRI. No acute infarct, mass, or hemorrhage.  2.  Mild age-related changes.    HbA1c 11.2 on 1/12/23    My interpretation performed today of outside testing:  I have independently reviewed patient's MRI and there was no abnormality along the entire course of the sixth nerve.       Review of outside clinical notes:    Visit with Dr. Mert VICENTE on 3/27/23       Past medical history:    Patient Active Problem List   Diagnosis     Chronic rhinitis     Type 2 diabetes mellitus with hyperglycemia (H)     Hyperlipidemia LDL goal <100     Venous lake on Right chest     Congenital nevus of Right upper back         Medications:   aspirin  atorvastatin  blood glucose  Diabetic Sterile Lancets  empagliflozin Tabs  FreeStyle Bianca 2 Sensor Misc  Glucometer Elite Classic Kit  metFORMIN  Trulicity Sopn      Family history / social history:  Patient's family history includes Diabetes in his father; Heart Disease in his mother.     Patient  reports that he has never smoked. He has never used smokeless tobacco. He reports current alcohol use. He reports that he does not use drugs.       Exam:  Uncorrected distance visual acuity was 20/40 +2 (PH 20/25) in the right eye and  20/20 -1 in the left eye. Intraocular pressure was 10 in the right eye and 13 in the left eye using ICare.  Color vision 11/11 right eye and 11/11 left eye.  Pupils isocoric with no APD.      Anterior segment was unremarkable.  Fundus exam revealed scattered MAs and dot hemorrhages in BOTH eyes.    Sensorimotor exam revealed orthophoric posture in all gazes except for right gaze, where he has a 14 pd RET with a -1 abduction deficit of the right eye.      Assessment/Plan:   It is my impression that patient has a right cranial nerve 6 palsy.  MRI was unrevealing on 3/27/23.  He has normal facial sensation.  He does not have a history of cancer.  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  This typically resolves over the course of 3 to 4 months.  He was given a 15 base out Fresnel prism over the RIGHT eye, which allowed him to see single in all fields of gaze. Follow up in 2-3 months or sooner as needed for worsening symptoms.          Attending Physician Attestation:  Complete documentation of historical and exam elements from today's encounter can be found in the full encounter summary report (not reduplicated in this progress note).  I personally obtained the chief complaint(s) and history of present illness.  I confirmed and edited as necessary the review of systems, past medical/surgical history, family history, social history, and examination findings as documented by others; and I examined the patient myself.  I personally reviewed the relevant tests, images, and reports as documented above.  I formulated and edited as necessary the assessment and plan and discussed the findings and management plan with the patient and family. - Baltazar Carroll MD    Precharting:  Harleen Kapoor MD  Ophthalmology Resident, PGY-3  [facility]  [place]    Latonia Fuentes, OD

## 2023-04-20 NOTE — NURSING NOTE
Chief Complaint(s) and History of Present Illness(es)     Diplopia Evaluation    In both eyes.  Characterized as horizontal.  Duration of 1 month.  Occurring constantly.  Occurring all the time.  Since onset it is stable.  Associated symptoms include headaches.  Negative for droopy eyelid and eye pain.  Pain was noted as 0/10.           Comments    Luke Farias is a 57 year old male sent for consultation by Dr. Rancho Painting for right CN 6 palsy.    Patient with a history of poorly controlled type 2 diabetes, last A1C was 9.1 (a few months ago).  He recently had an MRI done with no acute pathology, stroke work-up was negative.  Luke states this all started with a headache that he thinks may have been associated with eri covid.  He reports the double vision is horizontal, happens all the time at distance and near.  No eye pain or discomfort.    GUS Hernandez 4/20/2023 12:52 PM

## 2023-04-20 NOTE — LETTER
2023         RE:  :  MRN: Luke Farias  1965  [id number]     Dear Dr. Rancho Painting,    Thank you for asking me to see your very pleasant patient, Luke Farias, in neuro-ophthalmic consultation.  I would like to thank you for sending your records and I have summarized them in the history of present illness. He presented with his spouse who provided additional history.  My assessment and plan are below.  For further details, please see my attached clinic note.      Luke Farias is a 57 year old male with the following diagnoses:   1. 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 Painting (internal medicine) for evaluation of new double vision and headache.    HPI:    Mr. Farias is a 57 year old man with a PMH of poorly controlled diabetes (Dx 10+ years ago) and chronic rhinitis presenting for evaluation of double vision. On 3/18 he had a sudden severe headache that eventually went away; he notes this was unusual because he is not typically a headache person.  About 2 days later he started to notice vision felt off when working on the computer.  Later that same week, he noticed he was seeing double when he tried to play pickleball. He went to see an optometrist on 3/25 who told him he had double vision and recommended neurology evaluation with an MRI. He contacted his internist via [employer] on 3/26 who ordered an MRI and saw him the next day and confirmed a right 6th nerve palsy and requested neuroophthalmology evaluation.    He notes that his horizontal double vision has not improved since onset.  He notices it only in right gaze, but comments that he does not have to look very far right to see double.  He denies headaches, jaw claudication, anorexia, fever/chills, or scalp tenderness.    His blood sugar has been well controlled recently ranging from 125-150 mg/dL.  His most recent HbA1c was in the 8 range per patient (improved from  "11.2).    Independent historians:  Patient    Review of outside testing:  MRI brain on 3/27/23  \"MRI OF THE BRAIN WITHOUT CONTRAST  3/27/2023 12:25 PM      HISTORY:  Double vision.      COMPARISON: None.      TECHNIQUE: Sagittal T1-weighted, axial T2-weighted, axial FLAIR, and  axial diffusion-weighted MR images of the brain were acquired without  intravenous contrast.     FINDINGS:   INTRACRANIAL CONTENTS: No acute or subacute infarct. No mass, acute  hemorrhage, or extra-axial fluid collections. There are a few  scattered nonspecific T2 FLAIR hyperintensities within the cerebral  white matter. Mild generalized parenchymal volume loss. No  hydrocephalus. Normal position of the cerebellar tonsils.     SELLA: No significant abnormality accounting for technique.     OSSEOUS STRUCTURES/SOFT TISSUES: No aggressive osseous lesion  involving the calvarium, skull base, or visualized upper cervical  spine. The major intracranial vascular flow voids are maintained.     ORBITS: No significant abnormality accounting for technique.     SINUSES/MASTOIDS: Mild mucosal thickening within the paranasal  sinuses. No significant middle ear or mastoid effusion.                                                                      IMPRESSION:  1.  Unremarkable brain MRI. No acute infarct, mass, or hemorrhage.  2.  Mild age-related changes.    HbA1c 11.2 on 1/12/23    My interpretation performed today of outside testing:  I have independently reviewed patient's MRI and there was no abnormality along the entire course of the sixth nerve.       Review of outside clinical notes:    Visit with Dr. Mert VICENTE on 3/27/23       Past medical history:    Patient Active Problem List   Diagnosis     Chronic rhinitis     Type 2 diabetes mellitus with hyperglycemia (H)     Hyperlipidemia LDL goal <100     Venous lake on Right chest     Congenital nevus of Right upper back         Medications:   aspirin  atorvastatin  blood glucose  Diabetic Sterile " Lancets  empagliflozin Tabs  FreeStyle Bianca 2 Sensor Misc  Glucometer Elite Classic Kit  metFORMIN  Trulicity Sopn      Family history / social history:  Patient's family history includes Diabetes in his father; Heart Disease in his mother.     Patient  reports that he has never smoked. He has never used smokeless tobacco. He reports current alcohol use. He reports that he does not use drugs.       Exam:  Uncorrected distance visual acuity was 20/40 +2 (PH 20/25) in the right eye and 20/20 -1 in the left eye. Intraocular pressure was 10 in the right eye and 13 in the left eye using ICare.  Color vision 11/11 right eye and 11/11 left eye.  Pupils isocoric with no APD.      Anterior segment was unremarkable.  Fundus exam revealed scattered MAs and dot hemorrhages in BOTH eyes.    Sensorimotor exam revealed orthophoric posture in all gazes except for right gaze, where he has a 14 pd RET with a -1 abduction deficit of the right eye.      Assessment/Plan:   It is my impression that patient has a microvascular right cranial nerve 6 palsy.  MRI was unrevealing on 3/27/23.  He has normal facial sensation.  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  I counseled on the natural course of recovery.  He was given a 15 base out Fresnel prism over the RIGHT eye. Follow up in 2-3 months or sooner as needed for worsening symptoms.         Again, thank you for allowing me to participate in the care of your patient.      Sincerely,    Baltazar Carroll MD  Professor  Ophthalmology Residency   Director of Neuro-Ophthalmology  [employer] Endowed Chair  Departments of Ophthalmology, Neurology, and Neurosurgery  [facility] [address] [place] [address]  [address]  T - [phone]  F - [phone]  VINCENT [email or web address]      CC: Rancho Painting MD  [address] [facility] [address]  Via In  Basket    DX = 6th nerve palsy

## 2023-04-20 NOTE — LETTER
2023         RE:  :  MRN: Luke Farias  1965  [id number]     Dear Dr. Painting,    Thank you for asking me to see your very pleasant patient, Luke Farias, in neuro-ophthalmic consultation.  I would like to thank you for sending your records and I have summarized them in the history of present illness.   My assessment and plan are below.  For further details, please see my attached clinic note.      Luke Farias is a 57 year old male with the following diagnoses:   1. 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 Painting (internal medicine) for evaluation of new double vision and headache.    HPI:    Mr. Farias is a 57 year old man with a PMH of poorly controlled diabetes (Dx 10+ years ago) and chronic rhinitis presenting for evaluation of double vision. On 3/18 he had a sudden severe headache that eventually went away; he notes this was unusual because he is not typically a headache person.  About 2 days later he started to notice vision felt off when working on the computer.  Later that same week, he noticed he was seeing double when he tried to play pickleball. He went to see an optometrist on 3/25 who told him he had double vision and recommended neurology evaluation with an MRI. He contacted his internist via [employer] on 3/26 who ordered an MRI and saw him the next day and confirmed a right 6th nerve palsy and requested neuroophthalmology evaluation.    He notes that his horizontal double vision has not improved since onset.  He notices it only in right gaze, but comments that he does not have to look very far right to see double.  He denies headaches, jaw claudication, anorexia, fever/chills, or scalp tenderness.    His blood sugar has been well controlled recently ranging from 125-150 mg/dL.  His most recent HbA1c was in the 8 range per patient (improved from 11.2).    Independent historians:  Patient    Review of outside testing:  MRI brain on  "3/27/23  \"MRI OF THE BRAIN WITHOUT CONTRAST  3/27/2023 12:25 PM      HISTORY:  Double vision.      COMPARISON: None.      TECHNIQUE: Sagittal T1-weighted, axial T2-weighted, axial FLAIR, and  axial diffusion-weighted MR images of the brain were acquired without  intravenous contrast.     FINDINGS:   INTRACRANIAL CONTENTS: No acute or subacute infarct. No mass, acute  hemorrhage, or extra-axial fluid collections. There are a few  scattered nonspecific T2 FLAIR hyperintensities within the cerebral  white matter. Mild generalized parenchymal volume loss. No  hydrocephalus. Normal position of the cerebellar tonsils.     SELLA: No significant abnormality accounting for technique.     OSSEOUS STRUCTURES/SOFT TISSUES: No aggressive osseous lesion  involving the calvarium, skull base, or visualized upper cervical  spine. The major intracranial vascular flow voids are maintained.     ORBITS: No significant abnormality accounting for technique.     SINUSES/MASTOIDS: Mild mucosal thickening within the paranasal  sinuses. No significant middle ear or mastoid effusion.                                                                      IMPRESSION:  1.  Unremarkable brain MRI. No acute infarct, mass, or hemorrhage.  2.  Mild age-related changes.    HbA1c 11.2 on 1/12/23    My interpretation performed today of outside testing:  I have independently reviewed patient's MRI and there was no abnormality along the entire course of the sixth nerve.       Review of outside clinical notes:    Visit with Dr. Mert VICENTE on 3/27/23       Past medical history:    Patient Active Problem List   Diagnosis     Chronic rhinitis     Type 2 diabetes mellitus with hyperglycemia (H)     Hyperlipidemia LDL goal <100     Venous lake on Right chest     Congenital nevus of Right upper back         Medications:   aspirin  atorvastatin  blood glucose  Diabetic Sterile Lancets  empagliflozin Tabs  FreeStyle Bianca 2 Sensor Misc  Glucometer Elite Classic " Kit  metFORMIN  Trulicity Sopn      Family history / social history:  Patient's family history includes Diabetes in his father; Heart Disease in his mother.     Patient  reports that he has never smoked. He has never used smokeless tobacco. He reports current alcohol use. He reports that he does not use drugs.       Exam:  Uncorrected distance visual acuity was 20/40 +2 (PH 20/25) in the right eye and 20/20 -1 in the left eye. Intraocular pressure was 10 in the right eye and 13 in the left eye using ICare.  Color vision 11/11 right eye and 11/11 left eye.  Pupils isocoric with no APD.      Anterior segment was unremarkable.  Fundus exam revealed scattered MAs and dot hemorrhages in BOTH eyes.    Sensorimotor exam revealed orthophoric posture in all gazes except for right gaze, where he has a 14 pd RET with a -1 abduction deficit of the right eye.      Assessment/Plan:   It is my impression that patient has a right cranial nerve 6 palsy.  MRI was unrevealing on 3/27/23.  He has normal facial sensation.  He does not have a history of cancer.  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  This typically resolves over the course of 3 to 4 months.  He was given a 15 base out Fresnel prism over the RIGHT eye, which allowed him to see single in all fields of gaze. Follow up in 2-3 months or sooner as needed for worsening symptoms.     Again, thank you for allowing me to participate in the care of your patient.      Sincerely,    Baltazar Carroll MD  Professor  Ophthalmology Residency   Director of Neuro-Ophthalmology  [employer] Endowed Chair  Departments of Ophthalmology, Neurology, and Neurosurgery  [facility] [address] [place]  [address]  T - [phone]  F - [phone]  E -  [email or web address]      CC: Rancho Painting MD  [address] [facility] [address]  Via In Basket    DX = VI nerve palsy

## 2023-07-15 ENCOUNTER — HEALTH MAINTENANCE LETTER (OUTPATIENT)
Age: 58
End: 2023-07-15

## 2023-08-01 NOTE — PATIENT INSTRUCTIONS
"DIABETES AND YOUR FEET    What effect does diabetes have on the feet?  Diabetes can result in several problems in the feet including contractures of the tendons leading to deformities and reduced function of the bones, skin ulcers or open sores on pressure points or prominent deformities, reduced sensation, reduced blood flow and thus reduced oxygen and immune cells to the tiny vessels in our feet. This all leads to higher risk of hospitalization, infections, and amputations.     What is neuropathy?  Neuropathy is a term used to describe a loss of nerve function.  Patients with diabetes are at risk of developing neuropathy if their sugars continue to run high and are above the normal value of 140.  The elevated blood sugar in the body enters the nerves causing it to swell and impair nerve function.  The higher the blood sugar and the longer it is elevated, the more damage is done to nerves.  This damage is permanent and irreversible.  These damaged sensory nerves can then cause reduced feeling or cause pain.  Damaged motor nerves can reduce blood flow and white blood cells into into your foot, skin and bones reducing your ability to heal a small problem. And neuropathy can cause tendons to become unbalanced and contribute to the formation of deformity and contractures in our feet. Often times, neuropathy can be prevented by controlling your blood sugar.  Your risk of developing neuropathy goes up dramatically as your hemoglobin A1C raises above 7.5.      How do I know if I have neuropathy?  When a person develops neuropathy, they usually begin to feel numbness or tingling in their feet and sometimes in their legs.  Other symptoms may include painful burning or hot feet, tingling, electrical sensations or feeling like insects or ants are crawling on your feet or legs.  If blood sugar remains above 140  for long periods of time, neuropathy can also occur in the hands.  When a person loses their \"protective threshold\" " or ability to detect a 5.[address] [place] monofilament is when they have elevated risk for developing foot deformity, contractures, foot infections, amputations, Charcot arthropathy, or other complications. Keep your hemoglobin A1C below 7.5 to reduce this risk.    What is vascular disease?  Peripheral vascular disease is a term used to describe a loss or decrease in circulation (blood flow).  There is a problem in getting blood, immune cells, and oxygen to areas that need it.  Similar to neuropathy, sugars can build up in the walls of the arteries (blood vessels) and cause them to become swollen, thickened and hardened.  This decreases the amount of blood that can go to an area that needs it.  Though this is common in the legs of diabetic patients, it can also affect other arteries (blood vessels) in the body such as in the heart, kidney, eyes, and the blood flow into bones.  It is often seen first in the small vessels of her body notably our feet and toes.    How do I know if I have vascular disease?  In the legs, vascular disease usually results in cramping.  Patients who develop leg cramps after walking the same distance every time (i.e. One block, half a mile, ect.) need to let their doctors know so that their circulation may be checked.  Cramps causing severe pain in the feet and/or legs while sleeping and the cramps go away when you stand or hang your legs off the side of the bed, may also be a sign of poor blood circulation.  Occasional cramping in cold weather or on rare occasions with activity may not be due to poor circulation, but you should inform your doctor.    How can these problems be prevented?  The key to prevention is good blood sugar control all day every day.  Inadequate blood sugar control is the most common way patients experience these problems. Reducing, controlling and measuring your daily consumption of sugar or carbohydrates is essential to understanding and managing diabetes.   Physical activity (exercise) is a very good way to help decrease your blood sugars.  Exercise can lower your blood sugar, blood pressure, and cholesterol.  It also reduces your risk for heart disease and stroke, relieves stress, and strengthens your heart, muscles and bones. Physical activity also increases your balance and reduces development of contractures and foot deformities over time. In addition, regular activity helps insulin work better, improves your blood circulation, and keeps your joints flexible.  If you're trying to lose weight, a combination of exercise and wise food choices can help you reach your target weight and maintain it.  Activity and exercise alone can not make up for poor diet choices, eating too much, or eating too many sugars or carbohydrates.  Ask your doctor for help when you are not meeting your blood sugar goals. Changes or increases in medication are powerful tools in reducing your blood sugar.    Know your blood sugar and hemoglobin A1C trend.  Upon first diagnosis or during acute illness, checking your blood sugar 4 times a day can help you understand how your diet, activity, and lifestyle affect your blood sugar.  Monitoring your hemoglobin A1C can help you understand how well you are managing blood sugar over the long run.  Your hemoglobin A1C tells you what your blood sugar averages all day, every day, over the past 90 days.       To experience the lowest risk of complications associated with diabetes such as neuropathy, loss of blood flow, bone or joint infection, charcot arthropathy, or amputation, the American Diabetes Association recommends a target hemoglobin A1C of less than 7.0%, while the American Association of Clinical Endocrinologists' recommendation is 6.5% or less.  Both organizations advise that the goals be individualized based on patient factors such as other health conditions, history of hypoglycemia, education, and life expectancy.  A patients risk of  experiencing complications associated with diabetes is only slightly elevated with a hemoglobin A1C above 6.0.  However, this risk goes up exponentially when the hemoglobin A1C is above 7.5.  The longer the hemoglobin A1C is elevated, the more risk that patient will experience in their lifetime. The damage that occurs to nerves, blood vessels, tendons, bones and body organs, while their hemoglobin A1C is elevated is mostly irreversible and worsens with each additional time period of elevated hemoglobin A1C.     You must understand and manage your disease.  Your health insurance or medical team cannot manage this disease for you.  When you take responsibility for understanding and managing your disease, you can expect to experience fewer problems associated with diabetes in your lifetime.  You will  Also experience a higher quality of life and health and reduced cost of health care.              Diabetic Foot Care Recommendations  The following are recommendations for avoiding serious foot problems or injury    DO'S  1. Be aware of your hemoglobin A1C and continue to follow up with your medical team for adjustments in your lifestyle and medication until your reach your A1C goal.  Keep this below 7.5 to reduce your risk of developing complications associated with diabetes.    2.  Wash your feet with lukewarm water and a mild soap and then dry them thoroughly, especially between the toes.  Gently floss your towel or washcloth between each toe at every bath.  Soaking your feet in water cannot clean dead skin, debris, and bacteria from your feet and is not necessary.   3. Examine your feet daily looking for cuts, corns, blisters, cracks, ect..., especially after wearing new shoes or increased or changed activities.  Make sure to look between your toes.  If you cannot see the bottom of your feet, set a mirror on the floor and hold your foot over it, or ask a family member to examine your feet for you daily.  Contact your  doctor immediately if new problems are noted or if sores are not healing.  4.  Immediately apply moisturizer cream such as Cetaphil to the tops and bottoms of your feet, avoiding areas between the toes.  Apply sunscreen or cover your feet if they will be exposed to extended sunlight.  5.Use clean comfortable shoes.  Socks should not have thick seams or cut off the circulation around the leg.  Break in new shoes slowly and rotate with older shoes until broken in.  Check the inside of your shoes with your hand to look for areas of irritation or objects that may have fallen into your shoes.    6. Keep slippers by the side of your bed for use during the night.  7. Shoes should be fitted by a professional and should not cause areas of irritation.  Check your feet regularly when wearing a new pair of shoes and replace them as needed.  8.  Talk to your doctor about proper exercise.  Exercise and stretching stimulate blood flow to your feet and maintain proper glucose levels.  Use it or lose it!  9.  Monitor your blood glucose level and your hemoglobin A1C.  Notify your doctor immediately if your blood sugar is abnormally high or low.  10.  Cut your nails straight across, but then gently round any sharp edges with a nail file.  If you have neuropathy, peripheral vascular disease or cannot see that well to trim your own toenails, see a medical professional for care.    DONT'S  1.  Do not soak your feet if you have an open sore or your provider has informed you that you have neuropathy or loss of protective threshold.  Use only lukewarm water and always check the temperature with your hand as hot water can easily burn your feet.    2.  Never use a hot water bottle or heating pad on your feet.  Also do not apply hot or cold compresses to your feet.  With decreased sensation, you could burn or freeze your feet.  Do not rest your feet near a heat source such as a heater or heat register.    3.  Do not apply any of these to your  feet:    - over the counter medicine for corns or warts    -  Harsh chemicals like boric acid    -  Do not self-treat corns, cuts, blisters or infections.  Always consult your doctor.   4.  Do not wear sandals, slippers or walk barefoot, especially on harsh surfaces.  5.  If you smoke, stop!!!           Birth Control Pills Pregnancy And Lactation Text: This medication should be avoided if pregnant and for the first 30 days post-partum.

## 2023-12-16 DIAGNOSIS — E11.65 TYPE 2 DIABETES MELLITUS WITH HYPERGLYCEMIA, WITHOUT LONG-TERM CURRENT USE OF INSULIN (H): ICD-10-CM

## 2023-12-18 RX ORDER — DULAGLUTIDE 0.75 MG/.5ML
0.75 INJECTION, SOLUTION SUBCUTANEOUS
Qty: 2 ML | Refills: 11 | Status: SHIPPED | OUTPATIENT
Start: 2023-12-18

## 2024-02-10 ENCOUNTER — HEALTH MAINTENANCE LETTER (OUTPATIENT)
Age: 59
End: 2024-02-10

## 2024-03-03 DIAGNOSIS — E11.65 TYPE 2 DIABETES MELLITUS WITH HYPERGLYCEMIA, WITHOUT LONG-TERM CURRENT USE OF INSULIN (H): ICD-10-CM

## 2024-03-04 RX ORDER — EMPAGLIFLOZIN 25 MG/1
25 TABLET, FILM COATED ORAL DAILY
Qty: 90 TABLET | Refills: 3 | Status: SHIPPED | OUTPATIENT
Start: 2024-03-04 | End: 2024-04-02

## 2024-03-04 RX ORDER — ATORVASTATIN CALCIUM 20 MG/1
20 TABLET, FILM COATED ORAL DAILY
Qty: 90 TABLET | Refills: 3 | Status: SHIPPED | OUTPATIENT
Start: 2024-03-04

## 2024-03-04 RX ORDER — METFORMIN HCL 500 MG
1000 TABLET, EXTENDED RELEASE 24 HR ORAL 2 TIMES DAILY WITH MEALS
Qty: 360 TABLET | Refills: 3 | Status: SHIPPED | OUTPATIENT
Start: 2024-03-04

## 2024-03-16 ENCOUNTER — MYC MEDICAL ADVICE (OUTPATIENT)
Dept: INTERNAL MEDICINE | Facility: CLINIC | Age: 59
End: 2024-03-16
Payer: COMMERCIAL

## 2024-03-16 DIAGNOSIS — Z86.0100 HISTORY OF COLONIC POLYPS: Primary | ICD-10-CM

## 2024-03-19 ENCOUNTER — TELEPHONE (OUTPATIENT)
Dept: GASTROENTEROLOGY | Facility: CLINIC | Age: 59
End: 2024-03-19
Payer: COMMERCIAL

## 2024-03-19 NOTE — TELEPHONE ENCOUNTER
"Endoscopy Scheduling Screen    Have you had a positive Covid test in the last 14 days?  No    What is your communication preference for Instructions and/or Bowel Prep?   MyChart    What insurance is in the chart?  Other:  [employer]    Ordering/Referring Provider: [facility]   (If ordering provider performs procedure, schedule with ordering provider unless otherwise instructed. )    BMI: Estimated body mass index is 29.15 kg/m  as calculated from the following:    Height as of 3/8/23: 1.88 m (6' 2\").    Weight as of 3/27/23: 103 kg (227 lb).     Sedation Ordered  moderate sedation.   If patient BMI > 50 do not schedule in [facility].    If patient BMI > 45 do not schedule at [facility].    Are you taking methadone or Suboxone?  No    Have you had difficulties, pain, or discomfort during past endoscopy procedures?  No    Are you taking any prescription medications for pain 3 or more times per week?   NO, No RN review required.    Do you have a history of malignant hyperthermia?  No    (Females) Are you currently pregnant?   No     Have you been diagnosed or told you have pulmonary hypertension?   No    Do you have an LVAD?  No    Have you been told you have moderate to severe sleep apnea?  No    Have you been told you have COPD, asthma, or any other lung disease?  No    Do you have any heart conditions?  No     Have you ever had or are you waiting for an organ transplant?  No. Continue scheduling, no site restrictions.    Have you had a stroke or transient ischemic attack (TIA aka \"mini stroke\" in the last 6 months?   No    Have you been diagnosed with or been told you have cirrhosis of the liver?   No    Are you currently on dialysis?   No    Do you need assistance transferring?   No    BMI: Estimated body mass index is 29.15 kg/m  as calculated from the following:    Height as of 3/8/23: 1.88 m (6' 2\").    Weight as of 3/27/23: 103 kg (227 lb).     Is patients BMI > 40 and scheduling location [facility]?  No    Do you take an injectable " medication for weight loss or diabetes (excluding insulin)?  Yes, hold time can be up to 7 days. Please consult with you prescribing provider to discuss endoscopy recommendations.     Do you take the medication Naltrexone?  No    Do you take blood thinners?  No       Prep   Are you currently on dialysis or do you have chronic kidney disease?  No    Do you have a diagnosis of diabetes?  Yes (Golytely Prep)    Do you have a diagnosis of cystic fibrosis (CF)?  No    On a regular basis do you go 3 -5 days between bowel movements?  No    BMI > 40?  No    Preferred Pharmacy:    [id number] - [place], [place] - [address]  [address]  [place] [address]  Phone: [phone] Fax: [phone]      Final Scheduling Details     Procedure scheduled  Colonoscopy    Surgeon:  Michael     Date of procedure:  3/26     Pre-OP / PAC:   No - Not required for this site.    Location  [facility] - Per order.    Sedation   MAC/Deep Sedation  location      Patient Reminders:   You will receive a call from a Nurse to review instructions and health history.  This assessment must be completed prior to your procedure.  Failure to complete the Nurse assessment may result in the procedure being cancelled.      On the day of your procedure, please designate an adult(s) who can drive you home stay with you for the next 24 hours. The medicines used in the exam will make you sleepy. You will not be able to drive.      You cannot take public transportation, ride share services, or non-medical taxi service without a responsible caregiver.  Medical transport services are allowed with the requirement that a responsible caregiver will receive you at your destination.  We require that drivers and caregivers are confirmed prior to your procedure.

## 2024-03-25 NOTE — H&P
[facility] Anesthesia Pre-op History and Physical    Luke Farias MRN# [id number]   Age: 58 year old YOB: 1965      Date of Surgery: 3/26/2024 Location [facility]      Date of Exam 3/26/2024 Facility (In hospital)       Home clinic: [facility]  Primary care provider: [facility]         Chief Complaint and/or Reason for Procedure:   No chief complaint on file.  Colonoscopy  Exam 2021 hx advanced adenoma, tattoo ascending.       Active problem list:     Patient Active Problem List    Diagnosis Date Noted    Venous lake on Right chest 03/22/2017     Priority: Medium    Congenital nevus of Right upper back 03/22/2017     Priority: Medium    Hyperlipidemia LDL goal <100 01/25/2011     Priority: Medium    Type 2 diabetes mellitus with hyperglycemia (H) 01/21/2011     Priority: Medium    Chronic rhinitis 03/20/2008     Priority: Medium            Medications (include herbals and vitamins):   Any Plavix use in the last 7 days? No     No current facility-administered medications for this encounter.     Current Outpatient Medications   Medication Sig    aspirin (ASA) 81 MG tablet Take 1 tablet (81 mg) by mouth daily    atorvastatin (LIPITOR) 20 MG tablet TAKE 1 TABLET(20 MG) BY MOUTH DAILY    JARDIANCE 25 MG TABS tablet TAKE 1 TABLET(25 MG) BY MOUTH DAILY    metFORMIN (GLUCOPHAGE XR) 500 MG 24 hr tablet TAKE 2 TABLETS(1000 MG) BY MOUTH TWICE DAILY WITH MEALS    TRULICITY 0.75 MG/0.5ML pen ADMINISTER 0.75 MG UNDER THE SKIN EVERY 7 DAYS    bisacodyl (DULCOLAX) 5 MG EC tablet Take 2 tablets at 3 pm the day before your procedure. If your procedure is before 11 am, take 2 additional tablets at 11 pm. If your procedure is after 11 am, take 2 additional tablets at 6 am. For additional instructions refer to your colonoscopy prep instructions.    blood glucose (NO BRAND SPECIFIED) test strip Use to test blood sugars 4 times daily or as directed due to  infection and hyperglycemia    Blood Glucose Monitoring Suppl (GLUCOMETER ELITE CLASSIC) KIT 1 Device daily.    Continuous Blood Gluc Sensor (FREESTYLE PIERRE 2 SENSOR) MISC 1 each every 14 days 1 each every 14 days. Change every 14 days.    DIABETIC STERILE LANCETS device 1 Device daily.    glucose blood VI test strips (ASCENSIA AUTODISC VI,ONE TOUCH ULTRA TEST VI) strip by In Vitro route. Test as directed    polyethylene glycol (GOLYTELY) 236 g suspension The night before the exam at 6 pm drink an 8-ounce glass every 15 minutes until the jug is half empty. If you arrive before 11 AM: Drink the other half of the Golytely jug at 11 PM night before procedure. If you arrive after 11 AM: Drink the other half of the Golytely jug at 6 AM day of procedure. For additional instructions refer to your colonoscopy prep instructions.             Allergies:      Allergies   Allergen Reactions    Seasonal Allergies      Allergy to Latex? No  Allergy to tape?   No  Intolerances:             Physical Exam:   All vitals have been reviewed  No data found.  No intake/output data recorded.  Lungs:   No increased work of breathing, good air exchange, clear to auscultation bilaterally, no crackles or wheezing     Cardiovascular:   Normal apical impulse, regular rate and rhythm, normal S1 and S2, no S3 or S4, and no murmur noted             Lab / Radiology Results:            Anesthetic risk and/or ASA classification:       Baltazar Rodriguez MD

## 2024-03-26 ENCOUNTER — HOSPITAL ENCOUNTER (OUTPATIENT)
Facility: CLINIC | Age: 59
Discharge: HOME | End: 2024-03-26
Attending: INTERNAL MEDICINE | Admitting: INTERNAL MEDICINE
Payer: COMMERCIAL

## 2024-03-26 ENCOUNTER — E-VISIT (OUTPATIENT)
Dept: FAMILY MEDICINE | Facility: CLINIC | Age: 59
End: 2024-03-26
Payer: COMMERCIAL

## 2024-03-26 ENCOUNTER — ANESTHESIA (OUTPATIENT)
Dept: GASTROENTEROLOGY | Facility: CLINIC | Age: 59
End: 2024-03-26
Payer: COMMERCIAL

## 2024-03-26 ENCOUNTER — ANESTHESIA EVENT (OUTPATIENT)
Dept: GASTROENTEROLOGY | Facility: CLINIC | Age: 59
End: 2024-03-26
Payer: COMMERCIAL

## 2024-03-26 VITALS
TEMPERATURE: 98.1 F | RESPIRATION RATE: 16 BRPM | HEART RATE: 66 BPM | OXYGEN SATURATION: 96 % | SYSTOLIC BLOOD PRESSURE: 114 MMHG | DIASTOLIC BLOOD PRESSURE: 68 MMHG

## 2024-03-26 DIAGNOSIS — L02.215 PERINEAL ABSCESS: Primary | ICD-10-CM

## 2024-03-26 LAB
COLONOSCOPY: NORMAL
GLUCOSE BLDC GLUCOMTR-MCNC: 340 MG/DL (ref 70–99)

## 2024-03-26 PROCEDURE — 88305 TISSUE EXAM BY PATHOLOGIST: CPT | Mod: 26 | Performed by: PATHOLOGY

## 2024-03-26 PROCEDURE — 258N000003 HC RX IP 258 OP 636: Performed by: NURSE ANESTHETIST, CERTIFIED REGISTERED

## 2024-03-26 PROCEDURE — 250N000011 HC RX IP 250 OP 636: Performed by: NURSE ANESTHETIST, CERTIFIED REGISTERED

## 2024-03-26 PROCEDURE — 45380 COLONOSCOPY AND BIOPSY: CPT | Performed by: INTERNAL MEDICINE

## 2024-03-26 PROCEDURE — 250N000009 HC RX 250: Performed by: NURSE ANESTHETIST, CERTIFIED REGISTERED

## 2024-03-26 PROCEDURE — 45385 COLONOSCOPY W/LESION REMOVAL: CPT | Mod: PT | Performed by: INTERNAL MEDICINE

## 2024-03-26 PROCEDURE — 82962 GLUCOSE BLOOD TEST: CPT

## 2024-03-26 PROCEDURE — 99207 PR NON-BILLABLE SERV PER CHARTING: CPT | Performed by: FAMILY MEDICINE

## 2024-03-26 PROCEDURE — 88305 TISSUE EXAM BY PATHOLOGIST: CPT | Mod: TC | Performed by: INTERNAL MEDICINE

## 2024-03-26 PROCEDURE — 370N000017 HC ANESTHESIA TECHNICAL FEE, PER MIN: Performed by: INTERNAL MEDICINE

## 2024-03-26 RX ORDER — ACETAMINOPHEN 325 MG/1
975 TABLET ORAL EVERY 6 HOURS PRN
Status: DISCONTINUED | OUTPATIENT
Start: 2024-03-26 | End: 2024-03-26 | Stop reason: HOSPADM

## 2024-03-26 RX ORDER — SODIUM CHLORIDE, SODIUM LACTATE, POTASSIUM CHLORIDE, CALCIUM CHLORIDE 600; 310; 30; 20 MG/100ML; MG/100ML; MG/100ML; MG/100ML
INJECTION, SOLUTION INTRAVENOUS CONTINUOUS
Status: DISCONTINUED | OUTPATIENT
Start: 2024-03-26 | End: 2024-03-26 | Stop reason: HOSPADM

## 2024-03-26 RX ORDER — NALOXONE HYDROCHLORIDE 0.4 MG/ML
0.1 INJECTION, SOLUTION INTRAMUSCULAR; INTRAVENOUS; SUBCUTANEOUS
Status: DISCONTINUED | OUTPATIENT
Start: 2024-03-26 | End: 2024-03-26 | Stop reason: HOSPADM

## 2024-03-26 RX ORDER — ONDANSETRON 2 MG/ML
4 INJECTION INTRAMUSCULAR; INTRAVENOUS EVERY 30 MIN PRN
Status: DISCONTINUED | OUTPATIENT
Start: 2024-03-26 | End: 2024-03-26 | Stop reason: HOSPADM

## 2024-03-26 RX ORDER — PROPOFOL 10 MG/ML
INJECTION, EMULSION INTRAVENOUS CONTINUOUS PRN
Status: DISCONTINUED | OUTPATIENT
Start: 2024-03-26 | End: 2024-03-26

## 2024-03-26 RX ORDER — ONDANSETRON 4 MG/1
4 TABLET, ORALLY DISINTEGRATING ORAL EVERY 30 MIN PRN
Status: DISCONTINUED | OUTPATIENT
Start: 2024-03-26 | End: 2024-03-26 | Stop reason: HOSPADM

## 2024-03-26 RX ORDER — LIDOCAINE HYDROCHLORIDE 20 MG/ML
INJECTION, SOLUTION INFILTRATION; PERINEURAL PRN
Status: DISCONTINUED | OUTPATIENT
Start: 2024-03-26 | End: 2024-03-26

## 2024-03-26 RX ORDER — PROPOFOL 10 MG/ML
INJECTION, EMULSION INTRAVENOUS PRN
Status: DISCONTINUED | OUTPATIENT
Start: 2024-03-26 | End: 2024-03-26

## 2024-03-26 RX ADMIN — PROPOFOL 100 MG: 10 INJECTION, EMULSION INTRAVENOUS at 11:29

## 2024-03-26 RX ADMIN — PROPOFOL 200 MCG/KG/MIN: 10 INJECTION, EMULSION INTRAVENOUS at 11:29

## 2024-03-26 RX ADMIN — LIDOCAINE HYDROCHLORIDE 1 ML: 10 INJECTION, SOLUTION EPIDURAL; INFILTRATION; INTRACAUDAL; PERINEURAL at 10:17

## 2024-03-26 RX ADMIN — SODIUM CHLORIDE, POTASSIUM CHLORIDE, SODIUM LACTATE AND CALCIUM CHLORIDE: 600; 310; 30; 20 INJECTION, SOLUTION INTRAVENOUS at 10:17

## 2024-03-26 RX ADMIN — LIDOCAINE HYDROCHLORIDE 50 MG: 20 INJECTION, SOLUTION INFILTRATION; PERINEURAL at 11:29

## 2024-03-26 ASSESSMENT — ACTIVITIES OF DAILY LIVING (ADL)
ADLS_ACUITY_SCORE: 35

## 2024-03-26 NOTE — ANESTHESIA POSTPROCEDURE EVALUATION
Patient: Luke Farias    Procedure: Procedure(s):  COLONOSCOPY, WITH POLYPECTOMY using snare       Anesthesia Type:  MAC    Note:  Disposition: Outpatient   Postop Pain Control: Uneventful            Sign Out: Well controlled pain   PONV: No   Neuro/Psych: Uneventful            Sign Out: Acceptable/Baseline neuro status   Airway/Respiratory: Uneventful            Sign Out: Acceptable/Baseline resp. status   CV/Hemodynamics: Uneventful            Sign Out: Acceptable CV status   Other NRE: NONE   DID A NON-ROUTINE EVENT OCCUR? No    Event details/Postop Comments:  Pt was happy with anesthesia care.  No complications.  I will follow up with the pt if needed.           Last vitals:  Vitals Value Taken Time   /76 03/26/24 1215   Temp     Pulse 71 03/26/24 1215   Resp     SpO2 94 % 03/26/24 1219   Vitals shown include unfiled device data.    Electronically Signed By: STEVENSON Gotti CRNA  March 26, 2024  12:38 PM

## 2024-03-26 NOTE — ANESTHESIA PREPROCEDURE EVALUATION
Anesthesia Pre-Procedure Evaluation    Patient: Luke Farias   MRN: [id number] : 1965        Procedure : Procedure(s):  Colonoscopy          Past Medical History:   Diagnosis Date    Diabetes (H)     type 2    Seasonal allergies     Allergy inj as a child      Past Surgical History:   Procedure Laterality Date    COLONOSCOPY N/A 2019    Procedure: Colonoscopy, With Polypectomy And Biopsy;  Surgeon: Bartolome Templeton MD;  Location: [facility]    COLONOSCOPY N/A 2021    Procedure: Colonoscopy, With Polypectomy And Biopsy;  Surgeon: Lisette Vickers DO;  Location: [facility]    COLONOSCOPY N/A 2021    Procedure: COLONOSCOPY, FLEXIBLE, WITH LESION REMOVAL USING SNARE;  Surgeon: Lisette Vickers DO;  Location: [facility]    COLONOSCOPY WITH CO2 INSUFFLATION N/A 2017    Procedure: COLONOSCOPY WITH CO2 INSUFFLATION;  Dr. [facility]/BMI: 30.8/Special screening for malignant neoplasms, colon/colonoscopy/[facility]:  [phone];  Surgeon: Krihs Galloway MD;  Location: [facility]    COLONOSCOPY WITH CO2 INSUFFLATION N/A 2019    Procedure: COLONOSCOPY WITH CO2 INSUFFLATION;  Surgeon: Bartolome Templeton MD;  Location: [facility]    COLONOSCOPY WITH CO2 INSUFFLATION N/A 2019    Procedure: COLONOSCOPY, WITH CO2 INSUFFLATION;  Surgeon: Bartolome Templeton MD;  Location: [facility]    COLONOSCOPY WITH CO2 INSUFFLATION N/A 2021    Procedure: COLONOSCOPY, WITH CO2 INSUFFLATION;  Surgeon: Lisette Vickers DO;  Location: [facility]      Allergies   Allergen Reactions    Seasonal Allergies       Social History     Tobacco Use    Smoking status: Never    Smokeless tobacco: Never   Substance Use Topics    Alcohol use: Yes     Comment: 1-2 beers a week / occ       Wt Readings from Last 1 Encounters:   23 103 kg (227 lb)        Anesthesia Evaluation   Pt has had prior anesthetic. Type: MAC.    No history of anesthetic complications       ROS/MED HX  ENT/Pulmonary:  - neg  "pulmonary ROS     Neurologic:  - neg neurologic ROS     Cardiovascular:     (+) Dyslipidemia - -   -  - -                                      METS/Exercise Tolerance:     Hematologic:  - neg hematologic  ROS     Musculoskeletal:  - neg musculoskeletal ROS     GI/Hepatic:       Renal/Genitourinary:       Endo:     (+)  type II DM, Last HgA1c: 11.2, date: 01/12/23, Not using insulin, - not using insulin pump.                Psychiatric/Substance Use:  - neg psychiatric ROS     Infectious Disease:       Malignancy:       Other:  - neg other ROS          Physical Exam    Airway        Mallampati: II   TM distance: > 3 FB   Neck ROM: full   Mouth opening: > 3 cm    Respiratory Devices and Support         Dental       (+) Minor Abnormalities - some fillings, tiny chips      Cardiovascular             Pulmonary                   OUTSIDE LABS:  CBC:   Lab Results   Component Value Date    WBC 10.6 07/18/2018    HGB 16.8 07/18/2018    HCT 47.1 07/18/2018     07/18/2018     BMP:   Lab Results   Component Value Date     04/21/2021     11/02/2018    POTASSIUM 4.1 04/21/2021    POTASSIUM 4.2 11/02/2018    CHLORIDE 105 04/21/2021    CHLORIDE 103 11/02/2018    CO2 28 04/21/2021    CO2 33 (H) 11/02/2018    BUN 13 04/21/2021    BUN 11 11/02/2018    CR 0.70 04/21/2021    CR 0.72 11/02/2018     (H) 11/05/2021     (H) 04/21/2021     COAGS: No results found for: \"PTT\", \"INR\", \"FIBR\"  POC:   Lab Results   Component Value Date     (H) 08/19/2019     HEPATIC:   Lab Results   Component Value Date    ALBUMIN 4.1 04/21/2021    PROTTOTAL 7.3 04/21/2021    ALT 23 04/21/2021    AST 11 04/21/2021    ALKPHOS 56 04/21/2021    BILITOTAL 0.7 04/21/2021     OTHER:   Lab Results   Component Value Date    A1C 8.9 (H) 04/21/2021    EARNEST 8.7 04/21/2021    TSH 4.83 10/01/2012    T4 0.79 09/28/2011    CRP 68.0 (H) 07/18/2018    SED 30 (H) 07/18/2018       Anesthesia Plan    ASA Status:  2    NPO Status:  NPO " Appropriate    Anesthesia Type: MAC.     - Reason for MAC: immobility needed   Induction: Propofol, Intravenous.   Maintenance: TIVA.        Consents    Anesthesia Plan(s) and associated risks, benefits, and realistic alternatives discussed. Questions answered and patient/representative(s) expressed understanding.     - Discussed: Risks, Benefits and Alternatives for BOTH SEDATION and the PROCEDURE were discussed     - Discussed with:  Patient      - Extended Intubation/Ventilatory Support Discussed: No.      - Patient is DNR/DNI Status: No     Use of blood products discussed: No .     Postoperative Care            Comments:    Other Comments: The risks and benefits of anesthesia, and the alternatives where applicable, have been discussed with the patient, and they wish to proceed.              STEVENSON Gotti CRNA    I have reviewed the pertinent notes and labs in the chart from the past 30 days and (re)examined the patient.  Any updates or changes from those notes are reflected in this note.

## 2024-03-26 NOTE — LETTER
April 1, 2024      Luke Farisa  [address] [address]        Dear ,    We are writing to inform you of your test results.    Benign polyps removed. A repeat exam in 5 yrs is suggested.     Resulted Orders   Surgical Pathology Exam   Result Value Ref Range    Case Report       Surgical Pathology Report                         Case: [id number]                                  Authorizing Provider:  Baltazar Rodriguez MD        Collected:           03/26/2024 11:34 AM          Ordering Location:     [facility]          Received:            03/26/2024 12:01 PM                                 [facility]                                                          Pathologist:           Mary Ann Cantu MD                                                          Specimen:    Large Intestine, Colon, Transverse, transverse colon polyps                                Final Diagnosis       A. Colon, Transverse, polyps x2, polypectomy:  -Fragments of tubular adenomas  -Negative for high-grade dysplasia and malignancy.            Clinical Information       Procedure:  COLONOSCOPY, WITH POLYPECTOMY using snare  Pre-op Diagnosis: History of colonic polyps [Z86.010]  Post-op Diagnosis: Z86.010 - History of colonic polyps [ICD-10-CM]      Gross Description       A(1). Large Intestine, Colon, Transverse, transverse colon polyps:  The specimen is received in formalin, labeled with the patient's name, medical record number and other identifying information and designated  transverse colon polyp . It consists of 4 tan soft tissue fragments ranging from 0.3-0.7 cm. Entirely submitted in one cassette.   [NISHA Richards(ASCP)]      Microscopic Description       Microscopic examination was performed.      Performing Labs       The technical component of this testing was completed at [facility]      Case Images         If you have any  questions or concerns, please call the clinic at the number listed above.       Sincerely,      Baltazar Rodriguez MD

## 2024-03-26 NOTE — DISCHARGE INSTRUCTIONS
[facility]    Home Care Following Endoscopy          Activity:  You have just undergone an endoscopic procedure usually performed with conscious sedation.  Do not work or operate machinery (including a car) for at least 12 hours.    I encourage you to walk and attempt to pass this air as soon as possible.    Diet:  Return to the diet you were on before your procedure but eat lightly for the first 12-24 hours.  Drink plenty of water.  Resume any regular medications unless otherwise advised by your physician.  Please begin any new medication prescribed as a result of your procedure as directed by your physician.   If you had any biopsy or polyp removed please refrain from aspirin or aspirin products for 2 days.  If on Coumadin please restart as instructed by your physician.   Pain:  You may take Tylenol as needed for pain.  Expected Recovery:  You can expect some mild abdominal fullness and/or discomfort due to the air used to inflate your intestinal tract. It is also normal to have a mild sore throat after upper endoscopy.    Call Your Physician if You Have:  After Colonoscopy:  Worsening persisting abdominal pain which is worse with activity.  Fevers (>101 degrees F), chills or shakes.  Passage of continued blood with bowel movements.     Any questions or concerns about your recovery, please call [phone] or after hours [phone] ([phone]) Nurse Advice Line.    Follow-up Care:  iF You did have polyps/biopsy tissue sample(s) removed.  The polyps/biopsy tissue sample(s) will be sent to pathology.    You should receive letter in your My Chart from DR ADRIAN with your results within 1-2 weeks. If you do not participate in My Chart a physical letter will come in the mail in 2-3 weeks.  Please call if you have not received a notification of your results.  If asked to return to clinic please make an appointment 1 week after your procedure.  Call [phone].

## 2024-03-26 NOTE — ANESTHESIA CARE TRANSFER NOTE
Patient: Luke Farias    Procedure: Procedure(s):  COLONOSCOPY, WITH POLYPECTOMY using snare       Diagnosis: History of colonic polyps [Z86.010]  Diagnosis Additional Information: No value filed.    Anesthesia Type:   MAC     Note:    Oropharynx: oropharynx clear of all foreign objects and spontaneously breathing  Level of Consciousness: awake  Oxygen Supplementation: room air    Independent Airway: airway patency satisfactory and stable  Dentition: dentition unchanged  Vital Signs Stable: post-procedure vital signs reviewed and stable  Report to RN Given: handoff report given  Patient transferred to: Phase II    Handoff Report: Identifed the Patient, Identified the Reponsible Provider, Reviewed the pertinent medical history, Discussed the surgical course, Reviewed Intra-OP anesthesia mangement and issues during anesthesia, Set expectations for post-procedure period and Allowed opportunity for questions and acknowledgement of understanding      Vitals:  Vitals Value Taken Time   /62 03/26/24 1150   Temp     Pulse 65 03/26/24 1150   Resp     SpO2 99 % 03/26/24 1152   Vitals shown include unfiled device data.    Electronically Signed By: STEVENSON Gotti CRNA  March 26, 2024  11:52 AM

## 2024-03-27 ENCOUNTER — HOSPITAL ENCOUNTER (EMERGENCY)
Facility: CLINIC | Age: 59
Discharge: HOME | End: 2024-03-27
Attending: EMERGENCY MEDICINE | Admitting: EMERGENCY MEDICINE
Payer: COMMERCIAL

## 2024-03-27 VITALS
HEART RATE: 71 BPM | OXYGEN SATURATION: 94 % | RESPIRATION RATE: 20 BRPM | DIASTOLIC BLOOD PRESSURE: 70 MMHG | HEIGHT: 74 IN | TEMPERATURE: 98.4 F | BODY MASS INDEX: 29.15 KG/M2 | SYSTOLIC BLOOD PRESSURE: 115 MMHG

## 2024-03-27 DIAGNOSIS — L02.215 ABSCESS OF PERINEUM: ICD-10-CM

## 2024-03-27 PROCEDURE — 10060 I&D ABSCESS SIMPLE/SINGLE: CPT | Performed by: EMERGENCY MEDICINE

## 2024-03-27 PROCEDURE — 99284 EMERGENCY DEPT VISIT MOD MDM: CPT | Mod: 25 | Performed by: EMERGENCY MEDICINE

## 2024-03-27 PROCEDURE — 87070 CULTURE OTHR SPECIMN AEROBIC: CPT | Performed by: EMERGENCY MEDICINE

## 2024-03-27 PROCEDURE — 96365 THER/PROPH/DIAG IV INF INIT: CPT | Mod: 59 | Performed by: EMERGENCY MEDICINE

## 2024-03-27 PROCEDURE — 250N000011 HC RX IP 250 OP 636: Performed by: EMERGENCY MEDICINE

## 2024-03-27 RX ORDER — AMPICILLIN AND SULBACTAM 2; 1 G/1; G/1
3 INJECTION, POWDER, FOR SOLUTION INTRAMUSCULAR; INTRAVENOUS ONCE
Status: COMPLETED | OUTPATIENT
Start: 2024-03-27 | End: 2024-03-27

## 2024-03-27 RX ORDER — LEVOFLOXACIN 500 MG/1
1 TABLET, FILM COATED ORAL
COMMUNITY
Start: 2024-03-20 | End: 2024-04-02

## 2024-03-27 RX ADMIN — AMPICILLIN SODIUM AND SULBACTAM SODIUM 3 G: 2; 1 INJECTION, POWDER, FOR SOLUTION INTRAMUSCULAR; INTRAVENOUS at 11:58

## 2024-03-27 ASSESSMENT — ACTIVITIES OF DAILY LIVING (ADL)
ADLS_ACUITY_SCORE: 35
ADLS_ACUITY_SCORE: 33

## 2024-03-27 ASSESSMENT — COLUMBIA-SUICIDE SEVERITY RATING SCALE - C-SSRS
6. HAVE YOU EVER DONE ANYTHING, STARTED TO DO ANYTHING, OR PREPARED TO DO ANYTHING TO END YOUR LIFE?: NO
2. HAVE YOU ACTUALLY HAD ANY THOUGHTS OF KILLING YOURSELF IN THE PAST MONTH?: NO
1. IN THE PAST MONTH, HAVE YOU WISHED YOU WERE DEAD OR WISHED YOU COULD GO TO SLEEP AND NOT WAKE UP?: NO

## 2024-03-27 NOTE — DISCHARGE INSTRUCTIONS
-Wound culture pending  -Augmentin 805 mg twice daily for 10 days.  -Monitor blood glucose values.  They have a tendency to increase  With active infection  -Soak in a bathtub for 20 minutes 3 times daily for 3 days  -If the wick type packing does not fall out on its own you can pull it in 3 days.  Appoint with Dr. Painting in 1 week

## 2024-03-27 NOTE — ED TRIAGE NOTES
Pt reports pain and swelling under his testicles and in front of his rectum that started around March 16. Was seen in UC 3/20 and started on an antibiotic but states symptoms are getting worse.      Triage Assessment (Adult)       Row Name 03/27/24 0955          Triage Assessment    Airway WDL WDL        Respiratory WDL    Respiratory WDL WDL        Skin Circulation/Temperature WDL    Skin Circulation/Temperature WDL WDL        Cardiac WDL    Cardiac WDL WDL

## 2024-03-27 NOTE — ED PROVIDER NOTES
History     Chief Complaint   Patient presents with    Wound Check     HPI  Luke Farias is a 58 year old male who presents today with concerns for pain between the rectum and the scrotum.  States he was seen in urgent care recently and they prescribed an antibiotic.  He is not sure what the antibiotic was.  States is very tender between the rectum and the scrotum.  No drainage.  No systemic symptoms.  History for DM type II.    Allergies:  Allergies   Allergen Reactions    Seasonal Allergies        Problem List:    Patient Active Problem List    Diagnosis Date Noted    Venous lake on Right chest 03/22/2017     Priority: Medium    Congenital nevus of Right upper back 03/22/2017     Priority: Medium    Hyperlipidemia LDL goal <100 01/25/2011     Priority: Medium    Type 2 diabetes mellitus with hyperglycemia (H) 01/21/2011     Priority: Medium    Chronic rhinitis 03/20/2008     Priority: Medium        Past Medical History:    Past Medical History:   Diagnosis Date    Diabetes (H)     Seasonal allergies        Past Surgical History:    Past Surgical History:   Procedure Laterality Date    COLONOSCOPY N/A 8/19/2019    Procedure: Colonoscopy, With Polypectomy And Biopsy;  Surgeon: Bartolome Templeton MD;  Location: [facility]    COLONOSCOPY N/A 11/5/2021    Procedure: Colonoscopy, With Polypectomy And Biopsy;  Surgeon: Lisette Vickers DO;  Location: [facility]    COLONOSCOPY N/A 11/5/2021    Procedure: COLONOSCOPY, FLEXIBLE, WITH LESION REMOVAL USING SNARE;  Surgeon: Lisette Vickers DO;  Location: [facility]    COLONOSCOPY N/A 3/26/2024    Procedure: COLONOSCOPY, WITH POLYPECTOMY using snare;  Surgeon: Baltazar Rodriguez MD;  Location: [facility]    COLONOSCOPY WITH CO2 INSUFFLATION N/A 5/12/2017    Procedure: COLONOSCOPY WITH CO2 INSUFFLATION;  Dr. Rancho Painting/BMI: 30.8/Special screening for malignant neoplasms, colon/colonoscopy/[facility]:  [phone];  Surgeon: Krish Galloway MD;  Location: [facility]  "   COLONOSCOPY WITH CO2 INSUFFLATION N/A 1/21/2019    Procedure: COLONOSCOPY WITH CO2 INSUFFLATION;  Surgeon: Bartolome Templeton MD;  [facility]    COLONOSCOPY WITH CO2 INSUFFLATION N/A 8/19/2019    Procedure: COLONOSCOPY, WITH CO2 INSUFFLATION;  Surgeon: Bartolome Templeton MD;  [facility]    COLONOSCOPY WITH CO2 INSUFFLATION N/A 11/5/2021    Procedure: COLONOSCOPY, WITH CO2 INSUFFLATION;  Surgeon: Lisette Vickers DO;  Location: [facility]       Family History:    Family History   Problem Relation Age of Onset    Heart Disease Mother     Diabetes Father        Social History:  Marital Status:   [2]  Social History     Tobacco Use    Smoking status: Never    Smokeless tobacco: Never   Substance Use Topics    Alcohol use: Yes     Comment: 1-2 beers a week / occ     Drug use: No        Medications:    levofloxacin (LEVAQUIN) 500 MG tablet  aspirin (ASA) 81 MG tablet  atorvastatin (LIPITOR) 20 MG tablet  bisacodyl (DULCOLAX) 5 MG EC tablet  blood glucose (NO BRAND SPECIFIED) test strip  Blood Glucose Monitoring Suppl (GLUCOMETER ELITE CLASSIC) KIT  Continuous Blood Gluc Sensor (FREESTYLE PIERRE 2 SENSOR) [facility]  DIABETIC STERILE LANCETS device  glucose blood VI test strips (ASCENSIA AUTODISC VI,ONE TOUCH ULTRA TEST VI) strip  JARDIANCE 25 MG TABS tablet  metFORMIN (GLUCOPHAGE XR) 500 MG 24 hr tablet  polyethylene glycol (GOLYTELY) 236 g suspension  TRULICITY 0.75 MG/0.5ML pen          Review of Systems   All other systems reviewed and are negative.      Physical Exam   BP: 109/80  Pulse: 83  Temp: 98.4  F (36.9  C)  Resp: 20  Height: 188 cm (6' 2\")  SpO2: 99 %      Physical Exam  Vitals and nursing note reviewed.     Inflammation, induration and fluctuance in the perineum between the rectum and the posterior aspect of the scrotum.  Bedside ultrasound notes an area that is 2 cm wide by 2 and half centimeters deep by 6 cm in length that has both liquid and solid phase.  It approaches the rectum but " does not efface with the rectum.     ED Course        Procedures  Verbal consent from patient for incision and drainage of the abscess on the perineum between the rectum and scrotum  Area prepped with Betadine  0.5% bupivacaine with a 30-gauge needle x 5 ml  15 blade used to make a 3 mm incision  Drainage of 5 to 6 cc of pus.  Culture sent/requested Gram stain  Half-inch wick type packing placed total length of about 8 cm to promote additional drainage  Used blunt dissection technique with a small curved [place] to break down loculated areas and to help express additional pus before placing the packing                No results found for this or any previous visit (from the past 24 hour(s)).    Medications   ampicillin-sulbactam (UNASYN) 3 g vial to attach to  mL bag (has no administration in time range)       Assessments & Plan (with Medical Decision Making)  58-year-old male with history for DM type II.  Presents with an infection in the perineum.  It extends 2 cm wide by 6 cm long by 2.5 cm deep between the rectal area and the scrotum.  Ultrasound confirmed size and location.  There is no effacement up against the rectum on ultrasound.  It did require incision and drainage as noted in the procedures above.  This was done under ultrasound guidance.  Culture was sent and is pending.  IV Unasyn 3 g administered in the ED and patient be discharged on Augmentin 805 mg twice daily for 10 days.  He should follow-up with his primary doctor in 1 week.  Soaks in the bathtub encouraged for 20 minutes 3 times daily x 3 days.  If the wick does not fall out on its own by day 3 he can pull it out.     I have reviewed the nursing notes.    I have reviewed the findings, diagnosis, plan and need for follow up with the patient.          New Prescriptions    No medications on file       Final diagnoses:   Abscess of perineum       3/27/2024   [facility] EMERGENCY DEPT       Dennis Rasmussen,   03/27/24  [id number]

## 2024-03-29 LAB
BACTERIA ABSC ANAEROBE+AEROBE CULT: ABNORMAL
BACTERIA ABSC ANAEROBE+AEROBE CULT: ABNORMAL
GRAM STAIN RESULT: ABNORMAL
GRAM STAIN RESULT: ABNORMAL

## 2024-03-30 ENCOUNTER — TELEPHONE (OUTPATIENT)
Dept: EMERGENCY MEDICINE | Facility: CLINIC | Age: 59
End: 2024-03-30
Payer: COMMERCIAL

## 2024-03-30 NOTE — TELEPHONE ENCOUNTER
[facility]    Reason for call: Lab Result Notification     Lab Result (including Rx patient on, if applicable).  If culture, copy of lab report at bottom.  Lab Result: Final abscess Aerobic Bacterial Culture (specimen - perineum; abscess) report on 3/29/24  [facility] Emergency Dept discharge antibiotic prescribed: Amoxicillin-Clavulanate (Augmentin) 875-125 mg PO tablet, 1 tablet by mouth 2 times daily for 10 days   #1. Bacteria,  Escherichia coli ,  is [RESISTANT] to antibiotic.  And Corynebacterium species  Incision and Drainage performed in the [place] ED: yes  Recommendations in treatment per [facility] lab result culture protocol    Creatinine Level (mg/dl)   Creatinine   Date Value Ref Range Status   04/21/2021 0.70 0.66 - 1.25 mg/dL Final    Creatinine clearance (ml/min), if applicable    Creatinine clearance cannot be calculated (Patient's most recent lab result is older than the maximum 365 days allowed.)     Patient's current Symptoms:   Pt reports it is sore, but has improved quite a bit.   Draining a small amount- clear and small amount of blood.   Denies fever or other s/s infection.  Reviewed these s/s with patient.   Encouraged to continue abx and follow up with PCP at completion.  Pt agreeable.     RN Recommendations/Instructions per [place] ED lab result protocol:   [facility] ED lab result protocol utilized: Culture     Patient/care giver notified to contact your PCP clinic or return to the Emergency department if your:  Symptoms return.  Symptoms do not improve after 3 days on antibiotic.  Symptoms do not resolve after completing antibiotic.  Symptoms worsen or other concerning symptoms.        Marci Morley, RN

## 2024-04-02 ENCOUNTER — OFFICE VISIT (OUTPATIENT)
Dept: INTERNAL MEDICINE | Facility: CLINIC | Age: 59
End: 2024-04-02
Payer: COMMERCIAL

## 2024-04-02 VITALS
WEIGHT: 216.7 LBS | OXYGEN SATURATION: 96 % | SYSTOLIC BLOOD PRESSURE: 118 MMHG | DIASTOLIC BLOOD PRESSURE: 72 MMHG | HEIGHT: 74 IN | BODY MASS INDEX: 27.81 KG/M2 | HEART RATE: 80 BPM | RESPIRATION RATE: 16 BRPM | TEMPERATURE: 97.6 F

## 2024-04-02 DIAGNOSIS — E11.65 TYPE 2 DIABETES MELLITUS WITH HYPERGLYCEMIA, WITHOUT LONG-TERM CURRENT USE OF INSULIN (H): ICD-10-CM

## 2024-04-02 DIAGNOSIS — N49.2 SCROTAL WALL ABSCESS: Primary | ICD-10-CM

## 2024-04-02 LAB
ALBUMIN SERPL BCG-MCNC: 4.1 G/DL (ref 3.5–5.2)
ALP SERPL-CCNC: 81 U/L (ref 40–150)
ALT SERPL W P-5'-P-CCNC: 30 U/L (ref 0–70)
ANION GAP SERPL CALCULATED.3IONS-SCNC: 13 MMOL/L (ref 7–15)
AST SERPL W P-5'-P-CCNC: 34 U/L (ref 0–45)
BILIRUB SERPL-MCNC: 0.2 MG/DL
BUN SERPL-MCNC: 13.8 MG/DL (ref 6–20)
CALCIUM SERPL-MCNC: 10 MG/DL (ref 8.6–10)
CHLORIDE SERPL-SCNC: 98 MMOL/L (ref 98–107)
CHOLEST SERPL-MCNC: 121 MG/DL
CREAT SERPL-MCNC: 0.68 MG/DL (ref 0.67–1.17)
DEPRECATED HCO3 PLAS-SCNC: 27 MMOL/L (ref 22–29)
EGFRCR SERPLBLD CKD-EPI 2021: >90 ML/MIN/1.73M2
FASTING STATUS PATIENT QL REPORTED: NO
GLUCOSE SERPL-MCNC: 250 MG/DL (ref 70–99)
HBA1C MFR BLD: 13.8 %
HDLC SERPL-MCNC: 33 MG/DL
LDLC SERPL CALC-MCNC: 34 MG/DL
NONHDLC SERPL-MCNC: 88 MG/DL
POTASSIUM SERPL-SCNC: 4.2 MMOL/L (ref 3.4–5.3)
PROT SERPL-MCNC: 8.1 G/DL (ref 6.4–8.3)
SODIUM SERPL-SCNC: 138 MMOL/L (ref 135–145)
TRIGL SERPL-MCNC: 271 MG/DL

## 2024-04-02 PROCEDURE — 83036 HEMOGLOBIN GLYCOSYLATED A1C: CPT | Performed by: INTERNAL MEDICINE

## 2024-04-02 PROCEDURE — 80053 COMPREHEN METABOLIC PANEL: CPT | Performed by: INTERNAL MEDICINE

## 2024-04-02 PROCEDURE — 80061 LIPID PANEL: CPT | Performed by: INTERNAL MEDICINE

## 2024-04-02 PROCEDURE — 99213 OFFICE O/P EST LOW 20 MIN: CPT | Performed by: INTERNAL MEDICINE

## 2024-04-02 PROCEDURE — 36415 COLL VENOUS BLD VENIPUNCTURE: CPT | Performed by: INTERNAL MEDICINE

## 2024-04-02 RX ORDER — GLIPIZIDE 10 MG/1
10 TABLET, FILM COATED, EXTENDED RELEASE ORAL DAILY
Qty: 30 TABLET | Refills: 3 | Status: SHIPPED | OUTPATIENT
Start: 2024-04-02 | End: 2024-07-29

## 2024-04-02 ASSESSMENT — PAIN SCALES - GENERAL: PAINLEVEL: MILD PAIN (2)

## 2024-04-02 NOTE — PROGRESS NOTES
"  Assessment & Plan   Problem List Items Addressed This Visit       Type 2 diabetes mellitus with hyperglycemia (H)     Other Visit Diagnoses       Scrotal wall abscess    -  Primary           Patient with uncontrolled type 2 diabetes.  He is not on insulin but has been on Jardiance Trulicity and metformin.  A1c in January was 11.  Unfortunately developed an infection in the perineal area, abscess was seen in the ER and had this drained.  He is now on Augmentin which should cover it E. coli and corynebacterium.  I think the drainage was the best for him.  There is a perineal infection we will stop his Jardiance replace with glipizide.  He will continue to let this drain and follow-up if not improving.    Diabetes is uncontrolled he is failed on metformin and Jardiance and Trulicity.  He is lost weight with Trulicity probably needs to stop that.  We had to stop his Jardiance due to the perineal infection and I recommended he go on insulin, and breaks it and eventually get an insulin pump and continuous sensor.  He is not enthusiastic about this but will talk to his wife and will let me know tomorrow his plans.         MED REC REQUIRED  Post Medication Reconciliation Status: discharge medications reconciled and changed, per note/orders  BMI  Estimated body mass index is 27.82 kg/m  as calculated from the following:    Height as of this encounter: 1.88 m (6' 2\").    Weight as of this encounter: 98.3 kg (216 lb 11.2 oz).       FUTURE APPOINTMENTS:       - Follow-up visit in 1 week if not better.       Darlene Butler is a 58 year old, presenting for the following health issues:  ER F/U      4/2/2024    11:24 AM   Additional Questions   Roomed by Wilma HUTCHISON     Via the Health Maintenance questionnaire, the patient has reported the following services have been completed -Eye Exam, this information has been sent to the abstraction team.  [facility]       ED/UC Followup:    Facility:  [facility] Emergency " "Room  Date of visit: 3/27/2024  Reason for visit: Abscess of perineum  Current Status: improved     Started on March 15th, then urgent care on the 20th, thought was prostatitis, given levofloxacin,     ER on the 27th, abscess, drained and culture sent, getting better.     E coli sensitive to Augmentin,      No fevers, pain is getting better, still draining from I&D.      Diabetes has been up and down,   Sugars was 188 after Easter, today 147, lots of 140 range.         Objective    /72 (BP Location: Left arm, Patient Position: Chair)   Pulse 80   Temp 97.6  F (36.4  C) (Temporal)   Resp 16   Ht 1.88 m (6' 2\")   Wt 98.3 kg (216 lb 11.2 oz)   SpO2 96%   BMI 27.82 kg/m    Body mass index is 27.82 kg/m .  Physical Exam   No acute distress, thin patient is lost weight  At the base of the scrotum and the perineum area he does have a draining wound which is 1 cm in size, some granulation tissue is present.  No erythema no pain.            Signed Electronically by: Rancho Painting MD    "

## 2024-04-17 ENCOUNTER — E-VISIT (OUTPATIENT)
Dept: FAMILY MEDICINE | Facility: CLINIC | Age: 59
End: 2024-04-17
Payer: COMMERCIAL

## 2024-04-17 DIAGNOSIS — L02.215 PERINEAL ABSCESS: Primary | ICD-10-CM

## 2024-04-17 PROCEDURE — 99421 OL DIG E/M SVC 5-10 MIN: CPT | Performed by: INTERNAL MEDICINE

## 2024-04-19 ENCOUNTER — OFFICE VISIT (OUTPATIENT)
Dept: SURGERY | Facility: CLINIC | Age: 59
End: 2024-04-19
Payer: COMMERCIAL

## 2024-04-19 VITALS
BODY MASS INDEX: 27.58 KG/M2 | SYSTOLIC BLOOD PRESSURE: 136 MMHG | TEMPERATURE: 98.3 F | HEIGHT: 74 IN | DIASTOLIC BLOOD PRESSURE: 80 MMHG | WEIGHT: 214.9 LBS

## 2024-04-19 DIAGNOSIS — T14.8XXA NON-HEALING NON-SURGICAL WOUND: Primary | ICD-10-CM

## 2024-04-19 DIAGNOSIS — L02.215 PERINEAL ABSCESS: ICD-10-CM

## 2024-04-19 PROCEDURE — 99204 OFFICE O/P NEW MOD 45 MIN: CPT | Performed by: SURGERY

## 2024-04-19 RX ORDER — SULFAMETHOXAZOLE/TRIMETHOPRIM 800-160 MG
1 TABLET ORAL 2 TIMES DAILY
Qty: 14 TABLET | Refills: 0 | Status: SHIPPED | OUTPATIENT
Start: 2024-04-19 | End: 2024-04-26

## 2024-04-19 ASSESSMENT — PAIN SCALES - GENERAL: PAINLEVEL: MILD PAIN (2)

## 2024-04-19 NOTE — PROGRESS NOTES
Patient seen in consultation for perineal abscess, nonhealing by Rancho Painting    HPI:  Patient is a 58 year old male with perineal abscess status post incision and drainage almost 4 weeks ago.  He initially had improvement of symptoms of pain but has had ongoing drainage and wound is still open quite a bit.  He completed a course of Augmentin antibiotics.  This is the first time it is ever happened.  There seems to be some fibrinous tissue protruding from the wound also.  He is a diabetic and has been working on better blood glucose control but most recent A1c was quite elevated.    Review Of Systems    Skin: As above  Ears/Nose/Throat: negative  Respiratory: No shortness of breath, dyspnea on exertion, cough, or hemoptysis  Cardiovascular: negative  Gastrointestinal: negative  Genitourinary: negative  Musculoskeletal: negative  Neurologic: negative  Hematologic/Lymphatic/Immunologic: negative  Endocrine: negative      Past Medical History:   Diagnosis Date    Diabetes (H)     type 2    Seasonal allergies     Allergy inj as a child       Past Surgical History:   Procedure Laterality Date    COLONOSCOPY N/A 8/19/2019    Procedure: Colonoscopy, With Polypectomy And Biopsy;  Surgeon: Bartolome Templeton MD;  Location: [facility]    COLONOSCOPY N/A 11/5/2021    Procedure: Colonoscopy, With Polypectomy And Biopsy;  Surgeon: Lisette Vickers DO;  Location: [facility]    COLONOSCOPY N/A 11/5/2021    Procedure: COLONOSCOPY, FLEXIBLE, WITH LESION REMOVAL USING SNARE;  Surgeon: Lisette Vickers DO;  Location: [facility]    COLONOSCOPY N/A 3/26/2024    Procedure: COLONOSCOPY, WITH POLYPECTOMY using snare;  Surgeon: Baltazar Rodriguez MD;  [facility]: [facility]    COLONOSCOPY WITH CO2 INSUFFLATION N/A 5/12/2017    Procedure: COLONOSCOPY WITH CO2 INSUFFLATION;  Dr. Rancho Painting/BMI: 30.8/Special screening for malignant neoplasms, colon/colonoscopy/[facility]:  [phone];  Surgeon: Krish Galloway MD;  Location:   OR    COLONOSCOPY WITH CO2 INSUFFLATION N/A 1/21/2019    Procedure: COLONOSCOPY WITH CO2 INSUFFLATION;  Surgeon: Bartolome Templeton MD;  [facility]: [facility]    COLONOSCOPY WITH CO2 INSUFFLATION N/A 8/19/2019    Procedure: COLONOSCOPY, WITH CO2 INSUFFLATION;  Surgeon: Bartolome Templeton MD;  [facility]: [facility]    COLONOSCOPY WITH CO2 INSUFFLATION N/A 11/5/2021    Procedure: COLONOSCOPY, WITH CO2 INSUFFLATION;  Surgeon: Lisetet Vickers DO;  Location: [facility]       Family History   Problem Relation Age of Onset    Heart Disease Mother     Diabetes Father        Social History     Socioeconomic History    Marital status:      Spouse name: Not on file    Number of children: Not on file    Years of education: Not on file    Highest education level: Not on file   Occupational History    Occupation: [employer]     Employer: [employer]   Tobacco Use    Smoking status: Never    Smokeless tobacco: Never   Vaping Use    Vaping status: Never Used   Substance and Sexual Activity    Alcohol use: Yes     Comment: 1-2 beers a week / occ     Drug use: No    Sexual activity: Yes     Partners: Female   Other Topics Concern    Parent/sibling w/ CABG, MI or angioplasty before 65F 55M? Not Asked   Social History Narrative    Not on file     Social Determinants of Health     Financial Resource Strain: Not on file   Food Insecurity: Not on file   Transportation Needs: Not on file   Physical Activity: Not on file   Stress: Not on file   Social Connections: Not on file   Interpersonal Safety: Low Risk  (4/2/2024)    Interpersonal Safety     Do you feel physically and emotionally safe where you currently live?: Yes     Within the past 12 months, have you been hit, slapped, kicked or otherwise physically hurt by someone?: No     Within the past 12 months, have you been humiliated or emotionally abused in other ways by your partner or ex-partner?: No   Housing Stability: Not on file       Current Outpatient Medications   Medication Sig  "Dispense Refill    atorvastatin (LIPITOR) 20 MG tablet TAKE 1 TABLET(20 MG) BY MOUTH DAILY 90 tablet 3    blood glucose (NO BRAND SPECIFIED) test strip Use to test blood sugars 4 times daily or as directed due to infection and hyperglycemia 200 strip 1    Blood Glucose Monitoring Suppl (GLUCOMETER ELITE CLASSIC) KIT 1 Device daily. 1 kit 0    glipiZIDE (GLUCOTROL XL) 10 MG 24 hr tablet Take 1 tablet (10 mg) by mouth daily 30 tablet 3    glucose blood VI test strips (ASCENSIA AUTODISC VI,ONE TOUCH ULTRA TEST VI) strip by In Vitro route. Test as directed 1 Box 12    metFORMIN (GLUCOPHAGE XR) 500 MG 24 hr tablet TAKE 2 TABLETS(1000 MG) BY MOUTH TWICE DAILY WITH MEALS 360 tablet 3    sulfamethoxazole-trimethoprim (BACTRIM DS) 800-160 MG tablet Take 1 tablet by mouth 2 times daily for 7 days 14 tablet 0    TRULICITY 0.75 MG/0.5ML pen ADMINISTER 0.75 MG UNDER THE SKIN EVERY 7 DAYS 2 mL 11    aspirin (ASA) 81 MG tablet Take 1 tablet (81 mg) by mouth daily (Patient not taking: Reported on 4/2/2024)      Continuous Blood Gluc Sensor (FREESTYLE PIERRE 2 SENSOR) MISC 1 each every 14 days 1 each every 14 days. Change every 14 days. (Patient not taking: Reported on 4/2/2024) 2 each 5    DIABETIC STERILE LANCETS device 1 Device daily. 1 Box 12       Medications and history reviewed    Physical exam:  Vitals: /80   Temp 98.3  F (36.8  C) (Temporal)   Ht 1.88 m (6' 2\")   Wt 97.5 kg (214 lb 14.4 oz)   BMI 27.59 kg/m    BMI= Body mass index is 27.59 kg/m .    Constitutional: alert, non-distressed   Head: normo-cephalic, atraumatic   Cardiovascular: RRR  Respiratory: equal chest rise, good respiratory effort, no audible wheeze  Gastrointestinal: Soft, non-tender, non distended, no masses or hernias palpated   : deferred  Musculoskeletal: moves all extremities   Skin: Posterior scrotum slightly to the left of midline there is a nonhealing wound with fibrinous and purulent exudate.  Expression promotes further drainage but no " underlying induration or fluctuance is found.  Using a suture removal kit I trimmed the fibrinous material from the incision.  This did not cause any pain or bleeding.  Psychiatric: mentation appears normal, affect appropriate   Patient able to get up on table without difficulty.    Labs show:  No results found for this or any previous visit (from the past 24 hour(s)).    Imaging shows:  No results found for this or any previous visit (from the past 744 hour(s)).     Assessment:     ICD-10-CM    1. Non-healing non-surgical wound  T14.8XXA       2. Perineal abscess  L02.215 Adult General Surg Referral     sulfamethoxazole-trimethoprim (BACTRIM DS) 800-160 MG tablet        Plan: I am going to prescribe another course of antibiotics given the ongoing purulent drainage.  We discussed how blood glucose control is going to be very important for promotion of healing and resolution of this problem.  Given the chronicity of the wound, I am also going to place a referral for wound care consultation for additional treatment options.    30 minutes spent by me on the date of the encounter doing chart review, history and exam, documentation and further activities per the note    Devin Kruse, DO

## 2024-04-19 NOTE — LETTER
4/19/2024         RE: Luke Farias  [address] [facility] [address]        Dear Colleague,    Thank you for referring your patient, Luke Farias, to the [facility]. Please see a copy of my visit note below.    Patient seen in consultation for perineal abscess, nonhealing by [facility]    HPI:  Patient is a 58 year old male with perineal abscess status post incision and drainage almost 4 weeks ago.  He initially had improvement of symptoms of pain but has had ongoing drainage and wound is still open quite a bit.  He completed a course of Augmentin antibiotics.  This is the first time it is ever happened.  There seems to be some fibrinous tissue protruding from the wound also.  He is a diabetic and has been working on better blood glucose control but most recent A1c was quite elevated.    Review Of Systems    Skin: As above  Ears/Nose/Throat: negative  Respiratory: No shortness of breath, dyspnea on exertion, cough, or hemoptysis  Cardiovascular: negative  Gastrointestinal: negative  Genitourinary: negative  Musculoskeletal: negative  Neurologic: negative  Hematologic/Lymphatic/Immunologic: negative  Endocrine: negative      Past Medical History:   Diagnosis Date     Diabetes (H)     type 2     Seasonal allergies     Allergy inj as a child       Past Surgical History:   Procedure Laterality Date     COLONOSCOPY N/A 8/19/2019    Procedure: Colonoscopy, With Polypectomy And Biopsy;  Surgeon: Bartolome Templeton MD;  Location: [facility]     COLONOSCOPY N/A 11/5/2021    Procedure: Colonoscopy, With Polypectomy And Biopsy;  Surgeon: Lisette Vickers DO;  Location: [facility]     COLONOSCOPY N/A 11/5/2021    Procedure: COLONOSCOPY, FLEXIBLE, WITH LESION REMOVAL USING SNARE;  Surgeon: Lisette Vickers DO;  Location: [facility]     COLONOSCOPY N/A 3/26/2024    Procedure: COLONOSCOPY, WITH POLYPECTOMY using snare;  Surgeon: Baltazar Rodriguez MD;  Location: [facility]     COLONOSCOPY WITH CO2  INSUFFLATION N/A 5/12/2017    Procedure: COLONOSCOPY WITH CO2 INSUFFLATION;  Dr. [facility]/BMI: 30.8/Special screening for malignant neoplasms, colon/colonoscopy/[facility]:  [phone];  Surgeon: Krish Galloway MD;  Location: [facility]     COLONOSCOPY WITH CO2 INSUFFLATION N/A 1/21/2019    Procedure: COLONOSCOPY WITH CO2 INSUFFLATION;  Surgeon: Bartolome Templeton MD;  Location: [facility]     COLONOSCOPY WITH CO2 INSUFFLATION N/A 8/19/2019    Procedure: COLONOSCOPY, WITH CO2 INSUFFLATION;  Surgeon: Bartolome Templeton MD;  Location: [facility]     COLONOSCOPY WITH CO2 INSUFFLATION N/A 11/5/2021    Procedure: COLONOSCOPY, WITH CO2 INSUFFLATION;  Surgeon: Lisette Vickers DO;  Location: [facility]       Family History   Problem Relation Age of Onset     Heart Disease Mother      Diabetes Father        Social History     Socioeconomic History     Marital status:      Spouse name: Not on file     Number of children: Not on file     Years of education: Not on file     Highest education level: Not on file   Occupational History     Occupation: [employer]     Employer: [employer]   Tobacco Use     Smoking status: Never     Smokeless tobacco: Never   Vaping Use     Vaping status: Never Used   Substance and Sexual Activity     Alcohol use: Yes     Comment: 1-2 beers a week / occ      Drug use: No     Sexual activity: Yes     Partners: Female   Other Topics Concern     Parent/sibling w/ CABG, MI or angioplasty before 65F 55M? Not Asked   Social History Narrative     Not on file     Social Determinants of Health     Financial Resource Strain: Not on file   Food Insecurity: Not on file   Transportation Needs: Not on file   Physical Activity: Not on file   Stress: Not on file   Social Connections: Not on file   Interpersonal Safety: Low Risk  (4/2/2024)    Interpersonal Safety      Do you feel physically and emotionally safe where you currently live?: Yes      Within the past 12 months, have you been  "hit, slapped, kicked or otherwise physically hurt by someone?: No      Within the past 12 months, have you been humiliated or emotionally abused in other ways by your partner or ex-partner?: No   Housing Stability: Not on file       Current Outpatient Medications   Medication Sig Dispense Refill     atorvastatin (LIPITOR) 20 MG tablet TAKE 1 TABLET(20 MG) BY MOUTH DAILY 90 tablet 3     blood glucose (NO BRAND SPECIFIED) test strip Use to test blood sugars 4 times daily or as directed due to infection and hyperglycemia 200 strip 1     Blood Glucose Monitoring Suppl (GLUCOMETER ELITE CLASSIC) KIT 1 Device daily. 1 kit 0     glipiZIDE (GLUCOTROL XL) 10 MG 24 hr tablet Take 1 tablet (10 mg) by mouth daily 30 tablet 3     glucose blood VI test strips (ASCENSIA AUTODISC VI,ONE TOUCH ULTRA TEST VI) strip by In Vitro route. Test as directed 1 Box 12     metFORMIN (GLUCOPHAGE XR) 500 MG 24 hr tablet TAKE 2 TABLETS(1000 MG) BY MOUTH TWICE DAILY WITH MEALS 360 tablet 3     sulfamethoxazole-trimethoprim (BACTRIM DS) 800-160 MG tablet Take 1 tablet by mouth 2 times daily for 7 days 14 tablet 0     TRULICITY 0.75 MG/0.5ML pen ADMINISTER 0.75 MG UNDER THE SKIN EVERY 7 DAYS 2 mL 11     aspirin (ASA) 81 MG tablet Take 1 tablet (81 mg) by mouth daily (Patient not taking: Reported on 4/2/2024)       Continuous Blood Gluc Sensor (FREESTYLE PIERRE 2 SENSOR) MISC 1 each every 14 days 1 each every 14 days. Change every 14 days. (Patient not taking: Reported on 4/2/2024) 2 each 5     DIABETIC STERILE LANCETS device 1 Device daily. 1 Box 12       Medications and history reviewed    Physical exam:  Vitals: /80   Temp 98.3  F (36.8  C) (Temporal)   Ht 1.88 m (6' 2\")   Wt 97.5 kg (214 lb 14.4 oz)   BMI 27.59 kg/m    BMI= Body mass index is 27.59 kg/m .    Constitutional: alert, non-distressed   Head: normo-cephalic, atraumatic   Cardiovascular: RRR  Respiratory: equal chest rise, good respiratory effort, no audible " wheeze  Gastrointestinal: Soft, non-tender, non distended, no masses or hernias palpated   : deferred  Musculoskeletal: moves all extremities   Skin: Posterior scrotum slightly to the left of midline there is a nonhealing wound with fibrinous and purulent exudate.  Expression promotes further drainage but no underlying induration or fluctuance is found.  Using a suture removal kit I trimmed the fibrinous material from the incision.  This did not cause any pain or bleeding.  Psychiatric: mentation appears normal, affect appropriate   Patient able to get up on table without difficulty.    Labs show:  No results found for this or any previous visit (from the past 24 hour(s)).    Imaging shows:  No results found for this or any previous visit (from the past 744 hour(s)).     Assessment:     ICD-10-CM    1. Non-healing non-surgical wound  T14.8XXA       2. Perineal abscess  L02.215 Adult General Surg Referral     sulfamethoxazole-trimethoprim (BACTRIM DS) 800-160 MG tablet        Plan: I am going to prescribe another course of antibiotics given the ongoing purulent drainage.  We discussed how blood glucose control is going to be very important for promotion of healing and resolution of this problem.  Given the chronicity of the wound, I am also going to place a referral for wound care consultation for additional treatment options.    30 minutes spent by me on the date of the encounter doing chart review, history and exam, documentation and further activities per the note    Devin Kruse DO      Again, thank you for allowing me to participate in the care of your patient.        Sincerely,        Devin Kruse DO

## 2024-04-24 ENCOUNTER — HOSPITAL ENCOUNTER (OUTPATIENT)
Dept: WOUND CARE | Facility: CLINIC | Age: 59
Discharge: HOME | End: 2024-04-24
Admitting: INTERNAL MEDICINE
Payer: COMMERCIAL

## 2024-04-24 DIAGNOSIS — T14.8XXA NON-HEALING NON-SURGICAL WOUND: ICD-10-CM

## 2024-04-24 DIAGNOSIS — L02.215 PERINEAL ABSCESS: ICD-10-CM

## 2024-04-24 PROCEDURE — G0463 HOSPITAL OUTPT CLINIC VISIT: HCPCS

## 2024-04-24 NOTE — PROGRESS NOTES
[facility]    Start of Care in [facility]: 4/24/2024   Referring Provider: Dr FREEDOM Kruse, initial referral 4/19/24.  Primary Care Provider: [facility]   Wound Location: perineum     Wound Clinic Visit: Initial visit today 4/24/24.    Reason for Visit:  Evaluate and treat wound.     Subjective:  On arrival today 4/24/24 for his initial visit to the Wound and Ostomy clinic, the pt adds to the Wound History below.     Wound History:   Pt presented to the ED on 3/27/23 with concerns of a perineum infection.  Per ED note -  It extends 2 cm wide by 6 cm long by 2.5 cm deep between the rectal area and the scrotum. Ultrasound confirmed size and location. There is no effacement up against the rectum on ultrasound. It did require incision and drainage. This was done under ultrasound guidance. Culture was sent and is pending. IV Unasyn 3 g administered in the ED and patient be discharged on Augmentin 805 mg twice daily for 10 days. He should follow-up with his primary doctor in 1 week. Soaks in the bathtub encouraged for 20 minutes 3 times daily x 3 days. If the wick does not fall out on its own by day 3 he can pull it out.   Follow up visit with Dr Painting 4/19/24.  Per Note - perineal abscess status post incision and drainage almost 4 weeks ago.  He initially had improvement of symptoms of pain but has had ongoing drainage and wound is still open quite a bit.  He completed a course of Augmentin antibiotics.  This is the first time it's ever happened.  There seems to be some fibrinous tissue protruding from the wound also.  He is a diabetic and has been working on better blood glucose control but most recent A1c was quite elevated.  Skin: Posterior scrotum slightly to the left of midline there is a nonhealing wound with fibrinous and purulent exudate.  Expression promotes further drainage but no underlying induration or fluctuance is found.  Using a suture removal  kit I trimmed the fibrinous material from the incision.  This did not cause any pain or bleeding.  Plan: I am going to prescribe another course of antibiotics given the ongoing purulent drainage.  We discussed how blood glucose control is going to be very important for promotion of healing and resolution of this problem.  Given the chronicity of the wound, I am also going to place a referral for wound care consultation for additional treatment options.   Pt with his wife's assist (she is a nurse here at [facility] [facility]) is able to do self cares at home, currently washing in the shower, rinsing well, only applying absorben pad within undergarments for drainage containment, no packing.  Wound and entire area remains very sore, draining yellow drainage, clear and cloudy but not real thick, odor has been noted on absorbent pad, using [employer] medicated powder to try to keep folds dry.    Past Medical History:  Patient Active Problem List   Diagnosis    Chronic rhinitis    Type 2 diabetes mellitus with hyperglycemia (H)    Hyperlipidemia LDL goal <100    Venous lake on Right chest    Congenital nevus of Right upper back                 Tobacco Use:     Tobacco Use      Smoking status: Never      Smokeless tobacco: Never     Diabetic: Type 2  HgbA1C:   Hemoglobin A1C   Date Value Ref Range Status   04/02/2024 13.8 (H) <5.7 % Final     Comment:     Normal <5.7%   Prediabetes 5.7-6.4%    Diabetes 6.5% or higher     Note: Adopted from ADA consensus guidelines.   04/21/2021 8.9 (H) 0 - 5.6 % Final     Comment:     Normal <5.7% Prediabetes 5.7-6.4%  Diabetes 6.5% or higher - adopted from ADA   consensus guidelines.  Results confirmed by repeat test     Checks Blood Glucose?:  once a day checks at most.  Average Readings: 145 to 180    Personal/social history:  pt lives with his wife independently, she is a nurse, no home care services in place    Objective:   Current treatment plan: Covering with small maxi pad for absorbency of  drainage  Last changed: few hours ago, having to change a few times daily to keep dry    Wound #1 Perineum, right lateral of midline.  Abscess s/p I&D 3/27/24.  Stage/tissue depth: full thickness  0.4 cm L x 0.4 cm W x 2.7 cm D  Tunneling: to the 12 o'clock at 45 degree angle  Undermining: none   Wound bed type/amount: unable to visualize inner wound bed; some fluctuance present.  Wound Edges: open  Periwound: localized edema with induration to the surrounding tissue 4 cm L x 2 cm W  Drainage: clear and cloudy yellow drainage, see Assessment for greater details.  Odor: small amount of foul odor noted to drainage expressed today  Pain: 4/10 with palpation and probing of the site. Per pt 1/0 with sitting normally, none with ambulation.  No photo taken this visit 4/24/24, [facility] nurse error.    Dorsalis Pedal Pulse: Not assessed this visit.  Posterior Tibial Pulse: Not assessed this visit.  Hair growth: normal at wound site  Capillary Refill: Not assessed this visit.  Feet/toes color: Not assessed this visit.  Nails: Not assessed this visit.  R Leg: Edema Not assessed this visit. Ankle circumference NA cm. Calf circumference NA cm.  L Leg: Edema Not assessed this visit. Ankle circumference NA cm. Calf circumference NA cm.    Mobility: appears wnl  Current offloading/footwear: Not assessed this visit.  Sensation: Not assessed this visit.    Other callusing/areas of concern: none noted    Diet: Regular with awareness of effects of carbs on diet    Assessment:  On initial assessment of the site the pad in place was noted for some thin purulent drainage and moist area silver dollar in size.  The outer area was cleansed with saline and dried well.  The wound opening had small amount of continued thin purulent yellow drainage ooze from the wound.  With palpation small amounts of additional similar drainage noted.  Site was probed with saline damp cotton tipped applicator, when removed small amounts of addition drainage flowed  from the wound bed slowly, no fluid was noted pooling within the wound when probed.    With palpation starting anterior to the wound pressing towards the wound, slow continued flow of similar drainage was expressed.  Palpated the site all around the wound distal to proximal of the wound and small amounts of continued similar drainage was expressed from all directions.    With in the wound with probing note interior has open depth directly 90 degree into the wounds opening 2.7 cm and additional open space present to the superior towards 12 o'clock at an approximate 45 degree angle.  All drainage present came in slow to moderate flow, no bleeding, all thin and cloudy yellow with no significant odor.    Entire area around the opening of the wound has localized firm edema with some induration, no erythema, no increased warmth to touch, pain moderate with firm pressure and probing of the wound but not excessive.      Factors impacting wound healing:   Poor nutrition: inadequate supply of protein, carbohydrates, fatty acids, and trace elements essential for all phases of wound healing.  Teaching on need for increased protein in diet for healing, foods high in protein.    Delayed healing as part of normal aging process  Reduced Blood Supply: inadequate perfusion to heal wound, appears adequate  Medication: NA  Chemotherapy: suppresses the immune system and inflammatory response, NA  Radiotherapy: increases production of free radical which damage cells, NA  Psychological stress: none noted  Obesity: decreases tissue perfusion NA  Infection: prolongs inflammatory phase, uses vital nutrients, impairs epithelialization and releases toxins, pt is on oral antibiotics, started antimicrobial primary dressing today 4/24/24.  Underlying Disease: diabetes mellitis present and poorly controlled  Maceration: reduces wound tensile strength and inhibits epithelial migration, none noted currently  Patient compliance, appears  motivated  Unrelieved pressure, potential, pt aware of need to keep pressure off the site with positioning and repositioning.    Immobility  Substance abuse: NA    Plan:  Due to the depth and steady flow of drainage from the wound I feel we need to promote better wicking of drainage and will use an antimicrobial packing strip for bioload and odor control.  Below instructions in italics given to the pt in printed AVS from today's visit.  Will have pt follow up at least weekly for follow ups with instructions to return to ED if increased signs of infection noted.    Instructions for the wound cares below, names used are printed on the products then selves.  1 Remove the old packing strip gauze, may fall out between changes and that's ok.   2 Continue to clean in the shower with soap and water and rinse well with water and dry well.  3 After you shower you may need to re cleanse the site before you get the packing strip gauze placed, do a surface clean with the spray bottle of MicroKlenz I am sending home with you today.  4 Cut a strip of the Iodoform ribbon gauze (brown bottle with red and white label) about 9 cm long.  5 Insert approximately 7 cm into the wound with a 2 cm tail left hanging out.  6 Try the smaller mini maxi pads that I am giving you to adhere to your underwear and absorb drainage.  7 Change the dressing once a day.    I will see you again in clinic on Tuesday of next week April the 30 th at 3 pm  Call with any questions or concerns [phone]    Discussed/Education provided: plan of care with rationale;     Topical care: Wound/surrounding skin cleansed with saline and gauze. Patted dry. Wound cares provided as listed in Plan above.    Pt feels with his wife's assist they will be able to perform recommended cares/have been caring for wound.    Additional recommendations: Seek PCP assist and guidance to lower Hgb A1C    The following discharge instructions were reviewed with and sent home with the  patient:  See AVS    The following supplies were sent home with the patient:  Remains of the wound cleanser and Iodoform gauze used today and not used up.    Return visit: 4/30/24    Verbal, written, & demonstrative education provided.  Face to face time: approximately 35 minutes  Procedure: JAROCHO Lopez RN, CWOCN  [phone]

## 2024-04-24 NOTE — PATIENT INSTRUCTIONS
Today the wound in your groin has some depth, 2.7 cm deep when you insert a cotton tipped applicator into the wound and angle slightly upwards.   Due to the depth and the continued pooling of drainage within, I recommend we start to fill the wound bed with some medicated ribbon guaze to act like a wick and to kill off any localized bacterial.    Instructions for the wound cares below, names used are printed on the products then selves.  1 Remove the old packing strip gauze, may fall out between changes and that's ok.   2 Continue to clean in the shower with soap and water and rinse well with water and dry well.  3 After you shower you may need to re cleanse the site before you get the packing strip gauze placed, do a surface clean with the spray bottle of MicroKlenz I am sending home with you today.  4 Cut a strip of the Iodoform ribbon gauze (brown bottle with red and white label) about 9 cm long.  5 Insert approximately 7 cm into the wound with a 2 cm tail left hanging out.  6 Try the smaller mini maxi pads that I am giving you to adhere to your underwear and absorb drainage.  7 Change the dressing once a day.    I will see you again in clinic on Tuesday of next week April the 30 th at 3 pm  Call with any questions or concerns [phone]    Luciano Lopez Rn cwocn

## 2024-04-30 ENCOUNTER — HOSPITAL ENCOUNTER (OUTPATIENT)
Dept: WOUND CARE | Facility: CLINIC | Age: 59
Discharge: HOME | End: 2024-04-30
Attending: INTERNAL MEDICINE | Admitting: INTERNAL MEDICINE
Payer: COMMERCIAL

## 2024-04-30 PROCEDURE — G0463 HOSPITAL OUTPT CLINIC VISIT: HCPCS

## 2024-05-09 ENCOUNTER — HOSPITAL ENCOUNTER (OUTPATIENT)
Dept: WOUND CARE | Facility: CLINIC | Age: 59
Discharge: HOME | End: 2024-05-09
Attending: INTERNAL MEDICINE | Admitting: INTERNAL MEDICINE
Payer: COMMERCIAL

## 2024-05-09 PROCEDURE — G0463 HOSPITAL OUTPT CLINIC VISIT: HCPCS

## 2024-05-09 NOTE — PROGRESS NOTES
[facility]    Start of Care in [facility]ound [facility]: 4/24/2024   Referring Provider: Dr FREEDOM Kruse, initial referral 4/19/24.  Primary Care Provider: Rancho Painting   Wound Location: perineum     Wound Clinic Visit: Scheduled follow up.    Reason for Visit:  Evaluate and treat wound.     Subjective:  On arrival today 5/9/24 alone, her reports the following:  His wife was out of town for a few days and pt was not able to pack the wound himself so he was solely washing it 2 to 3 times daily in the shower and covering the site with a small maxipad to absorb drainage.  He and his wife did resume packing in the last few days but the Iodoform gauze continues to fall out of the wound between changes daily.  He reports his wife did try to express drainage out, still able to press some out but lesser in amount, no odor and all appears thin mostly clear and yellow but after repeated palpation to the site the drainage will become more pink to light red.  Pain is well reduced, can sit longer periods of time with no discomfort and once discomfort is felt he repositions and it resolves immediately.  All antibiotics completed per pt just prior to our last visit.      Wound History:   Pt presented to the ED on 3/27/23 with concerns of a perineum infection.  Per ED note -  It extends 2 cm wide by 6 cm long by 2.5 cm deep between the rectal area and the scrotum. Ultrasound confirmed size and location. There is no effacement up against the rectum on ultrasound. It did require incision and drainage. This was done under ultrasound guidance. Culture was sent and is pending. IV Unasyn 3 g administered in the ED and patient be discharged on Augmentin 805 mg twice daily for 10 days. He should follow-up with his primary doctor in 1 week. Soaks in the bathtub encouraged for 20 minutes 3 times daily x 3 days. If the wick does not fall out on its own by day 3 he can pull it out.   Follow up visit  with Dr Painting 4/19/24.  Per Note - perineal abscess status post incision and drainage almost 4 weeks ago.  He initially had improvement of symptoms of pain but has had ongoing drainage and wound is still open quite a bit.  He completed a course of Augmentin antibiotics.  This is the first time it's ever happened.  There seems to be some fibrinous tissue protruding from the wound also.  He is a diabetic and has been working on better blood glucose control but most recent A1c was quite elevated.  Skin: Posterior scrotum slightly to the left of midline there is a nonhealing wound with fibrinous and purulent exudate.  Expression promotes further drainage but no underlying induration or fluctuance is found.  Using a suture removal kit I trimmed the fibrinous material from the incision.  This did not cause any pain or bleeding.  Plan: I am going to prescribe another course of antibiotics given the ongoing purulent drainage.  We discussed how blood glucose control is going to be very important for promotion of healing and resolution of this problem.  Given the chronicity of the wound, I am also going to place a referral for wound care consultation for additional treatment options.   Pt with his wife's assist (she is a nurse here at [employer]) is able to do self cares at home, as of initial visit washing in the shower, rinsing well, only applying absorben pad within undergarments for drainage containment, no packing.  Wound and entire area remains very sore, draining yellow drainage, clear and cloudy but not real thick, odor has been noted on absorbent pad, using Gold Bond medicated powder to try to keep folds dry.  At initial visit to the Wound and Ostomy clinic started packing with Iodoform gauze scheduled daily.    Past Medical History:  Patient Active Problem List   Diagnosis    Chronic rhinitis    Type 2 diabetes mellitus with hyperglycemia (H)    Hyperlipidemia LDL goal <100    Venous lake on Right chest     Congenital nevus of Right upper back                 Tobacco Use:     Tobacco Use      Smoking status: Never      Smokeless tobacco: Never     Diabetic: Type 2  HgbA1C:   Hemoglobin A1C   Date Value Ref Range Status   04/02/2024 13.8 (H) <5.7 % Final     Comment:     Normal <5.7%   Prediabetes 5.7-6.4%    Diabetes 6.5% or higher     Note: Adopted from ADA consensus guidelines.   04/21/2021 8.9 (H) 0 - 5.6 % Final     Comment:     Normal <5.7% Prediabetes 5.7-6.4%  Diabetes 6.5% or higher - adopted from ADA   consensus guidelines.  Results confirmed by repeat test     Checks Blood Glucose?:  once a day checks at most.  Average Readings: 145 to 180, no change.    Personal/social history:  pt lives with his wife independently, she is a nurse, no home care services in place    Objective:   Current treatment plan: Covering with small maxi pad for absorbency of drainage  Last changed: few hours ago, having to change a few times daily to keep dry    Wound #1 Perineum, right lateral of midline.  Abscess s/p I&D 3/27/24.  Stage/tissue depth: full thickness  0.4 cm L x 0.4 cm W x 1 cm D  Tunneling: to the 12 o'clock at 45 degree angle 1.6 cm D   Undermining: none   Wound bed type/amount: unable to visualize inner wound bed; some fluctuance present.  Wound Edges: open  Periwound: localized edema with induration to the surrounding tissue 4 cm L x 2 cm W, no change  Drainage: clear and cloudy yellow thin drainage and scant amounts serosanguinous drainage noted today, see Assessment for greater details.  Odor: no odor noted with drainage today  Pain: 1/10 with palpation and probing of the site. Per pt 1/0 with sitting normally, none with ambulation.  No photo taken this visit 5/9/24.    Photo from 4/30/24.    Dorsalis Pedal Pulse: Not assessed this visit.  Posterior Tibial Pulse: Not assessed this visit.  Hair growth: normal at wound site  Capillary Refill: Not assessed this visit.  Feet/toes color: Not assessed this  visit.  Nails: Not assessed this visit.  R Leg: Edema Not assessed this visit. Ankle circumference NA cm. Calf circumference NA cm.  L Leg: Edema Not assessed this visit. Ankle circumference NA cm. Calf circumference NA cm.    Mobility: appears wnl  Current offloading/footwear: Not assessed this visit.  Sensation: Not assessed this visit.    Other callusing/areas of concern: none noted    Diet: Regular with awareness of effects of carbs on diet    Assessment:  On initial assessment of the site the pad in place was noted for some thin purulent drainage slowly oozing from the wound bed.  The outer area was cleansed with saline and dried well.  The wound opening had small amount of continued thin purulent yellow clear drainage ooze from the wound.  With palpation small amounts of additional similar drainage noted again this visit.  But during palpation was also expressing air when pressing anterior from the inferior scrotum towards the wound.  Site was probed with saline damp cotton tipped applicator, when removed and small amounts of serosanguinous drainage oozed from the wound bed.  Depth directly into the wound at a 90 degree angle has decreased some and the tunnel was more difficult to locate and was also noted to be less in depth today.  Inner wound feels more narrow overall.  Induration remains but is a little softer today, pt feels also that the firm to hard tissue is softening.      Factors impacting wound healing:   Poor nutrition: inadequate supply of protein, carbohydrates, fatty acids, and trace elements essential for all phases of wound healing.  Reviewed need for increased protein in diet for healing, foods high in protein.    Delayed healing as part of normal aging process  Reduced Blood Supply: inadequate perfusion to heal wound, appears adequate  Medication: NA  Chemotherapy: suppresses the immune system and inflammatory response, NA  Radiotherapy: increases production of free radical which damage  cells, NA  Psychological stress: none noted  Obesity: decreases tissue perfusion NA  Infection: prolongs inflammatory phase, uses vital nutrients, impairs epithelialization and releases toxins, pt has completed all antibiotics, no signs infection currently  Underlying Disease: diabetes mellitis present and poorly controlled  Maceration: reduces wound tensile strength and inhibits epithelial migration, none noted currently  Patient compliance, appears motivated  Unrelieved pressure, potential, pt aware of need to keep pressure off the site with positioning and repositioning.    Immobility  Substance abuse: NA    Plan:  With the improvement in depth and new presentation of serosanguinous drainage I feel we can stop packing the wound.  Will have pt wash externally 2 to 3 times daily, encouraged use of a warm compress to promote drainage of any pooling or trapped fluid.  Pt will return in one week for reevaluation.     Discussed/Education provided: plan of care with rationale;     Topical care: Wound/surrounding skin cleansed with saline and gauze. Patted dry. Wound cares provided as listed in Plan above.    Pt feels with his wife's assist they will be able to perform recommended cares/have been caring for wound.    Additional recommendations: Seek PCP assist and guidance to lower Hgb A1C    The following discharge instructions were reviewed with and sent home with the patient:  Declined AVS today    The following supplies were sent home with the patient:  Remains of the Iodoform gauze used today and not used up.    Return visit: 5/16/24    Verbal, written, & demonstrative education provided.  Face to face time: approximately 20 minutes  Procedure: JAROCHO Lopez RN, CWOCN  [phone]

## 2024-05-11 ENCOUNTER — TRANSFERRED RECORDS (OUTPATIENT)
Dept: MULTI SPECIALTY CLINIC | Facility: CLINIC | Age: 59
End: 2024-05-11

## 2024-05-11 LAB — RETINOPATHY: NORMAL

## 2024-05-16 ENCOUNTER — HOSPITAL ENCOUNTER (OUTPATIENT)
Dept: WOUND CARE | Facility: CLINIC | Age: 59
Discharge: HOME | End: 2024-05-16
Attending: INTERNAL MEDICINE | Admitting: INTERNAL MEDICINE
Payer: COMMERCIAL

## 2024-05-16 PROCEDURE — G0463 HOSPITAL OUTPT CLINIC VISIT: HCPCS

## 2024-05-16 NOTE — PROGRESS NOTES
[facility]    Start of Care in [facility]ound [facility]: 4/24/2024   Referring Provider: Dr FREEDOM Kruse, initial referral 4/19/24.  Primary Care Provider: [employer]   Wound Location: perineum     Wound Clinic Visit: Scheduled follow up.    Reason for Visit:  Evaluate and treat wound.     Subjective:  On arrival today 5/16/24 alone, he reports the following:  He has been washing well in the shower and will press to express any noted drainage.  Until the last 2 to 3 days things appeared to be moving forward well but now it seems the wound site is worse.  See Assessment for more details as pt's report adds to concerns noted by [facility] nurse today leading to request for re evaluation by general surgery.      Wound History:   Pt presented to the ED on 3/27/23 with concerns of a perineum infection.  Per ED note -  It extends 2 cm wide by 6 cm long by 2.5 cm deep between the rectal area and the scrotum. Ultrasound confirmed size and location. There is no effacement up against the rectum on ultrasound. It did require incision and drainage. This was done under ultrasound guidance. Culture was sent and is pending. IV Unasyn 3 g administered in the ED and patient be discharged on Augmentin 805 mg twice daily for 10 days. He should follow-up with his primary doctor in 1 week. Soaks in the bathtub encouraged for 20 minutes 3 times daily x 3 days. If the wick does not fall out on its own by day 3 he can pull it out.   Follow up visit with Dr Painting 4/19/24.  Per Note - perineal abscess status post incision and drainage almost 4 weeks ago.  He initially had improvement of symptoms of pain but has had ongoing drainage and wound is still open quite a bit.  He completed a course of Augmentin antibiotics.  This is the first time it's ever happened.  There seems to be some fibrinous tissue protruding from the wound also.  He is a diabetic and has been working on better blood glucose control  but most recent A1c was quite elevated.  Skin: Posterior scrotum slightly to the left of midline there is a nonhealing wound with fibrinous and purulent exudate.  Expression promotes further drainage but no underlying induration or fluctuance is found.  Using a suture removal kit I trimmed the fibrinous material from the incision.  This did not cause any pain or bleeding.  Plan: I am going to prescribe another course of antibiotics given the ongoing purulent drainage.  We discussed how blood glucose control is going to be very important for promotion of healing and resolution of this problem.  Given the chronicity of the wound, I am also going to place a referral for wound care consultation for additional treatment options.   Pt with his wife's assist (she is a nurse here at [employer]) is able to do self cares at home, as of initial visit washing in the shower, rinsing well, only applying absorben pad within undergarments for drainage containment, no packing.  Wound and entire area remains very sore, draining yellow drainage, clear and cloudy but not real thick, odor has been noted on absorbent pad, using Gold Bond medicated powder to try to keep folds dry.  At initial visit to the Wound and Ostomy clinic started packing with Iodoform gauze scheduled daily.    Past Medical History:  Patient Active Problem List   Diagnosis    Chronic rhinitis    Type 2 diabetes mellitus with hyperglycemia (H)    Hyperlipidemia LDL goal <100    Venous lake on Right chest    Congenital nevus of Right upper back                 Tobacco Use:     Tobacco Use      Smoking status: Never      Smokeless tobacco: Never     Diabetic: Type 2  HgbA1C:   Hemoglobin A1C   Date Value Ref Range Status   04/02/2024 13.8 (H) <5.7 % Final     Comment:     Normal <5.7%   Prediabetes 5.7-6.4%    Diabetes 6.5% or higher     Note: Adopted from ADA consensus guidelines.   04/21/2021 8.9 (H) 0 - 5.6 % Final     Comment:     Normal <5.7% Prediabetes 5.7-6.4%   Diabetes 6.5% or higher - adopted from ADA   consensus guidelines.  Results confirmed by repeat test     Checks Blood Glucose?:  once a day checks at most.  Average Readings: 145 to 180, no change.    Personal/social history:  pt lives with his wife independently, she is a nurse, no home care services in place    Objective:   Current treatment plan: Covering with small maxi pad for absorbency of drainage  Last changed: few hours ago, having to change a few times daily to keep dry    Wound #1 Perineum, right lateral of midline.  Abscess s/p I&D 3/27/24.  Stage/tissue depth: full thickness  1 cm L x 0.8 cm W x 1 cm D, with open center of 0.4 cm L x 0.4 cm W x 1 cm D  Tunneling: to the 12 o'clock at 45 degree angle 2.6 cm D   Undermining: none   Wound bed type/amount: outer aspect today has newly denuded center of red nongranular tissue, unable to visualize inner wound bed with depth, entire site and immediate surrounding tissue has localized edema and erythema; appears fluctuant.  Wound Edges: open  Periwound: immediate periwound skin now has significant localized edema and erythema with very thin fragile skin 2.5 cm L x 2.2 cm W protrudes approximately 0.5 cm and is very firm to the touch, feels at least 1 cm thick, edema with induration to the surrounding tissue 4 cm L x 2 cm W.  Drainage: today all drainage was clear serous, serosanguinous and scant amounts of sanguinous  Odor: no odor noted with drainage today  Pain: 5/10 with palpation and probing of the site. Per pt 2-3/0 with sitting normally, none with ambulation.    Photo's from today's visit 5/16/24.    Photo from 4/30/24.    Dorsalis Pedal Pulse: Not assessed this visit.  Posterior Tibial Pulse: Not assessed this visit.  Hair growth: normal at wound site  Capillary Refill: Not assessed this visit.  Feet/toes color: Not assessed this visit.  Nails: Not assessed this visit.  R Leg: Edema Not assessed this visit. Ankle circumference NA cm. Calf circumference  NA cm.  L Leg: Edema Not assessed this visit. Ankle circumference NA cm. Calf circumference NA cm.    Mobility: appears wnl  Current offloading/footwear: Not assessed this visit.  Sensation: Not assessed this visit.    Other callusing/areas of concern: none noted    Diet: Regular with awareness of effects of carbs on diet    Assessment:  Pt reports he was mostly getting small amounts of pink drainage daily from the site after our last visit on 5/9.  Mon this week 5/13, his wife expressed a moderate amount of purulent drainage with some odor from the opening.  It seemed to get worse in the next few days.  Wednesday 5/15 his wife expressed again a moderate amount of purulent drainage with some odor noted.  He has had more pain, feels the firmness in the area is about the same but can not visualize well.  No other symptoms noted, no urination concerns, no fever or chills.  Wife has not been probing the site.    On initial assessment the pad in place in the pt's underwear has small amounts of serosanguinous drainage noted.  The site was cleansed with saline.    Noted immediately the opening of the wound now is surrounded with denuded dermal tissue 1 cm L x 0.8 cm W x 1 cm D, with open center of 0.4 cm L x 0.4 cm W x 1 cm D, distinct localized edema 2.5 cm L x 2.2 cm W and protrudes approximately 0.5 cm.  Site is very firm to the touch, feels at least 1 cm thick.  Surrounding the are there remains induration to the lateral sides and anterior onto the scrotum.  This surrounding induration does not feel any larger or firmer than last visits but is no improving.    The site was probed with damp cotton tipped applicator with 1 cm depth directly in at 90 degree angle, this is unchanged.  Probing to the 12 o'clock to known tunnel was able to easily reach 2.6 cm of depth, last visit it was a tighter feeling tunnel and only 1 cm D.  No significant drainage come out when probing today or when removing the applicator, unable to  find any other tracts within the opening of the wound.  With palpation able to express moderate amounts serosanguinous drainage, per pt this is more than he or his wife were able to this past week.    There is no increased warmth to touch noted but the wound is not improving, is deeper and new protrusion of distinct localized edema with denudement is concerning for infection.  The total areas firmness is also concerning for infection and or additional tracts or areas of fluid collection.  A full assessment of the site is needed by MD, will request follow up with Dr Kruse.    Factors impacting wound healing:   Poor nutrition: inadequate supply of protein, carbohydrates, fatty acids, and trace elements essential for all phases of wound healing.  Reviewed need for increased protein in diet for healing, foods high in protein.    Delayed healing as part of normal aging process  Reduced Blood Supply: inadequate perfusion to heal wound, appears adequate  Medication: NA  Chemotherapy: suppresses the immune system and inflammatory response, NA  Radiotherapy: increases production of free radical which damage cells, NA  Psychological stress: none noted  Obesity: decreases tissue perfusion NA  Infection: prolongs inflammatory phase, uses vital nutrients, impairs epithelialization and releases toxins, pt has completed all antibiotics, concerns of infection noted, packed wound with Mesalt antimicrobial dressing today.  Underlying Disease: diabetes mellitis present and poorly controlled  Maceration: reduces wound tensile strength and inhibits epithelial migration, none noted currently  Patient compliance, appears motivated  Unrelieved pressure, potential, pt aware of need to keep pressure off the site with positioning and repositioning.    Immobility  Substance abuse: NA    Plan:  With the deterioration of the wound and concerns or infection, potential abscess or other fluid collections was able to schedule pt with   [facility] for tomorrow Fri 5/17.  Updated MD's nurse Jesika YOUNG on plan of care today and concerns.  Used 1 cm wide Mesalt ribbon gauze today to pack the wound, approximately 6 cm with 1 cm tail left out, wicking of serosanguinous drainage immediately was noted.  Plans is to leave the Mesalt in place till appointment with MD tomorrow.  If it falls out I do not recommend trying to repack it.  May need to change the secondary dressing more often due to likely higher volume of drainage.      Discussed/Education provided: plan of care with rationale;     Topical care: Wound/surrounding skin cleansed with saline and gauze. Patted dry. Wound cares provided as listed in Plan above.    Pt feels with his wife's assist they will be able to perform recommended cares/have been caring for wound.    Additional recommendations: Seek PCP assist and guidance to lower Hgb A1C    The following discharge instructions were reviewed with and sent home with the patient:  Declined AVS today    The following supplies were sent home with the patient:  Remains of the Mesalt ribbon gauze opened but not used up today.    Return visit: 5/16/24    Verbal, written, & demonstrative education provided.  Face to face time: approximately 30 minutes  Procedure: JAROCHO Lopez RN, CWOCN  [phone]

## 2024-05-17 ENCOUNTER — OFFICE VISIT (OUTPATIENT)
Dept: SURGERY | Facility: CLINIC | Age: 59
End: 2024-05-17
Payer: COMMERCIAL

## 2024-05-17 VITALS
WEIGHT: 214.1 LBS | DIASTOLIC BLOOD PRESSURE: 70 MMHG | HEIGHT: 74 IN | BODY MASS INDEX: 27.48 KG/M2 | TEMPERATURE: 98.6 F | SYSTOLIC BLOOD PRESSURE: 118 MMHG

## 2024-05-17 DIAGNOSIS — T14.8XXA NON-HEALING NON-SURGICAL WOUND: ICD-10-CM

## 2024-05-17 DIAGNOSIS — L02.215 PERINEAL ABSCESS: Primary | ICD-10-CM

## 2024-05-17 PROCEDURE — 99207 PR NO CHARGE NURSE ONLY: CPT | Performed by: SURGERY

## 2024-05-17 ASSESSMENT — PAIN SCALES - GENERAL: PAINLEVEL: NO PAIN (1)

## 2024-05-17 NOTE — PROGRESS NOTES
WOUND CARE    Patient was seen per the order of Dr. Kruse.  Previous dressing removed noting moderate amount of serosanguinous drainage. Wound(s) cleansed with normal saline.     Wound 1:  Location: Perineum     Action & Treatment order per doctor: Site lightly packed with moistened mesalt that was cut down to approximately 0.5 cm in width. Patient instructed to change daily.     Patient verbalized understanding of treatment plan.    Follow up: x 1 week with [facility] nurse     Jesika Rangel RN  [facility]  [phone]  5/17/2024 12:07 PM

## 2024-05-17 NOTE — LETTER
5/17/2024         RE: Luke Farias  [address] [id number]        Dear Colleague,    Thank you for referring your patient, Luke Farias, to the [facility]. Please see a copy of my visit note below.    Patient seen and examined.  Posterior scrotal wound examined.  Thin purulent drainage expressed.  Wound probed with Q-tip.  Induration noted but no loculations or additional fluctuance.  Recommend continuing packing while drainage continues.    Dr Kruse    WOUND CARE    Patient was seen per the order of Dr. Kruse.  Previous dressing removed noting moderate amount of serosanguinous drainage. Wound(s) cleansed with normal saline.     Wound 1:  Location: Perineum     Action & Treatment order per doctor: Site   Patient verbalized understanding of treatment plan.    Follow up: ***     Jesika Rangel RN  [facility]  [phone]  5/17/2024 12:07 PM      Again, thank you for allowing me to participate in the care of your patient.        Sincerely,        Devin Kruse, DO

## 2024-05-17 NOTE — PROGRESS NOTES
Patient seen and examined.  Posterior scrotal wound examined.  Thin purulent drainage expressed.  Wound probed with Q-tip.  Induration noted but no loculations or additional fluctuance.  Recommend continuing packing while drainage continues.    Dr Kruse

## 2024-05-24 ENCOUNTER — HOSPITAL ENCOUNTER (OUTPATIENT)
Dept: WOUND CARE | Facility: CLINIC | Age: 59
Discharge: HOME | End: 2024-05-24
Attending: INTERNAL MEDICINE | Admitting: INTERNAL MEDICINE
Payer: COMMERCIAL

## 2024-05-24 PROCEDURE — G0463 HOSPITAL OUTPT CLINIC VISIT: HCPCS

## 2024-05-24 NOTE — PROGRESS NOTES
[facility]    Start of Care in [facility]: 4/24/2024   Referring Provider: Dr FREEDOM Kruse, initial referral 4/19/24.  Primary Care Provider: Rancho Painting   Wound Location: perineum     Wound Clinic Visit: Scheduled follow up.    Reason for Visit:  Evaluate and treat wound.     Subjective:  On arrival today 5/24/24 alone, he reports the following:  Adds to Wound History below concerning assessment by surgeon one week ago.  At that visit RN packed the wound with 1/2 wide Mesalt ribbon gauze.  The pt with his wife's assist did twice daily packing and the Mesalt stayed in place well with limited falling out, seemed to get smaller in depth quickly, had to reduce the length of the Mesalt each dressing change.  He had lots of blood drainage noted with each dressing change for the first few days but by Monday this week, four days ago, seemed greatly improved.  Minimal depth, difficult to pack in any Mesalt, drainage decreased, entire area softer and less painful.  Two days ago Wed stopped packing the wound and stopped inner underwear liner for absorbency.       Wound History:   Pt presented to the ED on 3/27/23 with concerns of a perineum infection.  Per ED note -  It extends 2 cm wide by 6 cm long by 2.5 cm deep between the rectal area and the scrotum. Ultrasound confirmed size and location. There is no effacement up against the rectum on ultrasound. It did require incision and drainage. This was done under ultrasound guidance. Culture was sent and is pending. IV Unasyn 3 g administered in the ED and patient be discharged on Augmentin 805 mg twice daily for 10 days. He should follow-up with his primary doctor in 1 week. Soaks in the bathtub encouraged for 20 minutes 3 times daily x 3 days. If the wick does not fall out on its own by day 3 he can pull it out.   Follow up visit with Dr Painting 4/19/24.  Per Note - perineal abscess status post incision and drainage  almost 4 weeks ago.  He initially had improvement of symptoms of pain but has had ongoing drainage and wound is still open quite a bit.  He completed a course of Augmentin antibiotics.  This is the first time it's ever happened.  There seems to be some fibrinous tissue protruding from the wound also.  He is a diabetic and has been working on better blood glucose control but most recent A1c was quite elevated.  Skin: Posterior scrotum slightly to the left of midline there is a nonhealing wound with fibrinous and purulent exudate.  Expression promotes further drainage but no underlying induration or fluctuance is found.  Using a suture removal kit I trimmed the fibrinous material from the incision.  This did not cause any pain or bleeding.  Plan: I am going to prescribe another course of antibiotics given the ongoing purulent drainage.  We discussed how blood glucose control is going to be very important for promotion of healing and resolution of this problem.  Given the chronicity of the wound, I am also going to place a referral for wound care consultation for additional treatment options.   Pt with his wife's assist (she is a nurse here at [facility]) is able to do self cares at home, as of initial visit washing in the shower, rinsing well, only applying absorben pad within undergarments for drainage containment, no packing.  Wound and entire area remains very sore, draining yellow drainage, clear and cloudy but not real thick, odor has been noted on absorbent pad, using Gold Bond medicated powder to try to keep folds dry.  At initial visit to the Wound and Ostomy clinic started packing with Iodoform gauze scheduled daily.  Dr Collier reevaluation 5/17/24, at [facility] nurse request.  Per MA note - Patient seen and examined.  Posterior scrotal wound examined.  Thin purulent drainage expressed.  Wound probed with Q-tip.  Induration noted but no loculations or additional fluctuance.  Recommend continuing packing while  drainage continues.    Past Medical History:  Patient Active Problem List   Diagnosis    Chronic rhinitis    Type 2 diabetes mellitus with hyperglycemia (H)    Hyperlipidemia LDL goal <100    Venous lake on Right chest    Congenital nevus of Right upper back                 Tobacco Use:     Tobacco Use      Smoking status: Never      Smokeless tobacco: Never     Diabetic: Type 2  HgbA1C:   Hemoglobin A1C   Date Value Ref Range Status   04/02/2024 13.8 (H) <5.7 % Final     Comment:     Normal <5.7%   Prediabetes 5.7-6.4%    Diabetes 6.5% or higher     Note: Adopted from ADA consensus guidelines.   04/21/2021 8.9 (H) 0 - 5.6 % Final     Comment:     Normal <5.7% Prediabetes 5.7-6.4%  Diabetes 6.5% or higher - adopted from ADA   consensus guidelines.  Results confirmed by repeat test     Checks Blood Glucose?:  once a day checks at most.  Average Readings: 145 to 180, not assessed this visit    Personal/social history:  pt lives with his wife independently, she is a nurse, no home care services in place    Objective:   Current treatment plan: See Subjective for details, no dressing for last two days.  Last changed: NA    Wound #1 Perineum, right lateral of midline.  Abscess s/p I&D 3/27/24.  Stage/tissue depth: full thickness  0 cm L x 0 cm W x 0 cm D  Tunneling: was present at 12 o'clock when the wound was open, no open wound today.  Undermining: none   Wound bed type/amount: 100 % scar closed; no longer appears fluctuant.  Wound Edges: open  Periwound: Immediate periwound skin that had distinct protruding localized induration, edema and erythema has mostly resolved, soft slightly edematous tissue present.  Firm area of induration noted 1 cm anterior to the scar closed wound, 3 cm L x 1 cm W along midline of the inferior scrotum is indurated, but softer to palpation than last visit and smaller in total size.  Drainage: None, no drainage of significance for last 5 days.  Odor: no odor noted with drainage today  Pain:  1/10 with pressure and assessment today.  Photo from today's visit 5/24/24.      Photo's from 5/16/24.    Photo from 4/30/24.    Dorsalis Pedal Pulse: Not assessed this visit.  Posterior Tibial Pulse: Not assessed this visit.  Hair growth: normal at wound site  Capillary Refill: Not assessed this visit.  Feet/toes color: Not assessed this visit.  Nails: Not assessed this visit.  R Leg: Edema Not assessed this visit. Ankle circumference NA cm. Calf circumference NA cm.  L Leg: Edema Not assessed this visit. Ankle circumference NA cm. Calf circumference NA cm.    Mobility: appears wnl  Current offloading/footwear: Not assessed this visit.  Sensation: Not assessed this visit.    Other callusing/areas of concern: none noted    Diet: Regular with awareness of effects of carbs on diet    Assessment:  As charted above in Subject report from pt today on arrival the wound site appears to have closed after rapid improvement with deeper twice daily packing with Mesalt, following MD assessment.  Site on arrival today is open to the air, dry and clean.  Initially a visible paling of the erythema and hyperpigmentation of the entire area is noted.  The wound opening site is scar closed with 0.3 cm x 0.1 cm intact pale scar, lays within small divot surrounded by ring of soft localized edema with pale erythema.  No increased warmth to touch.  Squeezing the site no lumps of significant induration or edema noted around the 2 cm are of the scar.    Induration of the midline inferior scrotum 3 cm L remains, but is no longer connected to the now scar closed wound,  by intact soft tissue, the remaining inferior scrotum induration feels softer and small in total size.  No significant pain with palpation today, unable to express any drainage, no suspicious pockets or areas of fluid collection noted.    Factors impacting wound healing:   Poor nutrition: inadequate supply of protein, carbohydrates, fatty acids, and trace elements  essential for all phases of wound healing.  Reviewed need for increased protein in diet for healing, foods high in protein.    Delayed healing as part of normal aging process  Reduced Blood Supply: inadequate perfusion to heal wound, appears adequate  Medication: NA  Chemotherapy: suppresses the immune system and inflammatory response, NA  Radiotherapy: increases production of free radical which damage cells, NA  Psychological stress: none noted  Obesity: decreases tissue perfusion NA  Infection: prolongs inflammatory phase, uses vital nutrients, impairs epithelialization and releases toxins, pt has completed all antibiotics.  None noted today  Underlying Disease: diabetes mellitis present and poorly controlled  Maceration: reduces wound tensile strength and inhibits epithelial migration, none noted currently  Patient compliance, appears motivated  Unrelieved pressure, potential, pt aware of need to keep pressure off the site with positioning and repositioning.    Immobility  Substance abuse: NA    Plan:  With the wound closed and the surrounding tissue improving with less total induration and less  induration where still present, less localized edema, no signs of current infection, no cares needed.  Instructed the pt to monitor daily, palpate to make sure no increased pain, firmness, warmth to touch and drainage is noted.  Any concerns have his wife assess for better visualization.  Keep clean and dry, don't allow body moisture to build up in next few weeks in the area, could disrupt scar maturation.  No moisturizer to the site.  Any return of drainage, obvious open wound, increased induration, increased pain, increased or newly noted hot to touch erythema to contact [facility] nurse and schedule follow up asap.  If he or his wife suspect infection or new abscess without an open wound, they should seek MD evaluation.    Discussed/Education provided: plan of care with rationale;     Topical care: Wound/surrounding  skin cleansed with saline and gauze. Patted dry. Wound cares provided as listed in Plan above.    Pt feels with his wife's assist they will be able to perform recommended cares/have been caring for wound.    Additional recommendations: Seek PCP assist and guidance to lower Hgb A1C    The following discharge instructions were reviewed with and sent home with the patient:  Declined AVS today    The following supplies were sent home with the patient:  None today    Return visit: None scheduled.  Pt will follow up if needed.    Verbal, written, & demonstrative education provided.  Face to face time: approximately 320 minutes  Procedure: JAROCHO Lopez RN, CWOCN  [phone]

## 2024-07-28 DIAGNOSIS — E11.65 TYPE 2 DIABETES MELLITUS WITH HYPERGLYCEMIA, WITHOUT LONG-TERM CURRENT USE OF INSULIN (H): ICD-10-CM

## 2024-07-29 RX ORDER — GLIPIZIDE 10 MG/1
10 TABLET, FILM COATED, EXTENDED RELEASE ORAL DAILY
Qty: 30 TABLET | Refills: 3 | Status: SHIPPED | OUTPATIENT
Start: 2024-07-29

## 2024-09-07 ENCOUNTER — HEALTH MAINTENANCE LETTER (OUTPATIENT)
Age: 59
End: 2024-09-07

## 2024-11-16 ENCOUNTER — HEALTH MAINTENANCE LETTER (OUTPATIENT)
Age: 59
End: 2024-11-16

## 2024-11-25 ENCOUNTER — OFFICE VISIT (OUTPATIENT)
Dept: FAMILY MEDICINE | Facility: CLINIC | Age: 59
End: 2024-11-25
Payer: COMMERCIAL

## 2024-11-25 VITALS
TEMPERATURE: 98 F | SYSTOLIC BLOOD PRESSURE: 127 MMHG | WEIGHT: 215.5 LBS | HEART RATE: 79 BPM | OXYGEN SATURATION: 96 % | DIASTOLIC BLOOD PRESSURE: 75 MMHG | HEIGHT: 74 IN | RESPIRATION RATE: 18 BRPM | BODY MASS INDEX: 27.66 KG/M2

## 2024-11-25 DIAGNOSIS — H57.12 EYE PAIN, LEFT: Primary | ICD-10-CM

## 2024-11-25 DIAGNOSIS — H57.89 REDNESS OF LEFT EYE: ICD-10-CM

## 2024-11-25 PROCEDURE — 99213 OFFICE O/P EST LOW 20 MIN: CPT | Performed by: FAMILY MEDICINE

## 2024-11-25 ASSESSMENT — PAIN SCALES - GENERAL: PAINLEVEL_OUTOF10: MODERATE PAIN (4)

## 2024-11-25 NOTE — PATIENT INSTRUCTIONS
I would recommend evaluation with ophthalmology now due to the persistent pain and redness symptoms.        Follow up with Dr. Painting for recheck of diabetes and health maintenance updates recommended.

## 2024-11-25 NOTE — PROGRESS NOTES
"  Assessment & Plan     Eye pain, left  Redness of left eye  3 day history of eye pain/redness/sensitivity to light unilateral with mild vision change reported. Referral to ophthalmology recommended for complete eye exam including slit lamp evaluation that I am not able to complete here.  He was not happy with that recommendation but referral was given and he may attempt to schedule today with his primary eye care specialist.    - Adult Eye  Referral; Future          BMI  Estimated body mass index is 27.67 kg/m  as calculated from the following:    Height as of this encounter: 1.88 m (6' 2\").    Weight as of this encounter: 97.8 kg (215 lb 8 oz).   Weight management plan: Patient was referred to their PCP to discuss a diet and exercise plan.      Patient Instructions   I would recommend evaluation with ophthalmology now due to the persistent pain and redness symptoms.        Follow up with Dr. Painting for recheck of diabetes and health maintenance updates recommended.            Darlene Butler is a 59 year old, presenting for the following health issues:  Eye Problem (Eye soreness / light sensitivityon going for 3 days )        11/25/2024     9:06 AM   Additional Questions   Roomed by Soraya   Accompanied by spouse       Via the Health Maintenance questionnaire, the patient has reported the following services have been completed -Eye Exam: [facility] 2024-05-11, this information has been sent to the abstraction team.  History of Present Illness       Reason for visit:  Eye soreness / light sensitivity  Symptoms include:  Sore  Symptom intensity:  Moderate  Symptom progression:  Staying the same  Had these symptoms before:  No  What makes it worse:  Bright lights  What makes it better:  Darkness   He is taking medications regularly.     Friday night symptoms started.  Light sensitivity and diffuse pain/tenderness to eye since that time.  worsening     Vision  - no double vision noted.  Mild blurry vision " "noted.     Using saline eye drops.  Noting clear drainage from eye today.  No crusting or mucus drainage noted.  No pain.    Thursday with root canal but no other unusual activities.  No outdoor work or activities associated with symptoms.            Review of Systems  Constitutional, HEENT, cardiovascular, pulmonary, gi and gu systems are negative, except as otherwise noted.      Objective    /75 (BP Location: Right arm, Patient Position: Sitting, Cuff Size: Adult Regular)   Pulse 79   Temp 98  F (36.7  C) (Oral)   Resp 18   Ht 1.88 m (6' 2\")   Wt 97.8 kg (215 lb 8 oz)   SpO2 96%   BMI 27.67 kg/m    Body mass index is 27.67 kg/m .  Physical Exam   GENERAL: alert and no distress  EYES: conjunctiva/corneas- conjunctival injection diffuse redness to the left conjunctiva noted.  Pupil reactive but limited exam due to difficulty keeping eye open.  Right eye normal.           Signed Electronically by: Blank Zurita MD    "

## 2024-11-26 ENCOUNTER — TELEPHONE (OUTPATIENT)
Dept: INTERNAL MEDICINE | Facility: CLINIC | Age: 59
End: 2024-11-26
Payer: COMMERCIAL

## 2024-11-26 NOTE — TELEPHONE ENCOUNTER
Patient Quality Outreach    Patient is due for the following:   Diabetes -  A1C and Foot Exam  Physical Preventive Adult Physical    Action(s) Taken:   Schedule a Adult Preventative    Type of outreach:    Sent [employer] message.    Questions for provider review:    None           Princess Steiner

## 2024-12-07 DIAGNOSIS — E11.65 TYPE 2 DIABETES MELLITUS WITH HYPERGLYCEMIA, WITHOUT LONG-TERM CURRENT USE OF INSULIN (H): ICD-10-CM

## 2024-12-09 RX ORDER — GLIPIZIDE 10 MG/1
10 TABLET, FILM COATED, EXTENDED RELEASE ORAL DAILY
Qty: 30 TABLET | Refills: 3 | Status: SHIPPED | OUTPATIENT
Start: 2024-12-09

## 2025-03-02 ENCOUNTER — HOSPITAL ENCOUNTER (INPATIENT)
Facility: CLINIC | Age: 60
DRG: 854 | End: 2025-03-02
Attending: STUDENT IN AN ORGANIZED HEALTH CARE EDUCATION/TRAINING PROGRAM | Admitting: FAMILY MEDICINE
Payer: COMMERCIAL

## 2025-03-02 ENCOUNTER — APPOINTMENT (OUTPATIENT)
Dept: GENERAL RADIOLOGY | Facility: CLINIC | Age: 60
DRG: 854 | End: 2025-03-02
Attending: STUDENT IN AN ORGANIZED HEALTH CARE EDUCATION/TRAINING PROGRAM
Payer: COMMERCIAL

## 2025-03-02 DIAGNOSIS — S91.302A OPEN WOUND OF LEFT HEEL, INITIAL ENCOUNTER: ICD-10-CM

## 2025-03-02 DIAGNOSIS — M86.9 DIABETES MELLITUS WITH OSTEOMYELITIS (H): ICD-10-CM

## 2025-03-02 DIAGNOSIS — R79.89 ELEVATED BLOOD KETONE BODY LEVEL: ICD-10-CM

## 2025-03-02 DIAGNOSIS — R70.0 ELEVATED ERYTHROCYTE SEDIMENTATION RATE: ICD-10-CM

## 2025-03-02 DIAGNOSIS — E11.69 DIABETES MELLITUS WITH OSTEOMYELITIS (H): ICD-10-CM

## 2025-03-02 DIAGNOSIS — L08.9 DIABETIC FOOT INFECTION (H): Primary | ICD-10-CM

## 2025-03-02 DIAGNOSIS — R79.82 ELEVATED C-REACTIVE PROTEIN (CRP): ICD-10-CM

## 2025-03-02 DIAGNOSIS — S91.302D OPEN WOUND OF LEFT HEEL, SUBSEQUENT ENCOUNTER: ICD-10-CM

## 2025-03-02 DIAGNOSIS — M86.172 OTHER ACUTE OSTEOMYELITIS OF LEFT FOOT (H): ICD-10-CM

## 2025-03-02 DIAGNOSIS — E11.628 DIABETIC FOOT INFECTION (H): Primary | ICD-10-CM

## 2025-03-02 PROBLEM — M79.89 LEFT LEG SWELLING: Status: ACTIVE | Noted: 2025-03-02

## 2025-03-02 PROBLEM — T39.015A PLATELET DYSFUNCTION DUE TO ASPIRIN (H): Status: ACTIVE | Noted: 2025-03-02

## 2025-03-02 PROBLEM — E11.42 DIABETIC PERIPHERAL NEUROPATHY (H): Status: ACTIVE | Noted: 2025-03-02

## 2025-03-02 PROBLEM — D69.1 PLATELET DYSFUNCTION DUE TO ASPIRIN (H): Status: ACTIVE | Noted: 2025-03-02

## 2025-03-02 PROBLEM — E87.1 HYPONATREMIA: Status: ACTIVE | Noted: 2025-03-02

## 2025-03-02 PROBLEM — R65.10 SIRS (SYSTEMIC INFLAMMATORY RESPONSE SYNDROME) (H): Status: ACTIVE | Noted: 2025-03-02

## 2025-03-02 PROBLEM — E87.8 HYPOCHLOREMIA: Status: ACTIVE | Noted: 2025-03-02

## 2025-03-02 LAB
ANION GAP SERPL CALCULATED.3IONS-SCNC: 17 MMOL/L (ref 7–15)
B-OH-BUTYR SERPL-SCNC: 2.64 MMOL/L
BASE EXCESS BLDV CALC-SCNC: 2.9 MMOL/L (ref -3–3)
BASOPHILS # BLD AUTO: 0.1 10E3/UL (ref 0–0.2)
BASOPHILS NFR BLD AUTO: 0 %
BUN SERPL-MCNC: 12.9 MG/DL (ref 6–20)
CALCIUM SERPL-MCNC: 9 MG/DL (ref 8.8–10.4)
CHLORIDE SERPL-SCNC: 90 MMOL/L (ref 98–107)
CREAT SERPL-MCNC: 0.63 MG/DL (ref 0.67–1.17)
CRP SERPL-MCNC: 175.94 MG/L
EGFRCR SERPLBLD CKD-EPI 2021: >90 ML/MIN/1.73M2
EOSINOPHIL # BLD AUTO: 0 10E3/UL (ref 0–0.7)
EOSINOPHIL NFR BLD AUTO: 0 %
ERYTHROCYTE [DISTWIDTH] IN BLOOD BY AUTOMATED COUNT: 12.6 % (ref 10–15)
ERYTHROCYTE [SEDIMENTATION RATE] IN BLOOD BY WESTERGREN METHOD: 52 MM/HR (ref 0–20)
EST. AVERAGE GLUCOSE BLD GHB EST-MCNC: 435 MG/DL
GLUCOSE BLDC GLUCOMTR-MCNC: 174 MG/DL (ref 70–99)
GLUCOSE BLDC GLUCOMTR-MCNC: 210 MG/DL (ref 70–99)
GLUCOSE SERPL-MCNC: 441 MG/DL (ref 70–99)
HBA1C MFR BLD: 16.8 %
HCO3 BLDV-SCNC: 27 MMOL/L (ref 21–28)
HCO3 SERPL-SCNC: 24 MMOL/L (ref 22–29)
HCT VFR BLD AUTO: 37.9 % (ref 40–53)
HGB BLD-MCNC: 13.5 G/DL (ref 13.3–17.7)
IMM GRANULOCYTES # BLD: 0.1 10E3/UL
IMM GRANULOCYTES NFR BLD: 1 %
LACTATE SERPL-SCNC: 1 MMOL/L (ref 0.7–2)
LYMPHOCYTES # BLD AUTO: 1 10E3/UL (ref 0.8–5.3)
LYMPHOCYTES NFR BLD AUTO: 8 %
MCH RBC QN AUTO: 28.9 PG (ref 26.5–33)
MCHC RBC AUTO-ENTMCNC: 35.6 G/DL (ref 31.5–36.5)
MCV RBC AUTO: 81 FL (ref 78–100)
MONOCYTES # BLD AUTO: 1.3 10E3/UL (ref 0–1.3)
MONOCYTES NFR BLD AUTO: 11 %
NEUTROPHILS # BLD AUTO: 9.9 10E3/UL (ref 1.6–8.3)
NEUTROPHILS NFR BLD AUTO: 80 %
NRBC # BLD AUTO: 0 10E3/UL
NRBC BLD AUTO-RTO: 0 /100
O2/TOTAL GAS SETTING VFR VENT: 21 %
OSMOLALITY SERPL: 304 MMOL/KG (ref 275–295)
OXYHGB MFR BLDV: 86 % (ref 70–75)
PCO2 BLDV: 39 MM HG (ref 40–50)
PH BLDV: 7.45 [PH] (ref 7.32–7.43)
PLATELET # BLD AUTO: 241 10E3/UL (ref 150–450)
PO2 BLDV: 51 MM HG (ref 25–47)
POTASSIUM SERPL-SCNC: 3.5 MMOL/L (ref 3.4–5.3)
RBC # BLD AUTO: 4.67 10E6/UL (ref 4.4–5.9)
SAO2 % BLDV: 87.4 % (ref 70–75)
SODIUM SERPL-SCNC: 131 MMOL/L (ref 135–145)
WBC # BLD AUTO: 12.5 10E3/UL (ref 4–11)

## 2025-03-02 PROCEDURE — 250N000012 HC RX MED GY IP 250 OP 636 PS 637: Performed by: PEDIATRICS

## 2025-03-02 PROCEDURE — 86140 C-REACTIVE PROTEIN: CPT | Performed by: STUDENT IN AN ORGANIZED HEALTH CARE EDUCATION/TRAINING PROGRAM

## 2025-03-02 PROCEDURE — 99285 EMERGENCY DEPT VISIT HI MDM: CPT | Mod: 25 | Performed by: STUDENT IN AN ORGANIZED HEALTH CARE EDUCATION/TRAINING PROGRAM

## 2025-03-02 PROCEDURE — 83036 HEMOGLOBIN GLYCOSYLATED A1C: CPT | Performed by: STUDENT IN AN ORGANIZED HEALTH CARE EDUCATION/TRAINING PROGRAM

## 2025-03-02 PROCEDURE — 258N000003 HC RX IP 258 OP 636: Performed by: STUDENT IN AN ORGANIZED HEALTH CARE EDUCATION/TRAINING PROGRAM

## 2025-03-02 PROCEDURE — 82805 BLOOD GASES W/O2 SATURATION: CPT | Performed by: STUDENT IN AN ORGANIZED HEALTH CARE EDUCATION/TRAINING PROGRAM

## 2025-03-02 PROCEDURE — 250N000013 HC RX MED GY IP 250 OP 250 PS 637: Performed by: PEDIATRICS

## 2025-03-02 PROCEDURE — 99285 EMERGENCY DEPT VISIT HI MDM: CPT | Performed by: STUDENT IN AN ORGANIZED HEALTH CARE EDUCATION/TRAINING PROGRAM

## 2025-03-02 PROCEDURE — 85652 RBC SED RATE AUTOMATED: CPT | Performed by: STUDENT IN AN ORGANIZED HEALTH CARE EDUCATION/TRAINING PROGRAM

## 2025-03-02 PROCEDURE — 36415 COLL VENOUS BLD VENIPUNCTURE: CPT | Performed by: STUDENT IN AN ORGANIZED HEALTH CARE EDUCATION/TRAINING PROGRAM

## 2025-03-02 PROCEDURE — 120N000001 HC R&B MED SURG/OB

## 2025-03-02 PROCEDURE — 258N000003 HC RX IP 258 OP 636: Performed by: PEDIATRICS

## 2025-03-02 PROCEDURE — 80048 BASIC METABOLIC PNL TOTAL CA: CPT | Performed by: STUDENT IN AN ORGANIZED HEALTH CARE EDUCATION/TRAINING PROGRAM

## 2025-03-02 PROCEDURE — 83605 ASSAY OF LACTIC ACID: CPT | Performed by: STUDENT IN AN ORGANIZED HEALTH CARE EDUCATION/TRAINING PROGRAM

## 2025-03-02 PROCEDURE — 250N000011 HC RX IP 250 OP 636: Performed by: PEDIATRICS

## 2025-03-02 PROCEDURE — 99223 1ST HOSP IP/OBS HIGH 75: CPT | Performed by: PEDIATRICS

## 2025-03-02 PROCEDURE — 82010 KETONE BODYS QUAN: CPT | Performed by: STUDENT IN AN ORGANIZED HEALTH CARE EDUCATION/TRAINING PROGRAM

## 2025-03-02 PROCEDURE — 73630 X-RAY EXAM OF FOOT: CPT | Mod: LT

## 2025-03-02 PROCEDURE — 83930 ASSAY OF BLOOD OSMOLALITY: CPT | Performed by: PEDIATRICS

## 2025-03-02 PROCEDURE — 85004 AUTOMATED DIFF WBC COUNT: CPT | Performed by: STUDENT IN AN ORGANIZED HEALTH CARE EDUCATION/TRAINING PROGRAM

## 2025-03-02 PROCEDURE — 250N000011 HC RX IP 250 OP 636: Performed by: STUDENT IN AN ORGANIZED HEALTH CARE EDUCATION/TRAINING PROGRAM

## 2025-03-02 PROCEDURE — 87641 MR-STAPH DNA AMP PROBE: CPT | Performed by: PEDIATRICS

## 2025-03-02 PROCEDURE — 96365 THER/PROPH/DIAG IV INF INIT: CPT | Performed by: STUDENT IN AN ORGANIZED HEALTH CARE EDUCATION/TRAINING PROGRAM

## 2025-03-02 PROCEDURE — 96361 HYDRATE IV INFUSION ADD-ON: CPT | Performed by: STUDENT IN AN ORGANIZED HEALTH CARE EDUCATION/TRAINING PROGRAM

## 2025-03-02 RX ORDER — NICOTINE POLACRILEX 4 MG
15-30 LOZENGE BUCCAL
Status: DISCONTINUED | OUTPATIENT
Start: 2025-03-02 | End: 2025-03-08 | Stop reason: HOSPADM

## 2025-03-02 RX ORDER — OXYCODONE HYDROCHLORIDE 5 MG/1
5 TABLET ORAL EVERY 4 HOURS PRN
Status: DISCONTINUED | OUTPATIENT
Start: 2025-03-02 | End: 2025-03-08 | Stop reason: HOSPADM

## 2025-03-02 RX ORDER — PIPERACILLIN SODIUM, TAZOBACTAM SODIUM 3; .375 G/15ML; G/15ML
3.38 INJECTION, POWDER, LYOPHILIZED, FOR SOLUTION INTRAVENOUS ONCE
Status: COMPLETED | OUTPATIENT
Start: 2025-03-02 | End: 2025-03-02

## 2025-03-02 RX ORDER — ONDANSETRON 2 MG/ML
4 INJECTION INTRAMUSCULAR; INTRAVENOUS EVERY 6 HOURS PRN
Status: DISCONTINUED | OUTPATIENT
Start: 2025-03-02 | End: 2025-03-08 | Stop reason: HOSPADM

## 2025-03-02 RX ORDER — NALOXONE HYDROCHLORIDE 0.4 MG/ML
0.4 INJECTION, SOLUTION INTRAMUSCULAR; INTRAVENOUS; SUBCUTANEOUS
Status: DISCONTINUED | OUTPATIENT
Start: 2025-03-02 | End: 2025-03-08 | Stop reason: HOSPADM

## 2025-03-02 RX ORDER — ACETAMINOPHEN 325 MG/1
975 TABLET ORAL EVERY 6 HOURS PRN
Status: DISCONTINUED | OUTPATIENT
Start: 2025-03-02 | End: 2025-03-08 | Stop reason: HOSPADM

## 2025-03-02 RX ORDER — ATORVASTATIN CALCIUM 10 MG/1
20 TABLET, FILM COATED ORAL DAILY
Status: DISCONTINUED | OUTPATIENT
Start: 2025-03-02 | End: 2025-03-08 | Stop reason: HOSPADM

## 2025-03-02 RX ORDER — PIPERACILLIN SODIUM, TAZOBACTAM SODIUM 4; .5 G/20ML; G/20ML
4.5 INJECTION, POWDER, LYOPHILIZED, FOR SOLUTION INTRAVENOUS EVERY 6 HOURS
Status: DISCONTINUED | OUTPATIENT
Start: 2025-03-02 | End: 2025-03-07

## 2025-03-02 RX ORDER — ONDANSETRON 4 MG/1
4 TABLET, ORALLY DISINTEGRATING ORAL EVERY 6 HOURS PRN
Status: DISCONTINUED | OUTPATIENT
Start: 2025-03-02 | End: 2025-03-08 | Stop reason: HOSPADM

## 2025-03-02 RX ORDER — CALCIUM CARBONATE 500 MG/1
1000 TABLET, CHEWABLE ORAL 4 TIMES DAILY PRN
Status: DISCONTINUED | OUTPATIENT
Start: 2025-03-02 | End: 2025-03-08 | Stop reason: HOSPADM

## 2025-03-02 RX ORDER — LIDOCAINE 40 MG/G
CREAM TOPICAL
Status: DISCONTINUED | OUTPATIENT
Start: 2025-03-02 | End: 2025-03-08 | Stop reason: HOSPADM

## 2025-03-02 RX ORDER — AMOXICILLIN 250 MG
2 CAPSULE ORAL 2 TIMES DAILY PRN
Status: DISCONTINUED | OUTPATIENT
Start: 2025-03-02 | End: 2025-03-08 | Stop reason: HOSPADM

## 2025-03-02 RX ORDER — DEXTROSE MONOHYDRATE 25 G/50ML
25-50 INJECTION, SOLUTION INTRAVENOUS
Status: DISCONTINUED | OUTPATIENT
Start: 2025-03-02 | End: 2025-03-08 | Stop reason: HOSPADM

## 2025-03-02 RX ORDER — AMOXICILLIN 250 MG
1 CAPSULE ORAL 2 TIMES DAILY PRN
Status: DISCONTINUED | OUTPATIENT
Start: 2025-03-02 | End: 2025-03-08 | Stop reason: HOSPADM

## 2025-03-02 RX ORDER — NALOXONE HYDROCHLORIDE 0.4 MG/ML
0.2 INJECTION, SOLUTION INTRAMUSCULAR; INTRAVENOUS; SUBCUTANEOUS
Status: DISCONTINUED | OUTPATIENT
Start: 2025-03-02 | End: 2025-03-08 | Stop reason: HOSPADM

## 2025-03-02 RX ADMIN — INSULIN ASPART 3 UNITS: 100 INJECTION, SOLUTION INTRAVENOUS; SUBCUTANEOUS at 18:42

## 2025-03-02 RX ADMIN — VANCOMYCIN HYDROCHLORIDE 1500 MG: 1 INJECTION, POWDER, LYOPHILIZED, FOR SOLUTION INTRAVENOUS at 20:28

## 2025-03-02 RX ADMIN — SODIUM CHLORIDE 1000 ML: 0.9 INJECTION, SOLUTION INTRAVENOUS at 15:15

## 2025-03-02 RX ADMIN — PIPERACILLIN AND TAZOBACTAM 4.5 G: 4; .5 INJECTION, POWDER, FOR SOLUTION INTRAVENOUS at 19:48

## 2025-03-02 RX ADMIN — PIPERACILLIN AND TAZOBACTAM 3.38 G: 3; .375 INJECTION, POWDER, FOR SOLUTION INTRAVENOUS at 14:33

## 2025-03-02 RX ADMIN — INSULIN GLARGINE 10 UNITS: 100 INJECTION, SOLUTION SUBCUTANEOUS at 21:30

## 2025-03-02 RX ADMIN — ATORVASTATIN CALCIUM 20 MG: 10 TABLET, FILM COATED ORAL at 19:48

## 2025-03-02 ASSESSMENT — ACTIVITIES OF DAILY LIVING (ADL)
ADLS_ACUITY_SCORE: 18
ADLS_ACUITY_SCORE: 18
ADLS_ACUITY_SCORE: 41
ADLS_ACUITY_SCORE: 18
ADLS_ACUITY_SCORE: 18
ADLS_ACUITY_SCORE: 41
ADLS_ACUITY_SCORE: 18
ADLS_ACUITY_SCORE: 41
ADLS_ACUITY_SCORE: 41

## 2025-03-02 ASSESSMENT — COLUMBIA-SUICIDE SEVERITY RATING SCALE - C-SSRS
2. HAVE YOU ACTUALLY HAD ANY THOUGHTS OF KILLING YOURSELF IN THE PAST MONTH?: NO
1. IN THE PAST MONTH, HAVE YOU WISHED YOU WERE DEAD OR WISHED YOU COULD GO TO SLEEP AND NOT WAKE UP?: NO
6. HAVE YOU EVER DONE ANYTHING, STARTED TO DO ANYTHING, OR PREPARED TO DO ANYTHING TO END YOUR LIFE?: NO

## 2025-03-02 NOTE — H&P
Formerly [facility]    History and Physical - Hospitalist Service       Date of Admission:  3/2/2025    Assessment & Plan      Luke Farias is a 59 year old male with type 2 diabetes treated with oral medication and diabetic peripheral neuropathy who presented to the ER on 3/2/2025 after he noticed redness on the top and side of his left foot today.  Clinical presentation and test results are concerning for severe diabetic infection of left foot with SIRS and possible sepsis.  He is at high risk for an adverse outcome including limb threatening infection in the left foot and complications of sepsis, so hospitalization is considered medically necessary.  Based on the presently available information, hospitalization for at least 2 midnights is anticipated.    Principal Problem:    Diabetic infection of left foot (H)  Active Problems:    SIRS (systemic inflammatory response syndrome) (H)    Open wound of left heel    Type 2 diabetes mellitus with hyperglycemia (H)    Diabetic peripheral neuropathy (H)    Hyponatremia    Hypochloremia    Left leg swelling    Hyperlipidemia LDL goal <100    Severe diabetic infection of left foot, potentially limb threatening  SIRS, possible sepsis  Open wound left heel  Diabetic peripheral neuropathy  Clinical presentation is suspicious for open wound of left heel with surrounding cellulitis and purulence complicating poorly controlled type 2 diabetes with severe hyperglycemia and diabetic peripheral neuropathy.  He has had chills and presents with leukocytosis concerning for SIRS and possible sepsis.  Taken together, this is concerning for severe diabetic left foot infection per standard criteria.  This is potentially limb threatening left foot infection.  He has been using an electric device to sand his foot recently and has purulence on exam increasing concern for both Pseudomonas and MRSA.  -Admit to inpatient  -Start high-dose parenteral Zosyn 4.5 mg IV every  6 hours  -Start vancomycin, pharmacy consulted for dosing  -Ordered nasal MRSA testing  -Ordered podiatry consult, anticipate possible surgical debridement during hospitalization  -Anticipate evaluation by [facility] nurse during hospitalization but this might be deferred until after surgical intervention  -Limit weightbearing on left foot to toe touch down only  -For now, withholding chronic prophylactic dosing of aspirin (which he reports he has not been taking recently) in anticipation of possible surgical intervention  -Ordered recheck WBC    Type 2 diabetes with severe hyperglycemia  Ketonemia  Longstanding type 2 diabetes treated with oral medication.  Hemoglobin A1c 16.8 today with initial blood sugar 441.  Serum ketones are positive and anion gap is elevated although he does not have outright metabolic acidosis.  Severe hyperglycemia also puts him at risk for hyperosmolar state.  He is interested and willing to start insulin treatment.  -Start basal insulin with Lantus 10 units twice daily (total dose approximately 0.2 unit/kg/day)  -Start NovoLog prandial insulin 1 unit per 15 g carbohydrates with meals and snacks  -For now, start NovoLog high correction scale but may need to reduce dosing of correction scale after starting Lantus and prandial NovoLog doses  -Not continuing either metformin or glipizide at this time  -Recommend diabetic education during hospitalization and close outpatient follow-up with PCP and diabetic nurse  -Moderate carbohydrate diet ordered   -ordered recheck VBG and serum ketones   -ordered serum osmolality    Left leg swelling  Left lower leg and foot are moderately swollen which is asymmetric compared with right.  Although this could be reactive due to foot infection, DVT is possible.  -Ordered venous Doppler ultrasound of left lower extremity to evaluate for DVT and if DVT is identified would initiate anticoagulation treatment as appropriate    Hyponatremia  Hypochloremia  Presenting  "with mild hyponatremia and hypochloremia likely due to recent inadequate oral nutritional intake.  He received IV fluid bolus normal saline 1 L in the ER today.  -Ordered recheck electrolytes    Hyperlipidemia  Chronic hyperlipidemia treated with statin  -Continue chronic statin          Diet:  Moderate carbohydrate  DVT Prophylaxis: Pneumatic Compression Devices  Carrillo Catheter: Not present  Lines: None     Cardiac Monitoring: None  Code Status:  Full resuscitation per his expressed preference    Clinically Significant Risk Factors Present on Admission         # Hyponatremia: Lowest Na = 131 mmol/L in last 2 days, will monitor as appropriate  # Hypochloremia: Lowest Cl = 90 mmol/L in last 2 days, will monitor as appropriate        # Drug Induced Platelet Defect: home medication list includes an antiplatelet medication            # DMII: A1C = 16.8 % (Ref range: <5.7 %) within past 6 months    # Overweight: Estimated body mass index is 27.55 kg/m  as calculated from the following:    Height as of this encounter: 1.88 m (6' 2\").    Weight as of this encounter: 97.3 kg (214 lb 9.6 oz).              Disposition Plan     Medically Ready for Discharge: Anticipated in 2-4 Days           Chris Glynn MD  Hospitalist Service  Former[facility]  Securely message with [employer] (more info)  Text page via [facility] Paging/Directory     ______________________________________________________________________    Chief Complaint   Left foot redness    History is obtained from the patient, electronic health record, emergency department physician, and patient's spouse    History of Present Illness   Luke Farias is a 59 year old male who reports that he purchased an electric sanding device to use on the calluses of his feet a few weeks ago and has been standing his feet intermittently including the balls of his feet and the outside of his heels where callus tends to build up.  This morning when he took off his " sock, he initially noted redness on the top of his left foot at the base of the toes.  When he looked further, he also noted significant redness at the left heel and region of the outside of the left ankle.  He has had some mild discomfort in his left foot.  He had not noticed any recent open skin lesion on his left heel.  He has not been feeling good for about a month.  This past week, he has been having chills including episodes of shaking chills at home where he has had to wear extra clothes and turn up his space heater to stay warm.  He presented to the ER for evaluation.  Dr. Kirkland reports that because of concern about infection in his left foot, patient was given a dose of IV Zosyn 3.375 mg today in the ER.  Because of hyperglycemia, patient was also given a bolus of IV fluids 1 L normal saline in the ER today.  Patient is now seen in the ER for evaluation.  Overall he is not feeling much differently from what he presented today.      Past Medical History    Past Medical History:   Diagnosis Date    Diabetes (H)     type 2    Seasonal allergies     Allergy inj as a child     Patient reports diagnosis of diabetes about 10 to 15 years ago.  His type 2 diabetes has previously been managed with oral medication although he reports that he and his PCP discussed transition to insulin within the last year.  Patient reports that his diabetes has been complicated by peripheral neuropathy in his feet.    Past Surgical History   Past Surgical History:   Procedure Laterality Date    COLONOSCOPY N/A 8/19/2019    Procedure: Colonoscopy, With Polypectomy And Biopsy;  Surgeon: Bartolome Templeton MD;  Location: [facility] OR    COLONOSCOPY N/A 11/5/2021    Procedure: Colonoscopy, With Polypectomy And Biopsy;  Surgeon: Lisette Vickers DO;  Location: [facility] OR    COLONOSCOPY N/A 11/5/2021    Procedure: COLONOSCOPY, FLEXIBLE, WITH LESION REMOVAL USING SNARE;  Surgeon: Lisette Vickers DO;  Location: [facility] OR    COLONOSCOPY N/A 3/26/2024     Procedure: COLONOSCOPY, WITH POLYPECTOMY using snare;  Surgeon: Baltazar Rodriguez MD;  Location: [facility]    COLONOSCOPY WITH CO2 INSUFFLATION N/A 2017    Procedure: COLONOSCOPY WITH CO2 INSUFFLATION;  Dr. Rancho Painting/BMI: 30.8/Special screening for malignant neoplasms, colon/colonoscopy/[facility]:  [phone];  Surgeon: Krish Galloway MD;  Location: [facility] OR    COLONOSCOPY WITH CO2 INSUFFLATION N/A 2019    Procedure: COLONOSCOPY WITH CO2 INSUFFLATION;  Surgeon: Bartolome Templeton MD;  Location: [facility] OR    COLONOSCOPY WITH CO2 INSUFFLATION N/A 2019    Procedure: COLONOSCOPY, WITH CO2 INSUFFLATION;  Surgeon: Bartolome Templeton MD;  Location: [facility]    COLONOSCOPY WITH CO2 INSUFFLATION N/A 2021    Procedure: COLONOSCOPY, WITH CO2 INSUFFLATION;  Surgeon: Lisette Vickers DO;  Location: [facility] OR       Prior to Admission Medications   Prior to Admission Medications   Prescriptions Last Dose Informant Patient Reported? Taking?   Blood Glucose Monitoring Suppl (GLUCOMETER ELITE CLASSIC) KIT   No No   Si Device daily.   Continuous Blood Gluc Sensor (FREESTYLE PIERRE 2 SENSOR) [facility]   No No   Si each every 14 days 1 each every 14 days. Change every 14 days.   aspirin (ASA) 81 MG tablet   Yes No   Sig: Take 1 tablet (81 mg) by mouth daily   Patient not taking: Reported on 2024   atorvastatin (LIPITOR) 20 MG tablet   No No   Sig: TAKE 1 TABLET(20 MG) BY MOUTH DAILY   blood glucose (NO BRAND SPECIFIED) test strip   No No   Sig: Use to test blood sugars 4 times daily or as directed due to infection and hyperglycemia   glipiZIDE (GLUCOTROL XL) 10 MG 24 hr tablet   No No   Sig: TAKE 1 TABLET(10 MG) BY MOUTH DAILY   glucose blood VI test strips (ASCENSIA AUTODISC VI,ONE TOUCH ULTRA TEST VI) strip   No No   Sig: by In Vitro route. Test as directed   metFORMIN (GLUCOPHAGE XR) 500 MG 24 hr tablet   No No   Sig: TAKE 2 TABLETS(1000 MG) BY MOUTH TWICE DAILY WITH MEALS     "  Facility-Administered Medications: None        Review of Systems    The 10 point Review of Systems is negative other than noted in the HPI or here.     Social History   I have reviewed this patient's social history and updated it with pertinent information if needed.  Social History     Tobacco Use    Smoking status: Never    Smokeless tobacco: Never   Vaping Use    Vaping status: Never Used   Substance Use Topics    Alcohol use: Yes     Comment: 1-2 beers a week / occ     Drug use: No      He is  and lives with his spouse.  He works from home.    Family History   I have reviewed this patient's family history and updated it with pertinent information if needed.  Family History   Problem Relation Age of Onset    Heart Disease Mother     Diabetes Father          Allergies   Allergies   Allergen Reactions    Seasonal Allergies         Physical Exam   Vital Signs: Temp: 97.2  F (36.2  C) Temp src: Temporal BP: 109/69 Pulse: 71   Resp: 18 SpO2: 94 % O2 Device: None (Room air)    Patient Vitals for the past 24 hrs:   BP Temp Temp src Pulse Resp SpO2 Height Weight   03/02/25 1527 109/69 -- -- 71 18 94 % -- --   03/02/25 1105 128/73 -- -- -- -- -- -- --   03/02/25 1103 -- 97.2  F (36.2  C) Temporal 72 17 97 % 1.88 m (6' 2\") 97.3 kg (214 lb 9.6 oz)     Weight: 214 lbs 9.6 oz  Wt Readings from Last 4 Encounters:   03/02/25 97.3 kg (214 lb 9.6 oz)   11/25/24 97.8 kg (215 lb 8 oz)   05/17/24 97.1 kg (214 lb 1.6 oz)   04/19/24 97.5 kg (214 lb 14.4 oz)     Constitutional: Middle-age man without signs of acute distress while resting in bed  Eyes: Anicteric sclerae, clear conjunctivae, symmetric pupils  ENT: Clear ear canals and nares, normal oropharynx  Neck: Trachea midline, no stridor, symmetric, nontender  Hematologic / Lymphatic: no cervical lymphadenopathy and no supraclavicular lymphadenopathy  Respiratory: Normal respiratory effort, symmetric breath sounds, clear lungs  Cardiovascular: Regular rate and rhythm, no " loud gallop or murmur, good radial pulses, normal capillary refill, difficult to assess for pedal pulses in the left foot because of significant pitting edema  GI: Normal bowel sounds, nondistended abdomen, soft, nontender, no hepatosplenomegaly  Skin: No diffuse rashes, approximately 3 to 4 cm full-thickness skin tear/fissure on the lateral aspect of the left heel with warm erythema extending more than 10 cm around that open wound over the lateral aspect of the left foot to the base of the left fifth MTP and posteriorly to the calcaneal tendon and proximally to beyond the lateral malleolus, subcutaneous fluctuant purulent material at the posterior margin of the open lesion and there are 2 other distinct open areas covered with purulent eschar in the midst of the area of skin erythema on the lateral aspect of the left foot and ankle (see photo)  Musculoskeletal: No pain with active and passive range of motion of the left ankle, no cords palpable in the left lower extremity, left lower leg and foot are moderately edematous with pitting edema which is asymmetric compared with the right which is not swollen  Neurologic: Alert and maintains wakefulness and attention without apparent confusion, no obvious focal deficits, clearly has decreased touch sensation in the feet  Neuropsychiatric: Mood and affect are appropriate for the circumstances, no overt hallucinations identified    Medical Decision Making       81 MINUTES SPENT BY ME on the date of service doing chart review, history, exam, documentation & further activities per the note.      Data     I have personally reviewed the following data over the past 24 hrs:    12.5 (H)  \   13.5   / 241     131 (L) 90 (L) 12.9 /  441 (H)   3.5 24 0.63 (L) \     TSH: N/A T4: N/A A1C: 16.8 (H)     Procal: N/A CRP: 175.94 (H) Lactic Acid: 1.0         Serum ketones positive at 2.64    Venous Blood Gas  Recent Labs   Lab 03/02/25  1414   PHV 7.45*   PCO2V 39*   PO2V 51*   HCO3V 27    CAROLINE 2.9   O2PER 21       Imaging results reviewed over the past 24 hrs:   Recent Results (from the past 24 hours)   XR Foot Left G/E 3 Views    Narrative    EXAM: XR FOOT LEFT G/E 3 VIEWS  LOCATION: [facility]  DATE: 3/2/2025    INDICATION: Diabetic wound to lateral heel, evaluate for plain film osteo deep infection.  COMPARISON: None.      Impression    IMPRESSION: Arterial calcifications. Multifocal osteoarthrosis. There is no evidence of osteomyelitis or foreign body.     Recent Labs   Lab 03/02/25  1140   WBC 12.5*   HGB 13.5   MCV 81      *   POTASSIUM 3.5   CHLORIDE 90*   CO2 24   BUN 12.9   CR 0.63*   ANIONGAP 17*   EARNEST 9.0   *

## 2025-03-02 NOTE — ED PROVIDER NOTES
History     Chief Complaint   Patient presents with    Wound Check    Foot Pain     HPI  Luke Farias is a 59 year old male with history of diabetes, neuropathy who presents for evaluation of a foot wound.  Patient noticed an area of skin irritation and redness to the left lateral heel this morning.  He notes recently purchasing a device to help peel dry skin and has been using it over the last several days.  Otherwise he denies any recent injury.  Neuropathy of the feet is at baseline and the area is not particularly painful.  He felt some chills and fatigue over the last few days.  Otherwise denies any calf pain or swelling, drainage or discharge from the wound, weakness, other complaints.    Allergies:  Allergies   Allergen Reactions    Seasonal Allergies      Problem List:    Patient Active Problem List    Diagnosis Date Noted    Diabetic infection of left foot (H) 03/02/2025     Priority: Medium    SIRS (systemic inflammatory response syndrome) (H) 03/02/2025     Priority: Medium    Diabetic peripheral neuropathy (H) 03/02/2025     Priority: Medium    Hyponatremia 03/02/2025     Priority: Medium    Hypochloremia 03/02/2025     Priority: Medium    Platelet dysfunction due to aspirin (H) 03/02/2025     Priority: Medium    Open wound of left heel 03/02/2025     Priority: Medium    Left leg swelling 03/02/2025     Priority: Medium    Elevated C-reactive protein (CRP) 03/02/2025     Priority: Medium    Elevated erythrocyte sedimentation rate 03/02/2025     Priority: Medium    Diabetic foot infection (H) 03/02/2025     Priority: Medium    Elevated blood ketone body level 03/02/2025     Priority: Medium    Venous lake on Right chest 03/22/2017     Priority: Medium    Congenital nevus of Right upper back 03/22/2017     Priority: Medium    Hyperlipidemia LDL goal <100 01/25/2011     Priority: Medium    Type 2 diabetes mellitus with hyperglycemia (H) 01/21/2011     Priority: Medium    Chronic rhinitis 03/20/2008      Priority: Medium      Past Medical History:    Past Medical History:   Diagnosis Date    Diabetes (H)     Seasonal allergies      Past Surgical History:    Past Surgical History:   Procedure Laterality Date    COLONOSCOPY N/A 8/19/2019    Procedure: Colonoscopy, With Polypectomy And Biopsy;  Surgeon: Bartolome Templeton MD;  Location: [facility]    COLONOSCOPY N/A 11/5/2021    Procedure: Colonoscopy, With Polypectomy And Biopsy;  Surgeon: Lisette Vickers DO;  Location: [facility]    COLONOSCOPY N/A 11/5/2021    Procedure: COLONOSCOPY, FLEXIBLE, WITH LESION REMOVAL USING SNARE;  Surgeon: Lisette Vickers DO;  Location: [facility]    COLONOSCOPY N/A 3/26/2024    Procedure: COLONOSCOPY, WITH POLYPECTOMY using snare;  Surgeon: Baltazar Rodriguez MD;  Location: [facility]    COLONOSCOPY WITH CO2 INSUFFLATION N/A 5/12/2017    Procedure: COLONOSCOPY WITH CO2 INSUFFLATION;  Dr. Rancho Painting/BMI: 30.8/Special screening for malignant neoplasms, colon/colonoscopy/[facility]:  [phone];  Surgeon: Krish Galloway MD;  Location: [facility]    COLONOSCOPY WITH CO2 INSUFFLATION N/A 1/21/2019    Procedure: COLONOSCOPY WITH CO2 INSUFFLATION;  Surgeon: Bartolome Templeton MD;  Location: [facility]    COLONOSCOPY WITH CO2 INSUFFLATION N/A 8/19/2019    Procedure: COLONOSCOPY, WITH CO2 INSUFFLATION;  Surgeon: Bartolome Templeton MD;  Location: [facility]    COLONOSCOPY WITH CO2 INSUFFLATION N/A 11/5/2021    Procedure: COLONOSCOPY, WITH CO2 INSUFFLATION;  Surgeon: Lisette Vickers DO;  Location: [facility]     Family History:    Family History   Problem Relation Age of Onset    Heart Disease Mother     Diabetes Father      Social History:  Marital Status:   [2]  Social History     Tobacco Use    Smoking status: Never    Smokeless tobacco: Never   Vaping Use    Vaping status: Never Used   Substance Use Topics    Alcohol use: Yes     Comment: 1-2 beers a week / occ     Drug use: No      Medications:    No current outpatient  "medications on file.    Review of Systems   All other systems reviewed and are negative.  See HPI.    Physical Exam   BP: 128/73  Pulse: 72  Temp: 97.2  F (36.2  C)  Resp: 17  Height: 188 cm (6' 2\")  Weight: 97.3 kg (214 lb 9.6 oz)  SpO2: 97 %    Physical Exam  Vitals and nursing note reviewed.   Constitutional:       General: He is not in acute distress.     Appearance: Normal appearance. He is not ill-appearing or toxic-appearing.   HENT:      Head: Normocephalic and atraumatic.      Mouth/Throat:      Mouth: Mucous membranes are moist.   Eyes:      General: No scleral icterus.     Conjunctiva/sclera: Conjunctivae normal.   Cardiovascular:      Rate and Rhythm: Normal rate and regular rhythm.      Pulses: Normal pulses.      Heart sounds: Normal heart sounds.   Pulmonary:      Effort: Pulmonary effort is normal. No respiratory distress.      Breath sounds: Normal breath sounds.   Abdominal:      Palpations: Abdomen is soft.      Tenderness: There is no abdominal tenderness.   Musculoskeletal:         General: No tenderness or deformity. Normal range of motion.      Cervical back: Normal range of motion and neck supple.      Comments: See skin exam.  There is no bony tenderness to the left foot or ankle.  He is able to move the toes and has baseline sensation for neuropathy.  DP/PT pulses are 2+.   Skin:     General: Skin is warm.      Findings: Erythema and lesion present.      Comments: Patient has dry skin throughout the bottom of his foot.  The area of greatest interest is over the left lateral heel.  See photos below.  There is some superficial ulceration and peeling of the skin.  It appears a bit macerated and areas.  This looks to be superficial in nature, but somewhat difficult to see with the top layer of peeling.  It is nontender to palpation.  There are a few additional areas more superior to the foot/ankle that look like developing ulcers.  Then the entire left lateral foot has some mild erythema most " consistent with developing cellulitis.  There is no crepitus.  He has completely normal range of motion of the ankle with no calf pain or tenderness.   Neurological:      Mental Status: He is alert.             Post-debridement:      ED Course     ED Course as of 03/02/25 1912   Sun Mar 02, 2025   1413 Still awaiting podiatry consult. After discussing with the patient we will obtain some additional labs to assess for DKA, systemic illness given chills and labs thus far. Will also treat empirically with a dose of zosyn while here in the ED, though hopefully he will be able to discharge home on oral treatment.   1439 Spoke with podiatry, Dr. Huff.  Stated that if we are able to discharge home that he would recommend broad coverage using Cipro and clindamycin.  Requested that we dry the area with Betadine and recommend as little weightbearing as possible.     Procedures            Results for orders placed or performed during the hospital encounter of 03/02/25 (from the past 24 hours)   CBC with platelets differential    Narrative    The following orders were created for panel order CBC with platelets differential.  Procedure                               Abnormality         Status                     ---------                               -----------         ------                     CBC with platelets and d...[[id number]]  Abnormal            Final result                 Please view results for these tests on the individual orders.   Basic metabolic panel   Result Value Ref Range    Sodium 131 (L) 135 - 145 mmol/L    Potassium 3.5 3.4 - 5.3 mmol/L    Chloride 90 (L) 98 - 107 mmol/L    Carbon Dioxide (CO2) 24 22 - 29 mmol/L    Anion Gap 17 (H) 7 - 15 mmol/L    Urea Nitrogen 12.9 6.0 - 20.0 mg/dL    Creatinine 0.63 (L) 0.67 - 1.17 mg/dL    GFR Estimate >90 >60 mL/min/1.73m2    Calcium 9.0 8.8 - 10.4 mg/dL    Glucose 441 (H) 70 - 99 mg/dL   CRP inflammation   Result Value Ref Range    CRP Inflammation 175.94 (H) <5.00  mg/L   Erythrocyte sedimentation rate auto   Result Value Ref Range    Erythrocyte Sedimentation Rate 52 (H) 0 - 20 mm/hr   CBC with platelets and differential   Result Value Ref Range    WBC Count 12.5 (H) 4.0 - 11.0 10e3/uL    RBC Count 4.67 4.40 - 5.90 10e6/uL    Hemoglobin 13.5 13.3 - 17.7 g/dL    Hematocrit 37.9 (L) 40.0 - 53.0 %    MCV 81 78 - 100 fL    MCH 28.9 26.5 - 33.0 pg    MCHC 35.6 31.5 - 36.5 g/dL    RDW 12.6 10.0 - 15.0 %    Platelet Count 241 150 - 450 10e3/uL    % Neutrophils 80 %    % Lymphocytes 8 %    % Monocytes 11 %    % Eosinophils 0 %    % Basophils 0 %    % Immature Granulocytes 1 %    NRBCs per 100 WBC 0 <1 /100    Absolute Neutrophils 9.9 (H) 1.6 - 8.3 10e3/uL    Absolute Lymphocytes 1.0 0.8 - 5.3 10e3/uL    Absolute Monocytes 1.3 0.0 - 1.3 10e3/uL    Absolute Eosinophils 0.0 0.0 - 0.7 10e3/uL    Absolute Basophils 0.1 0.0 - 0.2 10e3/uL    Absolute Immature Granulocytes 0.1 <=0.4 10e3/uL    Absolute NRBCs 0.0 10e3/uL   Ketone Beta-Hydroxybutyrate Quantitative   Result Value Ref Range    Ketone (Beta-Hydroxybutyrate) Quantitative 2.64 (HH) <=0.30 mmol/L   Hemoglobin A1c   Result Value Ref Range    Estimated Average Glucose 435 (H) <117 mg/dL    Hemoglobin A1C 16.8 (H) <5.7 %   XR Foot Left G/E 3 Views    Narrative    EXAM: XR FOOT LEFT G/E 3 VIEWS  LOCATION: [facility]  DATE: 3/2/2025    INDICATION: Diabetic wound to lateral heel, evaluate for plain film osteo deep infection.  COMPARISON: None.      Impression    IMPRESSION: Arterial calcifications. Multifocal osteoarthrosis. There is no evidence of osteomyelitis or foreign body.   Blood gas venous   Result Value Ref Range    pH Venous 7.45 (H) 7.32 - 7.43    pCO2 Venous 39 (L) 40 - 50 mm Hg    pO2 Venous 51 (H) 25 - 47 mm Hg    Bicarbonate Venous 27 21 - 28 mmol/L    Base Excess/Deficit Venous 2.9 -3.0 - 3.0 mmol/L    FIO2 21     Oxyhemoglobin Venous 86 (H) 70 - 75 %    O2 Sat, Venous 87.4 (H) 70.0 - 75.0 %     Narrative    In healthy individuals, oxyhemoglobin (O2Hb) and oxygen saturation (SO2) are approximately equal. In the presence of dyshemoglobins, oxyhemoglobin can be considerably lower than oxygen saturation.   Lactic acid whole blood with 1x repeat in 2 hr when >2   Result Value Ref Range    Lactic Acid, Initial 1.0 0.7 - 2.0 mmol/L   Glucose by meter   Result Value Ref Range    GLUCOSE BY METER POCT 210 (H) 70 - 99 mg/dL       Medications   atorvastatin (LIPITOR) tablet 20 mg (has no administration in time range)   lidocaine 1 % 0.1-1 mL (has no administration in time range)   lidocaine (LMX4) cream (has no administration in time range)   sodium chloride (PF) 0.9% PF flush 3 mL (has no administration in time range)   sodium chloride (PF) 0.9% PF flush 3 mL (has no administration in time range)   senna-docusate (SENOKOT-S/PERICOLACE) 8.6-50 MG per tablet 1 tablet (has no administration in time range)     Or   senna-docusate (SENOKOT-S/PERICOLACE) 8.6-50 MG per tablet 2 tablet (has no administration in time range)   calcium carbonate (TUMS) chewable tablet 1,000 mg (has no administration in time range)   glucose gel 15-30 g (has no administration in time range)     Or   dextrose 50 % injection 25-50 mL (has no administration in time range)     Or   glucagon injection 1 mg (has no administration in time range)   acetaminophen (TYLENOL) tablet 975 mg (has no administration in time range)   oxyCODONE IR (ROXICODONE) half-tab 2.5 mg (has no administration in time range)   oxyCODONE (ROXICODONE) tablet 5 mg (has no administration in time range)   ondansetron (ZOFRAN ODT) ODT tab 4 mg (has no administration in time range)     Or   ondansetron (ZOFRAN) injection 4 mg (has no administration in time range)   insulin glargine (LANTUS PEN) injection 10 Units (has no administration in time range)   insulin aspart (NovoLOG) injection (RAPID ACTING) (3 Units Subcutaneous $Given 3/2/25 1842)   insulin aspart (NovoLOG)  injection (RAPID ACTING) (has no administration in time range)   insulin aspart (NovoLOG) injection (RAPID ACTING) (has no administration in time range)   insulin aspart (NovoLOG) injection (RAPID ACTING) (has no administration in time range)   piperacillin-tazobactam (ZOSYN) 4.5 g vial to attach to  mL bag (has no administration in time range)   naloxone (NARCAN) injection 0.2 mg (has no administration in time range)     Or   naloxone (NARCAN) injection 0.4 mg (has no administration in time range)     Or   naloxone (NARCAN) injection 0.2 mg (has no administration in time range)     Or   naloxone (NARCAN) injection 0.4 mg (has no administration in time range)   vancomycin (VANCOCIN) 1,500 mg in 0.9% NaCl 265 mL intermittent infusion (has no administration in time range)   piperacillin-tazobactam (ZOSYN) 3.375 g vial to attach to  mL bag (0 g Intravenous Stopped 3/2/25 1510)   sodium chloride 0.9% BOLUS 1,000 mL (0 mLs Intravenous Stopped 3/2/25 1617)       Assessments & Plan (with Medical Decision Making)     I have reviewed the nursing notes.    I have reviewed the findings, diagnosis, plan and need for follow up with the patient.  Medical Decision Making  Luke Farias is a 59 year old male with history of diabetes, neuropathy who presents for evaluation of a foot wound.  Normal vitals on arrival.  Exam is most significant for an area of maceration and ulceration to the left lateral heel and also some surrounding erythema to the foot/ankle region.  See pictures above.  He has chronic neuropathy and DP/PT pulses seem intact.  Range of motion to the ankle is normal and he has no swelling or crepitus of the calf.  The surrounding area definitely looks like cellulitis and I suspect he could have a diabetic infection to the area of ulceration.  For this reason we obtained plain films which showed no evidence of osteomyelitis or necrotizing infection.  His labs showed several abnormalities, including a  leukocytosis of 12.5, CRP of 76, ESR 52.  His glucose was also noted to be 441 with an anion gap of 17.  After that we added on some additional labs which showed an unremarkable VBG, negative lactic, but his ketones were elevated at 2.6.  I was able to discuss the case with our podiatrist, Dr. Huff, who stated that if we were to discharge the patient home he would recommend clindamycin and ciprofloxacin for broad coverage.  As part of the additional labs ordered earlier it was determined that the patient's diabetes is very poorly controlled, A1c today is 16.8.  He has never required insulin therapy in the past.  Given this dynamic, lab results as above, and the fact that he has some systemic symptoms (fatigue, chills), I do believe that admission is warranted for the sake of IV antibiotics and hopefully initiation of insulin therapy.  A dose of Zosyn was given in the emergency department.  Case was subsequently discussed with hospitalist, Dr. Glynn, who agrees to admit.    Current Discharge Medication List        Final diagnoses:   Diabetic foot infection (H)   Elevated erythrocyte sedimentation rate   Elevated C-reactive protein (CRP)   Elevated blood ketone body level     3/2/2025   [facility] EMERGENCY DEPT       Donte Kirkland MD  03/02/25 1912

## 2025-03-02 NOTE — ED TRIAGE NOTES
PT presents with c/o left foot swelling,redness and pain, rates it as 1/10 pain score. he noticed this morning that there is some skin tear to the side of his left foot PT unsure how or what caused the skin tear. Wound bed appears pink on the center with yellow discoloration surrounding the wound bed, however, there is no active bleeding or drainage noted on triage. PT Afebrile. History of DM type 2 with neuropathy.

## 2025-03-03 ENCOUNTER — ENROLLMENT (OUTPATIENT)
Dept: HOME HEALTH SERVICES | Facility: HOME HEALTH | Age: 60
End: 2025-03-03
Payer: COMMERCIAL

## 2025-03-03 ENCOUNTER — ANESTHESIA EVENT (OUTPATIENT)
Dept: SURGERY | Facility: CLINIC | Age: 60
End: 2025-03-03
Payer: COMMERCIAL

## 2025-03-03 ENCOUNTER — APPOINTMENT (OUTPATIENT)
Dept: MRI IMAGING | Facility: CLINIC | Age: 60
DRG: 854 | End: 2025-03-03
Attending: FAMILY MEDICINE
Payer: COMMERCIAL

## 2025-03-03 ENCOUNTER — APPOINTMENT (OUTPATIENT)
Dept: ULTRASOUND IMAGING | Facility: CLINIC | Age: 60
DRG: 854 | End: 2025-03-03
Attending: PEDIATRICS
Payer: COMMERCIAL

## 2025-03-03 LAB
ANION GAP SERPL CALCULATED.3IONS-SCNC: 17 MMOL/L (ref 7–15)
B-OH-BUTYR SERPL-SCNC: 2.43 MMOL/L
B-OH-BUTYR SERPL-SCNC: 3.39 MMOL/L
B-OH-BUTYR SERPL-SCNC: 4.09 MMOL/L
BASE EXCESS BLDV CALC-SCNC: 2.9 MMOL/L (ref -3–3)
BUN SERPL-MCNC: 10.6 MG/DL (ref 6–20)
CALCIUM SERPL-MCNC: 8.6 MG/DL (ref 8.8–10.4)
CHLORIDE SERPL-SCNC: 95 MMOL/L (ref 98–107)
CREAT SERPL-MCNC: 0.66 MG/DL (ref 0.67–1.17)
EGFRCR SERPLBLD CKD-EPI 2021: >90 ML/MIN/1.73M2
GLUCOSE BLDC GLUCOMTR-MCNC: 108 MG/DL (ref 70–99)
GLUCOSE BLDC GLUCOMTR-MCNC: 125 MG/DL (ref 70–99)
GLUCOSE BLDC GLUCOMTR-MCNC: 137 MG/DL (ref 70–99)
GLUCOSE BLDC GLUCOMTR-MCNC: 147 MG/DL (ref 70–99)
GLUCOSE BLDC GLUCOMTR-MCNC: 154 MG/DL (ref 70–99)
GLUCOSE BLDC GLUCOMTR-MCNC: 161 MG/DL (ref 70–99)
GLUCOSE BLDC GLUCOMTR-MCNC: 162 MG/DL (ref 70–99)
GLUCOSE BLDC GLUCOMTR-MCNC: 175 MG/DL (ref 70–99)
GLUCOSE BLDC GLUCOMTR-MCNC: 177 MG/DL (ref 70–99)
GLUCOSE BLDC GLUCOMTR-MCNC: 178 MG/DL (ref 70–99)
GLUCOSE BLDC GLUCOMTR-MCNC: 186 MG/DL (ref 70–99)
GLUCOSE BLDC GLUCOMTR-MCNC: 201 MG/DL (ref 70–99)
GLUCOSE SERPL-MCNC: 123 MG/DL (ref 70–99)
HCO3 BLDV-SCNC: 27 MMOL/L (ref 21–28)
HCO3 SERPL-SCNC: 22 MMOL/L (ref 22–29)
MRSA DNA SPEC QL NAA+PROBE: NEGATIVE
O2/TOTAL GAS SETTING VFR VENT: 21 %
OXYHGB MFR BLDV: 61 % (ref 70–75)
PCO2 BLDV: 41 MM HG (ref 40–50)
PH BLDV: 7.44 [PH] (ref 7.32–7.43)
PO2 BLDV: 33 MM HG (ref 25–47)
POTASSIUM SERPL-SCNC: 3.2 MMOL/L (ref 3.4–5.3)
POTASSIUM SERPL-SCNC: 3.4 MMOL/L (ref 3.4–5.3)
POTASSIUM SERPL-SCNC: 3.6 MMOL/L (ref 3.4–5.3)
SA TARGET DNA: POSITIVE
SAO2 % BLDV: 62.7 % (ref 70–75)
SODIUM SERPL-SCNC: 134 MMOL/L (ref 135–145)
SODIUM SERPL-SCNC: 134 MMOL/L (ref 135–145)
WBC # BLD AUTO: 13 10E3/UL (ref 4–11)

## 2025-03-03 PROCEDURE — 250N000013 HC RX MED GY IP 250 OP 250 PS 637: Performed by: FAMILY MEDICINE

## 2025-03-03 PROCEDURE — 250N000011 HC RX IP 250 OP 636: Performed by: FAMILY MEDICINE

## 2025-03-03 PROCEDURE — 84295 ASSAY OF SERUM SODIUM: CPT | Performed by: FAMILY MEDICINE

## 2025-03-03 PROCEDURE — 250N000013 HC RX MED GY IP 250 OP 250 PS 637: Performed by: PEDIATRICS

## 2025-03-03 PROCEDURE — 85048 AUTOMATED LEUKOCYTE COUNT: CPT | Performed by: PEDIATRICS

## 2025-03-03 PROCEDURE — 255N000002 HC RX 255 OP 636: Performed by: FAMILY MEDICINE

## 2025-03-03 PROCEDURE — 200N000001 HC R&B ICU

## 2025-03-03 PROCEDURE — A9585 GADOBUTROL INJECTION: HCPCS | Performed by: FAMILY MEDICINE

## 2025-03-03 PROCEDURE — 258N000003 HC RX IP 258 OP 636: Performed by: FAMILY MEDICINE

## 2025-03-03 PROCEDURE — 99233 SBSQ HOSP IP/OBS HIGH 50: CPT | Performed by: FAMILY MEDICINE

## 2025-03-03 PROCEDURE — 82010 KETONE BODYS QUAN: CPT | Performed by: FAMILY MEDICINE

## 2025-03-03 PROCEDURE — 73723 MRI JOINT LWR EXTR W/O&W/DYE: CPT | Mod: LT

## 2025-03-03 PROCEDURE — 80048 BASIC METABOLIC PNL TOTAL CA: CPT | Performed by: PEDIATRICS

## 2025-03-03 PROCEDURE — 82010 KETONE BODYS QUAN: CPT | Performed by: PEDIATRICS

## 2025-03-03 PROCEDURE — 73723 MRI JOINT LWR EXTR W/O&W/DYE: CPT | Mod: 26 | Performed by: RADIOLOGY

## 2025-03-03 PROCEDURE — 84132 ASSAY OF SERUM POTASSIUM: CPT | Performed by: FAMILY MEDICINE

## 2025-03-03 PROCEDURE — 36415 COLL VENOUS BLD VENIPUNCTURE: CPT | Performed by: PEDIATRICS

## 2025-03-03 PROCEDURE — 250N000009 HC RX 250: Performed by: FAMILY MEDICINE

## 2025-03-03 PROCEDURE — 36415 COLL VENOUS BLD VENIPUNCTURE: CPT | Performed by: FAMILY MEDICINE

## 2025-03-03 PROCEDURE — 250N000011 HC RX IP 250 OP 636: Performed by: PEDIATRICS

## 2025-03-03 PROCEDURE — 82805 BLOOD GASES W/O2 SATURATION: CPT | Performed by: PEDIATRICS

## 2025-03-03 PROCEDURE — 93971 EXTREMITY STUDY: CPT | Mod: LT

## 2025-03-03 RX ORDER — DEXTROSE MONOHYDRATE, SODIUM CHLORIDE, AND POTASSIUM CHLORIDE 50; 1.49; 4.5 G/1000ML; G/1000ML; G/1000ML
INJECTION, SOLUTION INTRAVENOUS CONTINUOUS
Status: DISCONTINUED | OUTPATIENT
Start: 2025-03-03 | End: 2025-03-05

## 2025-03-03 RX ORDER — POTASSIUM CHLORIDE 1500 MG/1
40 TABLET, EXTENDED RELEASE ORAL ONCE
Status: COMPLETED | OUTPATIENT
Start: 2025-03-03 | End: 2025-03-03

## 2025-03-03 RX ORDER — GADOBUTROL 604.72 MG/ML
9.5 INJECTION INTRAVENOUS ONCE
Status: COMPLETED | OUTPATIENT
Start: 2025-03-03 | End: 2025-03-03

## 2025-03-03 RX ORDER — POTASSIUM CHLORIDE 1500 MG/1
20 TABLET, EXTENDED RELEASE ORAL ONCE
Status: COMPLETED | OUTPATIENT
Start: 2025-03-03 | End: 2025-03-03

## 2025-03-03 RX ADMIN — INSULIN HUMAN 1.5 UNITS/HR: 1 INJECTION, SOLUTION INTRAVENOUS at 15:17

## 2025-03-03 RX ADMIN — INSULIN GLARGINE 10 UNITS: 100 INJECTION, SOLUTION SUBCUTANEOUS at 08:22

## 2025-03-03 RX ADMIN — ATORVASTATIN CALCIUM 20 MG: 10 TABLET, FILM COATED ORAL at 08:13

## 2025-03-03 RX ADMIN — POTASSIUM CHLORIDE 20 MEQ: 1500 TABLET, EXTENDED RELEASE ORAL at 20:19

## 2025-03-03 RX ADMIN — POTASSIUM CHLORIDE 40 MEQ: 1500 TABLET, EXTENDED RELEASE ORAL at 08:12

## 2025-03-03 RX ADMIN — VANCOMYCIN HYDROCHLORIDE 1500 MG: 1 INJECTION, POWDER, LYOPHILIZED, FOR SOLUTION INTRAVENOUS at 15:25

## 2025-03-03 RX ADMIN — GADOBUTROL 9.5 ML: 604.72 INJECTION INTRAVENOUS at 11:31

## 2025-03-03 RX ADMIN — PIPERACILLIN AND TAZOBACTAM 4.5 G: 4; .5 INJECTION, POWDER, FOR SOLUTION INTRAVENOUS at 20:19

## 2025-03-03 RX ADMIN — DEXTROSE, SODIUM CHLORIDE, AND POTASSIUM CHLORIDE: 5; .45; .15 INJECTION INTRAVENOUS at 15:13

## 2025-03-03 RX ADMIN — PIPERACILLIN AND TAZOBACTAM 4.5 G: 4; .5 INJECTION, POWDER, FOR SOLUTION INTRAVENOUS at 14:02

## 2025-03-03 RX ADMIN — PIPERACILLIN AND TAZOBACTAM 4.5 G: 4; .5 INJECTION, POWDER, FOR SOLUTION INTRAVENOUS at 08:14

## 2025-03-03 RX ADMIN — POTASSIUM CHLORIDE 40 MEQ: 1500 TABLET, EXTENDED RELEASE ORAL at 14:01

## 2025-03-03 RX ADMIN — DEXTROSE, SODIUM CHLORIDE, AND POTASSIUM CHLORIDE: 5; .45; .15 INJECTION INTRAVENOUS at 22:17

## 2025-03-03 RX ADMIN — PIPERACILLIN AND TAZOBACTAM 4.5 G: 4; .5 INJECTION, POWDER, FOR SOLUTION INTRAVENOUS at 02:12

## 2025-03-03 ASSESSMENT — ACTIVITIES OF DAILY LIVING (ADL)
ADLS_ACUITY_SCORE: 19
ADLS_ACUITY_SCORE: 18
ADLS_ACUITY_SCORE: 18
ADLS_ACUITY_SCORE: 19
ADLS_ACUITY_SCORE: 18
ADLS_ACUITY_SCORE: 19
ADLS_ACUITY_SCORE: 18
ADLS_ACUITY_SCORE: 19
ADLS_ACUITY_SCORE: 18
ADLS_ACUITY_SCORE: 18
ADLS_ACUITY_SCORE: 19
ADLS_ACUITY_SCORE: 18

## 2025-03-03 NOTE — CONSULTS
[facility]  Podiatry Consultation         Darryl Huff DPM    Luke Farias MRN# [id number]   YOB: 1965 Age: 59 year old      Date of Consult: 3/3/2025         Assessment and Plan:   Luke Farias is a 59 year old male who presented with history, exam, laboratory and imaging most consistent with uncontrolled diabetes, ketoacidosis, abscess left foot and ankle with expected early osteomyelitis of the left calcaneus.     Recommend surgical debridement left ankle abscess with intraoperative culture and bone biopsy.  Depending on intraoperative findings I would expect this patient will require long-term IV antibiotics.       ICD-10-CM    1. Diabetic foot infection (H)  E11.628     L08.9       2. Elevated erythrocyte sedimentation rate  R70.0       3. Elevated C-reactive protein (CRP)  R79.82       4. Elevated blood ketone body level  R79.89                   Code Status:   Full Code         Primary Care Physician:   Rancho Painting         Requesting Physician:      No ref. provider found         Chief Complaint:     Chief Complaint   Patient presents with    Wound Check    Foot Pain       History is obtained from the patient         History of Present Illness:   Luke Farias is a 59 year old male who presents on consultation at the request of the Hospitalist Service with ketoacidosis uncontrolled diabetes and draining abscess red hot cellulitis about the left foot and ankle.  Feels this started only a few days ago but notes wounds for for a few weeks.  Patient reported to the emergency department when it became red hot and swollen and worsening.  The past medical history, past surgical history, family history, social history and review of systems was performed and as noted.           Past Medical History:     Past Medical History:   Diagnosis Date    Diabetes (H)     type 2    Seasonal allergies     Allergy inj as a child             Past Surgical History:     Past Surgical  History:   Procedure Laterality Date    COLONOSCOPY N/A 8/19/2019    Procedure: Colonoscopy, With Polypectomy And Biopsy;  Surgeon: Bartolome Templeton MD;  Location: [facility] OR    COLONOSCOPY N/A 11/5/2021    Procedure: Colonoscopy, With Polypectomy And Biopsy;  Surgeon: Lisette Vickers DO;  Location: [facility] OR    COLONOSCOPY N/A 11/5/2021    Procedure: COLONOSCOPY, FLEXIBLE, WITH LESION REMOVAL USING SNARE;  Surgeon: Lsiette Vickers DO;  Location: [facility] OR    COLONOSCOPY N/A 3/26/2024    Procedure: COLONOSCOPY, WITH POLYPECTOMY using snare;  Surgeon: Baltazar Rodriguez MD;  Location: [facility]    COLONOSCOPY WITH CO2 INSUFFLATION N/A 5/12/2017    Procedure: COLONOSCOPY WITH CO2 INSUFFLATION;  Dr. Rancho Painting/BMI: 30.8/Special screening for malignant neoplasms, colon/colonoscopy/[facility]:  [phone];  Surgeon: Krish Galloway MD;  Location: [facility] OR    COLONOSCOPY WITH CO2 INSUFFLATION N/A 1/21/2019    Procedure: COLONOSCOPY WITH CO2 INSUFFLATION;  Surgeon: Bartolome Templeton MD;  Location: [facility]    COLONOSCOPY WITH CO2 INSUFFLATION N/A 8/19/2019    Procedure: COLONOSCOPY, WITH CO2 INSUFFLATION;  Surgeon: Bartolome Templeton MD;  Location: [facility]    COLONOSCOPY WITH CO2 INSUFFLATION N/A 11/5/2021    Procedure: COLONOSCOPY, WITH CO2 INSUFFLATION;  Surgeon: Lisette Vickers DO;  Location: [facility] OR            Home Medications:     Prior to Admission medications    Medication Sig Last Dose Taking? Auth Provider Long Term End Date   aspirin (ASA) 81 MG tablet Take 81 mg by mouth as needed for fever or pain. Past Week Yes Rancho Painting MD     atorvastatin (LIPITOR) 20 MG tablet TAKE 1 TABLET(20 MG) BY MOUTH DAILY 2/28/2025 Morning Yes Rancho Painting MD Yes    blood glucose (NO BRAND SPECIFIED) test strip Use to test blood sugars 4 times daily or as directed due to infection and hyperglycemia Past Month Yes Rancho Painting MD     Blood Glucose Monitoring Suppl (GLUCOMETER ELITE CLASSIC) KIT 1  Device daily. Past Month Yes Rancho Painting MD     glipiZIDE (GLUCOTROL XL) 10 MG 24 hr tablet TAKE 1 TABLET(10 MG) BY MOUTH DAILY 2/28/2025 Morning Yes Rancho Painting MD Yes    metFORMIN (GLUCOPHAGE XR) 500 MG 24 hr tablet TAKE 2 TABLETS(1000 MG) BY MOUTH TWICE DAILY WITH MEALS  Patient taking differently: Take 1,000 mg by mouth 2 times daily (with meals). 2 tablets (1,000 mg) twice daily with meals 2/28/2025 Evening Yes Rancho Painting MD Yes    Continuous Blood Gluc Sensor (FREESTYLE PIERRE 2 SENSOR) MISC 1 each every 14 days 1 each every 14 days. Change every 14 days.  Patient not taking: Reported on 3/3/2025 Not Taking  Rancho Painting MD     glucose blood VI test strips (ASCENSIA AUTODISC VI,ONE TOUCH ULTRA TEST VI) strip by In Vitro route. Test as directed   Rancho Painting MD              Current Medications:         Current Facility-Administered Medications   Medication Dose Route Frequency Provider Last Rate Last Admin    atorvastatin (LIPITOR) tablet 20 mg  20 mg Oral Daily Chris Glynn MD   20 mg at 03/03/25 0813    insulin aspart (NovoLOG) injection (RAPID ACTING)   Subcutaneous TID w/meals Daniela Zaldivar MD   2 Units at 03/03/25 1244    insulin aspart (NovoLOG) injection (RAPID ACTING)  1-10 Units Subcutaneous TID AC Chris Glynn MD   3 Units at 03/02/25 1842    insulin aspart (NovoLOG) injection (RAPID ACTING)  1-7 Units Subcutaneous At Bedtime Chris Glynn MD        insulin glargine (LANTUS PEN) injection 10 Units  10 Units Subcutaneous BID Chris Glynn MD   10 Units at 03/03/25 0822    piperacillin-tazobactam (ZOSYN) 4.5 g vial to attach to  mL bag  4.5 g Intravenous Q6H Chris Glynn MD   4.5 g at 03/03/25 0814    sodium chloride (PF) 0.9% PF flush 3 mL  3 mL Intracatheter Q8H Chris Glynn MD   3 mL at 03/03/25 1222    vancomycin (VANCOCIN) 1,500 mg in 0.9% NaCl 265 mL intermittent infusion  1,500 mg Intravenous Q12H Daniela Zaldivar MD          Current Facility-Administered Medications   Medication Dose Route Frequency Provider Last Rate Last Admin    acetaminophen (TYLENOL) tablet 975 mg  975 mg Oral Q6H PRChris Corea MD        calcium carbonate (TUMS) chewable tablet 1,000 mg  1,000 mg Oral 4x Daily PRN Chris Glynn MD        glucose gel 15-30 g  15-30 g Oral Q15 Min PRN Chris Glynn MD        Or    dextrose 50 % injection 25-50 mL  25-50 mL Intravenous Q15 Min PRN Chris Glynn MD        Or    glucagon injection 1 mg  1 mg Subcutaneous Q15 Min PRChris Corea MD        insulin aspart (NovoLOG) injection (RAPID ACTING)   Subcutaneous With Snacks or Supplements Chris Glynn MD        lidocaine (LMX4) cream   Topical Q1H PRN Chris Glynn MD        lidocaine 1 % 0.1-1 mL  0.1-1 mL Other Q1H PRChris Corea MD        naloxone (NARCAN) injection 0.2 mg  0.2 mg Intravenous Q2 Min PRChris Corea MD        Or    naloxone (NARCAN) injection 0.4 mg  0.4 mg Intravenous Q2 Min PRChris Corea MD        Or    naloxone (NARCAN) injection 0.2 mg  0.2 mg Intramuscular Q2 Min PRChris Corea MD        Or    naloxone (NARCAN) injection 0.4 mg  0.4 mg Intramuscular Q2 Min PRChris Corea MD        ondansetron (ZOFRAN ODT) ODT tab 4 mg  4 mg Oral Q6H PRN Chris Glynn MD        Or    ondansetron (ZOFRAN) injection 4 mg  4 mg Intravenous Q6H PRChris Corea MD        oxyCODONE (ROXICODONE) tablet 5 mg  5 mg Oral Q4H PRChris Corea MD        oxyCODONE IR (ROXICODONE) half-tab 2.5 mg  2.5 mg Oral Q4H PRN Chris Glynn MD        senna-docusate (SENOKOT-S/PERICOLACE) 8.6-50 MG per tablet 1 tablet  1 tablet Oral BID PRN Chris Glynn MD        Or    senna-docusate (SENOKOT-S/PERICOLACE) 8.6-50 MG per tablet 2 tablet  2 tablet Oral BID PRN Chris Glynn MD        sodium chloride (PF) 0.9% PF flush 3 mL  3 mL Intracatheter q1 min prn Chris Glynn MD                Allergies:     Allergies  "  Allergen Reactions    Seasonal Allergies             Social History:   Luke Farias  reports that he has never smoked. He has never used smokeless tobacco. He reports current alcohol use. He reports that he does not use drugs.          Family History:     Family History   Problem Relation Age of Onset    Heart Disease Mother     Diabetes Father              Review of Systems:   The 10 point Review of Systems is negative other than noted in the HPI or here.           Physical Exam:    ,   Vitals: /61 (BP Location: Right arm)   Pulse 70   Temp 98.7  F (37.1  C) (Oral)   Resp 20   Ht 1.905 m (6' 3\")   Wt 96.4 kg (212 lb 9.6 oz)   SpO2 92%   BMI 26.57 kg/m    BMI= Body mass index is 26.57 kg/m .    Constitutional: Awake, alert, no acute distress, and fully cooperative with history & exam.  Psychiatric: The patient is alert and oriented times 3.  The patient's affect is not blunted and mood is appropriate.  Respiratory: Breathing is regular & unlabored while sitting.  HEENT: Hearing is intact to spoken word.  Speech is clear.  No gross evidence of visual impairment that would impact ambulation.    Cardiovascular: DP pulse 1 PT pulse 1 bilateral, CFT 3 sec, temp warm to warm and hot about the left lateral foot and ankle, diminished hair growth but still sparse hair growth on the toes, moderate pitting edema noted bilateral legs and feet, minimal hemosiderin pigmentation noted bilateral legs.   Neurologic: Light touch and plantar response equal intact bilateral.  Protective threshold is diminished.  Skin: Mildly diminished texture turgor tone about the integument of the bilateral lower extremities.  Wounds: Multiple wounds are noted about the left lateral calcaneus with a dry eschar and off-and-on bloody drainage and palpable fluctuance beneath multiple open ulcerations of the lateral left calcaneus.  No pain throughout range of motion of the ankle subtalar midtarsal joint.  Musculoskeletal: Gross " deformities noted patient is ambulatory without crepitus.  Achilles peroneal and posterior tibial tendons appear strong against resistance bilateral    Hemoglobin A1C (%)   Date Value   03/02/2025 16.8 (H)   04/02/2024 13.8 (H)   04/21/2021 8.9 (H)   01/14/2020 10.4 (H)   11/02/2018 6.1 (H)   07/18/2018 11.3 (H)   10/01/2012 8.2 (H)   06/08/2011 6.3 (H)     Creatinine (mg/dL)   Date Value   03/03/2025 0.66 (L)   03/02/2025 0.63 (L)   04/02/2024 0.68   04/21/2021 0.70   11/02/2018 0.72   07/18/2018 0.68   10/01/2012 0.83   01/21/2011 0.76       XRAY:  My interpretation, no open fractures or complete collapse of the calcaneus.  No joint diastases    MRI: My interpretation, abscess about the left lateral calcaneus with reactive edema about the lateral calcaneus suggestive of early osteomyelitis.     Darryl Huff DPM  [facility]/Podiatry    Please schedule for surgery, pre op H&P, and post ops.    Surgery:  Patient Name:  Luke Farias ([id number])  Procedure:   Surgical debridement of abscess and calcaneus left ankle  Bone biopsy left calcaneus  Diagnosis:    Uncontrolled diabetes  Deep abscess left calcaneus and ankle with possible osteomyelitis  Assistant: Single scrub tech  Surgeon:  Darryl Huff DPM  Anesthesia:  MAC with Daria ankle block  PT type:  Inpatient ICU  Time needed: 60 minutes  Patient position:  lying supine  With large black sandbag under hip to keep foot vertical  Mini fluoro:  No  C-arm:  no  Equipment:    pulsed lavage  Jamshidi needle  Cultures  Suture; 3-0, 4-0 Vicryl, 4-0 prolene  Possible quarter inch Nu Gauze drain  Anticoagulation:  No  Vendor:  no  Surgeon Notes: ICU inpatient within 36 hours    Post op appts:    5-7 days po  12-15 days po  approx 4 weeks    Expected work restrictions:  Minimal weightbearing left foot for bathroom privileges only    [facility] Discussed:  NO    Urgency to Schedule: Tuesday afternoon or evening    Specimens:     Aerobic,  anaerobic, acid fast, fungal culture and sensitivity, dry speciman cup, formalin for histology,   Specimens titled : Deep bone cultures left calcaneus abscess       Allergies   Allergen Reactions    Seasonal Allergies      Risk of blood loss: less than 100mL    Dressings:  Adaptic, box of 4x4s, gauze roll, ACE compression      Local:  0.5% marcaine plain for all MAC anesthesia    Tourniquet:  Ankle tourniquet if MAC anesthesia and it will not impede the expected incision placement.     Any communication needed to other team members? No

## 2025-03-03 NOTE — PROGRESS NOTES
[facility]    Medicine Progress Note - Hospitalist Service    Date of Admission:  3/2/2025    Assessment & Plan     Luke Farias is a 59 year old male with type 2 diabetes treated with oral medication and diabetic peripheral neuropathy who presented to the ER on 3/2/2025 after he noticed redness on the top and side of his left foot and was found to have a severe diabetic infection of left foot with SIRS and possible sepsis.  He is at high risk for an adverse outcome including limb threatening infection in the left foot and complications of sepsis, so patient was hospitalized and placed on Zosyn and Vanco for antibiotic coverage.  Discussed with Dr. Huff, podiatrist, and will proceed with MRI of the foot for further evaluation of deeper abscess or osteomyelitis given elevated inflammatory markers and unknown duration of ulceration.  Dr. Huff will see patient after MRI has returned and review options.       Severe diabetic infection of left foot, potentially limb threatening  SIRS, possible sepsis  Open wound left heel  Diabetic peripheral neuropathy  Clinical presentation is suspicious for open wound of left heel with surrounding cellulitis and purulence complicating poorly controlled type 2 diabetes with severe hyperglycemia and diabetic peripheral neuropathy.  He has had chills and presents with leukocytosis concerning for SIRS and sepsis.  He has been using an electric device to sand his foot recently and has purulence on exam increasing concern for both Pseudomonas and MRSA.  MRSA nares swab negative but there is concern for deeper infection involvement with MRI ordered for today.   -Continue high-dose parenteral Zosyn 4.5 mg IV every 6 hours  -Continue vancomycin, pharmacy consulted for dosing  -Ordered podiatry consult - Dr. Huff to see this afternoon following MRI at 11:00 this morning  -Anticipate evaluation by [facility] nurse during hospitalization but this might be deferred  until after surgical intervention (if needed)  -Limit weightbearing on left foot to toe touch down only  -For now, withholding chronic prophylactic dosing of aspirin (which he reports he has not been taking recently) in anticipation of possible surgical intervention    Type 2 diabetes with severe hyperglycemia  Ketonemia  Longstanding type 2 diabetes treated with oral medication.  Hemoglobin A1c 16.8 with initial blood sugar 441.  Serum ketones are positive and anion gap is elevated although he does not have outright metabolic acidosis.  Severe hyperglycemia also puts him at risk for hyperosmolar state.  He is interested and willing to start insulin treatment and blood sugars have improved greatly with Lantus 10 units BID and carb coverage Novolog however ketones worsening on recheck this morning.    -Continue basal insulin with Lantus 10 units twice daily (total dose approximately 0.2 unit/kg/day)  -Continue NovoLog prandial insulin 1 unit per 15 g carbohydrates with meals and snacks  -Continue NovoLog high correction scale but may need to reduce dosing of correction scale after starting Lantus and prandial NovoLog doses  -Not continuing either metformin or glipizide at this time  -Recommend diabetic education during hospitalization and close outpatient follow-up with PCP and diabetic nurse  -recheck ketones this afternoon to ensure improvement - may need to consider insulin drip if ongoing worsening ketosis develops     Left leg swelling  Left lower leg and foot are moderately swollen which is asymmetric compared with right.  Although this could be reactive due to foot infection, DVT is possible and ultrasound has been ordered for today   -Proceed with venous Doppler ultrasound of left lower extremity to evaluate for DVT and if DVT is identified would initiate anticoagulation treatment as appropriate    Hyponatremia  Hypochloremia  Presenting with mild hyponatremia and hypochloremia likely due to recent inadequate  "oral nutritional intake.  He received IV fluid bolus normal saline 1 L in the ER with improvement but not resolution noted   -Ordered recheck electrolytes  -consider further IV fluids if appropriate     Hyperlipidemia  Chronic hyperlipidemia treated with statin  -Continue chronic statin          Diet: Moderate Consistent Carb (60 g CHO per Meal) Diet    DVT Prophylaxis: Pneumatic Compression Devices  Carrillo Catheter: Not present  Lines: None     Cardiac Monitoring: None  Code Status: Full Code      Clinically Significant Risk Factors Present on Admission        # Hypokalemia: Lowest K = 3.2 mmol/L in last 2 days, will replace as needed  # Hyponatremia: Lowest Na = 131 mmol/L in last 2 days, will monitor as appropriate  # Hypochloremia: Lowest Cl = 90 mmol/L in last 2 days, will monitor as appropriate        # Drug Induced Platelet Defect: home medication list includes an antiplatelet medication            # DMII: A1C = 16.8 % (Ref range: <5.7 %) within past 6 months    # Overweight: Estimated body mass index is 26.57 kg/m  as calculated from the following:    Height as of this encounter: 1.905 m (6' 3\").    Weight as of this encounter: 96.4 kg (212 lb 9.6 oz).              Social Drivers of Health            Disposition Plan     Medically Ready for Discharge: Anticipated in 2-4 Days       Daniela Zaldivar MD  Hospitalist Service  [facility]  Securely message with [facility] (more info)  Text page via [employer] Paging/Directory   ______________________________________________________________________    Interval History   Patient has been vitally stable, continues to have no pain secondary to peripheral neuropathy.  Redness has decreased from lines of demarcation.  Blood sugars have improved greatly with Lantus initiation and are in the 100-120 range so far this morning.  Patient has no new concerns.     Physical Exam   Vital Signs: Temp: 98.9  F (37.2  C) Temp src: Oral BP: 119/61 " Pulse: 72   Resp: 20 SpO2: 94 % O2 Device: None (Room air)    Weight: 212 lbs 9.6 oz    Constitutional: awake, alert, cooperative, no apparent distress, and appears stated age  Respiratory: No increased work of breathing, good air exchange, clear to auscultation bilaterally, no crackles or wheezing  Cardiovascular: Normal apical impulse, regular rate and rhythm  GI: bowel sounds present, abdomen soft and non-tender   Skin: patient has unstageable ulceration on the left outer heel with surrounding erythema and warmth but is decreasing from lines of demarcation placed at time of admission.  Patient also has several other deep fissures from severely dry skin on bilateral feet and severe flaking skin noted  Musculoskeletal: no lower extremity pitting edema present  Neurologic: Awake, alert, oriented to name, place and situation     Medical Decision Making       52 MINUTES SPENT BY ME on the date of service doing chart review, history, exam, documentation & further activities per the note.      Data     I have personally reviewed the following data over the past 24 hrs:    13.0 (H)  \   N/A   / N/A     134 (L) 95 (L) 10.6 /  177 (H)   3.4 22 0.66 (L) \

## 2025-03-03 NOTE — PLAN OF CARE
Goal Outcome Evaluation:      Plan of Care Reviewed With: patient    Overall Patient Progress: no changeOverall Patient Progress: no change    Outcome Evaluation: Pt A/Ox4. VSS on RA. Denies pain. Has numbness to bilateral feet. Left foot wound open to air, no drainage noted. Bled minimally last night and nothing since. Redness outline, no change. Scheduled Vancomycin and Zosyn given. MRSA negative, Vancomycin discontinued. BG at 174 and 161. Up independently. Critical result of Ketones 3.39, previous was 2.64. Potassium at 3.2. MD aware and would like to hold off on potassium protocol. No new orders for the critical result.

## 2025-03-03 NOTE — PROGRESS NOTES
S-(situation): Patient arrives to room 256 via cart from ED    B-(background): Wound with cellulitis to left foot - heel.Poorly controlled DMII.    A-(assessment): A/Ox4, VSS on RA, left foot limited wt barring to toes, MRSA swab to lab.     R-(recommendations): Orders reviewed with Patient. Will monitor patient per MD orders.     Inpatient nursing criteria listed below were met:    Health care directives status obtained and documented: No  VTE ordered/documented: Yes  Skin issues/needs documented:Yes  Isolation addressed and Signage used: NA  Fall Prevention: Care plan updated NA Education given and documented NA  Care Plan initiated and Co-Morbidities added: Yes  Education Assessment documented:Yes  Admission Education Documented: Yes  New medication patient education completed and documented (Possible Side Effects of Common Medications handout):  discussed insulin use  Allergies Reviewed: Yes  Admission Medication Reconciliation completed: No  Home medications if not able to send immediately home with family stored here: NA  Reminder note placed in discharge instructions regarding home meds: NA  Individualized care needs/preferences addressed and charted: Yes  Provider Notified that patient has arrived to the unit: Yes

## 2025-03-03 NOTE — PROGRESS NOTES
MRI results have returned and are concerning for abscess and possible osteomyelitis.  Discussed with Dr. Huff and plan is to continue IV antibiotics and perform bone biopsies once blood sugars and ketones controlled and will get virtual ID involvement going forward following bone biopsy results return to determine plan going forward.      Ketones continue to worsen despite blood sugar improvement throughout the day.  Will transition patient into the ICU and start dextrose containing IV fluids and insulin drip for more rapid resolution of ketosis and anticipated surgical intervention for bone biopsies tomorrow.  Discussed with patient and his wife and all questions answered.  Transition orders for ICU and insulin drip placed.     Electronically Signed:  Daniela Zaldivar MD

## 2025-03-03 NOTE — MEDICATION SCRIBE - ADMISSION MEDICATION HISTORY
Medication Scribe Admission Medication History    Admission medication history is complete. The information provided in this note is only as accurate as the sources available at the time of the update.    Information Source(s): Patient via phone    Pertinent Information:     Changes made to PTA medication list:  Added: None  Deleted: None  Changed: None    Allergies reviewed with patient and updates made in EHR: yes    Medication History Completed By: YANELIS ANN 3/3/2025 12:28 PM    PTA Med List   Medication Sig Note Last Dose/Taking    aspirin (ASA) 81 MG tablet Take 81 mg by mouth as needed for fever or pain.  Past Week    atorvastatin (LIPITOR) 20 MG tablet TAKE 1 TABLET(20 MG) BY MOUTH DAILY  2/28/2025 Morning    blood glucose (NO BRAND SPECIFIED) test strip Use to test blood sugars 4 times daily or as directed due to infection and hyperglycemia 3/3/2025: Does not check often  Past Month    Blood Glucose Monitoring Suppl (GLUCOMETER ELITE CLASSIC) KIT 1 Device daily.  Past Month    glipiZIDE (GLUCOTROL XL) 10 MG 24 hr tablet TAKE 1 TABLET(10 MG) BY MOUTH DAILY  2/28/2025 Morning    metFORMIN (GLUCOPHAGE XR) 500 MG 24 hr tablet TAKE 2 TABLETS(1000 MG) BY MOUTH TWICE DAILY WITH MEALS (Patient taking differently: Take 1,000 mg by mouth 2 times daily (with meals). 2 tablets (1,000 mg) twice daily with meals)  2/28/2025 Evening

## 2025-03-03 NOTE — PHARMACY-VANCOMYCIN DOSING SERVICE
"Pharmacy Vancomycin Initial Note  Date of Service 2025  Patient's  1965  59 year old, male    Indication: Sepsis and Skin and Soft Tissue Infection    Current estimated CrCl = Estimated Creatinine Clearance: 175.9 mL/min (A) (based on SCr of 0.63 mg/dL (L)).    Creatinine for last 3 days  3/2/2025: 11:40 AM Creatinine 0.63 mg/dL    Recent Vancomycin Level(s) for last 3 days  No results found for requested labs within last 3 days.      Vancomycin IV Administrations (past 72 hours)        No vancomycin orders with administrations in past 72 hours.                    Nephrotoxins and other renal medications (From now, onward)      Start     Dose/Rate Route Frequency Ordered Stop    25  piperacillin-tazobactam (ZOSYN) 4.5 g vial to attach to  mL bag        Note to Pharmacy: For [facility], [facility] and [facility]: For Zosyn-naive patients, use the \"Zosyn initial dose + extended infusion\" order panel.    4.5 g  over 30 Minutes Intravenous EVERY 6 HOURS 25 18125 1900  vancomycin (VANCOCIN) 1,500 mg in 0.9% NaCl 265 mL intermittent infusion         1,500 mg  over 90 Minutes Intravenous EVERY 12 HOURS 25 1817              Contrast Orders - past 72 hours (72h ago, onward)      None         [employer] Prediction of Planned Initial Vancomycin Regimen   Loading dose: N/A  Regimen: 1500 mg IV every 12 hours.  Start time: 1830 on 2025  Exposure target: AUC24 (range)400-600 mg/L.hr   AUC24,ss: 547 mg/L.hr  Probability of AUC24 > 400: 81 %  Ctrough,ss: 16.6 mg/L  Probability of Ctrough,ss > 20: 35 %  Probability of nephrotoxicity (Lodise HAILEY ): 12 %              Plan:  Start vancomycin  1500 mg IV q12h.   Vancomycin monitoring method: AUC  Vancomycin therapeutic monitoring goal: 400-600 mg*h/L  Pharmacy will check vancomycin levels as appropriate in 1-3 Days.    Serum creatinine levels will be ordered daily for the first week of therapy and at least twice weekly for subsequent " weeks.      Lionel Santo, [facility]

## 2025-03-04 ENCOUNTER — ANESTHESIA (OUTPATIENT)
Dept: SURGERY | Facility: CLINIC | Age: 60
End: 2025-03-04
Payer: COMMERCIAL

## 2025-03-04 ENCOUNTER — HOME INFUSION (OUTPATIENT)
Dept: HOME HEALTH SERVICES | Facility: HOME HEALTH | Age: 60
End: 2025-03-04
Payer: COMMERCIAL

## 2025-03-04 ENCOUNTER — APPOINTMENT (OUTPATIENT)
Dept: GENERAL RADIOLOGY | Facility: CLINIC | Age: 60
DRG: 854 | End: 2025-03-04
Attending: FAMILY MEDICINE
Payer: COMMERCIAL

## 2025-03-04 DIAGNOSIS — M86.9 OSTEOMYELITIS (H): Primary | ICD-10-CM

## 2025-03-04 LAB
ANION GAP SERPL CALCULATED.3IONS-SCNC: 14 MMOL/L (ref 7–15)
B-OH-BUTYR SERPL-SCNC: 0.78 MMOL/L
BASE EXCESS BLDV CALC-SCNC: 2.2 MMOL/L (ref -3–3)
BUN SERPL-MCNC: 8.7 MG/DL (ref 6–20)
CALCIUM SERPL-MCNC: 8.4 MG/DL (ref 8.8–10.4)
CHLORIDE SERPL-SCNC: 99 MMOL/L (ref 98–107)
CREAT SERPL-MCNC: 0.55 MG/DL (ref 0.67–1.17)
CRP SERPL-MCNC: 121.45 MG/L
EGFRCR SERPLBLD CKD-EPI 2021: >90 ML/MIN/1.73M2
ERYTHROCYTE [DISTWIDTH] IN BLOOD BY AUTOMATED COUNT: 12.8 % (ref 10–15)
GLUCOSE BLDC GLUCOMTR-MCNC: 105 MG/DL (ref 70–99)
GLUCOSE BLDC GLUCOMTR-MCNC: 111 MG/DL (ref 70–99)
GLUCOSE BLDC GLUCOMTR-MCNC: 113 MG/DL (ref 70–99)
GLUCOSE BLDC GLUCOMTR-MCNC: 117 MG/DL (ref 70–99)
GLUCOSE BLDC GLUCOMTR-MCNC: 122 MG/DL (ref 70–99)
GLUCOSE BLDC GLUCOMTR-MCNC: 124 MG/DL (ref 70–99)
GLUCOSE BLDC GLUCOMTR-MCNC: 126 MG/DL (ref 70–99)
GLUCOSE BLDC GLUCOMTR-MCNC: 127 MG/DL (ref 70–99)
GLUCOSE BLDC GLUCOMTR-MCNC: 127 MG/DL (ref 70–99)
GLUCOSE BLDC GLUCOMTR-MCNC: 130 MG/DL (ref 70–99)
GLUCOSE BLDC GLUCOMTR-MCNC: 141 MG/DL (ref 70–99)
GLUCOSE BLDC GLUCOMTR-MCNC: 149 MG/DL (ref 70–99)
GLUCOSE BLDC GLUCOMTR-MCNC: 151 MG/DL (ref 70–99)
GLUCOSE BLDC GLUCOMTR-MCNC: 175 MG/DL (ref 70–99)
GLUCOSE BLDC GLUCOMTR-MCNC: 91 MG/DL (ref 70–99)
GLUCOSE SERPL-MCNC: 141 MG/DL (ref 70–99)
HCO3 BLDV-SCNC: 26 MMOL/L (ref 21–28)
HCO3 SERPL-SCNC: 20 MMOL/L (ref 22–29)
HCT VFR BLD AUTO: 33.7 % (ref 40–53)
HGB BLD-MCNC: 11.8 G/DL (ref 13.3–17.7)
MCH RBC QN AUTO: 28.6 PG (ref 26.5–33)
MCHC RBC AUTO-ENTMCNC: 35 G/DL (ref 31.5–36.5)
MCV RBC AUTO: 82 FL (ref 78–100)
O2/TOTAL GAS SETTING VFR VENT: 26 %
OXYHGB MFR BLDV: 88 % (ref 70–75)
PCO2 BLDV: 36 MM HG (ref 40–50)
PH BLDV: 7.46 [PH] (ref 7.32–7.43)
PLATELET # BLD AUTO: 241 10E3/UL (ref 150–450)
PO2 BLDV: 54 MM HG (ref 25–47)
POTASSIUM SERPL-SCNC: 3.3 MMOL/L (ref 3.4–5.3)
POTASSIUM SERPL-SCNC: 3.8 MMOL/L (ref 3.4–5.3)
RBC # BLD AUTO: 4.13 10E6/UL (ref 4.4–5.9)
SAO2 % BLDV: 88.7 % (ref 70–75)
SODIUM SERPL-SCNC: 133 MMOL/L (ref 135–145)
WBC # BLD AUTO: 12.8 10E3/UL (ref 4–11)

## 2025-03-04 PROCEDURE — 250N000013 HC RX MED GY IP 250 OP 250 PS 637: Performed by: PODIATRIST

## 2025-03-04 PROCEDURE — 85014 HEMATOCRIT: CPT | Performed by: FAMILY MEDICINE

## 2025-03-04 PROCEDURE — 80048 BASIC METABOLIC PNL TOTAL CA: CPT | Performed by: FAMILY MEDICINE

## 2025-03-04 PROCEDURE — 360N000075 HC SURGERY LEVEL 2, PER MIN: Performed by: PODIATRIST

## 2025-03-04 PROCEDURE — 370N000017 HC ANESTHESIA TECHNICAL FEE, PER MIN: Performed by: PODIATRIST

## 2025-03-04 PROCEDURE — 250N000013 HC RX MED GY IP 250 OP 250 PS 637: Performed by: FAMILY MEDICINE

## 2025-03-04 PROCEDURE — 20220 BONE BIOPSY TROCAR/NDL SUPFC: CPT | Mod: LT | Performed by: PODIATRIST

## 2025-03-04 PROCEDURE — 250N000011 HC RX IP 250 OP 636: Performed by: NURSE ANESTHETIST, CERTIFIED REGISTERED

## 2025-03-04 PROCEDURE — 250N000009 HC RX 250: Performed by: FAMILY MEDICINE

## 2025-03-04 PROCEDURE — 258N000003 HC RX IP 258 OP 636: Performed by: NURSE ANESTHETIST, CERTIFIED REGISTERED

## 2025-03-04 PROCEDURE — 200N000001 HC R&B ICU

## 2025-03-04 PROCEDURE — 250N000011 HC RX IP 250 OP 636: Performed by: FAMILY MEDICINE

## 2025-03-04 PROCEDURE — 71045 X-RAY EXAM CHEST 1 VIEW: CPT

## 2025-03-04 PROCEDURE — 0Q9M0ZZ DRAINAGE OF LEFT TARSAL, OPEN APPROACH: ICD-10-PCS | Performed by: PODIATRIST

## 2025-03-04 PROCEDURE — 87075 CULTR BACTERIA EXCEPT BLOOD: CPT | Performed by: PODIATRIST

## 2025-03-04 PROCEDURE — 87102 FUNGUS ISOLATION CULTURE: CPT | Performed by: PODIATRIST

## 2025-03-04 PROCEDURE — 82010 KETONE BODYS QUAN: CPT | Performed by: FAMILY MEDICINE

## 2025-03-04 PROCEDURE — 87040 BLOOD CULTURE FOR BACTERIA: CPT | Performed by: FAMILY MEDICINE

## 2025-03-04 PROCEDURE — 272N000001 HC OR GENERAL SUPPLY STERILE: Performed by: PODIATRIST

## 2025-03-04 PROCEDURE — 250N000011 HC RX IP 250 OP 636: Performed by: PODIATRIST

## 2025-03-04 PROCEDURE — 84132 ASSAY OF SERUM POTASSIUM: CPT | Performed by: FAMILY MEDICINE

## 2025-03-04 PROCEDURE — 82805 BLOOD GASES W/O2 SATURATION: CPT | Performed by: FAMILY MEDICINE

## 2025-03-04 PROCEDURE — 0QBM0ZX EXCISION OF LEFT TARSAL, OPEN APPROACH, DIAGNOSTIC: ICD-10-PCS | Performed by: PODIATRIST

## 2025-03-04 PROCEDURE — 258N000003 HC RX IP 258 OP 636: Performed by: FAMILY MEDICINE

## 2025-03-04 PROCEDURE — 87205 SMEAR GRAM STAIN: CPT | Performed by: PODIATRIST

## 2025-03-04 PROCEDURE — 88307 TISSUE EXAM BY PATHOLOGIST: CPT | Mod: TC | Performed by: PODIATRIST

## 2025-03-04 PROCEDURE — 87070 CULTURE OTHR SPECIMN AEROBIC: CPT | Performed by: PODIATRIST

## 2025-03-04 PROCEDURE — 99233 SBSQ HOSP IP/OBS HIGH 50: CPT | Performed by: FAMILY MEDICINE

## 2025-03-04 PROCEDURE — 93005 ELECTROCARDIOGRAM TRACING: CPT

## 2025-03-04 PROCEDURE — 87206 SMEAR FLUORESCENT/ACID STAI: CPT | Performed by: PODIATRIST

## 2025-03-04 PROCEDURE — 28002 TREATMENT OF FOOT INFECTION: CPT | Mod: LT | Performed by: PODIATRIST

## 2025-03-04 PROCEDURE — 86140 C-REACTIVE PROTEIN: CPT | Performed by: FAMILY MEDICINE

## 2025-03-04 PROCEDURE — 36415 COLL VENOUS BLD VENIPUNCTURE: CPT | Performed by: FAMILY MEDICINE

## 2025-03-04 RX ORDER — BUPIVACAINE HYDROCHLORIDE 5 MG/ML
INJECTION, SOLUTION PERINEURAL PRN
Status: DISCONTINUED | OUTPATIENT
Start: 2025-03-04 | End: 2025-03-04 | Stop reason: HOSPADM

## 2025-03-04 RX ORDER — FENTANYL CITRATE 50 UG/ML
50 INJECTION, SOLUTION INTRAMUSCULAR; INTRAVENOUS
Status: DISCONTINUED | OUTPATIENT
Start: 2025-03-04 | End: 2025-03-04

## 2025-03-04 RX ORDER — DEXAMETHASONE SODIUM PHOSPHATE 10 MG/ML
4 INJECTION, SOLUTION INTRAMUSCULAR; INTRAVENOUS
Status: DISCONTINUED | OUTPATIENT
Start: 2025-03-04 | End: 2025-03-04

## 2025-03-04 RX ORDER — PROPOFOL 10 MG/ML
INJECTION, EMULSION INTRAVENOUS PRN
Status: DISCONTINUED | OUTPATIENT
Start: 2025-03-04 | End: 2025-03-04

## 2025-03-04 RX ORDER — ONDANSETRON 2 MG/ML
4 INJECTION INTRAMUSCULAR; INTRAVENOUS EVERY 30 MIN PRN
Status: DISCONTINUED | OUTPATIENT
Start: 2025-03-04 | End: 2025-03-04

## 2025-03-04 RX ORDER — POTASSIUM CHLORIDE 1500 MG/1
40 TABLET, EXTENDED RELEASE ORAL ONCE
Status: COMPLETED | OUTPATIENT
Start: 2025-03-04 | End: 2025-03-04

## 2025-03-04 RX ORDER — CEFAZOLIN SODIUM 2 G/50ML
2 SOLUTION INTRAVENOUS
Status: DISCONTINUED | OUTPATIENT
Start: 2025-03-04 | End: 2025-03-04

## 2025-03-04 RX ORDER — POTASSIUM CHLORIDE 1500 MG/1
20 TABLET, EXTENDED RELEASE ORAL ONCE
Status: COMPLETED | OUTPATIENT
Start: 2025-03-04 | End: 2025-03-04

## 2025-03-04 RX ORDER — PROPOFOL 10 MG/ML
INJECTION, EMULSION INTRAVENOUS CONTINUOUS PRN
Status: DISCONTINUED | OUTPATIENT
Start: 2025-03-04 | End: 2025-03-04

## 2025-03-04 RX ORDER — FUROSEMIDE 10 MG/ML
20 INJECTION INTRAMUSCULAR; INTRAVENOUS ONCE
Status: COMPLETED | OUTPATIENT
Start: 2025-03-04 | End: 2025-03-04

## 2025-03-04 RX ORDER — CEFAZOLIN SODIUM 1 G/3ML
INJECTION, POWDER, FOR SOLUTION INTRAMUSCULAR; INTRAVENOUS PRN
Status: DISCONTINUED | OUTPATIENT
Start: 2025-03-04 | End: 2025-03-04

## 2025-03-04 RX ORDER — ONDANSETRON 4 MG/1
4 TABLET, ORALLY DISINTEGRATING ORAL EVERY 30 MIN PRN
Status: DISCONTINUED | OUTPATIENT
Start: 2025-03-04 | End: 2025-03-04

## 2025-03-04 RX ORDER — CEFAZOLIN SODIUM 2 G/50ML
2 SOLUTION INTRAVENOUS SEE ADMIN INSTRUCTIONS
Status: DISCONTINUED | OUTPATIENT
Start: 2025-03-04 | End: 2025-03-04

## 2025-03-04 RX ORDER — FENTANYL CITRATE 50 UG/ML
INJECTION, SOLUTION INTRAMUSCULAR; INTRAVENOUS PRN
Status: DISCONTINUED | OUTPATIENT
Start: 2025-03-04 | End: 2025-03-04

## 2025-03-04 RX ORDER — SODIUM CHLORIDE, SODIUM LACTATE, POTASSIUM CHLORIDE, CALCIUM CHLORIDE 600; 310; 30; 20 MG/100ML; MG/100ML; MG/100ML; MG/100ML
INJECTION, SOLUTION INTRAVENOUS CONTINUOUS PRN
Status: DISCONTINUED | OUTPATIENT
Start: 2025-03-04 | End: 2025-03-04

## 2025-03-04 RX ORDER — NALOXONE HYDROCHLORIDE 0.4 MG/ML
0.1 INJECTION, SOLUTION INTRAMUSCULAR; INTRAVENOUS; SUBCUTANEOUS
Status: DISCONTINUED | OUTPATIENT
Start: 2025-03-04 | End: 2025-03-04

## 2025-03-04 RX ADMIN — SODIUM CHLORIDE, POTASSIUM CHLORIDE, SODIUM LACTATE AND CALCIUM CHLORIDE: 600; 310; 30; 20 INJECTION, SOLUTION INTRAVENOUS at 14:43

## 2025-03-04 RX ADMIN — PIPERACILLIN AND TAZOBACTAM 4.5 G: 4; .5 INJECTION, POWDER, FOR SOLUTION INTRAVENOUS at 01:31

## 2025-03-04 RX ADMIN — DEXTROSE, SODIUM CHLORIDE, AND POTASSIUM CHLORIDE: 5; .45; .15 INJECTION INTRAVENOUS at 05:18

## 2025-03-04 RX ADMIN — PIPERACILLIN AND TAZOBACTAM 4.5 G: 4; .5 INJECTION, POWDER, FOR SOLUTION INTRAVENOUS at 08:48

## 2025-03-04 RX ADMIN — PROPOFOL 30 MG: 10 INJECTION, EMULSION INTRAVENOUS at 14:50

## 2025-03-04 RX ADMIN — PIPERACILLIN AND TAZOBACTAM 4.5 G: 4; .5 INJECTION, POWDER, FOR SOLUTION INTRAVENOUS at 20:53

## 2025-03-04 RX ADMIN — DEXTROSE, SODIUM CHLORIDE, AND POTASSIUM CHLORIDE: 5; .45; .15 INJECTION INTRAVENOUS at 16:16

## 2025-03-04 RX ADMIN — ATORVASTATIN CALCIUM 20 MG: 10 TABLET, FILM COATED ORAL at 08:48

## 2025-03-04 RX ADMIN — CEFAZOLIN 2 G: 1 INJECTION, POWDER, FOR SOLUTION INTRAMUSCULAR; INTRAVENOUS at 14:43

## 2025-03-04 RX ADMIN — POTASSIUM CHLORIDE 20 MEQ: 1500 TABLET, EXTENDED RELEASE ORAL at 16:27

## 2025-03-04 RX ADMIN — INSULIN HUMAN 2 UNITS/HR: 1 INJECTION, SOLUTION INTRAVENOUS at 16:04

## 2025-03-04 RX ADMIN — VANCOMYCIN HYDROCHLORIDE 1500 MG: 1 INJECTION, POWDER, LYOPHILIZED, FOR SOLUTION INTRAVENOUS at 02:20

## 2025-03-04 RX ADMIN — MIDAZOLAM 2 MG: 1 INJECTION INTRAMUSCULAR; INTRAVENOUS at 14:40

## 2025-03-04 RX ADMIN — PROPOFOL 75 MCG/KG/MIN: 10 INJECTION, EMULSION INTRAVENOUS at 14:52

## 2025-03-04 RX ADMIN — ACETAMINOPHEN 975 MG: 325 TABLET, FILM COATED ORAL at 22:54

## 2025-03-04 RX ADMIN — FENTANYL CITRATE 50 MCG: 50 INJECTION INTRAMUSCULAR; INTRAVENOUS at 14:43

## 2025-03-04 RX ADMIN — VANCOMYCIN HYDROCHLORIDE 1500 MG: 1 INJECTION, POWDER, LYOPHILIZED, FOR SOLUTION INTRAVENOUS at 14:20

## 2025-03-04 RX ADMIN — PIPERACILLIN AND TAZOBACTAM 4.5 G: 4; .5 INJECTION, POWDER, FOR SOLUTION INTRAVENOUS at 16:16

## 2025-03-04 RX ADMIN — POTASSIUM CHLORIDE 40 MEQ: 1500 TABLET, EXTENDED RELEASE ORAL at 06:54

## 2025-03-04 RX ADMIN — FUROSEMIDE 20 MG: 10 INJECTION, SOLUTION INTRAMUSCULAR; INTRAVENOUS at 12:08

## 2025-03-04 ASSESSMENT — ACTIVITIES OF DAILY LIVING (ADL)
ADLS_ACUITY_SCORE: 19
ADLS_ACUITY_SCORE: 26
ADLS_ACUITY_SCORE: 19
ADLS_ACUITY_SCORE: 21
ADLS_ACUITY_SCORE: 19
ADLS_ACUITY_SCORE: 26
ADLS_ACUITY_SCORE: 19
ADLS_ACUITY_SCORE: 26
ADLS_ACUITY_SCORE: 19
ADLS_ACUITY_SCORE: 26
ADLS_ACUITY_SCORE: 21
ADLS_ACUITY_SCORE: 21

## 2025-03-04 NOTE — PROGRESS NOTES
[facility]  Podiatry Consultation         Darryl Huff DPM    Luke Farias MRN# [id number]   YOB: 1965 Age: 59 year old      Date of Consult: 3/3/2025         Assessment and Plan:   Luke Farias is a 59 year old male who presented with history, exam, laboratory and imaging most consistent with uncontrolled diabetes, ketoacidosis, abscess left foot and ankle with expected early osteomyelitis of the left calcaneus.     Recommend surgical debridement left ankle abscess with intraoperative culture and bone biopsy.  Depending on intraoperative findings I would expect this patient will require long-term IV antibiotics.       ICD-10-CM    1. Diabetic foot infection (H)  E11.628 Case Request: Surgical debridement left calcaneus and left ankle with bone biopsy left calcaneus    L08.9 Case Request: Surgical debridement left calcaneus and left ankle with bone biopsy left calcaneus      2. Elevated erythrocyte sedimentation rate  R70.0       3. Elevated C-reactive protein (CRP)  R79.82 Case Request: Surgical debridement left calcaneus and left ankle with bone biopsy left calcaneus     Case Request: Surgical debridement left calcaneus and left ankle with bone biopsy left calcaneus      4. Elevated blood ketone body level  R79.89       5. Diabetes mellitus with osteomyelitis (H)  E11.69 Case Request: Surgical debridement left calcaneus and left ankle with bone biopsy left calcaneus    M86.9 Case Request: Surgical debridement left calcaneus and left ankle with bone biopsy left calcaneus                  Code Status:   Full Code         Primary Care Physician:   Rancho Painting         Requesting Physician:      No ref. provider found         Chief Complaint:     Chief Complaint   Patient presents with    Wound Check    Foot Pain       History is obtained from the patient         History of Present Illness:   Luke Farias is a 59 year old male who presents on consultation at the request of  the Hospitalist Service with ketoacidosis uncontrolled diabetes and draining abscess red hot cellulitis about the left foot and ankle.  Feels this started only a few days ago but notes wounds for for a few weeks.  Patient reported to the emergency department when it became red hot and swollen and worsening.  The past medical history, past surgical history, family history, social history and review of systems was performed and as noted.      3/4/2025  I obtained informed consent to treat Diabetic foot infection (H) [E11.628, L08.9]  Elevated C-reactive protein (CRP) [R79.82]  Diabetes mellitus with osteomyelitis (H) [E11.69, M86.9] with treatment of Procedure(s):  Surgical debridement left calcaneus and left ankle  with bone biopsy left calcaneus.  I discussed with the patient potential risks and complications as well as alternative treatment options and post op care requirements.   The patients clinical exam and indications are unchanged.  I answered all questions for the patient.  No guarantees were given or implied.  They wish to proceed with surgical treatment.  They have been NPO for 8 hours or more.  No contraindications to surgical treatment at this time.               Past Medical History:     Past Medical History:   Diagnosis Date    Diabetes (H)     type 2    Seasonal allergies     Allergy inj as a child             Past Surgical History:     Past Surgical History:   Procedure Laterality Date    COLONOSCOPY N/A 8/19/2019    Procedure: Colonoscopy, With Polypectomy And Biopsy;  Surgeon: Bartolome Templeton MD;  Location: [facility]    COLONOSCOPY N/A 11/5/2021    Procedure: Colonoscopy, With Polypectomy And Biopsy;  Surgeon: Lisette Vickers DO;  Location: [facility] OR    COLONOSCOPY N/A 11/5/2021    Procedure: COLONOSCOPY, FLEXIBLE, WITH LESION REMOVAL USING SNARE;  Surgeon: Lisette Vickers DO;  Location: [facility]    COLONOSCOPY N/A 3/26/2024    Procedure: COLONOSCOPY, WITH POLYPECTOMY using snare;  Surgeon: Michael  Baltazar KAUR MD;  Location: [facility]    COLONOSCOPY WITH CO2 INSUFFLATION N/A 5/12/2017    Procedure: COLONOSCOPY WITH CO2 INSUFFLATION;  Dr. Rancho Painting/BMI: 30.8/Special screening for malignant neoplasms, colon/colonoscopy/[facility]:  [phone];  Surgeon: Krish Galloway MD;  Location: [facility] OR    COLONOSCOPY WITH CO2 INSUFFLATION N/A 1/21/2019    Procedure: COLONOSCOPY WITH CO2 INSUFFLATION;  Surgeon: Bartolome Templeton MD;  Location: [facility]    COLONOSCOPY WITH CO2 INSUFFLATION N/A 8/19/2019    Procedure: COLONOSCOPY, WITH CO2 INSUFFLATION;  Surgeon: Bartolome Templeton MD;  Location: [facility]    COLONOSCOPY WITH CO2 INSUFFLATION N/A 11/5/2021    Procedure: COLONOSCOPY, WITH CO2 INSUFFLATION;  Surgeon: Lisette Vickers DO;  Location: [facility]            Home Medications:     Prior to Admission medications    Medication Sig Last Dose Taking? Auth Provider Long Term End Date   aspirin (ASA) 81 MG tablet Take 81 mg by mouth as needed for fever or pain. Past Week Yes Rancho Painting MD     atorvastatin (LIPITOR) 20 MG tablet TAKE 1 TABLET(20 MG) BY MOUTH DAILY 2/28/2025 Morning Yes Rancho Painting MD Yes    blood glucose (NO BRAND SPECIFIED) test strip Use to test blood sugars 4 times daily or as directed due to infection and hyperglycemia Past Month Yes Rancho Painting MD     Blood Glucose Monitoring Suppl (GLUCOMETER ELITE CLASSIC) KIT 1 Device daily. Past Month Yes Rancho Painting MD     glipiZIDE (GLUCOTROL XL) 10 MG 24 hr tablet TAKE 1 TABLET(10 MG) BY MOUTH DAILY 2/28/2025 Morning Yes Rancho Painting MD Yes    metFORMIN (GLUCOPHAGE XR) 500 MG 24 hr tablet TAKE 2 TABLETS(1000 MG) BY MOUTH TWICE DAILY WITH MEALS  Patient taking differently: Take 1,000 mg by mouth 2 times daily (with meals). 2 tablets (1,000 mg) twice daily with meals 2/28/2025 Evening Yes Rancho Painting MD Yes    Continuous Blood Gluc Sensor (FREESTYLE PIERRE 2 SENSOR) MISC 1 each every 14 days 1 each every 14 days.  Change every 14 days.  Patient not taking: Reported on 3/3/2025 Not Taking  Rancho Painting MD     glucose blood VI test strips (ASCENSIA AUTODISC VI,ONE TOUCH ULTRA TEST VI) strip by In Vitro route. Test as directed   Rancho Painting MD              Current Medications:         Current Facility-Administered Medications   Medication Dose Route Frequency Provider Last Rate Last Admin    atorvastatin (LIPITOR) tablet 20 mg  20 mg Oral Daily Daniela Zaldivar MD   20 mg at 03/04/25 0848    [Held by provider] insulin aspart (NovoLOG) injection (RAPID ACTING)   Subcutaneous TID w/meals Daniela Zaldivar MD   2 Units at 03/03/25 1244    [Held by provider] insulin aspart (NovoLOG) injection (RAPID ACTING)  1-10 Units Subcutaneous TID AC Daniela Zaldivar MD   3 Units at 03/02/25 1842    [Held by provider] insulin aspart (NovoLOG) injection (RAPID ACTING)  1-7 Units Subcutaneous At Bedtime Daniela Zaldivar MD        [Held by provider] insulin glargine (LANTUS PEN) injection 10 Units  10 Units Subcutaneous BID Daniela Zaldivar MD   10 Units at 03/03/25 0822    piperacillin-tazobactam (ZOSYN) 4.5 g vial to attach to  mL bag  4.5 g Intravenous Q6H Daniela Zaldivar MD   4.5 g at 03/04/25 0848    sodium chloride (PF) 0.9% PF flush 3 mL  3 mL Intracatheter Q8H Daniela Zaldivar MD   3 mL at 03/04/25 0848    vancomycin (VANCOCIN) 1,500 mg in 0.9% NaCl 265 mL intermittent infusion  1,500 mg Intravenous Q12H Daniela Zaldivar MD   1,500 mg at 03/04/25 1420     Current Facility-Administered Medications   Medication Dose Route Frequency Provider Last Rate Last Admin    acetaminophen (TYLENOL) tablet 975 mg  975 mg Oral Q6H PRN Daniela Zaldivar MD        calcium carbonate (TUMS) chewable tablet 1,000 mg  1,000 mg Oral 4x Daily PRN Daniela Zaldivare, MD        glucose gel 15-30 g  15-30 g Oral Q15 Min Daniela Bean  MD Maeve        Or    dextrose 50 % injection 25-50 mL  25-50 mL Intravenous Q15 Min PRN Daniela Zaldivar MD        Or    glucagon injection 1 mg  1 mg Subcutaneous Q15 Min PRN Daniela Zaldivar MD        [Held by provider] insulin aspart (NovoLOG) injection (RAPID ACTING)   Subcutaneous With Snacks or Supplements Daniela Zaldivar MD        lidocaine (LMX4) cream   Topical Q1H PRN Daniela Zaldivar MD        lidocaine 1 % 0.1-1 mL  0.1-1 mL Other Q1H PRN Daniela Zaldivar MD        naloxone (NARCAN) injection 0.2 mg  0.2 mg Intravenous Q2 Min PRN Daniela Zaldivar MD        Or    naloxone (NARCAN) injection 0.4 mg  0.4 mg Intravenous Q2 Min PRN Daniela Zaldivar MD        Or    naloxone (NARCAN) injection 0.2 mg  0.2 mg Intramuscular Q2 Min PRN Daniela Zaldivar MD        Or    naloxone (NARCAN) injection 0.4 mg  0.4 mg Intramuscular Q2 Min PRN Daniela Zaldivar MD        ondansetron (ZOFRAN ODT) ODT tab 4 mg  4 mg Oral Q6H PRN Daniela Zaldivar MD        Or    ondansetron (ZOFRAN) injection 4 mg  4 mg Intravenous Q6H PRDaniela Reddy MD        oxyCODONE (ROXICODONE) tablet 5 mg  5 mg Oral Q4H PRDaniela Reddy MD        oxyCODONE IR (ROXICODONE) half-tab 2.5 mg  2.5 mg Oral Q4H PRDaniela Reddy MD        senna-docusate (SENOKOT-S/PERICOLACE) 8.6-50 MG per tablet 1 tablet  1 tablet Oral BID PRDanieal Reddy MD        Or    senna-docusate (SENOKOT-S/PERICOLACE) 8.6-50 MG per tablet 2 tablet  2 tablet Oral BID PRDaniela Reddy MD        sodium chloride (PF) 0.9% PF flush 3 mL  3 mL Intracatheter q1 min prn Daniela Zaldivar MD                Allergies:     Allergies   Allergen Reactions    Seasonal Allergies             Social History:   Luke Farias  reports that he has never smoked. He has never used smokeless tobacco. He reports  "current alcohol use. He reports that he does not use drugs.          Family History:     Family History   Problem Relation Age of Onset    Heart Disease Mother     Diabetes Father              Review of Systems:   The 10 point Review of Systems is negative other than noted in the HPI or here.           Physical Exam:    ,   Vitals: /86   Pulse 73   Temp 99.5  F (37.5  C) (Oral)   Resp 20   Ht 1.905 m (6' 3\")   Wt 99.8 kg (220 lb)   SpO2 95%   BMI 27.50 kg/m    BMI= Body mass index is 27.5 kg/m .    Constitutional: Awake, alert, no acute distress, and fully cooperative with history & exam.  Psychiatric: The patient is alert and oriented times 3.  The patient's affect is not blunted and mood is appropriate.  Respiratory: Breathing is regular & unlabored while sitting.  HEENT: Hearing is intact to spoken word.  Speech is clear.  No gross evidence of visual impairment that would impact ambulation.    Cardiovascular: DP pulse 1 PT pulse 1 bilateral, CFT 3 sec, temp warm to warm and hot about the left lateral foot and ankle, diminished hair growth but still sparse hair growth on the toes, moderate pitting edema noted bilateral legs and feet, minimal hemosiderin pigmentation noted bilateral legs.   Neurologic: Light touch and plantar response equal intact bilateral.  Protective threshold is diminished.  Skin: Mildly diminished texture turgor tone about the integument of the bilateral lower extremities.  Wounds: Multiple wounds are noted about the left lateral calcaneus with a dry eschar and off-and-on bloody drainage and palpable fluctuance beneath multiple open ulcerations of the lateral left calcaneus.  No pain throughout range of motion of the ankle subtalar midtarsal joint.  Musculoskeletal: Gross deformities noted patient is ambulatory without crepitus.  Achilles peroneal and posterior tibial tendons appear strong against resistance bilateral    Hemoglobin A1C (%)   Date Value   03/02/2025 16.8 (H) "   04/02/2024 13.8 (H)   04/21/2021 8.9 (H)   01/14/2020 10.4 (H)   11/02/2018 6.1 (H)   07/18/2018 11.3 (H)   10/01/2012 8.2 (H)   06/08/2011 6.3 (H)     Creatinine (mg/dL)   Date Value   03/04/2025 0.55 (L)   03/03/2025 0.66 (L)   03/02/2025 0.63 (L)   04/02/2024 0.68   04/21/2021 0.70   11/02/2018 0.72   07/18/2018 0.68   10/01/2012 0.83   01/21/2011 0.76       XRAY:  My interpretation, no open fractures or complete collapse of the calcaneus.  No joint diastases    MRI: My interpretation, abscess about the left lateral calcaneus with reactive edema about the lateral calcaneus suggestive of early osteomyelitis.     Darryl Huff DPM  [facility]/Podiatry    Please schedule for surgery, pre op H&P, and post ops.    Surgery:  Patient Name:  Luke Farias ([id number])  Procedure:   Surgical debridement of abscess and calcaneus left ankle  Bone biopsy left calcaneus  Diagnosis:    Uncontrolled diabetes  Deep abscess left calcaneus and ankle with possible osteomyelitis  Assistant: Single scrub tech  Surgeon:  Darryl Huff DPM  Anesthesia:  MAC with Runde ankle block  PT type:  Inpatient ICU  Time needed: 60 minutes  Patient position:  lying supine  With large black sandbag under hip to keep foot vertical  Mini fluoro:  No  C-arm:  no  Equipment:    pulsed lavage  Jamshidi needle  Cultures  Suture; 3-0, 4-0 Vicryl, 4-0 prolene  Possible quarter inch Nu Gauze drain  Anticoagulation:  No  Vendor:  no  Surgeon Notes: ICU inpatient within 36 hours    Post op appts:    5-7 days po  12-15 days po  approx 4 weeks    Expected work restrictions:  Minimal weightbearing left foot for bathroom privileges only    [facility] Discussed:  NO    Urgency to Schedule: Tuesday afternoon or evening    Specimens:     Aerobic, anaerobic, acid fast, fungal culture and sensitivity, dry speciman cup, formalin for histology,   Specimens titled : Deep bone cultures left calcaneus abscess       Allergies   Allergen  Reactions    Seasonal Allergies      Risk of blood loss: less than 100mL    Dressings:  Adaptic, box of 4x4s, gauze roll, ACE compression      Local:  0.5% marcaine plain for all MAC anesthesia    Tourniquet:  Ankle tourniquet if MAC anesthesia and it will not impede the expected incision placement.     Any communication needed to other team members? No

## 2025-03-04 NOTE — PROGRESS NOTES
SPIRITUAL HEALTH SERVICES Progress Note     Medical Surgical Intensive Care Unit at [facility]     REFERRAL SOURCE/REASON FOR VISIT - Patient is Religion.     SUMMARY - One of our [facility] visited Luke to give him Holy Communion  Family informed the [facility]  that Luke is NPO, so Communion was not given.           Plan - [facility] will continue to follow patient to offer him Holy Communion until his discharge.      Isaiah Mantilla, Ph.D, [facility]  Spiritual Health Services  [address] [place] [address]  [phone]  [email or web address]     Assessment -      Strengths, Coping, and Resources - family, talia, talia community     Meaning, Beliefs, and Spirituality - Patient is Religion.

## 2025-03-04 NOTE — PROGRESS NOTES
Purulence does extend to the calcaneus but the lateral wall the calcaneus does not appear to be necrotic.  Jamshidi needle was utilized to obtain to spent separate specimens of bone through the wound 1 for culture and 1 for histology.  This is consistent with early osteomyelitis.     Multiple deep bone cultures obtained  Less than 10 mL of blood loss noted.  No active bleeding noted.  Placed order for WOC RN to pull the dressing and drain tomorrow and repack gently.  May reinforce if any bleeding occurs through the night or remove the outer dressing if it becomes wet or soiled but leave the Nu Gauze packing in place then place 1 box of 4 x 4 gauze with an ABD gauze roll and gentle compression if needed the night.    Podiatry will follow on Thursday or evening on Wednesday if necessary.

## 2025-03-04 NOTE — ANESTHESIA CARE TRANSFER NOTE
Patient: Luke Farias    Procedure: Procedure(s):  Surgical debridement left calcaneus and left ankle  with bone biopsy left calcaneus       Diagnosis: Diabetic foot infection (H) [E11.628, L08.9]  Elevated C-reactive protein (CRP) [R79.82]  Diabetes mellitus with osteomyelitis (H) [E11.69, M86.9]  Diagnosis Additional Information: No value filed.    Anesthesia Type:   MAC     Note:    Oropharynx: oropharynx clear of all foreign objects and spontaneously breathing  Level of Consciousness: drowsy  Oxygen Supplementation: face mask    Independent Airway: airway patency satisfactory and stable  Dentition: dentition unchanged  Vital Signs Stable: post-procedure vital signs reviewed and stable  Report to RN Given: handoff report given  Patient transferred to: PACU    Handoff Report: Identifed the Patient, Identified the Reponsible Provider, Reviewed the pertinent medical history, Discussed the surgical course, Reviewed Intra-OP anesthesia mangement and issues during anesthesia, Set expectations for post-procedure period and Allowed opportunity for questions and acknowledgement of understanding  Vitals:  Vitals Value Taken Time   /70 03/04/25 1553   Temp 98.8  F (37.1  C) 03/04/25 1553   Pulse     Resp     SpO2         Electronically Signed By: STEVENSON Talamantes CRNA  March 4, 2025  3:57 PM

## 2025-03-04 NOTE — PLAN OF CARE
Goal Outcome Evaluation:      Plan of Care Reviewed With: patient    Overall Patient Progress: no changeOverall Patient Progress: no change    Outcome Evaluation: Pt A&Ox4.  Denies pain.  Left foot with +2 edema, redness, and warmth. Dressing changed x 1 due to falling off. Small amount  of sersanquinous drainage.  Continues on insulin gtt in Algorithm 4 . Good appetite. Adaquate urine output. Max temp 99.5. O2 dropping to mid 80s during sleep.  1.5L O2 NC applied to keep sats >90%. Tele SR with BBB. PM Lantus held per provider. K+ replaced this morning with midday recheck.

## 2025-03-04 NOTE — ANESTHESIA PREPROCEDURE EVALUATION
Anesthesia Pre-Procedure Evaluation    Patient: Luke Farias   MRN: [id number] : 1965        Procedure : Procedure(s):  Surgical debridement left calcaneus and left ankle           Past Medical History:   Diagnosis Date    Diabetes (H)     type 2    Seasonal allergies     Allergy inj as a child      Past Surgical History:   Procedure Laterality Date    COLONOSCOPY N/A 2019    Procedure: Colonoscopy, With Polypectomy And Biopsy;  Surgeon: Bartolome Templeton MD;  Location: [facility]    COLONOSCOPY N/A 2021    Procedure: Colonoscopy, With Polypectomy And Biopsy;  Surgeon: Lisette Vickers DO;  Location: [facility]    COLONOSCOPY N/A 2021    Procedure: COLONOSCOPY, FLEXIBLE, WITH LESION REMOVAL USING SNARE;  Surgeon: Lisette Vickers DO;  Location: [facility]    COLONOSCOPY N/A 3/26/2024    Procedure: COLONOSCOPY, WITH POLYPECTOMY using snare;  Surgeon: Baltazar Rodriguez MD;  Location: [facility]    COLONOSCOPY WITH CO2 INSUFFLATION N/A 2017    Procedure: COLONOSCOPY WITH CO2 INSUFFLATION;  Dr. Rancho Painting/BMI: 30.8/Special screening for malignant neoplasms, colon/colonoscopy/[facility]:  [phone];  Surgeon: Krish Galloway MD;  Location: [facility]    COLONOSCOPY WITH CO2 INSUFFLATION N/A 2019    Procedure: COLONOSCOPY WITH CO2 INSUFFLATION;  Surgeon: Bartolome Templeton MD;  Location: [facility]    COLONOSCOPY WITH CO2 INSUFFLATION N/A 2019    Procedure: COLONOSCOPY, WITH CO2 INSUFFLATION;  Surgeon: Bartolome Templeton MD;  Location: [facility]    COLONOSCOPY WITH CO2 INSUFFLATION N/A 2021    Procedure: COLONOSCOPY, WITH CO2 INSUFFLATION;  Surgeon: Lisette Vickers DO;  Location: [facility]      Allergies   Allergen Reactions    Seasonal Allergies       Social History     Tobacco Use    Smoking status: Never    Smokeless tobacco: Never   Substance Use Topics    Alcohol use: Yes     Comment: 1-2 beers a week / occ       Wt Readings from Last 1 Encounters:   25  "96.4 kg (212 lb 9.6 oz)        Anesthesia Evaluation   Pt has had prior anesthetic. Type: MAC.    No history of anesthetic complications       ROS/MED HX  ENT/Pulmonary: Comment: SOB on exertion       Neurologic: Comment: Diabetic peripheral neuropathy     (+)    peripheral neuropathy,                            Cardiovascular:     (+) Dyslipidemia - -   -  - -                                 Previous cardiac testing   Echo: Date: Results:    Stress Test:  Date: Results:    ECG Reviewed:  Date: 3/4/2024 Results:  SR  Cath:  Date: Results:      METS/Exercise Tolerance:     Hematologic:  - neg hematologic  ROS     Musculoskeletal:  - neg musculoskeletal ROS     GI/Hepatic:       Renal/Genitourinary:       Endo:     (+)  type II DM, Last HgA1c: 16.8, date: 3/2/25,                  Psychiatric/Substance Use:  - neg psychiatric ROS     Infectious Disease: Comment: Diabetic foot infection suspected sepsis       Malignancy:       Other:  - neg other ROS          Physical Exam    Airway        Mallampati: II   TM distance: > 3 FB   Neck ROM: full   Mouth opening: > 3 cm    Respiratory Devices and Support         Dental       (+) Minor Abnormalities - some fillings, tiny chips      Cardiovascular             Pulmonary                 OUTSIDE LABS:  CBC:   Lab Results   Component Value Date    WBC 13.0 (H) 03/03/2025    WBC 12.5 (H) 03/02/2025    HGB 13.5 03/02/2025    HGB 16.8 07/18/2018    HCT 37.9 (L) 03/02/2025    HCT 47.1 07/18/2018     03/02/2025     07/18/2018     BMP:   Lab Results   Component Value Date     (L) 03/03/2025     (L) 03/03/2025    POTASSIUM 3.6 03/03/2025    POTASSIUM 3.4 03/03/2025    CHLORIDE 95 (L) 03/03/2025    CHLORIDE 90 (L) 03/02/2025    CO2 22 03/03/2025    CO2 24 03/02/2025    BUN 10.6 03/03/2025    BUN 12.9 03/02/2025    CR 0.66 (L) 03/03/2025    CR 0.63 (L) 03/02/2025     (H) 03/03/2025     (H) 03/03/2025     COAGS: No results found for: \"PTT\", \"INR\", " "\"FIBR\"  POC:   Lab Results   Component Value Date     (H) 08/19/2019     HEPATIC:   Lab Results   Component Value Date    ALBUMIN 4.1 04/02/2024    PROTTOTAL 8.1 04/02/2024    ALT 30 04/02/2024    AST 34 04/02/2024    ALKPHOS 81 04/02/2024    BILITOTAL 0.2 04/02/2024     OTHER:   Lab Results   Component Value Date    LACT 1.0 03/02/2025    A1C 16.8 (H) 03/02/2025    EARNEST 8.6 (L) 03/03/2025    TSH 4.83 10/01/2012    T4 0.79 09/28/2011    CRP 68.0 (H) 07/18/2018    SED 52 (H) 03/02/2025       Anesthesia Plan    ASA Status:  3    NPO Status:  NPO Appropriate    Anesthesia Type: MAC.     - Reason for MAC: immobility needed   Induction: Propofol, Intravenous.   Maintenance: TIVA.        Consents    Anesthesia Plan(s) and associated risks, benefits, and realistic alternatives discussed. Questions answered and patient/representative(s) expressed understanding.     - Discussed: Risks, Benefits and Alternatives for BOTH SEDATION and the PROCEDURE were discussed     - Discussed with:  Patient      - Extended Intubation/Ventilatory Support Discussed: No.      - Patient is DNR/DNI Status: No     Use of blood products discussed: No .     Postoperative Care    Pain management: IV analgesics, Oral pain medications.   PONV prophylaxis: Background Propofol Infusion, Ondansetron (or other 5HT-3)     Comments:    Other Comments: The risks and benefits of anesthesia, and the alternatives where applicable, have been discussed with the patient, and they wish to proceed.              STEVENSON Talamantes CRNA    I have reviewed the pertinent notes and labs in the chart from the past 30 days and (re)examined the patient.  Any updates or changes from those notes are reflected in this note.              "

## 2025-03-04 NOTE — OP NOTE
Procedure date: 3/4/2025     SURGEON:  Darryl uHff DPM     ASSISTANT:  None.     PREOPERATIVE DIAGNOSIS:  Diabetic foot infection (H) [E11.628, L08.9]  Elevated C-reactive protein (CRP) [R79.82]  Diabetes mellitus with osteomyelitis (H) [E11.69, M86.9].     POSTOPERATIVE DIAGNOSIS:    Diabetic foot infection (H) [E11.628, L08.9]  Elevated C-reactive protein (CRP) [R79.82]  Diabetes mellitus with osteomyelitis (H) [E11.69, M86.9].     PLANNED PROCEDURE:    Procedure(s):  Surgical debridement left calcaneus and left ankle  with bone biopsy left calcaneus.    Estimated Blood Loss: Less than 10 ml      Specimens:     Left calcaneus bone for bone culture, aerobic, fungus, AFB  Intraoperative cultures deep to bone with purulence E swab for aerobic, fungus, AFB  Intraoperative culture tissue for anaerobic culture.    DESCRIPTION OF PROCEDURE:  In the preoperative holding area I obtained informed consent to treat diabetic foot infection left foot and ankle with suspected osteomyelitis, uncontrolled diabetes with deep abscess and limb threatening cellulitis with treatment of Procedure(s):  Surgical debridement left calcaneus and left ankle  with bone biopsy left calcaneus.      I discussed with the patient potential risks and complications as well as alternative treatment options and post op care requirements.  I answered all questions for the patient.  No guarantees were given or implied.  They wish to proceed with surgical treatment.  They have been NPO consistent with current facility guidelines.  No contraindications to surgical treatment are identified at this time.      The patient was brought into the operating room and placed on the operating table in a supine position.  The anesthesia team induced MAC anesthesia.  No tourniquet was used.  The patient was positioned lying supine throughout the procedure.  The extremity was prepped and draped in the usual sterile fashion.  A timeout was performed.     A 15 blade  was utilized to make a 3 cm incision over the lateral calcaneus directly over the abscess where purulence could be expressed from the lateral calcaneus.  There were multiple patchy areas of necrotic tissue and the incision was extended through these patches.  They were made deep to bone and about 3 mL of purulence were expressed from the lateral calcaneus deep.  A Jamshidi needle was utilized to take 2 separate specimens from the lateral calcaneus 1 for histology and 1 for bone biopsy.  An E swab was utilized to swab along the plantar fashion as well as posteriorly along the Achilles tendon and tissue was obtained from this region as well and placed in the anaerobic swab for culture IntraOp.  Purulence does not appear to track along the hernial tendons but it does appear to progress directly to the lateral calcaneus and plantar medial tubercle of the calcaneus.  The cortex of the calcaneus appears to be quite firm not obviously necrotic or collapsed but purulence remains present with the calcaneus consistent with osteomyelitis.      Tracking appears to be demonstrated deep plantarly along the plantar fascia for 2 cm and then towards the Achilles tendon superior to the calcaneus by about 2 or 3 cm.  These areas were probed and exsanguinated.  All obvious purulence was expressed from the foot.  Sloughing epidermis was removed manually from the lateral foot as well as all hyperkeratosis from the fissuring on the plantar calcaneus and plantar forefoot.  No purulence was found in these areas.    3 L of pulse lavage were then pulsed through the entire surgical site.  The lateral incision was then partially closed about 50% after removing all obviously nonviable tissue.  Quarter inch Nu Gauze with iodoform was then packed deep to the plantar fascia and towards the Achilles tendon and deep within the wound against the calcaneus.  About 10 mL of blood loss were noted throughout the entire procedure.  No active bleeding was  identified.  The Bovie was not utilized.    Adaptic, 1 box of 4 x 4 gauze, ABD, was utilized as a bolstered sterile dressing was applied to the surgical site.  A 4 inch Ace wrap was then applied.  The patient tolerated the procedure and anesthesia well and was brought back to recovery room with vital signs stable and vascular status intact to the extremity.     Darryl Huff DPM

## 2025-03-04 NOTE — PROVIDER NOTIFICATION
This writer sent a message to provider stating pt has scheduled Lantus tonight. He was started on insulin gtt at 1500 due to worsening ketones (improving now). Eating diabetic diet. Novolog on hold. Plan for surgery to foot tomorrow. Did you want Lantus given?    Provider response :Not if he is still on insulin gtt    Lantus held

## 2025-03-04 NOTE — CONSULTS
SPIRITUAL HEALTH SERVICES Consult Note    Medical Surgical Intensive Care Unit at [facility]    REFERRAL SOURCE/REASON FOR VISIT -   Saw patient per patient/family request.    SUMMARY - I stopped in to visit Luke, and gave him a blessing.         Plan - I will continue to follow patient and be available for any ongoing support needs until hisdischarge.     Isaiah Mantilla, Ph.D,   Spiritual Health Services  [address] [place], [place] [address] [phone]  [email or web address]    Assessment -     Strengths, Coping, and Resources - talia, talia community    Meaning, Beliefs, and Spirituality - Patient reported at admission that he is [employer].

## 2025-03-04 NOTE — PROGRESS NOTES
"[facility]    Medicine Progress Note - Hospitalist Service    Date of Admission:  3/2/2025    Assessment & Plan   Luke Farias is a 59 year old male with type 2 diabetes treated with oral medication and diabetic peripheral neuropathy who presented to the ER on 3/2/2025 after he noticed redness on the top and side of his left foot and was found to have a severe diabetic infection of left foot with SIRS and suspected sepsis and was hospitalized and placed on Zosyn and Vanco for antibiotic coverage.  MRI of the foot has shown concern for abscess and possible osteomyelitis and plan is to proceed with OR exploration, debridement, and bone biopsy collection this afternoon.  Patient does have slight increased shortness of breath this morning with \"heavy breathing\" sensation and of note is up 8 lbs in the past 24 hours with VBG today showing mild respiratory alkalosis.  Chest x-ray per this providers view shows mild pulmonary congestion and small B line and lasix IV 20 mg x1 given.  EKG shows sinus rhythm without acute ST or T wave changes.  Blood sugars and ketones are much improved following insulin drip initiation and patient has now been medically optimized for surgery and can proceed without further intervention but will return to ICU setting and monitor closely following surgical intervention.      Severe diabetic infection of left foot, potentially limb threatening  SIRS, possible sepsis  Open wound left heel  Diabetic peripheral neuropathy  Clinical presentation is suspicious for open wound of left heel with surrounding cellulitis and purulence complicating poorly controlled type 2 diabetes with severe hyperglycemia and diabetic peripheral neuropathy.  He has had chills and presents with leukocytosis concerning for SIRS and sepsis.  He has been using an electric device to sand his foot recently and has purulence on exam increasing concern for both Pseudomonas and MRSA.  MRSA nares " swab negative but there is concern for deeper infection involvement with abscess and possible osteomyelitis seen on MRI on 3/3/25.  Patient to undergo surgical intervention today for debridement, culture collection, and bone biopsy collection.    -Continue high-dose parenteral Zosyn 4.5 mg IV every 6 hours  -Continue vancomycin, pharmacy consulted for dosing  -proceed with surgical intervention and culture collections this afternoon  -Anticipate evaluation by [facility] nurse during hospitalization but will re-evaluate after surgical intervention   -Limit weightbearing on left foot to toe touch down only or as per podiatry team   -For now, withholding chronic prophylactic dosing of aspirin (which he reports he has not been taking recently) in anticipation of possible surgical intervention    Type 2 diabetes with severe hyperglycemia  Ketonemia  Longstanding type 2 diabetes treated with oral medication.  Hemoglobin A1c 16.8 with initial blood sugar 441.  Serum ketones are positive and anion gap is elevated although he does not have outright metabolic acidosis.  Severe hyperglycemia also puts him at risk for hyperosmolar state.  He is interested and willing to start insulin treatment and blood sugars have improved with Lantus 10 units BID and carb coverage Novolog however ketones continued to worsen with ongoing anion gap elevation and patient was transitioned to insulin drip for more rapid correction of ketosis and blood sugar control prior to surgical intervention.  Ketones are now near normal and blood sugars have been excellently controlled  -Continue with insulin drip until patient transfer down to surgery and will restart on return from surgery   -Once patient is tolerating diet well following post-op recovery will transition patient to Lantus 10 units twice daily (total dose approximately 0.2 unit/kg/day) and NovoLog prandial insulin 1 unit per 15 g carbohydrates with meals and snacks as well as NovoLog high  correction scale and stop insulin drip   -Not continuing either metformin or glipizide at this time  -Recommend diabetic education during hospitalization and close outpatient follow-up with PCP and diabetic nurse    Left leg swelling  Left lower leg and foot are moderately swollen which is asymmetric compared with right, thought secondary to cellulitis infection. Doppler ultrasound negative for DVT.    -no further acute testing needed, continue with plan for infection as above     Hyponatremia  Hypochloremia  Presenting with mild hyponatremia and hypochloremia likely due to recent inadequate oral nutritional intake.  He received IV fluid bolus normal saline 1 L in the ER with improvement but not resolution noted and patient currently NPO for upcoming surgery on D5 1/2 NS fluids  -monitor for ongoing resolution     Hypokalemia  Patient's potassium was initially low normal but following initiation fluid bolus in the ED decreased to 3.2 and patient has been started on standard replacement protocol.  Still slightly low at 3.3 this morning.   -Continue potassium containing IV fluids while NPO and standard replacement protocol    Hyperlipidemia  Chronic hyperlipidemia treated with statin  -Continue chronic statin          Diet: NPO for Procedure/Surgery per Anesthesia Guidelines Except for: Meds; Clear liquids before procedure/surgery: ADULT (Age GREATER than or Equal to 18 years) - Clear liquids 2 hours before procedure/surgery  NPO for Medical/Clinical Reasons Except for: Meds    DVT Prophylaxis: Pneumatic Compression Devices  Carrillo Catheter: Not present  Lines: None     Cardiac Monitoring: None  Code Status: Full Code      Clinically Significant Risk Factors        # Hypokalemia: Lowest K = 3.2 mmol/L in last 2 days, will replace as needed  # Hyponatremia: Lowest Na = 131 mmol/L in last 2 days, will monitor as appropriate  # Hypochloremia: Lowest Cl = 90 mmol/L in last 2 days, will monitor as appropriate               "      # DMII: A1C = 16.8 % (Ref range: <5.7 %) within past 6 months, PRESENT ON ADMISSION  # Overweight: Estimated body mass index is 27.5 kg/m  as calculated from the following:    Height as of this encounter: 1.905 m (6' 3\").    Weight as of this encounter: 99.8 kg (220 lb)., PRESENT ON ADMISSION            Social Drivers of Health            Disposition Plan     Medically Ready for Discharge: Anticipated in 2-4 Days             Daniela Zaldivar MD  Hospitalist Service  [facility]  Securely message with [employer] (more info)  Text page via [facility] Paging/Directory   ______________________________________________________________________    Interval History   Patient has been afebrile but temps in the upper 99 range, blood pressures low normal.  Patient did need 1-1.5 L NC oxygen to maintain saturations while sleeping and today has mild tachypnea and states it feels more congested and is harder to get a deep breath.  Weight is up 8 lbs since yesterday morning and slight heaviness to his breathing with concern for increased vascular congestion on x-ray.  No other changes or new symptoms.     Physical Exam   Vital Signs: Temp: 99.5  F (37.5  C) Temp src: Oral BP: 115/73 Pulse: 70   Resp: 16 SpO2: 92 % O2 Device: None (Room air) Oxygen Delivery: 1.5 LPM  Weight: 220 lbs 0 oz    Constitutional: awake, alert, cooperative, no apparent distress at rest, and appears stated age  Respiratory: No increased work of breathing at rest, good air exchange, clear to auscultation bilaterally, no crackles or wheezing  Cardiovascular: RRR  GI: bowel sounds present, abdomen soft and non-tender   Skin: dressing was not removed from ulceration but the skin seen outside of the dressing is much improved with less redness, warmth and swelling in the areas of demarcation visible   Musculoskeletal: ongoing left lower leg edema, slightly worse than yesterday   Neurologic: Awake, alert, oriented to name, " place and situation     Medical Decision Making       54 MINUTES SPENT BY ME on the date of service doing chart review, history, exam, documentation & further activities per the note.      Data     I have personally reviewed the following data over the past 24 hrs:    12.8 (H)  \   11.8 (L)   / 241     133 (L) 99 8.7 /  91   3.3 (L) 20 (L) 0.55 (L) \     Procal: N/A CRP: 121.45 (H) Lactic Acid: N/A

## 2025-03-05 ENCOUNTER — APPOINTMENT (OUTPATIENT)
Dept: PHYSICAL THERAPY | Facility: CLINIC | Age: 60
DRG: 854 | End: 2025-03-05
Attending: INTERNAL MEDICINE
Payer: COMMERCIAL

## 2025-03-05 LAB
ANION GAP SERPL CALCULATED.3IONS-SCNC: 13 MMOL/L (ref 7–15)
ATRIAL RATE - MUSE: 67 BPM
B-OH-BUTYR SERPL-SCNC: 0.29 MMOL/L
BACTERIA SPEC CULT: NORMAL
BUN SERPL-MCNC: 4.6 MG/DL (ref 6–20)
CALCIUM SERPL-MCNC: 8.5 MG/DL (ref 8.8–10.4)
CHLORIDE SERPL-SCNC: 101 MMOL/L (ref 98–107)
CREAT SERPL-MCNC: 0.68 MG/DL (ref 0.67–1.17)
CRP SERPL-MCNC: 105.44 MG/L
DIASTOLIC BLOOD PRESSURE - MUSE: NORMAL MMHG
EGFRCR SERPLBLD CKD-EPI 2021: >90 ML/MIN/1.73M2
ERYTHROCYTE [DISTWIDTH] IN BLOOD BY AUTOMATED COUNT: 12.8 % (ref 10–15)
GLUCOSE BLDC GLUCOMTR-MCNC: 102 MG/DL (ref 70–99)
GLUCOSE BLDC GLUCOMTR-MCNC: 106 MG/DL (ref 70–99)
GLUCOSE BLDC GLUCOMTR-MCNC: 108 MG/DL (ref 70–99)
GLUCOSE BLDC GLUCOMTR-MCNC: 112 MG/DL (ref 70–99)
GLUCOSE BLDC GLUCOMTR-MCNC: 114 MG/DL (ref 70–99)
GLUCOSE BLDC GLUCOMTR-MCNC: 120 MG/DL (ref 70–99)
GLUCOSE BLDC GLUCOMTR-MCNC: 120 MG/DL (ref 70–99)
GLUCOSE BLDC GLUCOMTR-MCNC: 136 MG/DL (ref 70–99)
GLUCOSE BLDC GLUCOMTR-MCNC: 167 MG/DL (ref 70–99)
GLUCOSE BLDC GLUCOMTR-MCNC: 183 MG/DL (ref 70–99)
GLUCOSE BLDC GLUCOMTR-MCNC: 191 MG/DL (ref 70–99)
GLUCOSE BLDC GLUCOMTR-MCNC: 212 MG/DL (ref 70–99)
GLUCOSE BLDC GLUCOMTR-MCNC: 217 MG/DL (ref 70–99)
GLUCOSE SERPL-MCNC: 113 MG/DL (ref 70–99)
GRAM STAIN RESULT: NORMAL
GRAM STAIN RESULT: NORMAL
HCO3 SERPL-SCNC: 25 MMOL/L (ref 22–29)
HCT VFR BLD AUTO: 36.7 % (ref 40–53)
HGB BLD-MCNC: 12.4 G/DL (ref 13.3–17.7)
HOLD SPECIMEN: NORMAL
HOLD SPECIMEN: NORMAL
INTERPRETATION ECG - MUSE: NORMAL
MCH RBC QN AUTO: 28.4 PG (ref 26.5–33)
MCHC RBC AUTO-ENTMCNC: 33.8 G/DL (ref 31.5–36.5)
MCV RBC AUTO: 84 FL (ref 78–100)
P AXIS - MUSE: 20 DEGREES
PLATELET # BLD AUTO: 259 10E3/UL (ref 150–450)
POTASSIUM SERPL-SCNC: 3.5 MMOL/L (ref 3.4–5.3)
PR INTERVAL - MUSE: 190 MS
QRS DURATION - MUSE: 88 MS
QT - MUSE: 438 MS
QTC - MUSE: 462 MS
R AXIS - MUSE: 17 DEGREES
RBC # BLD AUTO: 4.37 10E6/UL (ref 4.4–5.9)
SODIUM SERPL-SCNC: 139 MMOL/L (ref 135–145)
SYSTOLIC BLOOD PRESSURE - MUSE: NORMAL MMHG
T AXIS - MUSE: 9 DEGREES
VANCOMYCIN SERPL-MCNC: 7.4 UG/ML
VENTRICULAR RATE- MUSE: 67 BPM
WBC # BLD AUTO: 12.5 10E3/UL (ref 4–11)

## 2025-03-05 PROCEDURE — 36415 COLL VENOUS BLD VENIPUNCTURE: CPT | Performed by: FAMILY MEDICINE

## 2025-03-05 PROCEDURE — 86140 C-REACTIVE PROTEIN: CPT | Performed by: FAMILY MEDICINE

## 2025-03-05 PROCEDURE — 250N000011 HC RX IP 250 OP 636: Performed by: PODIATRIST

## 2025-03-05 PROCEDURE — G0463 HOSPITAL OUTPT CLINIC VISIT: HCPCS

## 2025-03-05 PROCEDURE — 250N000011 HC RX IP 250 OP 636: Performed by: FAMILY MEDICINE

## 2025-03-05 PROCEDURE — 80048 BASIC METABOLIC PNL TOTAL CA: CPT | Performed by: FAMILY MEDICINE

## 2025-03-05 PROCEDURE — 97116 GAIT TRAINING THERAPY: CPT | Mod: GP | Performed by: PHYSICAL THERAPIST

## 2025-03-05 PROCEDURE — 97161 PT EVAL LOW COMPLEX 20 MIN: CPT | Mod: GP | Performed by: PHYSICAL THERAPIST

## 2025-03-05 PROCEDURE — 97530 THERAPEUTIC ACTIVITIES: CPT | Mod: GP | Performed by: PHYSICAL THERAPIST

## 2025-03-05 PROCEDURE — 250N000013 HC RX MED GY IP 250 OP 250 PS 637: Performed by: FAMILY MEDICINE

## 2025-03-05 PROCEDURE — 258N000003 HC RX IP 258 OP 636: Performed by: FAMILY MEDICINE

## 2025-03-05 PROCEDURE — 250N000013 HC RX MED GY IP 250 OP 250 PS 637: Performed by: PODIATRIST

## 2025-03-05 PROCEDURE — 120N000004 HC R&B MS OVERFLOW

## 2025-03-05 PROCEDURE — 82010 KETONE BODYS QUAN: CPT | Performed by: FAMILY MEDICINE

## 2025-03-05 PROCEDURE — 99233 SBSQ HOSP IP/OBS HIGH 50: CPT | Performed by: FAMILY MEDICINE

## 2025-03-05 PROCEDURE — 258N000003 HC RX IP 258 OP 636: Performed by: PODIATRIST

## 2025-03-05 PROCEDURE — 80202 ASSAY OF VANCOMYCIN: CPT | Performed by: PODIATRIST

## 2025-03-05 PROCEDURE — 36415 COLL VENOUS BLD VENIPUNCTURE: CPT | Performed by: PODIATRIST

## 2025-03-05 PROCEDURE — 85027 COMPLETE CBC AUTOMATED: CPT | Performed by: FAMILY MEDICINE

## 2025-03-05 RX ORDER — FUROSEMIDE 10 MG/ML
20 INJECTION INTRAMUSCULAR; INTRAVENOUS ONCE
Status: COMPLETED | OUTPATIENT
Start: 2025-03-05 | End: 2025-03-05

## 2025-03-05 RX ORDER — DEXTROSE MONOHYDRATE, SODIUM CHLORIDE, AND POTASSIUM CHLORIDE 50; 1.49; 4.5 G/1000ML; G/1000ML; G/1000ML
INJECTION, SOLUTION INTRAVENOUS CONTINUOUS
Status: DISCONTINUED | OUTPATIENT
Start: 2025-03-05 | End: 2025-03-05

## 2025-03-05 RX ORDER — POTASSIUM CHLORIDE 1500 MG/1
20 TABLET, EXTENDED RELEASE ORAL ONCE
Status: COMPLETED | OUTPATIENT
Start: 2025-03-05 | End: 2025-03-05

## 2025-03-05 RX ADMIN — POTASSIUM CHLORIDE 20 MEQ: 1500 TABLET, EXTENDED RELEASE ORAL at 08:02

## 2025-03-05 RX ADMIN — PIPERACILLIN AND TAZOBACTAM 4.5 G: 4; .5 INJECTION, POWDER, FOR SOLUTION INTRAVENOUS at 08:02

## 2025-03-05 RX ADMIN — FUROSEMIDE 20 MG: 10 INJECTION, SOLUTION INTRAMUSCULAR; INTRAVENOUS at 08:02

## 2025-03-05 RX ADMIN — VANCOMYCIN HYDROCHLORIDE 1500 MG: 1 INJECTION, POWDER, LYOPHILIZED, FOR SOLUTION INTRAVENOUS at 02:08

## 2025-03-05 RX ADMIN — ATORVASTATIN CALCIUM 20 MG: 10 TABLET, FILM COATED ORAL at 08:02

## 2025-03-05 RX ADMIN — VANCOMYCIN HYDROCHLORIDE 1500 MG: 1 INJECTION, POWDER, LYOPHILIZED, FOR SOLUTION INTRAVENOUS at 10:05

## 2025-03-05 RX ADMIN — VANCOMYCIN HYDROCHLORIDE 1500 MG: 1 INJECTION, POWDER, LYOPHILIZED, FOR SOLUTION INTRAVENOUS at 17:53

## 2025-03-05 RX ADMIN — DEXTROSE, SODIUM CHLORIDE, AND POTASSIUM CHLORIDE: 5; .45; .15 INJECTION INTRAVENOUS at 00:00

## 2025-03-05 RX ADMIN — PIPERACILLIN AND TAZOBACTAM 4.5 G: 4; .5 INJECTION, POWDER, FOR SOLUTION INTRAVENOUS at 20:21

## 2025-03-05 RX ADMIN — DEXTROSE, SODIUM CHLORIDE, AND POTASSIUM CHLORIDE: 5; .45; .15 INJECTION INTRAVENOUS at 07:57

## 2025-03-05 RX ADMIN — PIPERACILLIN AND TAZOBACTAM 4.5 G: 4; .5 INJECTION, POWDER, FOR SOLUTION INTRAVENOUS at 14:02

## 2025-03-05 RX ADMIN — PIPERACILLIN AND TAZOBACTAM 4.5 G: 4; .5 INJECTION, POWDER, FOR SOLUTION INTRAVENOUS at 03:51

## 2025-03-05 ASSESSMENT — ACTIVITIES OF DAILY LIVING (ADL)
ADLS_ACUITY_SCORE: 26
ADLS_ACUITY_SCORE: 32
ADLS_ACUITY_SCORE: 26
DEPENDENT_IADLS:: INDEPENDENT
ADLS_ACUITY_SCORE: 32
ADLS_ACUITY_SCORE: 26
ADLS_ACUITY_SCORE: 30
ADLS_ACUITY_SCORE: 26
ADLS_ACUITY_SCORE: 32
ADLS_ACUITY_SCORE: 26
ADLS_ACUITY_SCORE: 32
ADLS_ACUITY_SCORE: 32
ADLS_ACUITY_SCORE: 26
ADLS_ACUITY_SCORE: 32
ADLS_ACUITY_SCORE: 26
ADLS_ACUITY_SCORE: 26

## 2025-03-05 NOTE — PLAN OF CARE
"Goal Outcome Evaluation:      Plan of Care Reviewed With: patient    Overall Patient Progress: improvingOverall Patient Progress: improving    Outcome Evaluation: Pt A&Ox4. VSS. Max temp 99.6.  Left foot wound in dressing with ace wrap. No drainage. Denies pain. Elevated and ice applied.  NWB to left foot and using walker as needed. WOC changed packing and dressing.  Insulin drip stopped after breakfast.  , 217, and 191 with s/s and carb coverage.  Educated on short acting and long acting insulin and insulin pen use. Was able to dial pen to correct dose and administer correctly. Dry cough. Lungs clear, diminished. IS 1500-2000ml. Potassium replaced and recheck for AM. Continues on Zosyn and vancomycin.    /75   Pulse 77   Temp 99.6  F (37.6  C)   Resp 18   Ht 1.905 m (6' 3\")   Wt 98.9 kg (218 lb 1.6 oz)   SpO2 (!) 91%   BMI 27.26 kg/m      "

## 2025-03-05 NOTE — PROGRESS NOTES
[facility]    Medicine Progress Note - Hospitalist Service    Date of Admission:  3/2/2025    Assessment & Plan   Luke Farias is a 59 year old male with type 2 diabetes treated with oral medication and diabetic peripheral neuropathy who presented to the ER on 3/2/2025 after he noticed redness on the top and side of his left foot and was found to have a severe diabetic infection of left foot with SIRS and suspected sepsis and was hospitalized and placed on Zosyn and Vanco for antibiotic coverage.  MRI of the foot has shown concern for abscess and possible osteomyelitis and patient proceeded with OR intervention on 3/4/25 with abscess drained and bone cultures obtained but early osteomyelitis was suspected.  Patient continues on IV antibiotics while awaiting culture results at which time virtual ID consultation will occur to help narrow antibiotic therapy plan and assist in discharge plan but anticipate PICC placement, prolonged IV antibiotics.      Patient required insulin drip preoperatively to improve ketosis and hyperglycemia but patient eating and drinking well and will transition to subcutaneous insulin regimen today with plans to move to Med/Surg status later today if well tolerated.      Severe diabetic infection of left foot, potentially limb threatening  SIRS, possible sepsis  Open wound left heel  Diabetic peripheral neuropathy  Clinical presentation is suspicious for open wound of left heel with surrounding cellulitis and purulence complicating poorly controlled type 2 diabetes with severe hyperglycemia and diabetic peripheral neuropathy.  He has had chills and presents with leukocytosis concerning for SIRS and sepsis.  He has been using an electric device to sand his foot recently and has purulence on exam increasing concern for both Pseudomonas and MRSA.  MRSA nares swab negative but there is concern for deeper infection involvement with abscess and possible  osteomyelitis seen on MRI on 3/3/25.  Patient underwent surgical intervention on 3/4/25 for debridement, culture collection, and bone biopsy collection.  Cultures are pending   -Continue high-dose parenteral Zosyn 4.5 mg IV every 6 hours  -Continue vancomycin, pharmacy consulted for dosing  -Monitor for blood culture and surgery culture results   -Anticipate evaluation by [facility] nurse today   -Limit weightbearing on left foot to non-weight bearing status as much as possible but if patient needs rare pressure to allow transfer it is okay per podiatry team.     Type 2 diabetes with severe hyperglycemia  Ketonemia  Longstanding type 2 diabetes treated with oral medication.  Hemoglobin A1c 16.8 with initial blood sugar 441.  Serum ketones are positive and anion gap was elevated although he did not have outright metabolic acidosis.  He was placed on an insulin drip pre-operatively with resolution of ketones and blood sugar improvement.  He is interested and willing to start insulin treatment  -transition patient to Lantus 10 units twice daily (total dose approximately 0.2 unit/kg/day) and NovoLog prandial insulin 1 unit per 15 g carbohydrates with meals and snacks as well as NovoLog high correction scale and stop insulin drip   -Not continuing either metformin or glipizide at this time  -Recommend diabetic education during hospitalization and close outpatient follow-up with PCP and diabetic nurse    Left leg swelling  Left lower leg and foot are moderately swollen which is asymmetric compared with right, thought secondary to cellulitis infection. Doppler ultrasound negative for DVT.    -no further acute testing needed, continue with plan for infection as above     Hyponatremia  Hypochloremia  Presenting with mild hyponatremia and hypochloremia likely due to recent inadequate oral nutritional intake.  He received IV fluid bolus normal saline 1 L in the ER and ongoing IV fluids while NPO for surgery.  Electrolytes are now  "normalized   -saline lock IV fluids and monitor for ongoing resolution     Hypokalemia  Patient's potassium was initially low normal but following initiation fluid bolus in the ED decreased to 3.2 and patient has been started on standard replacement protocol.  Potassium has now normalized  -Continue standard replacement protocol as needed    Hyperlipidemia  Chronic hyperlipidemia treated with statin  -Continue chronic statin          Diet: Moderate Consistent Carb (60 g CHO per Meal) Diet    DVT Prophylaxis: Pneumatic Compression Devices  Carrillo Catheter: Not present  Lines: None     Cardiac Monitoring: None  Code Status: Full Code      Clinically Significant Risk Factors        # Hypokalemia: Lowest K = 3.3 mmol/L in last 2 days, will replace as needed  # Hyponatremia: Lowest Na = 133 mmol/L in last 2 days, will monitor as appropriate                     # DMII: A1C = 16.8 % (Ref range: <5.7 %) within past 6 months, PRESENT ON ADMISSION  # Overweight: Estimated body mass index is 27.26 kg/m  as calculated from the following:    Height as of this encounter: 1.905 m (6' 3\").    Weight as of this encounter: 98.9 kg (218 lb 1.6 oz)., PRESENT ON ADMISSION            Social Drivers of Health            Disposition Plan     Medically Ready for Discharge: Anticipated in 2-4 Days             Daniela Zaldivar MD  Hospitalist Service  [facility]  Securely message with [employer] (more info)  Text page via [employer] Paging/Directory   ______________________________________________________________________    Interval History   Patient recovered very well from surgery and is eating solid food without difficulty.  No pain in foot due to ongoing severe neuropathy.  No new patient concerns.  No nursing concerns     Physical Exam   Vital Signs: Temp: 98.7  F (37.1  C) Temp src: Oral BP: 116/72 Pulse: 66   Resp: 22 SpO2: 93 % O2 Device: Nasal cannula Oxygen Delivery: 1 LPM  Weight: 218 lbs 1.6 " oz    Constitutional: awake, alert, cooperative, no apparent distress, and appears stated age  Respiratory: No increased work of breathing, good air exchange, clear to auscultation bilaterally, no crackles or wheezing  Cardiovascular: RRR  GI: bowel sounds hypoactive but abdomen soft and non-tender   Musculoskeletal: ongoing mild edema in left lower leg but improved form yesterday, surgical dressing was not removed this morning but patient will be seen by C later today and will evaluate wound at that time   Neurologic: Awake, alert, oriented to name, place and situation     Medical Decision Making       52 MINUTES SPENT BY ME on the date of service doing chart review, history, exam, documentation & further activities per the note.      Data     I have personally reviewed the following data over the past 24 hrs:    12.5 (H)  \   12.4 (L)   / 259     139 101 4.6 (L) /  191 (H)   3.5 25 0.68 \     Procal: N/A CRP: 105.44 (H) Lactic Acid: N/A

## 2025-03-05 NOTE — ANESTHESIA POSTPROCEDURE EVALUATION
Patient: Luke Farias    Procedure: Procedure(s):  Surgical debridement left calcaneus and left ankle  with bone biopsy left calcaneus       Anesthesia Type:  MAC    Note:  Disposition: Inpatient   Postop Pain Control: Uneventful            Sign Out: Well controlled pain   PONV: No   Neuro/Psych: Uneventful            Sign Out: Acceptable/Baseline neuro status   Airway/Respiratory: Uneventful            Sign Out: Acceptable/Baseline resp. status   CV/Hemodynamics: Uneventful            Sign Out: Acceptable CV status; No obvious hypovolemia; No obvious fluid overload   Other NRE: NONE   DID A NON-ROUTINE EVENT OCCUR? No           Last vitals:  Vitals:    03/05/25 1000 03/05/25 1201 03/05/25 1218   BP: 120/72  131/80   Pulse:  80    Resp:  13    Temp:  99.2  F (37.3  C)    SpO2:  95%        Electronically Signed By: STEVENSON Dotson CRNA  March 5, 2025  1:11 PM

## 2025-03-05 NOTE — PHARMACY-VANCOMYCIN DOSING SERVICE
"Pharmacy Vancomycin Note  Date of Service 2025  Patient's  1965   59 year old, male    Indication: Sepsis and Skin and Soft Tissue Infection  Day of Therapy: 4  Current vancomycin regimen:  1500 mg IV q12h  Current vancomycin monitoring method: AUC  Current vancomycin therapeutic monitoring goal: 400-600 mg*h/L    [employer] Prediction of Current Vancomycin Regimen  Loading dose: N/A  Regimen: 1500 mg IV every 12 hours.  Start time: 14:08 on 2025  Exposure target: AUC24 (range)400-600 mg/L.hr   AUC24,ss: 366 mg/L.hr  Probability of AUC24 > 400: 29 %  Ctrough,ss: 7.6 mg/L  Probability of Ctrough,ss > 20: 0 %  Probability of nephrotoxicity (Lodise HAILEY ): 4 %      Current estimated CrCl = Estimated Creatinine Clearance: 206.7 mL/min (A) (based on SCr of 0.55 mg/dL (L)).    Creatinine for last 3 days  3/2/2025: 11:40 AM Creatinine 0.63 mg/dL  3/3/2025:  5:24 AM Creatinine 0.66 mg/dL  3/4/2025:  5:10 AM Creatinine 0.55 mg/dL    Recent Vancomycin Levels (past 3 days)  3/5/2025:  1:02 AM Vancomycin 7.4 ug/mL    Vancomycin IV Administrations (past 72 hours)                     vancomycin (VANCOCIN) 1,500 mg in 0.9% NaCl 265 mL intermittent infusion (mg) 1,500 mg New Bag 25 0208     1,500 mg New Bag 25 1420     1,500 mg New Bag  0220     1,500 mg New Bag 25 1525    vancomycin (VANCOCIN) 1,500 mg in 0.9% NaCl 265 mL intermittent infusion (mg) 1,500 mg New Bag 25                    Nephrotoxins and other renal medications (From now, onward)      Start     Dose/Rate Route Frequency Ordered Stop    25 1400  vancomycin (VANCOCIN) 1,500 mg in 0.9% NaCl 265 mL intermittent infusion         1,500 mg  over 90 Minutes Intravenous EVERY 12 HOURS 25 1317      25 2000  piperacillin-tazobactam (ZOSYN) 4.5 g vial to attach to  mL bag        Note to Pharmacy: For [facility], [facility] and [facility]: For Zosyn-naive patients, use the \"Zosyn initial dose + extended infusion\" order " panel.    4.5 g  over 30 Minutes Intravenous EVERY 6 HOURS 03/02/25 1812                 Contrast Orders - past 72 hours (72h ago, onward)      Start     Dose/Rate Route Frequency Stop    03/03/25 1030  gadobutrol (GADAVIST) injection 9.5 mL         9.5 mL Intravenous ONCE 03/03/25 1131            Interpretation of levels and current regimen:  Vancomycin level is reflective of AUC less than 400    Has serum creatinine changed greater than 50% in last 72 hours: No    Urine output:  good urine output    Renal Function: Stable    Insight[employer] Prediction of Planned New Vancomycin Regimen  Loading dose: N/A  Regimen: 1500 mg IV every 8 hours.  Start time: 14:08 on 03/05/2025  Exposure target: AUC24 (range)400-600 mg/L.hr   AUC24,ss: 549 mg/L.hr  Probability of AUC24 > 400: 98 %  Ctrough,ss: 14.2 mg/L  Probability of Ctrough,ss > 20: 2 %  Probability of nephrotoxicity (Lodise HAILEY 2009): 9 %      Plan:  Increase Dose to 1500 mg every 8 hours  Vancomycin monitoring method: AUC  Vancomycin therapeutic monitoring goal: 400-600 mg*h/L  Pharmacy will check vancomycin levels as appropriate in 1-3 Days.  Serum creatinine levels will be ordered daily for the first week of therapy and at least twice weekly for subsequent weeks.    Jasmeet Mckeon RPH

## 2025-03-05 NOTE — CONSULTS
Reason For Visit: Luke Farias, 59 year old male, seen for a WOC consultation to evaluate and treat Left lateral Calcaneous. Patient was admitted on 3/2/25 for Infection of DM ulcer, left foot.  RNKhoa is also present with patient in the room.  Patient is A&Ox4, pleasant upon interaction.     Start of Care in [facility]: 3/5/2025, inpatient   Referring Provider: Dr. Darryl Huff   Primary Care Provider: [employer] [facility]   Wound Location: Left Lateral calcaneus  Surgery: Debridement left calcaneus with bone biopsy, 3/4/25 Darryl Huff, GHANSHYAM      Wound History:  Patient reports having dry, peeling skin to the lateral calcaneus, used this device to help remove the non-viable tissue.  At this time the area was dry with no excess moisture.  On 3/2/25 noted to have increase redness and moist skin, due to concerns for infection was evaluated at [facility] ER. He later was admitted to floor for IV antibiotics. MRI completed on 3/3/25 which showed Soft tissue ulceration lateral to the calcaneus,  Mild bone marrow edema within the adjacent lateral calcaneus. Osteomyelitis is likely if this wound probes to bone. Adjacent subcutaneous fluid collection measuring up to 3.2 cm, suspicious for abscess.  Regional cellulitis. Podiatry was consulted same day (3/3/25) where it was recommended patient have surgical debridement of left ankle wound/abscess in addition to bone biopsy.  Surgery was completed on 3/4/25.  Dr. Huff reached out to WOC team to see if WOC nurse could remove packing and re dress the wound on 3/5/25.  Visit was completed, WOC team to continue to follow if Podiatry recommends.      Past Medical History:  Patient Active Problem List   Diagnosis    Chronic rhinitis    Type 2 diabetes mellitus with hyperglycemia (H)    Hyperlipidemia LDL goal <100    Venous lake on Right chest    Congenital nevus of Right upper back    Diabetic infection of left foot (H)    SIRS (systemic inflammatory response  "syndrome) (H)    Diabetic peripheral neuropathy (H)    Hyponatremia    Hypochloremia    Platelet dysfunction due to aspirin (H)    Open wound of left heel    Left leg swelling    Elevated C-reactive protein (CRP)    Elevated erythrocyte sedimentation rate    Diabetic foot infection (H)    Elevated blood ketone body level                     Tobacco Use:     Tobacco Use      Smoking status: Never      Smokeless tobacco: Never       Diabetic: DM II  HgbA1C:   Hemoglobin A1C   Date Value Ref Range Status   03/02/2025 16.8 (H) <5.7 % Final     Comment:     Normal <5.7%   Prediabetes 5.7-6.4%    Diabetes 6.5% or higher     Note: Adopted from ADA consensus guidelines.   04/21/2021 8.9 (H) 0 - 5.6 % Final     Comment:     Normal <5.7% Prediabetes 5.7-6.4%  Diabetes 6.5% or higher - adopted from ADA   consensus guidelines.  Results confirmed by repeat test       Checks Blood Glucose?:  See Hospital flow sheet Average Readings: See Hospital flow sheet.       Personal/social history:  Works full time, owns [employer], more office side. Lives with Wife, no paid help.     Objective:   Physical appearance: Lying in bed, no obvious signs of pain or distress   Mobility: NWB to left foot/Calcaneous.    Current treatment plan: 1/4\" Nu Gauze packing, 1 box of 4 x 4 bulk gauze , followed by ABD, secured with rolled gauze roll and ace bandage.   Last changed: Applied after surgery yesterday 3/4/25      Wound #1 Left Lateral Calcaneus - Surgical wound, Surgical debridement, DOS 3/4/25  Stage/tissue depth: Full Thickness   4 cm L x 2.5 cm W x 2 cm D  Tunneling: none  Undermining: the Proximal wound has undermining in all directions with max of 1 cm.    Wound bed type/amount: Approximately 80% slough and 20% red tissue with no granular buds present; not fluctuant  Wound Edges:  Closed with necrotic tissue  Periwound: intact, dry with warm to hot erythema. Blanchable.   Drainage: Large amount of Serous and Sanguineous drainage. " "  Odor: none  Pain: Neuropathy, no sensation. No pain   Photos from today's initial inpatient visit 3/5/25     Photos from ER Visit on 3/2/25    Dorsalis Pedal Pulse: 0/4 palpable: NA doppler: NA phasic  Hair growth: Absent from knee distally  Capillary Refill: 3 seconds  Feet/toes color: deep pink with erythema as listed in assessment   Nails:   R Leg: Edema . Ankle circumference  cm. Calf circumference  cm.  L Leg: Edema. Ankle circumference  cm. Calf circumference  cm.- Not assessed    Mobility: NWB to foot/heel  Current offloading/footwear: Gripper sock while inpatient.   Sensation: Absent at the foot       Other callousing/areas of concern:  Plantar aspect of left foot- Vertical DM Fissure- Sharp debridement in OR, wound is red open dermal tissue, no drainage. Periwound is painted with betadine and dry.    Photos from today's initial inpatient visit 3/5/25      Diet: DM. Low carb.       Discussed: etiology of wound (DM neuropathy ), pathophysiology and patient specific goals for wound healing.   Education: plan of care     Goals of Wound Healing:   - Transition from inflammatory to Remodeling stage of healing  - Maintain moist environment- Betadine   - Control exudate-Bulk Gauze   - Autolytic debridement of necrotic/slough tissue- Dry to dry dressing  - Protect wound from outside environment- Rolled gauze   - Antimicrobial -betadine to periwound skin   - Pack open space- 1/2\" Nu gauze   - Promote healing by secondary intention for complete closure of wound  - Offloading/pressure reduction  -     Assessment:  Left lateral calcaneus, surgical dressing in place on arrival. Dressing did not need to be reinforced over the past 24 hours.  The rolled gauze and ABD was absent of strike through drainage.  The bulk gauze had moderate to large amount of serous and sanguineous drainage.  Wound has three sutures in place that divided the wound into two.  I measured the whole wound as one as the opening communicate.  WOC " nurse had to remove the proximal suture as the wound had dehisced and suture was no longer holding tissue together.  Removed packing, mid packing was semi adherent to suture but was able to free the packing from suture and remove all the packing.  Wound is mostly non-viable slough tissue. Has limited red tissue with no granular buds scattered throughout wound.  Wound edges are closed with slough tissue.  The center of wound is sutured, but the tissue has not reconnected/adhered.  Periwound has deep maroon Vertical line proximal of the distal opening, unsure if this tissue will remain viable.  The dorsal, lateral foot, and calcaneus have deep red erythema that remains very warm to touch. But erythema has receded when comparing to the surgical makers.      Barriers to wound healing:   Poor nutrition: inadequate supply of protein, carbohydrates, fatty acids, and trace elements essential for all phases of wound healing- Educated that high protein diet is optimal for wound healing, goal is 100-120 g per day. Provided verbal list of foods rich in protein.   Reduced Blood Supply: inadequate perfusion to heal wound- No know ABIs   Medication: none  Chemotherapy: suppresses the immune system and inflammatory response-NA  Radiotherapy: increases production of free radical which damage cells-NA  Psychological stress: Current hospital stay with IV antibiotics   Obesity: decreases tissue perfusion- Appropriate weight and BMI   Infection: prolongs inflammatory phase, uses vital nutrients, impairs epithelialization and releases toxins-Currently on IV antibiotics   Underlying Disease: poorly controlled diabetes mellitis, educated on diet correction and following with PCP for optimal management.   Maceration: reduces wound tensile strength and inhibits epithelial migration- not at time of assessment.   Patient compliance- Agrees to initial assessment   Unrelieved pressure- NA  Immobility- altered, but not immobile.   Substance  "abuse: NA    Plan: Dr. Huff requested that [facility] nurse remove surgical dressing/packing on 3/5/25 and re pack wound after assessment, this was completed.  [facility] nurse and Dr. Huff will discuss wound on 3/6/25 and advise on appropriate discharge planning.  WO nurse suggestions would be to remove two sutures place wound VAC or use wound product for autolytic debridement if VAC is not available.  Will need close assessment unsure how the periwound skin will evolve.   Patient's wife is a nurse and willing to assist with wound cares       Interventions to Barriers:  As listed above.     Topical care to Left Lateral Calcaneous.   Irrigated wound out with Normal Saline  Allowed to air dry  Packed wound lightly (more of a wick) with 1/2\" Nu gauze  Covered with layered bulk gauze  Secured with Kerlix and Ace bandage (little to no compression).    This dressing will stay in place until 3/6/25, Dr. Huff or [facility] nurse will provide further guidance on dressing needs after assessment on 3/6/25.        Offloading:    Additional recommendations: Nutrition and DM control     Wound Cares provided as listed above.    Discussed plan of care with patient, HECTOR Couch, and HECTOR Guajardo. Teaching done with patient, HECTOR Couch, and HECTOR Guajardo for dressing changes;  nurses able and willing to perform.    Discharge instructions updated to include:  Pending, will need to assess how wound evolves.     Wound Supplies: 1/2\" Nu gauze packing     WO Return visit: As needed, per Podiatry recommendations.     Nursing to notify Provider and WO Nurse if wound deteriorates.    Verbal, written, & demonstrative education provided.  Face to face time (excluding procedure): approximately 45 minutes.  Procedure: 0 min non-selective debridement dressing applied  Care plan was changed.      Radha Meraz RN CWOCN  [phone]        "

## 2025-03-05 NOTE — PROGRESS NOTES
03/05/25 1242   Appointment Info   Signing Clinician's Name / Credentials (PT) Donna Raygoza PT, DPT, ATC, LAT   Rehab Comments (PT) PT eval and treat strengthening, home safety. Activity order: NWB LLE, expect limited weight bearing as needed for stability during transfers and bathroom privileges.       Present no   Living Environment   People in Home spouse   Current Living Arrangements house   Home Accessibility stairs to enter home;stairs within home   Number of Stairs, Main Entrance 2   Stair Railings, Main Entrance none   Number of Stairs, Within Home, Primary greater than 10 stairs  (upper and lower level)   Stair Railings, Within Home, Primary railing on right side (ascending)   Transportation Anticipated family or friend will provide   Living Environment Comments 14 stairs up to bedroom, able to stay on the main level if needed. upper and lower level walk in shower, main level only 1/2 bath   Self-Care   Usual Activity Tolerance good   Current Activity Tolerance moderate   Regular Exercise Yes   Activity/Exercise Type team sports  (pickel ball)   Equipment Currently Used at Home none   Fall history within last six months no   General Information   Onset of Illness/Injury or Date of Surgery 03/04/25   Referring Physician Faviola Hi MD   Patient/Family Therapy Goals Statement (PT) go home   Pertinent History of Current Problem (include personal factors and/or comorbidities that impact the POC) Patient is a 59 year old male, presented to the ED due to foot wound found to have severe diabetic infection of left foot, SIRS. Patient is day 1 status post   I and D left calcaneous and left ankle with bone biopsy by Dr. Huff, 10mL EBL. Patient with a previous medical history of DM type 2, HLD, SIRS, diabetic peripheral neuropathy, hyponatremia.   Existing Precautions/Restrictions fall   Weight-Bearing Status - LUE full weight-bearing   Weight-Bearing Status - RUE full  weight-bearing   Weight-Bearing Status - LLE nonweight-bearing   Weight-Bearing Status - RLE full weight-bearing   Cognition   Affect/Mental Status (Cognition) WFL   Orientation Status (Cognition) oriented x 4   Follows Commands (Cognition) WFL   Pain Assessment   Patient Currently in Pain No   Integumentary/Edema   Integumentary/Edema Comments Left foot post op dressing intact   Posture    Posture Not impaired   Range of Motion (ROM)   Range of Motion ROM is WNL   Strength (Manual Muscle Testing)   Strength (Manual Muscle Testing) Able to perform R SLR;Able to perform L SLR   Bed Mobility   Bed Mobility supine-sit;sit-supine   Supine-Sit [place] (Bed Mobility) independent   Sit-Supine [place] (Bed Mobility) independent   Transfers   Transfers sit-stand transfer   Maintains Weight-bearing Status (Transfers) able to maintain   Sit-Stand Transfer   Sit-Stand [place] (Transfers) contact guard   Assistive Device (Sit-Stand Transfers) walker, front-wheeled   Gait/Stairs (Locomotion)   [place] Level (Gait) supervision   Assistive Device (Gait) walker, front-wheeled   Distance in Feet (Gait) 20   Pattern (Gait) step-to;2-point   Deviations/Abnormal Patterns (Gait) gait speed decreased;stride length decreased   Maintains Weight-bearing Status (Gait) able to maintain   Balance   Balance Comments IND sitting balance. Standing balance with walker with good safety and controlled sway using 2WW. Using crutches significant sway and LOB with assist to prevent falls   Sensory Examination   Sensory Perception patient reports no sensory changes   Coordination   Coordination no deficits were identified   Muscle Tone   Muscle Tone no deficits were identified   Clinical Impression   Criteria for Skilled Therapeutic Intervention Yes, treatment indicated   PT Diagnosis (PT) impaired mobility, impaired gait, impaired balance   Influenced by the following impairments NWB LLE post op status   Functional limitations due to  impairments use of AD for transfers, fatigue, increased risk of falls, inability to manage stairs at home   Clinical Presentation (PT Evaluation Complexity) evolving   Clinical Presentation Rationale post op acuity, medical status, clinical judgement   Clinical Decision Making (Complexity) moderate complexity   Planned Therapy Interventions (PT) balance training;bed mobility training;gait training;home exercise program;patient/family education;ROM (range of motion);stair training;strengthening;transfer training;progressive activity/exercise;home program guidelines   Risk & Benefits of therapy have been explained evaluation/treatment results reviewed;participants voiced agreement with care plan;participants included;patient   Clinical Impression Comments Patient presents with impaired mobility in the setting of NWB post op foot. Patient able to complete functional mobility safely with use of walker and knee scooter. Anticipate with continued practice he will make good functional progress towards returning home with family support. Patient would benefit from use of knee scooter as well as a front wheeled walker in order to safely nagivate his home and maintain his WB limitation.   PT Total Evaluation Time   PT Armindaal, Low Complexity Minutes (93746) 18   Physical Therapy Goals   PT Frequency 3x/week   PT Predicted Duration/Target Date for Goal Attainment 03/12/25   PT Goals Transfers;Gait;Stairs   PT: Transfers Modified independent;Sit to/from stand;Within precautions;Assistive device   PT: Gait Modified independent;Rolling walker;25 feet;Within precautions   PT: Stairs 2 stairs;Modified independent;Assistive device   PT Discharge Planning   PT Plan 1/3 Wed. stairs   PT Discharge Recommendation (DC Rec) home with assist   PT Rationale for DC Rec Patient from home with wife, stairs to enter and within with ability to stay on one level of the home. Patient presents with impaired mobility in the setting of NWB post op foot.  Patient able to complete functional mobility safely with use of walker and knee scooter. Anticipate with continued practice he will make good functional progress towards returning home with family support. Patient would benefit from use of knee scooter as well as a front wheeled walker in order to safely nagivate his home and maintain his WB limitation.   PT Brief overview of current status IND bed mobility. MOD IND sit to stand with walker. SBA ambulation 2 x 15' with walker. CGA ambulation 10' with crutches with LOB. Knee scooter ambulation 250' IND.   PT Total Distance Amb During Session (feet) 40   Physical Therapy Time and Intention   Total Session Time (sum of timed and untimed services) 18     Thank you for your referral.  Donna Raygoza, PT, DPT, ATC, LAT    [facility]  O: [phone]  E: [email or web address]

## 2025-03-05 NOTE — CONSULTS
Care Management Initial Consult    General Information  Assessment completed with: Patient, Spouse or significant other,    Type of CM/SW Visit: Initial Assessment    Primary Care Provider verified and updated as needed: Yes   Readmission within the last 30 days:        Reason for Consult: discharge planning, other (see comments) (Home Infusion)  Advance Care Planning: Advance Care Planning Reviewed: no concerns identified        Communication Assessment  Patient's communication style: spoken language (English or Bilingual)    Hearing Difficulty or Deaf: no   Wear Glasses or Blind: yes    Cognitive  Cognitive/Neuro/Behavioral: WDL                      Living Environment:   People in home: spouse     Current living Arrangements: house      Able to return to prior arrangements: yes       Family/Social Support:  Care provided by: spouse/significant other  Provides care for: no one  Marital Status:   Support system: Wife  Lisa       Description of Support System: Involved, Supportive    Support Assessment: Adequate family and caregiver support    Current Resources:   Patient receiving home care services: No        Community Resources: None  Equipment currently used at home: none  Supplies currently used at home: None    Employment/Financial:  Employment Status: employed full-time        Financial Concerns: none   Referral to Financial Worker: No       Does the patient's insurance plan have a 3 day qualifying hospital stay waiver?  No    Lifestyle & Psychosocial Needs:  Social Drivers of Health     Food Insecurity: Low Risk  (3/2/2025)    Food Insecurity     Within the past 12 months, did you worry that your food would run out before you got money to buy more?: No     Within the past 12 months, did the food you bought just not last and you didn t have money to get more?: No   Depression: Not at risk (4/2/2024)    PHQ-2     PHQ-2 Score: 0   Housing Stability: Low Risk  (3/2/2025)    Housing Stability     Do you  have housing? : Yes     Are you worried about losing your housing?: No   Tobacco Use: Low Risk  (11/25/2024)    Patient History     Smoking Tobacco Use: Never     Smokeless Tobacco Use: Never     Passive Exposure: Not on file   Financial Resource Strain: Low Risk  (3/2/2025)    Financial Resource Strain     Within the past 12 months, have you or your family members you live with been unable to get utilities (heat, electricity) when it was really needed?: No   Alcohol Use: Not on file   Transportation Needs: Low Risk  (3/2/2025)    Transportation Needs     Within the past 12 months, has lack of transportation kept you from medical appointments, getting your medicines, non-medical meetings or appointments, work, or from getting things that you need?: No   Physical Activity: Not on file   Interpersonal Safety: Low Risk  (3/2/2025)    Interpersonal Safety     Do you feel physically and emotionally safe where you currently live?: Yes     Within the past 12 months, have you been hit, slapped, kicked or otherwise physically hurt by someone?: No     Within the past 12 months, have you been humiliated or emotionally abused in other ways by your partner or ex-partner?: No   Stress: Not on file   Social Connections: Not on file   Health Literacy: Not on file       Functional Status:  Prior to admission patient needed assistance:   Dependent ADLs:: Independent  Dependent IADLs:: Independent       Mental Health Status:  Mental Health Status: No Current Concerns       Chemical Dependency Status:  Chemical Dependency Status: No Current Concerns             Values/Beliefs:  Spiritual, Cultural Beliefs, Jain Practices, Values that affect care: no  Description of Beliefs that Will Affect Care: spiritual            Discussed  Partnership in Safe Discharge Planning  document with patient/family: No    Additional Information:  Consult received for discharge planning. Notified by provider, that patient will need to discharge on  IV-Abx. Patient will likely need IV-Vanco Q8 for 6-12 weeks at discharge.    Patient lives with his wife, Lisa, in their home in [place]. Patient is independent at baseline. Patient very capable of being taught how to administer IV-Antibiotics. His wife is an RN here in the infusion center and is capable of helping also.     Benefit Check per [facility] is as follows:  UMR 85%  Patient 15%  Deductible $4400. He has met $0  Out of pocket Max $4400. He has met $0.    Once deductible is met, patient will be covered at 100%.    Patient updated and in agreement with moving forward with Home Infusion plan.      Next Steps: Awaiting biopsy results    YOJANA Alvarado  [facility] [phone]/ [facility] [phone]  Care Management

## 2025-03-05 NOTE — PLAN OF CARE
Goal Outcome Evaluation:      Plan of Care Reviewed With: patient    Overall Patient Progress: improvingOverall Patient Progress: improving    Outcome Evaluation: Pt A&Ox4. Left foot dressing CDI, elevated and ice applied intermittently. Pt reports baseline BLE numbness. Tmax 100.4F. Tylenol x1. Good urine output. 1L O2 overnight while sleeping to maintain sats >90%. Tele SR with BBB.   Insulin gtt continues overnight, currently Alg 4, 2 units/hr.

## 2025-03-06 VITALS
OXYGEN SATURATION: 95 % | HEIGHT: 75 IN | WEIGHT: 215.1 LBS | SYSTOLIC BLOOD PRESSURE: 100 MMHG | HEART RATE: 71 BPM | RESPIRATION RATE: 18 BRPM | BODY MASS INDEX: 26.75 KG/M2 | DIASTOLIC BLOOD PRESSURE: 56 MMHG | TEMPERATURE: 98.2 F

## 2025-03-06 LAB
ANION GAP SERPL CALCULATED.3IONS-SCNC: 12 MMOL/L (ref 7–15)
BACTERIA BLD CULT: NORMAL
BACTERIA BONE ANAEROBE+AEROBE CULT: ABNORMAL
BACTERIA BONE ANAEROBE+AEROBE CULT: ABNORMAL
BACTERIA BONE ANAEROBE+AEROBE CULT: NORMAL
BACTERIA TISS BX CULT: ABNORMAL
BACTERIA TISS BX CULT: ABNORMAL
BACTERIA TISS BX CULT: NORMAL
BUN SERPL-MCNC: 9 MG/DL (ref 6–20)
CALCIUM SERPL-MCNC: 8.7 MG/DL (ref 8.8–10.4)
CHLORIDE SERPL-SCNC: 99 MMOL/L (ref 98–107)
CREAT SERPL-MCNC: 0.81 MG/DL (ref 0.67–1.17)
CRP SERPL-MCNC: 118.92 MG/L
EGFRCR SERPLBLD CKD-EPI 2021: >90 ML/MIN/1.73M2
ERYTHROCYTE [DISTWIDTH] IN BLOOD BY AUTOMATED COUNT: 12.8 % (ref 10–15)
GLUCOSE BLDC GLUCOMTR-MCNC: 186 MG/DL (ref 70–99)
GLUCOSE BLDC GLUCOMTR-MCNC: 189 MG/DL (ref 70–99)
GLUCOSE BLDC GLUCOMTR-MCNC: 207 MG/DL (ref 70–99)
GLUCOSE BLDC GLUCOMTR-MCNC: 245 MG/DL (ref 70–99)
GLUCOSE BLDC GLUCOMTR-MCNC: 292 MG/DL (ref 70–99)
GLUCOSE SERPL-MCNC: 183 MG/DL (ref 70–99)
HCO3 SERPL-SCNC: 24 MMOL/L (ref 22–29)
HCT VFR BLD AUTO: 35.2 % (ref 40–53)
HGB BLD-MCNC: 12 G/DL (ref 13.3–17.7)
MCH RBC QN AUTO: 28.2 PG (ref 26.5–33)
MCHC RBC AUTO-ENTMCNC: 34.1 G/DL (ref 31.5–36.5)
MCV RBC AUTO: 83 FL (ref 78–100)
PATH REPORT.COMMENTS IMP SPEC: NORMAL
PATH REPORT.COMMENTS IMP SPEC: NORMAL
PATH REPORT.FINAL DX SPEC: NORMAL
PATH REPORT.GROSS SPEC: NORMAL
PATH REPORT.MICROSCOPIC SPEC OTHER STN: NORMAL
PATH REPORT.RELEVANT HX SPEC: NORMAL
PHOTO IMAGE: NORMAL
PLATELET # BLD AUTO: 277 10E3/UL (ref 150–450)
POTASSIUM SERPL-SCNC: 4 MMOL/L (ref 3.4–5.3)
RBC # BLD AUTO: 4.26 10E6/UL (ref 4.4–5.9)
SODIUM SERPL-SCNC: 135 MMOL/L (ref 135–145)
VANCOMYCIN SERPL-MCNC: 11.4 UG/ML
WBC # BLD AUTO: 10.6 10E3/UL (ref 4–11)

## 2025-03-06 PROCEDURE — 250N000013 HC RX MED GY IP 250 OP 250 PS 637: Performed by: FAMILY MEDICINE

## 2025-03-06 PROCEDURE — 250N000011 HC RX IP 250 OP 636: Performed by: FAMILY MEDICINE

## 2025-03-06 PROCEDURE — 36415 COLL VENOUS BLD VENIPUNCTURE: CPT | Performed by: FAMILY MEDICINE

## 2025-03-06 PROCEDURE — 99232 SBSQ HOSP IP/OBS MODERATE 35: CPT | Performed by: STUDENT IN AN ORGANIZED HEALTH CARE EDUCATION/TRAINING PROGRAM

## 2025-03-06 PROCEDURE — 250N000012 HC RX MED GY IP 250 OP 636 PS 637: Performed by: STUDENT IN AN ORGANIZED HEALTH CARE EDUCATION/TRAINING PROGRAM

## 2025-03-06 PROCEDURE — 80202 ASSAY OF VANCOMYCIN: CPT | Performed by: FAMILY MEDICINE

## 2025-03-06 PROCEDURE — 80048 BASIC METABOLIC PNL TOTAL CA: CPT | Performed by: FAMILY MEDICINE

## 2025-03-06 PROCEDURE — 258N000003 HC RX IP 258 OP 636: Performed by: FAMILY MEDICINE

## 2025-03-06 PROCEDURE — 86140 C-REACTIVE PROTEIN: CPT | Performed by: FAMILY MEDICINE

## 2025-03-06 PROCEDURE — 85014 HEMATOCRIT: CPT | Performed by: FAMILY MEDICINE

## 2025-03-06 PROCEDURE — 120N000001 HC R&B MED SURG/OB

## 2025-03-06 PROCEDURE — 88304 TISSUE EXAM BY PATHOLOGIST: CPT | Mod: 26 | Performed by: PATHOLOGY

## 2025-03-06 RX ADMIN — VANCOMYCIN HYDROCHLORIDE 1500 MG: 1 INJECTION, POWDER, LYOPHILIZED, FOR SOLUTION INTRAVENOUS at 18:07

## 2025-03-06 RX ADMIN — PIPERACILLIN AND TAZOBACTAM 4.5 G: 4; .5 INJECTION, POWDER, FOR SOLUTION INTRAVENOUS at 19:46

## 2025-03-06 RX ADMIN — PIPERACILLIN AND TAZOBACTAM 4.5 G: 4; .5 INJECTION, POWDER, FOR SOLUTION INTRAVENOUS at 15:34

## 2025-03-06 RX ADMIN — PIPERACILLIN AND TAZOBACTAM 4.5 G: 4; .5 INJECTION, POWDER, FOR SOLUTION INTRAVENOUS at 08:51

## 2025-03-06 RX ADMIN — VANCOMYCIN HYDROCHLORIDE 1500 MG: 1 INJECTION, POWDER, LYOPHILIZED, FOR SOLUTION INTRAVENOUS at 11:11

## 2025-03-06 RX ADMIN — PIPERACILLIN AND TAZOBACTAM 4.5 G: 4; .5 INJECTION, POWDER, FOR SOLUTION INTRAVENOUS at 01:18

## 2025-03-06 RX ADMIN — ATORVASTATIN CALCIUM 20 MG: 10 TABLET, FILM COATED ORAL at 08:51

## 2025-03-06 RX ADMIN — VANCOMYCIN HYDROCHLORIDE 1500 MG: 1 INJECTION, POWDER, LYOPHILIZED, FOR SOLUTION INTRAVENOUS at 01:17

## 2025-03-06 RX ADMIN — INSULIN GLARGINE 12 UNITS: 100 INJECTION, SOLUTION SUBCUTANEOUS at 21:28

## 2025-03-06 ASSESSMENT — ACTIVITIES OF DAILY LIVING (ADL)
ADLS_ACUITY_SCORE: 31
ADLS_ACUITY_SCORE: 31
ADLS_ACUITY_SCORE: 32
ADLS_ACUITY_SCORE: 32
ADLS_ACUITY_SCORE: 31
ADLS_ACUITY_SCORE: 32
ADLS_ACUITY_SCORE: 32
ADLS_ACUITY_SCORE: 31
ADLS_ACUITY_SCORE: 32
ADLS_ACUITY_SCORE: 31
ADLS_ACUITY_SCORE: 31
ADLS_ACUITY_SCORE: 32
ADLS_ACUITY_SCORE: 31
ADLS_ACUITY_SCORE: 31
ADLS_ACUITY_SCORE: 32
ADLS_ACUITY_SCORE: 31
ADLS_ACUITY_SCORE: 32
ADLS_ACUITY_SCORE: 32

## 2025-03-06 NOTE — PLAN OF CARE
Goal Outcome Evaluation:      Plan of Care Reviewed With: patient    Overall Patient Progress: improvingOverall Patient Progress: improving    Outcome Evaluation: Pt A&Ox4. Denies pain. Dressing to LLE CDI. NWB to LLE, Pt able to utilize walker well. & 189. Pt educated on insulin injections and performed injection with assist. Afebrile. Pt able to make needs known. Abx vanco and zosyn.    Pot 4.0, no replacement needed - recheck next AM.

## 2025-03-06 NOTE — PROGRESS NOTES
S: Patient is resting well.  Blood sugars are normalizing.  No ketones.  Overall he has less pain is resting well    O: Previous marked erythema is now darkening and lessening.  Less edema is noted.  No purulence can be expressed from the wound.  Subtle fibrotic debris is noted scattered throughout the wound and subtle demarcation has occurred.  Upon removing this with a tissue nipper good bleeding occurs.  Minimal granulation tissue yet.  No abscess or purulence and much reduced edema overall.  Nothing can be expressed from the wound.    CRP remains elevated but trending down from 4 days prior    A: Uncontrolled diabetes with purulent abscess extending to the calcaneus cortex and then bone biopsy.    P: Purulent abscess extending to the cortex of the calcaneus has now been opened and the cortical bone was also biopsied with a punch biopsy.  Recommended infectious disease consult for further management of IV antibiosis.  The wound appears to be clean and no further abscess or purulence.  Less edema is noted.  However CRP remains elevated but trending down.  Recommend continued follow-up with wound care for education about how managing the wound and for supplies.  Follow-up with podiatry in about 2 weeks upon discharge.  It will be very important for strict control of blood glucose in the next few weeks for him to close this wound.

## 2025-03-06 NOTE — CONSULTS
"SPIRITUAL HEALTH SERVICES Consult Note     Medical Surgical Intensive Care Unit at [facility]     REFERRAL SOURCE/REASON FOR VISIT -   Saw patient per follow-up.     SUMMARY - I visited Lkue again.  He was sitting up, looking at his laptop, and said he is feeling \"fine.\"  I spoke with him about his illness and about his family.  Luke reported that he will go to the regular unit when a bed is available.  I gave him a blessing.        Plan - I will continue to follow patient and be available for any ongoing support needs until his discharge.      Isaiah Mantilla, Ph.D,   Spiritual Health Services  [address] [place] [address] [phone]  [email or web address]     Assessment -      Strengths, Coping, and Resources - talia, talia community     Meaning, Beliefs, and Spirituality - Patient reported at admission that he is Episcopal.  "

## 2025-03-06 NOTE — PROGRESS NOTES
[facility]    Medicine Progress Note - Hospitalist Service    Date of Admission:  3/2/2025    Assessment & Plan   Luke Farias is a 59 year old male with type 2 diabetes treated with oral medication and diabetic peripheral neuropathy who presented to the ER on 3/2/2025 after he noticed redness on the top and side of his left foot and was found to have a severe diabetic infection of left foot with SIRS and suspected sepsis and was hospitalized and placed on Zosyn and Vanco for antibiotic coverage.  MRI of the foot has shown concern for abscess and possible osteomyelitis and patient proceeded with OR intervention on 3/4/25 with abscess drained and bone cultures obtained but early osteomyelitis was suspected.  Patient continues on IV antibiotics while awaiting culture results at which time virtual ID consultation will occur to help narrow antibiotic therapy plan and assist in discharge plan but anticipate PICC placement, prolonged IV antibiotics.      Patient required insulin drip preoperatively to improve ketosis and hyperglycemia but patient eating and drinking well and was transitioned to subcutaneous insulin regimen.    Severe diabetic infection of left foot, potentially limb threatening  SIRS, possible sepsis  Open wound left heel  Diabetic peripheral neuropathy  Clinical presentation is suspicious for open wound of left heel with surrounding cellulitis and purulence complicating poorly controlled type 2 diabetes with severe hyperglycemia and diabetic peripheral neuropathy.  He has had chills and presents with leukocytosis concerning for SIRS and sepsis.  He has been using an electric device to sand his foot recently and has purulence on exam increasing concern for both Pseudomonas and MRSA.  MRSA nares swab negative but there is concern for deeper infection involvement with abscess and possible osteomyelitis seen on MRI on 3/3/25.  Patient underwent surgical intervention on 3/4/25  for debridement, culture collection, and bone biopsy collection.  Cultures are pending   -Continue high-dose parenteral Zosyn 4.5 mg IV every 6 hours  -Continue vancomycin, pharmacy consulted for dosing  -Monitor for blood culture and surgery culture results   -Anticipate evaluation by [facility] nurse today   -Limit weightbearing on left foot to non-weight bearing status as much as possible but if patient needs rare pressure to allow transfer it is okay per podiatry team.   -ID consulted, appreciate recommendations     Type 2 diabetes with severe hyperglycemia  Ketonemia  Longstanding type 2 diabetes treated with oral medication.  Hemoglobin A1c 16.8 with initial blood sugar 441.  Serum ketones are positive and anion gap was elevated although he did not have outright metabolic acidosis.  He was placed on an insulin drip pre-operatively with resolution of ketones and blood sugar improvement.  He is interested and willing to start insulin treatment  -transition patient to Lantus 10 units twice daily (total dose approximately 0.2 unit/kg/day) and NovoLog prandial insulin 1 unit per 15 g carbohydrates with meals and snacks as well as NovoLog high correction scale and stop insulin drip   -Not continuing either metformin or glipizide at this time  -Recommend diabetic education during hospitalization and close outpatient follow-up with PCP and diabetic nurse    - increasing insulin to Lantus 12     Recent Labs   Lab 03/06/25  1141 03/06/25  0750 03/06/25  0510 03/06/25  0122 03/05/25  2123 03/05/25  1646   * 186* 183* 189* 212* 191*        Left leg swelling  Left lower leg and foot are moderately swollen which is asymmetric compared with right, thought secondary to cellulitis infection. Doppler ultrasound negative for DVT.    -no further acute testing needed, continue with plan for infection as above     Hyponatremia  Hypochloremia  Presenting with mild hyponatremia and hypochloremia likely due to recent inadequate oral  "nutritional intake.  He received IV fluid bolus normal saline 1 L in the ER and ongoing IV fluids while NPO for surgery.  Electrolytes are now normalized   -saline lock IV fluids and monitor for ongoing resolution     Hypokalemia  Patient's potassium was initially low normal but following initiation fluid bolus in the ED decreased to 3.2 and patient has been started on standard replacement protocol.  Potassium has now normalized  -Continue standard replacement protocol as needed    Hyperlipidemia  Chronic hyperlipidemia treated with statin  -Continue chronic statin          Diet: Moderate Consistent Carb (60 g CHO per Meal) Diet    DVT Prophylaxis: Pneumatic Compression Devices  Carrillo Catheter: Not present  Lines: None     Cardiac Monitoring: None  Code Status: Full Code      Clinically Significant Risk Factors                             # DMII: A1C = 16.8 % (Ref range: <5.7 %) within past 6 months, PRESENT ON ADMISSION  # Overweight: Estimated body mass index is 26.89 kg/m  as calculated from the following:    Height as of this encounter: 1.905 m (6' 3\").    Weight as of this encounter: 97.6 kg (215 lb 1.6 oz)., PRESENT ON ADMISSION     # Financial/Environmental Concerns: none         Social Drivers of Health            Disposition Plan     Medically Ready for Discharge: Anticipated in 2-4 Days             Carmina Staley MD  Hospitalist Service  [facility]  Securely message with [employer] (more info)  Text page via [employer] Paging/Directory   ______________________________________________________________________    Interval History     No fever, chills  No chest pain  No acute events   Able to be non-weight bearing     Physical Exam   Vital Signs: Temp: 98.5  F (36.9  C) Temp src: Oral BP: 111/66 Pulse: 68   Resp: 18 SpO2: 95 % O2 Device: None (Room air)    Weight: 215 lbs 1.6 oz    Constitutional: awake, alert, cooperative, no apparent distress, and appears stated age  Respiratory: " No increased work of breathing, good air exchange, clear to auscultation bilaterally, no crackles or wheezing  Cardiovascular: Normal apical impulse, regular rate and rhythm, normal S1 and S2, no S3 or S4, and no murmur noted    Medical Decision Making       45 MINUTES SPENT BY ME on the date of service doing chart review, history, exam, documentation & further activities per the note.      Data   ------------------------- PAST 24 HR DATA REVIEWED -----------------------------------------------    I have personally reviewed the following data over the past 24 hrs:    10.6  \   12.0 (L)   / 277     135 99 9.0 /  207 (H)   4.0 24 0.81 \     Procal: N/A CRP: 118.92 (H) Lactic Acid: N/A         Imaging results reviewed over the past 24 hrs:   No results found for this or any previous visit (from the past 24 hours).

## 2025-03-07 ENCOUNTER — APPOINTMENT (OUTPATIENT)
Dept: GENERAL RADIOLOGY | Facility: CLINIC | Age: 60
DRG: 854 | End: 2025-03-07
Attending: STUDENT IN AN ORGANIZED HEALTH CARE EDUCATION/TRAINING PROGRAM
Payer: COMMERCIAL

## 2025-03-07 ENCOUNTER — APPOINTMENT (OUTPATIENT)
Dept: PHYSICAL THERAPY | Facility: CLINIC | Age: 60
DRG: 854 | End: 2025-03-07
Payer: COMMERCIAL

## 2025-03-07 LAB
ACID FAST STAIN (ARUP): NORMAL
ANION GAP SERPL CALCULATED.3IONS-SCNC: 13 MMOL/L (ref 7–15)
BACTERIA BONE ANAEROBE+AEROBE CULT: ABNORMAL
BACTERIA BONE ANAEROBE+AEROBE CULT: ABNORMAL
BACTERIA TISS BX CULT: ABNORMAL
BACTERIA TISS BX CULT: ABNORMAL
BUN SERPL-MCNC: 10.8 MG/DL (ref 6–20)
CALCIUM SERPL-MCNC: 8.8 MG/DL (ref 8.8–10.4)
CHLORIDE SERPL-SCNC: 99 MMOL/L (ref 98–107)
CREAT SERPL-MCNC: 0.76 MG/DL (ref 0.67–1.17)
CRP SERPL-MCNC: 77.4 MG/L
EGFRCR SERPLBLD CKD-EPI 2021: >90 ML/MIN/1.73M2
GLUCOSE BLDC GLUCOMTR-MCNC: 210 MG/DL (ref 70–99)
GLUCOSE BLDC GLUCOMTR-MCNC: 228 MG/DL (ref 70–99)
GLUCOSE BLDC GLUCOMTR-MCNC: 229 MG/DL (ref 70–99)
GLUCOSE BLDC GLUCOMTR-MCNC: 245 MG/DL (ref 70–99)
GLUCOSE BLDC GLUCOMTR-MCNC: 325 MG/DL (ref 70–99)
GLUCOSE SERPL-MCNC: 260 MG/DL (ref 70–99)
HCO3 SERPL-SCNC: 24 MMOL/L (ref 22–29)
HOLD SPECIMEN: NORMAL
POTASSIUM SERPL-SCNC: 3.9 MMOL/L (ref 3.4–5.3)
SODIUM SERPL-SCNC: 136 MMOL/L (ref 135–145)

## 2025-03-07 PROCEDURE — 86140 C-REACTIVE PROTEIN: CPT | Performed by: STUDENT IN AN ORGANIZED HEALTH CARE EDUCATION/TRAINING PROGRAM

## 2025-03-07 PROCEDURE — 250N000011 HC RX IP 250 OP 636: Performed by: FAMILY MEDICINE

## 2025-03-07 PROCEDURE — G0463 HOSPITAL OUTPT CLINIC VISIT: HCPCS

## 2025-03-07 PROCEDURE — 80048 BASIC METABOLIC PNL TOTAL CA: CPT | Performed by: STUDENT IN AN ORGANIZED HEALTH CARE EDUCATION/TRAINING PROGRAM

## 2025-03-07 PROCEDURE — 272N000579 HC TRAY POWER PICC SOLO 4FR SINGLE LUMEN

## 2025-03-07 PROCEDURE — 97116 GAIT TRAINING THERAPY: CPT | Mod: GP | Performed by: PHYSICAL THERAPIST

## 2025-03-07 PROCEDURE — 250N000009 HC RX 250: Performed by: STUDENT IN AN ORGANIZED HEALTH CARE EDUCATION/TRAINING PROGRAM

## 2025-03-07 PROCEDURE — 99232 SBSQ HOSP IP/OBS MODERATE 35: CPT | Performed by: STUDENT IN AN ORGANIZED HEALTH CARE EDUCATION/TRAINING PROGRAM

## 2025-03-07 PROCEDURE — G0545 PR INHRENT VISIT TO INPT/OBS W CNFRM/SUSPCT INFCT DIS BY INFCT DIS SPCIALST: HCPCS | Performed by: INTERNAL MEDICINE

## 2025-03-07 PROCEDURE — 999N000065 XR CHEST PORT 1 VIEW

## 2025-03-07 PROCEDURE — 120N000001 HC R&B MED SURG/OB

## 2025-03-07 PROCEDURE — 36569 INSJ PICC 5 YR+ W/O IMAGING: CPT

## 2025-03-07 PROCEDURE — 250N000013 HC RX MED GY IP 250 OP 250 PS 637: Performed by: FAMILY MEDICINE

## 2025-03-07 PROCEDURE — G0427 INPT/ED TELECONSULT70: HCPCS | Mod: 24 | Performed by: INTERNAL MEDICINE

## 2025-03-07 PROCEDURE — 258N000003 HC RX IP 258 OP 636: Performed by: FAMILY MEDICINE

## 2025-03-07 PROCEDURE — 36415 COLL VENOUS BLD VENIPUNCTURE: CPT | Performed by: STUDENT IN AN ORGANIZED HEALTH CARE EDUCATION/TRAINING PROGRAM

## 2025-03-07 RX ORDER — NAFCILLIN SODIUM 2 G/8ML
2 INJECTION, POWDER, FOR SOLUTION INTRAMUSCULAR; INTRAVENOUS EVERY 4 HOURS
Status: DISCONTINUED | OUTPATIENT
Start: 2025-03-07 | End: 2025-03-08 | Stop reason: HOSPADM

## 2025-03-07 RX ORDER — LIDOCAINE 40 MG/G
CREAM TOPICAL
Status: DISCONTINUED | OUTPATIENT
Start: 2025-03-07 | End: 2025-03-08 | Stop reason: HOSPADM

## 2025-03-07 RX ADMIN — PIPERACILLIN AND TAZOBACTAM 4.5 G: 4; .5 INJECTION, POWDER, FOR SOLUTION INTRAVENOUS at 08:42

## 2025-03-07 RX ADMIN — NAFCILLIN SODIUM 2 G: 2 INJECTION, POWDER, LYOPHILIZED, FOR SOLUTION INTRAMUSCULAR; INTRAVENOUS at 18:35

## 2025-03-07 RX ADMIN — VANCOMYCIN HYDROCHLORIDE 1500 MG: 1 INJECTION, POWDER, LYOPHILIZED, FOR SOLUTION INTRAVENOUS at 10:06

## 2025-03-07 RX ADMIN — LIDOCAINE HYDROCHLORIDE 2 ML: 10 INJECTION, SOLUTION EPIDURAL; INFILTRATION; INTRACAUDAL; PERINEURAL at 17:35

## 2025-03-07 RX ADMIN — ATORVASTATIN CALCIUM 20 MG: 10 TABLET, FILM COATED ORAL at 08:41

## 2025-03-07 RX ADMIN — NAFCILLIN SODIUM 2 G: 2 INJECTION, POWDER, LYOPHILIZED, FOR SOLUTION INTRAMUSCULAR; INTRAVENOUS at 21:25

## 2025-03-07 RX ADMIN — VANCOMYCIN HYDROCHLORIDE 1500 MG: 1 INJECTION, POWDER, LYOPHILIZED, FOR SOLUTION INTRAVENOUS at 03:00

## 2025-03-07 RX ADMIN — PIPERACILLIN AND TAZOBACTAM 4.5 G: 4; .5 INJECTION, POWDER, FOR SOLUTION INTRAVENOUS at 02:02

## 2025-03-07 ASSESSMENT — ACTIVITIES OF DAILY LIVING (ADL)
ADLS_ACUITY_SCORE: 31

## 2025-03-07 NOTE — PLAN OF CARE
Physical Therapy Discharge Summary    Reason for therapy discharge:    All goals and outcomes met, no further needs identified.    Progress towards therapy goal(s). See goals on Care Plan in Epic electronic health record for goal details.  Goals met    Therapy recommendation(s):    No further therapy is recommended.      Thank you for your referral.  Donna Raygoza, PT, DPT, ATC, [facility]  O: [phone]  E: [email or web address]

## 2025-03-07 NOTE — PHARMACY-VANCOMYCIN DOSING SERVICE
Pharmacy Vancomycin Note  Date of Service 2025  Patient's  1965   59 year old, male    Indication: Sepsis and Skin and Soft Tissue Infection  Day of Therapy: 5  Current vancomycin regimen:  1500 mg IV q8h  Current vancomycin monitoring method: AUC  Current vancomycin therapeutic monitoring goal: 400-600 mg*h/L    InsightRX Prediction of Current Vancomycin Regimen  Loading dose: N/A  Regimen: 1500 mg IV every 8 hours.  Start time: 02:07 on 2025  Exposure target: AUC24 (range)400-600 mg/L.hr   AUC24,ss: 541 mg/L.hr  Probability of AUC24 > 400: 97 %  Ctrough,ss: 13.3 mg/L  Probability of Ctrough,ss > 20: 0 %  Probability of nephrotoxicity (Lodise HAILEY ): 8 %    Current estimated CrCl = Estimated Creatinine Clearance: 137.2 mL/min (based on SCr of 0.81 mg/dL).    Creatinine for last 3 days  3/4/2025:  5:10 AM Creatinine 0.55 mg/dL  3/5/2025:  5:11 AM Creatinine 0.68 mg/dL  3/6/2025:  5:10 AM Creatinine 0.81 mg/dL    Recent Vancomycin Levels (past 3 days)  3/5/2025:  1:02 AM Vancomycin 7.4 ug/mL  3/6/2025:  9:00 AM Vancomycin 11.4 ug/mL    Vancomycin IV Administrations (past 72 hours)                     vancomycin (VANCOCIN) 1,500 mg in 0.9% NaCl 265 mL intermittent infusion (mg) 1,500 mg New Bag 25 1807     1,500 mg New Bag  1111     1,500 mg New Bag  0117     1,500 mg New Bag 25 1753     1,500 mg New Bag  1005    vancomycin (VANCOCIN) 1,500 mg in 0.9% NaCl 265 mL intermittent infusion (mg) 1,500 mg New Bag 25 0208     1,500 mg New Bag 25 1420     1,500 mg New Bag  0220                    Nephrotoxins and other renal medications (From now, onward)      Start     Dose/Rate Route Frequency Ordered Stop    25 1000  vancomycin (VANCOCIN) 1,500 mg in 0.9% NaCl 265 mL intermittent infusion         1,500 mg  over 90 Minutes Intravenous EVERY 8 HOURS 25 0217      25  piperacillin-tazobactam (ZOSYN) 4.5 g vial to attach to  mL bag        Note to  "Pharmacy: For [facility], [facility] and [facility]: For Zosyn-naive patients, use the \"Zosyn initial dose + extended infusion\" order panel.    4.5 g  over 30 Minutes Intravenous EVERY 6 HOURS 03/02/25 1812                 Contrast Orders - past 72 hours (72h ago, onward)      None            Interpretation of levels and current regimen:  Vancomycin level is reflective of -600    Has serum creatinine changed greater than 50% in last 72 hours: Yes    Urine output:  good urine output    Renal Function: Stable      Plan:  Continue Current Dose  Vancomycin monitoring method: AUC  Vancomycin therapeutic monitoring goal: 400-600 mg*h/L  Pharmacy will check vancomycin levels as appropriate in 3-5 Days.  Serum creatinine levels will be ordered daily for the first week of therapy and at least twice weekly for subsequent weeks.    Libby Molina, [facility]    "

## 2025-03-07 NOTE — PLAN OF CARE
Goal Outcome Evaluation:      Plan of Care Reviewed With: patient    Overall Patient Progress: improvingOverall Patient Progress: improving    Outcome Evaluation: A&O. VSS on rm air. Ambulating independently. Denies pain. Wound dressing is CDI. Receiving antibiotics per MAR. IV saline locked. Adequate UOP. Tolerating oral intake.

## 2025-03-07 NOTE — PROGRESS NOTES
SPIRITUAL HEALTH SERVICES Progress Note     Medical Surgical Unit at [facility]     REFERRAL SOURCE/REASON FOR VISIT - Patient is Tenriism.     SUMMARY - One of our Eucharistic Ministers visited Luke to give him Holy Communion, but patient declined.           Plan - Eucharistic Ministers will continue to follow patient to offer him Holy Communion until his discharge.      Isaiah Mantilla, Ph.D, [facility]  [address] [place], [place] [id number] [phone]  [email or web address]     Assessment -      Strengths, Coping, and Resources - talia, talia community     Meaning, Beliefs, and Spirituality - Patient is Tenriism.

## 2025-03-07 NOTE — PROGRESS NOTES
[facility]    Medicine Progress Note - Hospitalist Service    Date of Admission:  3/2/2025    Assessment & Plan   Luke Fairas is a 59 year old male with type 2 diabetes treated with oral medication and diabetic peripheral neuropathy who presented to the ER on 3/2/2025 after he noticed redness on the top and side of his left foot and was found to have a severe diabetic infection of left foot with SIRS and suspected sepsis and was hospitalized and placed on Zosyn and Vanco for antibiotic coverage.  MRI of the foot has shown concern for abscess and possible osteomyelitis and patient proceeded with OR intervention on 3/4/25 with abscess drained and bone cultures obtained but early osteomyelitis was suspected.  Patient continues on IV antibiotics while awaiting culture results at which time virtual ID consultation will occur to help narrow antibiotic therapy plan and assist in discharge plan but anticipate PICC placement, prolonged IV antibiotics.      Patient required insulin drip preoperatively to improve ketosis and hyperglycemia but patient eating and drinking well and was transitioned to subcutaneous insulin regimen.    Left ankle abscesses and osteomyelitis of left calcaneus  Severe diabetic infection of left foot, over left heel  SIRS, possible sepsis  Diabetic peripheral neuropathy  Clinical presentation is suspicious for open wound of left heel with surrounding cellulitis and purulence complicating poorly controlled type 2 diabetes with severe hyperglycemia and diabetic peripheral neuropathy.  He has had chills and presents with leukocytosis concerning for SIRS and sepsis.  He has been using an electric device to sand his foot recently and has purulence on exam increasing concern for both Pseudomonas and MRSA.  MRSA nares swab negative but there is concern for deeper infection involvement with abscess and possible osteomyelitis seen on MRI on 3/3/25.  Patient underwent surgical  intervention on 3/4/25 for debridement, culture collection, and bone biopsy collection.    -Podiatry following, discussed plan  -Infectious disease consulted, appreciate recommendations  -Switching patient to nafcillin 2 g IV every 4 hours per ID recommendation  -Blood cultures negative, tissue culture growing Staph aureus  -Continue wound care  -Limit weightbearing on left foot to non-weight bearing status as much as possible but if patient needs rare pressure to allow transfer it is okay per podiatry team.   -PICC line placement today  -Plan for discharge tomorrow with home IV antibiotic infusion    Type 2 diabetes with severe hyperglycemia  Ketonemia  Longstanding type 2 diabetes treated with oral medication.  Hemoglobin A1c 16.8 with initial blood sugar 441.  Serum ketones are positive and anion gap was elevated although he did not have outright metabolic acidosis.  He was placed on an insulin drip pre-operatively with resolution of ketones and blood sugar improvement.  He is interested and willing to start insulin treatment  -transition patient to Lantus 10 units twice daily (total dose approximately 0.2 unit/kg/day) and NovoLog prandial insulin 1 unit per 15 g carbohydrates with meals and snacks as well as NovoLog high correction scale and stop insulin drip   -Not continuing either metformin or glipizide at this time  -Recommend diabetic education during hospitalization and close outpatient follow-up with PCP and diabetic nurse    - increasing insulin to Lantus 15        Left leg swelling  Left lower leg and foot are moderately swollen which is asymmetric compared with right, thought secondary to cellulitis infection. Doppler ultrasound negative for DVT.    -no further acute testing needed, continue with plan for infection as above     Hyponatremia  Hypochloremia  Presenting with mild hyponatremia and hypochloremia likely due to recent inadequate oral nutritional intake.  He received IV fluid bolus normal  "saline 1 L in the ER and ongoing IV fluids while NPO for surgery.  Electrolytes are now normalized   -saline lock IV fluids and monitor for ongoing resolution     Hypokalemia  Patient's potassium was initially low normal but following initiation fluid bolus in the ED decreased to 3.2 and patient has been started on standard replacement protocol.  Potassium has now normalized  -Continue standard replacement protocol as needed    Hyperlipidemia  Chronic hyperlipidemia treated with statin  -Continue chronic statin          Diet: Moderate Consistent Carb (60 g CHO per Meal) Diet    DVT Prophylaxis: Pneumatic Compression Devices  Carrillo Catheter: Not present  Lines: None     Cardiac Monitoring: None  Code Status: Full Code      Clinically Significant Risk Factors                             # DMII: A1C = 16.8 % (Ref range: <5.7 %) within past 6 months   # Overweight: Estimated body mass index is 26.86 kg/m  as calculated from the following:    Height as of this encounter: 1.905 m (6' 3\").    Weight as of this encounter: 97.5 kg (214 lb 14.4 oz).      # Financial/Environmental Concerns: none         Social Drivers of Health            Disposition Plan     Medically Ready for Discharge: Anticipated Tomorrow             Carmina Staley MD  Hospitalist Service  [facility]  Securely message with Attributor (more info)  Text page via [facility] Paging/Directory   ______________________________________________________________________    Interval History     Patient is doing well with diabetic education, self administering insulin    Physical Exam   Vital Signs: Temp: 98.4  F (36.9  C) Temp src: Oral BP: 112/56 Pulse: 70   Resp: 14 SpO2: 95 % O2 Device: None (Room air)    Weight: 214 lbs 14.4 oz    Constitutional: awake, alert, cooperative, no apparent distress, and appears stated age  Respiratory: No increased work of breathing, good air exchange, clear to auscultation bilaterally, no crackles or " wheezing  Cardiovascular: Normal apical impulse, regular rate and rhythm, normal S1 and S2, no S3 or S4, and no murmur noted    Medical Decision Making       45 MINUTES SPENT BY ME on the date of service doing chart review, history, exam, documentation & further activities per the note.      Data   ------------------------- PAST 24 HR DATA REVIEWED -----------------------------------------------    I have personally reviewed the following data over the past 24 hrs:    N/A  \   N/A   / N/A     136 99 10.8 /  245 (H)   3.9 24 0.76 \     Procal: N/A CRP: 77.40 (H) Lactic Acid: N/A         Imaging results reviewed over the past 24 hrs:   No results found for this or any previous visit (from the past 24 hours).

## 2025-03-07 NOTE — PROGRESS NOTES
Reason For Visit: Luke Farias, 59 year old male, seen for a WOC consultation to evaluate and treat Left lateral Calcaneous. Patient was admitted on 3/2/25 for Infection of DM ulcer, left foot.  Pt;s wife Lisa is also present during the second half of my visit today.  Patient is A&Ox4, pleasant upon interaction.      Start of Care in [facility]: 3/5/2025, inpatient   Referring Provider: Dr. Darryl Huff   Primary Care Provider: [employer]   Wound Location: Left Lateral calcaneus  Surgery: Debridement left calcaneus with bone biopsy, 3/4/25 Darryl Huff DPM      Wound History:  Patient reports having dry, peeling skin to the lateral calcaneus, used this device to help remove the non-viable tissue.  At this time the area was dry with no excess moisture.  On 3/2/25 noted to have increase redness and moist skin, due to concerns for infection was evaluated at [facility] ER. He later was admitted to floor for IV antibiotics. MRI completed on 3/3/25 which showed Soft tissue ulceration lateral to the calcaneus,  Mild bone marrow edema within the adjacent lateral calcaneus. Osteomyelitis is likely if this wound probes to bone. Adjacent subcutaneous fluid collection measuring up to 3.2 cm, suspicious for abscess.  Regional cellulitis. Podiatry was consulted same day (3/3/25) where it was recommended patient have surgical debridement of left ankle wound/abscess in addition to bone biopsy.  Surgery was completed on 3/4/25.  Dr. Huff reached out to WOC team to see if WO nurse could remove packing and re dress the wound on 3/5/25.  Visit was completed, WOC team to continue to follow if Podiatry recommends.    - 3/7/25 woc RN VIANCA Lopez and Dr Huff DPM discussed via phone options for wound cares and follow up post hospital discharge which potentially could occur some time over the upcoming weekend.  Dr Huff questioned potential wound vac use, at this time I feel the two small open wounds would  benefit from a vac but the surrounding tissue is too fluctuant to apply a vac without considerable risk for periwound skin and tissue breakdown.  Also discussed that pt was scheduled in Epic for follow up with Specialty RN for this upcoming Monday 3/10, that was scheduled in error per MD. I canceled that order and rescheduled pt same day to be seen in the Wound and Ostomy clinic.  If however he is still hospitalized I will see him inpatient.      Past Medical History:  Patient Active Problem List   Diagnosis    Chronic rhinitis    Type 2 diabetes mellitus with hyperglycemia (H)    Hyperlipidemia LDL goal <100    Venous lake on Right chest    Congenital nevus of Right upper back    Diabetic infection of left foot (H)    SIRS (systemic inflammatory response syndrome) (H)    Diabetic peripheral neuropathy (H)    Hyponatremia    Hypochloremia    Platelet dysfunction due to aspirin (H)    Open wound of left heel    Left leg swelling    Elevated C-reactive protein (CRP)    Elevated erythrocyte sedimentation rate    Diabetic foot infection (H)    Elevated blood ketone body level                 Tobacco Use:     Tobacco Use      Smoking status: Never      Smokeless tobacco: Never     Diabetic: Type 2  HgbA1C:   Hemoglobin A1C   Date Value Ref Range Status   03/02/2025 16.8 (H) <5.7 % Final     Comment:     Normal <5.7%   Prediabetes 5.7-6.4%    Diabetes 6.5% or higher     Note: Adopted from ADA consensus guidelines.   04/21/2021 8.9 (H) 0 - 5.6 % Final     Comment:     Normal <5.7% Prediabetes 5.7-6.4%  Diabetes 6.5% or higher - adopted from ADA   consensus guidelines.  Results confirmed by repeat test     Checks Blood Glucose?:  See Hospital flow sheet Average Readings: See Hospital flow sheet.      Personal/social history:  Works full time, owns [employer], more office side. Lives with Wife, no paid help.      Objective:   Physical appearance: Lying in bed, no obvious signs of pain or distress   Mobility: NWB to  "left foot/Calcaneous.    Current treatment plan: 1/4\" Nu Gauze packing, 1 box of 4 x 4 bulk gauze , followed by ABD, secured with rolled gauze roll and ace bandage.   Last changed: By DPM yesterday 3/6/25     Wound #1 Left Lateral Calcaneus - Surgical wound, Surgical debridement, DOS 3/4/25  Stage/tissue depth: Full Thickness   Total area of concerning damage and fluctuance 11 cm L x 10 cm W  - Size of the two open wounds and well approximated incision between 4 cm L x 2.5 cm L x 1.3 cm D  - Individual open aspect- Proximal posterior of two 1 cm L x 1 cm W x 1.3 cm C.  Distal anterior of two 2 cm L x 0.8 cm W x 1.3 cm D  Tunneling: none  Undermining:  Proximal and distal wound communicate via undermining  Wound bed type/amount: In two open wounds approximately 80% slough and 20% red tissue with no granular buds present; not fluctuant  Wound bed of the area of concerning fluctuance approximately 20 % the open aspects described above, 10 % macerated skin, 20 % moist denuded dermal tissue and 50 % fragile slightly boggy skin and tissue, dry   Wound Edges:  Closed with necrotic tissue  Periwound: Edema further out from fluctuant area and wound, erythema with some increased warmth to touch.  Drainage: Large amount of Serous and Sanguineous drainage.   Odor: none  Pain: Neuropathy, no sensation. No pain    Photo's from today inpatient [facility] nurse follow up 3/7/25.       Photo's from initial inpatient visit 3/5/25.        Photo's from ED 3/2/25.     Dorsalis Pedal Pulse: 0/4 palpable: NA doppler: NA phasic  Hair growth: Absent from knee distally  Capillary Refill: 3 seconds  Feet/toes color: deep pink with erythema as listed in assessment   Nails:   R Leg: Edema . Ankle circumference  cm. Calf circumference  cm.  L Leg: Edema. Ankle circumference  cm. Calf circumference  cm.- Not assessed     Mobility: NWB to foot/heel  Current offloading/footwear: Gripper sock while inpatient.   Sensation: Absent at the foot         Other " "callousing/areas of concern:  Plantar aspect of left foot- Vertical DM Fissure- Sharp debridement in OR, wound is red open dermal tissue, no drainage. Periwound is painted with betadine and dry.    Not assessed this visit 3/7/25 [facility] ulices flores.    Photos from initial inpatient visit 3/5/25.     Diet: DM. Low carb.      Discussed: etiology of wound (DM neuropathy ), pathophysiology and patient specific goals for wound healing.   Education: plan of care      Goals of Wound Healing:   - Transition from inflammatory to Remodeling stage of healing  - Maintain moist environment- Betadine   - Control exudate-Bulk Gauze   - Autolytic debridement of necrotic/slough tissue- Dry to dry dressing  - Protect wound from outside environment- Rolled gauze   - Antimicrobial - NA  - Pack open space- 1/2\" Nu gauze   - Promote healing by secondary intention for complete closure of wound  - Offloading/pressure reduction     Assessment:  The two open wounds and the well approximated incision between are about the same in status.  The skin directly around the wound had more denudement and some increased maceration of the wound edges.   The tissue surrounding the open wound is fluctuant and needs monitoring closely.     Barriers to wound healing:   Poor nutrition: inadequate supply of protein, carbohydrates, fatty acids, and trace elements essential for all phases of wound healing- Educated that high protein diet is optimal for wound healing, goal is 100-120 g per day. Provided verbal list of foods rich in protein.   Reduced Blood Supply: inadequate perfusion to heal wound- No know ABIs   Medication: none  Chemotherapy: suppresses the immune system and inflammatory response-NA  Radiotherapy: increases production of free radical which damage cells-NA  Psychological stress: Current hospital stay with IV antibiotics   Obesity: decreases tissue perfusion- Appropriate weight and BMI   Infection: prolongs inflammatory phase, uses vital nutrients, " impairs epithelialization and releases toxins-Currently on IV antibiotics   Underlying Disease: poorly controlled diabetes mellitis, educated on diet correction and following with PCP for optimal management.   Maceration: reduces wound tensile strength and inhibits epithelial migration- not at time of assessment.   Patient compliance- Agrees to initial assessment   Unrelieved pressure- NA  Immobility- altered, but not immobile.   Substance abuse: NA     Plan:   Pt will discharge soon to home, may be over this upcoming weekend.  I spoke with he and his wife and reviewed wound cares to be done now twice daily at home with follow up scheduled in Wound and Ostomy clinic Monday 3/10 with BOBBY Lopez RN cwocn.  Will monitor the pt very closely in clinic, likely twice weekly visits to start and will likely modify plan of care for the wound dressings to manage the moisture needs and debriding antimicrobial needs as the wound evolves.  Stressed the importance of:  - twice daily wound cares for now if discharge this weekend home.  - need to be compliant with NWB status for the left foot at all times in or out of any type of fracture or similar off loading boot.  - need for monitoring for worsening infection, redness with increased warmth, spreading redness, new odor, uncontrolled pain, fever or chills and to see emergent assessment by provider.     Interventions to Barriers:  As listed above.      Topical care to Left Lateral Calcaneous.  - Remove the old dressing.  - Cleanse the skin around the wounds and entire foot with saline and gauze.  - Irrigated wound out with Normal Saline approximately 10 mL between both open wounds.  - Dry with gauze.  - Fill each wound bed individually with dry 1/2 in plain packing strip gauze (Nu Gauze)  - Cover wound area with multiple layers of bulk 4x4 gauze from plastic tubes.  - Secured with Kerlix and Ace bandage (no compression).    - At home dressing needs to be changed twice daily.  IF  discharged today no need to change dressing tonight.    Tomorrow nursing to change if still inpatient based on current orders and if pt discharges before 5 pm Saturday he and his will will need to change Saturday evening.  Same applies for Sunday if he is still hospitalized, nursing to do am cares, pt and wife to do pm cares if not discharged.     Offloading:  STRICT NWB keep heels elevated/floated while in bed on pillows     Additional recommendations: Nutrition and DM control      Wound Cares provided as listed above.     Discussed plan of care with patient and his wife and his nurse Paco YOUNG. Teaching done with patient, patient and his wife and his nurse Paco YOUNG;  pt's wife is able and willing to perform.     Discharge instructions updated to include:  As listed above in Plan and Topical Care      Wound Supplies:  Left pt and his wife wound care supplies in plastic bag today all needed to cover twice daily wound cares till he returns on Monday 6/7 to clinic.       WOC Return visit: 3/10/25 in clinic or inpatient, clinic visit is scheduled, woc will change to inpatient is still hospitalized Mon.     Nursing to notify Provider and WOC Nurse if wound deteriorates.     Verbal, written, & demonstrative education provided.  Face to face time (excluding procedure): approximately 55 minutes total.  Procedure: NA  Care plan was not changed.     Luciano Lopez RN [facility]  [phone]

## 2025-03-07 NOTE — PLAN OF CARE
Goal Outcome Evaluation:      Plan of Care Reviewed With: patient    Overall Patient Progress: improvingOverall Patient Progress: improving    Outcome Evaluation: A and Ox4. Independent. DM education done. No complaint of pain. Wound dressing done by WOC nurse. CMS intact. VSS and afebrile. On room air. ID consult done. IV antibiotic shifted to Nafcillin IV. PICC line inserted by PICCSTAT team.

## 2025-03-07 NOTE — DISCHARGE INSTRUCTIONS
Luciano whittaker Wound care instructions updated 3/7/25 for post hospital cares.    Pt will discharge soon to home, may be over this upcoming weekend.  I spoke with he and his wife and reviewed wound cares to be done now twice daily at home with follow up scheduled in Wound and Ostomy clinic Monday 3/10 with BOBBY raygozan.  Will monitor the pt very closely in clinic, likely twice weekly visits to start and will likely modify plan of care for the wound dressings to manage the moisture needs and debriding antimicrobial needs as the wound evolves.  Stressed the importance of:  - twice daily wound cares for now if discharge this weekend home.  - need to be compliant with NWB status for the left foot at all times in or out of any type of fracture or similar off loading boot.  - need for monitoring for worsening infection, redness with increased warmth, spreading redness, new odor, uncontrolled pain, fever or chills and to see emergent assessment by provider.     Topical care to Left Lateral Calcaneous.  - Remove the old dressing.  - Cleanse the skin around the wounds and entire foot with saline and gauze.  - Irrigated wound out with Normal Saline approximately 10 mL between both open wounds.  - Dry with gauze.  - Fill each wound bed individually with dry 1/2 in plain packing strip gauze (Nu Gauze)  - Cover wound area with multiple layers of bulk 4x4 gauze from plastic tubes.  - Secured with Kerlix and Ace bandage (no compression).    - At home dressing needs to be changed twice daily.      IF discharged today no need to change dressing tonight.    Tomorrow nursing to change if still inpatient based on current orders and if pt discharges before 5 pm Saturday he and his will will need to change Saturday evening.  Same applies for Sunday if he is still hospitalized, nursing to do am cares, pt and wife to do pm cares if not discharged.    Luciano raygozan [phone]

## 2025-03-07 NOTE — PLAN OF CARE
Goal Outcome Evaluation:      Plan of Care Reviewed With: patient, spouse          Outcome Evaluation: Home with [facility]

## 2025-03-07 NOTE — PLAN OF CARE
Goal Outcome Evaluation:      Plan of Care Reviewed With: patient    Overall Patient Progress: improvingOverall Patient Progress: improving    Outcome Evaluation: 4 hour shift note: Pt denies pain. LLE dressing CDI. Ambulating IND, walker and NWB to LLE. Education reinforced r/t insulin administration. , lantus and sliding scale coverage.

## 2025-03-07 NOTE — PLAN OF CARE
Goal Outcome Evaluation:      Plan of Care Reviewed With: patient    Overall Patient Progress: improvingOverall Patient Progress: improving    Outcome Evaluation: Pt A&Ox4. VSS on RA. Denies pain. LLE dressing/packing changed by surgeon this shift. Dressing CDI. Ambulates independently with walker and NWB to LLE. , 207, and 254. Novolog given for sliding scale and carb coverage. Pt demonstrates proper technique with insulin injections and counts his carbs independently. Continues on IV zosyn and vanco at this time, SL otherwise.

## 2025-03-07 NOTE — CONSULTS
Telemedicine Visit - General ID Service: Consult Note      Patient:  Luke Farias, Date of birth 1965, Medical record number [id number]  Date of Visit:  March 7, 2025         Assessment and Recommendations:   ID Problem List:  1. Left ankle abscess and osteomyelitis of left calcaneous, due to methicillin sensitive Staphylococcus aureus (MSSA).    2. Infected diabetic foot wound over left heel, likely initiated by using an electric ada on a callous on his heel.     Recommendations:  Discontinue IV vancomycin and IV piperacillin-tazobactam   Start IV nafcillin 2 grams IV q 4 hours for osteomyelitis of the calcaneous and associated soft tissue abscess due to MSSA.   Plan for home IV antibiotic therapy. Would complete a 3 week course of IV nafcillin and then if wound and foot is responding well could transition over to oral antibiotics to complete a total 6 week antibiotic treatment course.   Patient will need a PICC line and a referral for home infusion therapy.   Check 2 x a week labs with CMP and CBC with diff/plts while on home IV antibiotics. Arrange follow-up in the ID clinic at [facility] in 1-2 weeks.       Gardenia Colon MD  ID staff physician [facility]  ID Tele-Medicine consult service  On Vocera      I spent at least 70 minutes on the day of this encounter on chart review, patient interview/exam, and note preparation. This visit was performed remotely as a telemedicine encounter using the [employer] telemedicine platform.    I did an in depth chart review including the full breadth of his health history due to evaluation requiring higher -level synthesis for complex diagnosis. Patient qualifies for  code.     ID was consulted by primary hospitalist Dr. Staley for IV antibiotic therapy recommendations for severe diabetic foot infection of the left foot.         History of Present Illness:     Luke Farias is a 59 year old male with type 2 diabetes treated with oral medication and diabetic  peripheral neuropathy who presented to the ER on 3/2/2025 after he noticed redness on the top and side of his left foot today.  Clinical presentation and test results are concerning for severe diabetic infection of left foot with SIRS and possible sepsis.  He is at high risk for an adverse outcome including limb threatening infection in the left foot and complications of sepsis, so hospitalization is considered medically necessary.     Clinical presentation was suspicious for open wound of left heel with surrounding cellulitis and purulence complicating poorly controlled type 2 diabetes with severe hyperglycemia and diabetic peripheral neuropathy.  He had chills and presents with leukocytosis concerning for SIRS and possible sepsis.  Admitting MD felt this was  potentially limb threatening left foot infection.  Pt. had been using an electric device to sand his foot recently and has purulence on exam. Admitting MD was  concerned for both Pseudomonas and MRSA and therefore started the patient on IV vancomycin and Zosyn. Podiatry consulted on the patient on 3/3/25 and recommended surgical debridement.   On 3/4/25 patient had surgical debridement of left calcaneus and left ankle with bone biopsy of the left calcaneus. Surgeon noted purulence remaining present with the calcaneus consistent with osteomyelitis.   Tissue culture and bone culture from the left foot are growing Staph aureus. Tissue culture is MSSA susceptibility of the bone culture is pending.            Review of Systems:     General: No change in weight, sleep or appetite.  Normal energy.  No fever or chills, no excessive fatigue or daytime drowsiness  Eyes: Negative for vision changes or eye problemsar   ENT: No problems with ears, nose or throat.  No difficulty swallowing.  Resp: No coughing, wheezing or shortness of breath, denies apnea  CV: No chest pains or palpitations  GI: No nausea, vomiting,  heartburn, abdominal pain, diarrhea, constipation or  change in bowel habits  : No urinary frequency or dysuria, bladder or kidney problems  Musculoskeletal: No significant muscle or joint pains  Neurologic: No headaches, numbness, tingling, weakness, problems with balance or coordination  Skin: Denies rashes or lesions         Current Antimicrobials     Current:  Vanomycin  Zosyn    Prior:      Current Facility-Administered Medications:     acetaminophen (TYLENOL) tablet 975 mg, 975 mg, Oral, Q6H PRN, Daniela Zaldivar MD, 975 mg at 03/04/25 2254    atorvastatin (LIPITOR) tablet 20 mg, 20 mg, Oral, Daily, Daniela Zaldivar MD, 20 mg at 03/07/25 0841    calcium carbonate (TUMS) chewable tablet 1,000 mg, 1,000 mg, Oral, 4x Daily PRN, Daniela Zaldivar MD    glucose gel 15-30 g, 15-30 g, Oral, Q15 Min PRN **OR** dextrose 50 % injection 25-50 mL, 25-50 mL, Intravenous, Q15 Min PRN **OR** glucagon injection 1 mg, 1 mg, Subcutaneous, Q15 Min PRN, Daniela Zaldivar MD    insulin aspart (NovoLOG) injection (RAPID ACTING), , Subcutaneous, TID w/meals, Daniela Zaldivar MD, 3 Units at 03/07/25 0836    insulin aspart (NovoLOG) injection (RAPID ACTING), , Subcutaneous, With Snacks or Supplements, Daniela Zaldivar MD    insulin aspart (NovoLOG) injection (RAPID ACTING), 1-10 Units, Subcutaneous, TID AC, Daniela Zaldivar MD, 3 Units at 03/07/25 0836    insulin aspart (NovoLOG) injection (RAPID ACTING), 1-7 Units, Subcutaneous, At Bedtime, Daniela Zaldivar MD, 4 Units at 03/06/25 2119    insulin glargine (LANTUS PEN) injection 15 Units, 15 Units, Subcutaneous, BID, Carmina Staley MD, 15 Units at 03/07/25 0839    lidocaine (LMX4) cream, , Topical, Q1H PRN, Daniela Zaldivar MD    lidocaine 1 % 0.1-1 mL, 0.1-1 mL, Other, Q1H PRN, Daniela Zaldivar MD    naloxone (NARCAN) injection 0.2 mg, 0.2 mg, Intravenous, Q2 Min PRN **OR** naloxone (NARCAN) injection 0.4 mg, 0.4 mg,  Intravenous, Q2 Min PRN **OR** naloxone (NARCAN) injection 0.2 mg, 0.2 mg, Intramuscular, Q2 Min PRN **OR** naloxone (NARCAN) injection 0.4 mg, 0.4 mg, Intramuscular, Q2 Min PRN, Daniela Zaldivar MD    ondansetron (ZOFRAN ODT) ODT tab 4 mg, 4 mg, Oral, Q6H PRN **OR** ondansetron (ZOFRAN) injection 4 mg, 4 mg, Intravenous, Q6H PRN, Daniela Zaldivar MD    oxyCODONE (ROXICODONE) tablet 5 mg, 5 mg, Oral, Q4H PRN, Daniela Zaldivar MD    oxyCODONE IR (ROXICODONE) half-tab 2.5 mg, 2.5 mg, Oral, Q4H PRN, Daniela Zaldivar MD    piperacillin-tazobactam (ZOSYN) 4.5 g vial to attach to  mL bag, 4.5 g, Intravenous, Q6H, Daniela Zaldivar MD, Last Rate: 0 mL/hr at 03/05/25 0844, 4.5 g at 03/07/25 0842    senna-docusate (SENOKOT-S/PERICOLACE) 8.6-50 MG per tablet 1 tablet, 1 tablet, Oral, BID PRN **OR** senna-docusate (SENOKOT-S/PERICOLACE) 8.6-50 MG per tablet 2 tablet, 2 tablet, Oral, BID PRN, Daniela Zaldivar MD    sodium chloride (PF) 0.9% PF flush 3 mL, 3 mL, Intracatheter, Q8H, Daniela Zaldivar MD, 3 mL at 03/07/25 1006    sodium chloride (PF) 0.9% PF flush 3 mL, 3 mL, Intracatheter, q1 min prn, Daniela Zaldivar MD, 3 mL at 03/06/25 0118    vancomycin (VANCOCIN) 1,500 mg in 0.9% NaCl 265 mL intermittent infusion, 1,500 mg, Intravenous, Q8H, Daniela Zaldivar MD, 1,500 mg at 03/07/25 1006         Past Medical History:     Past Medical History:   Diagnosis Date    Diabetes (H)     type 2    Seasonal allergies     Allergy inj as a child     Past Surgical History:   Procedure Laterality Date    BIOPSY BONE FOOT Left 3/4/2025    Procedure: with bone biopsy left calcaneus;  Surgeon: Darryl Huff DPM;  Location: [facility]    COLONOSCOPY N/A 8/19/2019    Procedure: Colonoscopy, With Polypectomy And Biopsy;  Surgeon: Bartolome Templeton MD;  Location: [facility]    COLONOSCOPY N/A 11/5/2021    Procedure: Colonoscopy, With  Polypectomy And Biopsy;  Surgeon: Lisette Vickers DO;  Location: [facility]    COLONOSCOPY N/A 11/5/2021    Procedure: COLONOSCOPY, FLEXIBLE, WITH LESION REMOVAL USING SNARE;  Surgeon: Lisette Vickers DO;  Location: [facility]    COLONOSCOPY N/A 3/26/2024    Procedure: COLONOSCOPY, WITH POLYPECTOMY using snare;  Surgeon: Baltazar Rodriguez MD;  Location: [facility]    COLONOSCOPY WITH CO2 INSUFFLATION N/A 5/12/2017    Procedure: COLONOSCOPY WITH CO2 INSUFFLATION;  Dr. Rancho Painting/BMI: 30.8/Special screening for malignant neoplasms, colon/colonoscopy/[facility]:  [phone];  Surgeon: Krish Galloway MD;  Location: [facility]    COLONOSCOPY WITH CO2 INSUFFLATION N/A 1/21/2019    Procedure: COLONOSCOPY WITH CO2 INSUFFLATION;  Surgeon: Bartolome Templeton MD;  [facility]: [facility]    COLONOSCOPY WITH CO2 INSUFFLATION N/A 8/19/2019    Procedure: COLONOSCOPY, WITH CO2 INSUFFLATION;  Surgeon: Bartolome Templeton MD;  Location: [facility]    COLONOSCOPY WITH CO2 INSUFFLATION N/A 11/5/2021    Procedure: COLONOSCOPY, WITH CO2 INSUFFLATION;  Surgeon: Lisette Vickers DO;  Location: [facility]    INCISION AND DRAINAGE BONE LOWER EXTREMITY, COMBINED Left 3/4/2025    Procedure: Surgical debridement left calcaneus and left ankle;  Surgeon: Darryl Huff DPM;  Location: [facility] OR          Family History:     Family History   Problem Relation Age of Onset    Heart Disease Mother     Diabetes Father           Social History:     Social History     Socioeconomic History    Marital status:      Spouse name: Not on file    Number of children: Not on file    Years of education: Not on file    Highest education level: Not on file   Occupational History    Occupation: [employer]     Employer: [employer]   Tobacco Use    Smoking status: Never    Smokeless tobacco: Never   Vaping Use    Vaping status: Never Used   Substance and Sexual Activity    Alcohol use: Yes     Comment: 1-2 beers a week / occ     Drug use: No    Sexual  activity: Yes     Partners: Female   Other Topics Concern    Parent/sibling w/ CABG, MI or angioplasty before 65F 55M? Not Asked   Social History Narrative    Not on file     Social Drivers of Health     Financial Resource Strain: Low Risk  (3/2/2025)    Financial Resource Strain     Within the past 12 months, have you or your family members you live with been unable to get utilities (heat, electricity) when it was really needed?: No   Food Insecurity: Low Risk  (3/2/2025)    Food Insecurity     Within the past 12 months, did you worry that your food would run out before you got money to buy more?: No     Within the past 12 months, did the food you bought just not last and you didn t have money to get more?: No   Transportation Needs: Low Risk  (3/2/2025)    Transportation Needs     Within the past 12 months, has lack of transportation kept you from medical appointments, getting your medicines, non-medical meetings or appointments, work, or from getting things that you need?: No   Physical Activity: Not on file   Stress: Not on file   Social Connections: Not on file   Interpersonal Safety: Low Risk  (3/2/2025)    Interpersonal Safety     Do you feel physically and emotionally safe where you currently live?: Yes     Within the past 12 months, have you been hit, slapped, kicked or otherwise physically hurt by someone?: No     Within the past 12 months, have you been humiliated or emotionally abused in other ways by your partner or ex-partner?: No   Housing Stability: Low Risk  (3/2/2025)    Housing Stability     Do you have housing? : Yes     Are you worried about losing your housing?: No            Physical Exam:   Physical exam performed via the [facility] cart with bedside nursing assistance.    Ranges for vital signs:  Temp:  [98  F (36.7  C)-98.7  F (37.1  C)] 98.1  F (36.7  C)  Pulse:  [65-74] 65  Resp:  [17-18] 17  BP: ()/(55-71) 121/71  SpO2:  [92 %-95 %] 92 %    Intake/Output Summary (Last 24  "hours) at 3/7/2025 1005  Last data filed at 3/7/2025 0836  Gross per 24 hour   Intake 1885 ml   Output --   Net 1885 ml     Exam:  GENERAL:  well-developed, well-nourished, sitting in bed in no acute distress.   ENT:  Head is normocephalic, atraumatic.   EYES:  Eyes have anicteric sclerae.    NECK:  Supple.  LUNGS:  Normal respirations  EXT: Extremities warm and without edema. Left heel and foot are wrapped in a dressing. Photos in chart of the ankle and heel were reviewed by me.   SKIN:  No acute rashes.  Line is in place without any surrounding erythema.  NEUROLOGIC:  Grossly nonfocal.         Laboratory Data:   Reviewed.  Pertinent for:    Laboratory Data:   No results found for: \"ACD4\"    Microbiology:  Culture   Date Value Ref Range Status   03/04/2025 No growth after 2 days  Preliminary   03/04/2025 2+ Staphylococcus aureus (A)  Final   03/04/2025 2+ Staphylococcus aureus (A)  Final   03/04/2025 No anaerobic organisms isolated after 2 days  Preliminary   03/04/2025 No growth after 1 day  Preliminary   03/04/2025 Culture in progress  Preliminary   03/04/2025 2+ Staphylococcus aureus (A)  Preliminary   03/27/2024 4+ Escherichia coli (A)  Final   03/27/2024 1+ Corynebacterium species (A)  Final     Comment:     Identification obtained by MALDI-TOF mass spectrometry research use only database. Test characteristics determined and verified by the Infectious Diseases Diagnostic Laboratory.  Susceptibilities not routinely done, refer to antibiogram to view typical susceptibility profiles     GS Culture   Date Value Ref Range Status   03/04/2025 See corresponding culture for results  Final         Inflammatory Markers    Recent Labs   Lab Test 03/02/25  1140 07/18/18  1105   SED 52* 30*   CRP  --  68.0*         Metabolic Studies       Recent Labs   Lab Test 03/07/25  0753 03/07/25  0508 03/07/25  0228 03/06/25  2115 03/06/25  1629 03/06/25  1141 03/06/25  0750 03/06/25  0510 03/05/25  0641 03/05/25  0511 03/04/25  1154 " 03/04/25  1124 03/04/25  0658 03/04/25  0510 03/03/25  1913 03/03/25  1815 03/03/25  1512 03/03/25  1225 03/03/25  0724 03/03/25  0524 03/02/25  1837 03/02/25  1414 03/02/25  1140   NA  --  136  --   --   --   --   --  135  --  139  --   --   --  133*  --   --   --  134*  --  134*  --   --  131*   POTASSIUM  --  3.9  --   --   --   --   --  4.0  --  3.5  --  3.8  --  3.3*  --  3.6  --  3.4  --  3.2*  --   --  3.5   CHLORIDE  --  99  --   --   --   --   --  99  --  101  --   --   --  99  --   --   --   --   --  95*  --   --  90*   CO2  --  24  --   --   --   --   --  24  --  25  --   --   --  20*  --   --   --   --   --  22  --   --  24   ANIONGAP  --  13  --   --   --   --   --  12  --  13  --   --   --  14  --   --   --   --   --  17*  --   --  17*   BUN  --  10.8  --   --   --   --   --  9.0  --  4.6*  --   --   --  8.7  --   --   --   --   --  10.6  --   --  12.9   CR  --  0.76  --   --   --   --   --  0.81  --  0.68  --   --   --  0.55*  --   --   --   --   --  0.66*  --   --  0.63*   GFRESTIMATED  --  >90  --   --   --   --   --  >90  --  >90  --   --   --  >90  --   --   --   --   --  >90  --   --  >90   * 260* 229* 292* 245* 207*   < > 183*   < > 113*   < >  --    < > 141*   < >  --    < >  --    < > 123*   < >  --  441*   A1C  --   --   --   --   --   --   --   --   --   --   --   --   --   --   --   --   --   --   --   --   --   --  16.8*   EARNEST  --  8.8  --   --   --   --   --  8.7*  --  8.5*  --   --   --  8.4*  --   --   --   --   --  8.6*  --   --  9.0   LACT  --   --   --   --   --   --   --   --   --   --   --   --   --   --   --   --   --   --   --   --   --  1.0  --     < > = values in this interval not displayed.        Hepatic Studies    Recent Labs   Lab Test 04/02/24  1251 04/21/21  0814 11/02/18  0839 07/18/18  1105   BILITOTAL 0.2 0.7 0.7 0.6   ALKPHOS 81 56 46 73   ALBUMIN 4.1 4.1 4.0 3.3*   AST 34 11 18 12   ALT 30 23 35 17       Pancreatitis testing    Recent Labs   Lab Test  04/02/24  1251 04/21/21  0814 01/14/20  0957   TRIG 271* 311* 206*       Hematology Studies      Recent Labs   Lab Test 03/06/25  0510 03/05/25  0511 03/04/25  0510 03/03/25  0524 03/02/25  1140 07/18/18  0900   WBC 10.6 12.5* 12.8* 13.0* 12.5* 10.6   ANEU  --   --   --   --   --  7.5   ALYM  --   --   --   --   --  1.6   CARRI  --   --   --   --   --  1.3   AEOS  --   --   --   --   --  0.2   HGB 12.0* 12.4* 11.8*  --  13.5 16.8   HCT 35.2* 36.7* 33.7*  --  37.9* 47.1    259 241  --  241 281       Arterial Blood Gas Testing    Recent Labs   Lab Test 03/04/25  0510 03/03/25  0524 03/02/25  1414   O2PER 26 21 21        Urine Studies     Recent Labs   Lab Test 07/18/18  0900   URINEPH 6.0   NITRITE Negative   LEUKEST Negative       Vancomycin Levels     Recent Labs   Lab Test 03/06/25  0900 03/05/25  0102   VANCOMYCIN 11.4 7.4              Imaging:   XR Chest Port 1 View    Result Date: 3/4/2025  EXAM: XR CHEST PORT 1 VIEW LOCATION: [facility] DATE: 3/4/2025 INDICATION: mild tachypnea and decreased O2 sats, 8 lbs weight gain in last 24 hours with upcoming surgery   evaluate for fluid or other etiology for respiratory change COMPARISON: None.     IMPRESSION: Shallow inspiration and portable technique accentuate the cardiac silhouette which is likely borderline or mildly enlarged. Bilateral interstitial prominence suspicious for edema given clinical history. No significant consolidative opacity. No significant pleural effusion. No pneumothorax.    MR Ankle Left w/o & w Contrast    Result Date: 3/3/2025  MR Left ankle without  contrast 3/3/2025 12:17 PM History: left diabetic foot ulcer of unknown duration with surrounding cellulitis and concern for deeper infection (abscess vs osteomyelitis) Techniques: Multiplanar multisequence imaging of the Left ankle was obtained without and with administration of intravenous contrast. Contrast: 9.5mL Gadavist Comparison: Radiographs 3/2/2025  Findings: Examination degraded by motion. MEDIAL COMPARTMENT Tendons: Medial flexor tendons are intact. Ligaments: Deltoid and spring ligamentous complexes are grossly intact. LATERAL COMPARTMENT Tendons: Peroneal tendons are grossly intact with mild tenosynovial fluid. Ligaments: Lateral ligamentous complex and syndesmotic ligamentous complex are intact. POSTERIOR COMPARTMENT Achilles tendon: Intact. Plantar fascia: Mild thickening of the proximal central cord of the plantar fascia with mild adjacent calcaneal bone marrow edema. ANTERIOR COMPARTMENT Tendons: Anterior extensor tendons are intact. JOINTS Small ankle joint effusion. GENERAL FINDINGS Bones: Soft tissue ulceration plantar-lateral to the calcaneus. Lobular, cranial extending fluid collection in this region measuring up to 3.2 cm on coronal series 113 image 29. There is mild bone marrow edema in the adjacent calcaneus without appreciable T1 marrow signal change. Muscles: Diffuse muscular edema and atrophy compatible with polyneuropathy/microangiopathy. Tarsal tunnel: Normal. Sinus tarsi: Normal. ANCILLARY FINDINGS Extensive subcutaneous edema 7 trace enhancement bilaterally.     Impression: Examination degraded by motion. Soft tissue ulceration lateral to the calcaneus: *  Mild bone marrow edema within the adjacent lateral calcaneus. Osteomyelitis is likely if this wound probes to bone. *  Adjacent subcutaneous fluid collection measuring up to 3.2 cm suspicious for abscess. *  Regional cellulitis. CELSO HINOJOSA MD (Joe)   SYSTEM ID:  [id number]    US Lower Extremity Venous Duplex Left    Result Date: 3/3/2025  EXAM: US LOWER EXTREMITY VENOUS DUPLEX LEFT LOCATION: [facility] DATE: 3/3/2025 INDICATION: asymmetric left lower leg and foot swelling, ?DVT COMPARISON: None. TECHNIQUE: Venous Duplex ultrasound of the left lower extremity with and without compression, augmentation and duplex. Color flow and spectral Doppler  with waveform analysis performed. FINDINGS: Exam includes the common femoral, femoral, popliteal, and contralateral common femoral veins as well as segmentally visualized deep calf veins and greater saphenous vein. LEFT: No deep vein thrombosis. No superficial thrombophlebitis. No popliteal cyst. There is lower extremity subcutaneous edema.     IMPRESSION: 1.  No deep venous thrombosis in the left lower extremity.    XR Foot Left G/E 3 Views    Result Date: 3/2/2025  EXAM: XR FOOT LEFT G/E 3 VIEWS LOCATION: [facility] DATE: 3/2/2025 INDICATION: Diabetic wound to lateral heel, evaluate for plain film osteo deep infection. COMPARISON: None.     IMPRESSION: Arterial calcifications. Multifocal osteoarthrosis. There is no evidence of osteomyelitis or foreign body.         Video-Visit Details    Type of service:  Video Telemedicine Visit   Video Start Time: 10:30 am  Video End Time: 11:00 am  Originating Location (pt. Location):   [facility]/  Distant Location (provider location):  Offsite- [facility]-office  Platform used for Video Visit: Chitra

## 2025-03-07 NOTE — PROGRESS NOTES
Care Management Follow Up    Length of Stay (days): 5    Expected Discharge Date: 03/08/2025     Concerns to be Addressed: all concerns addressed in this encounter       Patient plan of care discussed at interdisciplinary rounds: Yes    Anticipated Discharge Disposition: Home, Home Infusion  Disposition Comments: Insurance benefits for Home IVs discussed with patient       Anticipated Discharge Services: None    Anticipated Discharge DME: None    Patient/family educated on Medicare website which has current facility and service quality ratings: no  Education Provided on the Discharge Plan: Yes  Patient/Family in Agreement with the Plan: yes    Referrals Placed by CM/SW: Internal Clinic Care Coordination, Home Infusion    Private pay costs discussed: insurance costs out of pocket expenses and deductibles    Discussed  Partnership in Safe Discharge Planning  document with patient/family: No     Handoff Completed: Yes, [facility] PCP: Internal handoff referral completed    Additional Information:  Patient had ID consult today. Plan is for patient to discharge home tomorrow after his 2pm IV Abx dose.     MAHIN has been working with , at [facility] (Phone: [phone] Fax: [phone]) to coordinate discharge plan. [facility] will have supplies delivered and a RN visit to patient's home before his next Abx dose tomorrow evening. Patient will be on continuous pump IV-Nafcillin once home.     Deductibles and out of pocket costs discussed with patient and wife.   Patient in agreement with discharge plan.     YOJANA Alvarado  [facility] [phone]/ [place] [phone]  Care Management

## 2025-03-07 NOTE — PROCEDURES
[facility]    Single Lumen PICC Placement    Date/Time: 3/7/2025 5:31 PM    Performed by: Donna Cavazos RN  Authorized by: Carmina Staley MD  Indications: vascular access      UNIVERSAL PROTOCOL   Site Marked: Yes  Prior Images Obtained and Reviewed:  No  Required items: Required blood products, implants, devices and special equipment available    Patient identity confirmed:  Verbally with patient and arm band  NA - No sedation, light sedation, or local anesthesia  Confirmation Checklist:  Patient's identity using two indicators, procedure was appropriate and matched the consent or emergent situation, relevant allergies and correct equipment/implants were available  Time out: Immediately prior to the procedure a time out was called    Universal Protocol: the Joint Commission Universal Protocol was followed    Preparation: Patient was prepped and draped in usual sterile fashion    ESBL (mL):  1     ANESTHESIA    Anesthesia:  Local infiltration  Local Anesthetic:  Lidocaine 1% without epinephrine  Anesthetic Total (mL):  2      SEDATION    Patient Sedated: No        Preparation: skin prepped with ChloraPrep  Skin prep agent: skin prep agent completely dried prior to procedure  Sterile barriers: maximum sterile barriers were used: cap, mask, sterile gown, sterile gloves, and large sterile sheet  Hand hygiene: hand hygiene performed prior to central venous catheter insertion  Type of line used: PICC  Catheter type: single lumen  Lumen type: valved and power PICC  Catheter size: 4 Fr  Brand: Bard  Lot number: [id number]  Placement method: MST and ultrasound  Number of attempts: 1  Difficulty threading catheter: no  Successful placement: yes  Orientation: left  Catheter to Vein (%): 15  Location: basilic vein  Tip Location: SVC  Arm circumference: adults 10 cm  Extremity circumference: 28  Visible catheter length: 0  Total catheter length: 43  Dressing and securement:  chlorhexidine disc applied, securement device and transparent securement dressing  Post procedure assessment: placement verified by x-ray  PROCEDURE Describe Procedure: Pt tolerated picc placement well. Good blood return. Flushes easily. Verified by xray. Ok to use.    Ok to use  Disposal: sharps and needle count correct at the end of procedure, needles and guidewire disposed in sharps container  Patient Tolerance:  Patient tolerated the procedure well with no immediate complications

## 2025-03-08 ENCOUNTER — HOME INFUSION (OUTPATIENT)
Dept: HOME HEALTH SERVICES | Facility: HOME HEALTH | Age: 60
End: 2025-03-08
Payer: COMMERCIAL

## 2025-03-08 ENCOUNTER — TELEPHONE (OUTPATIENT)
Dept: INTERNAL MEDICINE | Facility: CLINIC | Age: 60
End: 2025-03-08
Payer: COMMERCIAL

## 2025-03-08 ENCOUNTER — HOME CARE VISIT (OUTPATIENT)
Dept: HOME HEALTH SERVICES | Facility: HOME HEALTH | Age: 60
End: 2025-03-08
Payer: COMMERCIAL

## 2025-03-08 ENCOUNTER — HOME INFUSION BILLING (OUTPATIENT)
Dept: HOME HEALTH SERVICES | Facility: HOME HEALTH | Age: 60
End: 2025-03-08
Payer: COMMERCIAL

## 2025-03-08 ENCOUNTER — HOME INFUSION (OUTPATIENT)
Dept: HOME HEALTH SERVICES | Facility: HOME HEALTH | Age: 60
End: 2025-03-08

## 2025-03-08 VITALS
SYSTOLIC BLOOD PRESSURE: 122 MMHG | DIASTOLIC BLOOD PRESSURE: 76 MMHG | OXYGEN SATURATION: 93 % | WEIGHT: 220 LBS | RESPIRATION RATE: 20 BRPM | HEART RATE: 69 BPM | HEIGHT: 75 IN | TEMPERATURE: 97.8 F | BODY MASS INDEX: 27.35 KG/M2

## 2025-03-08 VITALS
TEMPERATURE: 97.7 F | HEART RATE: 73 BPM | SYSTOLIC BLOOD PRESSURE: 122 MMHG | DIASTOLIC BLOOD PRESSURE: 58 MMHG | OXYGEN SATURATION: 95 % | RESPIRATION RATE: 22 BRPM

## 2025-03-08 PROBLEM — M86.9 OSTEOMYELITIS (H): Status: ACTIVE | Noted: 2025-03-08

## 2025-03-08 LAB
GLUCOSE BLDC GLUCOMTR-MCNC: 154 MG/DL (ref 70–99)
GLUCOSE BLDC GLUCOMTR-MCNC: 175 MG/DL (ref 70–99)
GLUCOSE BLDC GLUCOMTR-MCNC: 180 MG/DL (ref 70–99)
POTASSIUM SERPL-SCNC: 3.9 MMOL/L (ref 3.4–5.3)

## 2025-03-08 PROCEDURE — 84132 ASSAY OF SERUM POTASSIUM: CPT | Performed by: STUDENT IN AN ORGANIZED HEALTH CARE EDUCATION/TRAINING PROGRAM

## 2025-03-08 PROCEDURE — 99239 HOSP IP/OBS DSCHRG MGMT >30: CPT | Performed by: STUDENT IN AN ORGANIZED HEALTH CARE EDUCATION/TRAINING PROGRAM

## 2025-03-08 PROCEDURE — 250N000009 HC RX 250: Performed by: STUDENT IN AN ORGANIZED HEALTH CARE EDUCATION/TRAINING PROGRAM

## 2025-03-08 PROCEDURE — S9500 HIT ANTIBIOTIC Q24H DIEM: HCPCS

## 2025-03-08 PROCEDURE — 250N000013 HC RX MED GY IP 250 OP 250 PS 637: Performed by: FAMILY MEDICINE

## 2025-03-08 PROCEDURE — 99601 HOME NFS VISIT <2 HRS: CPT

## 2025-03-08 RX ORDER — NAFCILLIN SODIUM 2 G/8ML
2 INJECTION, POWDER, FOR SOLUTION INTRAMUSCULAR; INTRAVENOUS EVERY 4 HOURS
Qty: 1008 ML | Refills: 0 | OUTPATIENT
Start: 2025-03-08 | End: 2025-03-08

## 2025-03-08 RX ORDER — NAFCILLIN SODIUM 2 G/8ML
2 INJECTION, POWDER, FOR SOLUTION INTRAMUSCULAR; INTRAVENOUS EVERY 4 HOURS
Qty: 1008 ML | Refills: 0 | Status: SHIPPED | OUTPATIENT
Start: 2025-03-08 | End: 2025-03-08

## 2025-03-08 RX ORDER — INSULIN LISPRO-AABC 100 [IU]/ML
10 INJECTION, SOLUTION SUBCUTANEOUS 3 TIMES DAILY
Qty: 15 ML | Refills: 0 | Status: SHIPPED | OUTPATIENT
Start: 2025-03-08

## 2025-03-08 RX ADMIN — NAFCILLIN SODIUM 2 G: 2 INJECTION, POWDER, LYOPHILIZED, FOR SOLUTION INTRAMUSCULAR; INTRAVENOUS at 09:32

## 2025-03-08 RX ADMIN — NAFCILLIN SODIUM 2 G: 2 INJECTION, POWDER, LYOPHILIZED, FOR SOLUTION INTRAMUSCULAR; INTRAVENOUS at 05:50

## 2025-03-08 RX ADMIN — NAFCILLIN SODIUM 2 G: 2 INJECTION, POWDER, LYOPHILIZED, FOR SOLUTION INTRAMUSCULAR; INTRAVENOUS at 13:01

## 2025-03-08 RX ADMIN — NAFCILLIN SODIUM 2 G: 2 INJECTION, POWDER, LYOPHILIZED, FOR SOLUTION INTRAMUSCULAR; INTRAVENOUS at 01:52

## 2025-03-08 RX ADMIN — ATORVASTATIN CALCIUM 20 MG: 10 TABLET, FILM COATED ORAL at 08:30

## 2025-03-08 ASSESSMENT — ACTIVITIES OF DAILY LIVING (ADL)
ADLS_ACUITY_SCORE: 31

## 2025-03-08 NOTE — PROGRESS NOTES
[facility]  Start of Care/Resumption of Care Note    [facility] SOC    DX: Osteomyelitis (H) [M86.9]     Therapy: Anti-Infective- Nafcillin 12 G continuous via smartez pump    Next Dose Due: 6pm    Delivery plan: today by 5 pm    In-basket sent to [facility] Scheduling, requesting visit on 3/8/25    Per [facility] care plan, labs are due on Mondays and Thursday    Nursing plan: [facility].      Teaching Plan: Liaison Teach Done?: No    Other Info:     Latonia Hurtado RN 03/08/25

## 2025-03-08 NOTE — PROGRESS NOTES
Care Management Discharge Note    Discharge Date: 03/08/2025     Discharge Disposition: Home with [facility]    Discharge Services: Home Infusion     Discharge DME: None    Discharge Transportation: family will provide    Private pay costs discussed: insurance costs out of pocket expenses and deductibles--Home Infusion     Patient/family educated on Medicare website which has current facility and service quality ratings: no    Education Provided on the Discharge Plan: Yes  Persons Notified of Discharge Plans: Patient and Wife  Patient/Family in Agreement with the Plan: yes    Handoff Referral Completed: Yes, [facility] PCP: Internal handoff referral completed    Additional Information:  Update received during IDT rounds. Pt scheduled to discharge prior to next IV dose this afternoon.     CM working closely with Charge RN to coordinate discharge plan and communicating with [facility] Team     [facility] have been speaking with the Patient directly to alert of plan.     Delivery will be to the Patient's home by 5pm  RN scheduled to be at the Pt's home by 6pm to initiate service for next IV dose    Pt to be discharged with Wound Care supplies to last a few days until [facility] can assist with ordering.       Discharge Plan: Home with [facility] at [phone]  Fax: [phone] for pt's IV abx needs upon discharge.           YOJANA Robles  [facility] [phone]   [facility] [place][note 2]  [phone]

## 2025-03-08 NOTE — PROGRESS NOTES
S-(situation): Patient discharged to home with home care and infusion via private car with spouse    B-(background): Left ankle abscesses and osteomyelitis of left calcaneus  Severe diabetic infection of left foot, over left heel  SIRS, possible sepsis  Diabetic peripheral neuropathy    A-(assessment): A and Ox4. Pain free. VSS and afebrile. Wound care done as per WOC plan. Independent.    R-(recommendations): Discharge instructions reviewed with patient. Listed belongings gathered and returned to patient.          Discharge Nursing Criteria:   Patient Education given and documented: (Diabetes):  {Yes   Care Plan and Patient education resolved: Yes    New Medications- pt has been educated about purpose and side effects: Yes    Vaccines  Influenza status verified at discharge:  Yes    Intentionally Retained Items (PICC line )  Patient was sent home with PICC left in place.   Follow up appointment made for removal:  on home infusion, home infusion nurse will follow-up    MISC  Prescriptions if needed, hard copies sent with patient  NA  Home medications returned to patient: NA  Medication Bin checked and emptied on discharge Yes  Patient reports post-discharge pain management plan is effective: Yes

## 2025-03-08 NOTE — PROGRESS NOTES
"Orientation: A/O x 4.  VS: VSS. /67 (BP Location: Right arm)   Pulse 68   Temp 97.9  F (36.6  C) (Oral)   Resp 18   Ht 1.905 m (6' 3\")   Wt 97.5 kg (214 lb 14.4 oz)   SpO2 95%   BMI 26.86 kg/m    LS: Clear with infrequent dry cough.  Cardiac: WDL  Skin: LLE wound dressing CDI.   Activity: Independently with walker. Numbness and tingling in BLE at baseline.  Pain: Denies  Lines/IV: Right forearm PIV saline locked. L PICC.   "

## 2025-03-08 NOTE — PLAN OF CARE
Goal Outcome Evaluation:      Plan of Care Reviewed With: patient    Overall Patient Progress: improvingOverall Patient Progress: improving    Outcome Evaluation: A&O. VSS on rm air. Ambulating independently w/walker to help w/minimal weight bearing. Dressing to foot ulcer is CDI. Denies pain overnight. Anticipated discharge today w/PICC line for continued antibiotics.

## 2025-03-08 NOTE — DISCHARGE SUMMARY
"[facility]  Hospitalist Discharge Summary      Date of Admission:  3/2/2025  Date of Discharge:  3/8/2025  Discharging Provider: Carmina Staley MD  Discharge Service: Hospitalist Service    Discharge Diagnoses     Left ankle abscesses and osteomyelitis of left calcaneus, MSSA  Severe diabetic infection of left foot, over left heel  SIRS, possible sepsis  Diabetic peripheral neuropathy  Type 2 diabetes with severe hyperglycemia  Ketonemia  Left leg swelling  Hyponatremia  Hypochloremia  Hypokalemia  Hyperlipidemia    Clinically Significant Risk Factors     # DMII: A1C = 16.8 % (Ref range: <5.7 %) within past 6 months    # Overweight: Estimated body mass index is 27.5 kg/m  as calculated from the following:    Height as of this encounter: 1.905 m (6' 3\").    Weight as of this encounter: 99.8 kg (220 lb).       Follow-ups Needed After Discharge   Follow-up Appointments       Follow-up and recommended labs and tests       Follow up with primary care provider, [facility], within 7 days for hospital follow- up.    Follow-up with podiatry.  Follow-up with wound care.  Sending a referral for endocrinology.  Sending a referral to infectious disease.                Unresulted Labs Ordered in the Past 30 Days of this Admission       Date and Time Order Name Status Description    3/4/2025  4:30 PM Blood Culture Arm, Right Preliminary     3/4/2025  3:39 PM Fungal or Yeast Culture Routine Preliminary     3/4/2025  3:39 PM Anaerobic Bacterial Culture Routine Preliminary     3/4/2025  3:39 PM Acid-Fast Bacilli Culture and Stain In process         These results will be followed up by hospitalist, podiatry, ID, PCP    Discharge Disposition   Discharged to home  Condition at discharge: Stable    Hospital Course     Luke Farias is a 59 year old male with type 2 diabetes treated with oral medication and diabetic peripheral neuropathy who presented to the ER on 3/2/2025 after he noticed redness " on the top and side of his left foot and was found to have a severe diabetic infection of left foot with SIRS and suspected sepsis and was hospitalized and placed on Zosyn and Vanco for antibiotic coverage.  MRI of the foot has shown concern for abscess and possible osteomyelitis and patient proceeded with OR intervention on 3/4/25 with abscess drained and bone cultures obtained. Infectious disease consulted, antibiotics switched to nafcillin 2 g IV every 4 hours per ID recommendation. Blood cultures negative, tissue culture growing Staph aureus. Patient was discharged with a three week course of IV nafcillin and ID follow up in 3 weeks to discuss further antibiotic course. PICC line was placed prior to discharge.  Patient was instructed to limit weightbearing on left foot to non-weight bearing status as much as possible. Knee scooter will be sent to patient's house. Patient was discharged with podiatry and wound care follow up.     Longstanding type 2 diabetes treated with oral medication.  Hemoglobin A1c 16.8 with hyperglycemia.  Serum ketones were positive and anion gap was elevated although he did not have outright metabolic acidosis.  He was placed on an insulin drip pre-operatively with resolution of ketones and blood sugar improvement.  He was interested and willing to start insulin treatment. Patient was discharged with the following regiment. Was discharged with a referral to endocrinology.   -  Lantus 15 units BID   -  Insulin lispro 10 U TID with meals (covered by insurance)   -  Not continuing either metformin or glipizide at this time  -  Recommend close outpatient follow-up with PCP and diabetic nurse, and consultation with endocrinology (referral sent)      Consultations This Hospital Stay   PODIATRY IP CONSULT  PHARMACY TO DOSE VANCO  PHARMACY TO DOSE VANCO  WOUND OSTOMY CONTINENCE NURSE  IP CONSULT  CARE MANAGEMENT / SOCIAL WORK IP CONSULT  PHYSICAL THERAPY ADULT IP CONSULT  INFECTIOUS DISEASES IP  CONSULT  VASCULAR ACCESS ADULT IP CONSULT    Code Status   Full Code    Time Spent on this Encounter   I, Carmina Staley MD, personally saw the patient today and spent greater than 30 minutes discharging this patient.       Carmina Staley MD  [address] MEDICAL SURGICAL  [address] [facility]   [place] [address]  Phone: [phone]  ______________________________________________________________________    Physical Exam   Vital Signs: Temp: 97.8  F (36.6  C) Temp src: Oral BP: 122/76 Pulse: 69   Resp: 20 SpO2: 93 % O2 Device: None (Room air)    Weight: 220 lbs 0 oz    Constitutional: awake, alert, cooperative, no apparent distress, and appears stated age  Respiratory: No increased work of breathing, good air exchange, clear to auscultation bilaterally, no crackles or wheezing  Cardiovascular: Normal apical impulse, regular rate and rhythm, normal S1 and S2, no S3 or S4, and no murmur noted  MSK: left ankle dressing clean and dry        Primary Care Physician   [facility]    Discharge Orders      Primary Care - Care Coordination Referral      Home Infusion Referral      Adult Endocrinology  Referral      Reason for your hospital stay    1. Left ankle abscess and osteomyelitis of left calcaneous, due to methicillin sensitive Staphylococcus aureus (MSSA).    2. Infected diabetic foot wound over left heel, likely initiated by using an electric ada on a callous on his heel.  2.  Uncontrolled diabetes mellitus type 2     Follow-up and recommended labs and tests     Follow up with primary care provider, [facility], within 7 days for hospital follow- up.    Follow-up with podiatry.  Follow-up with wound care.  Sending a referral for endocrinology.  Sending a referral to infectious disease.     Activity    Your activity upon discharge: Nonweightbearing left lower leg, expect limited weight bearing as needed for stability during transfers and bathroom privileges.     Tubes and  Drains    Current Tubes and Drains:     PICC Line  Duration           PICC 03/07/25 Single Lumen Left Basilic long term therapy <1 day        Packing  Duration           Incision/Surgical Site with Packing 03/04/25 Left;Lateral Heel Qty   Placed: 1 3 days              Rolling Knee Walker/Scooter Order for DME - ONLY FOR DME    DME Documentation:   Describe the reason for need to support medical necessity: post op foot with NWB LLE to allow for wound healing in the setting of severe diabetic infection. The rollator allows for improved safety and efficiency of mobility within the home and community to manage daily life with less burden on caregiver.     I, the undersigned, certify that the above prescribed supplies are medically necessary for this patient and is both reasonable and necessary in reference to accepted standards of medical and necessary in reference to accepted standards of medical practice in the treatment of this patient's condition and is not prescribed as a convenience.     Diet    Follow this diet upon discharge: Current Diet:Orders Placed This Encounter      Moderate Consistent Carb (60 g CHO per Meal) Diet       Significant Results and Procedures   Results for orders placed or performed during the hospital encounter of 03/02/25   XR Foot Left G/E 3 Views    Narrative    EXAM: XR FOOT LEFT G/E 3 VIEWS  LOCATION: [facility]  DATE: 3/2/2025    INDICATION: Diabetic wound to lateral heel, evaluate for plain film osteo deep infection.  COMPARISON: None.      Impression    IMPRESSION: Arterial calcifications. Multifocal osteoarthrosis. There is no evidence of osteomyelitis or foreign body.   US Lower Extremity Venous Duplex Left    Narrative    EXAM: US LOWER EXTREMITY VENOUS DUPLEX LEFT  LOCATION: [facility]  DATE: 3/3/2025    INDICATION: asymmetric left lower leg and foot swelling, ?DVT  COMPARISON: None.  TECHNIQUE: Venous Duplex  ultrasound of the left lower extremity with and without compression, augmentation and duplex. Color flow and spectral Doppler with waveform analysis performed.    FINDINGS: Exam includes the common femoral, femoral, popliteal, and contralateral common femoral veins as well as segmentally visualized deep calf veins and greater saphenous vein.     LEFT: No deep vein thrombosis. No superficial thrombophlebitis. No popliteal cyst.    There is lower extremity subcutaneous edema.      Impression    IMPRESSION:  1.  No deep venous thrombosis in the left lower extremity.   MR Ankle Left w/o & w Contrast    Narrative    MR Left ankle without  contrast 3/3/2025 12:17 PM    History: left diabetic foot ulcer of unknown duration with surrounding  cellulitis and concern for deeper infection (abscess vs osteomyelitis)    Techniques: Multiplanar multisequence imaging of the Left ankle was  obtained without and with administration of intravenous contrast.  Contrast: 9.5mL Gadavist    Comparison: Radiographs 3/2/2025    Findings: Examination degraded by motion.    MEDIAL COMPARTMENT    Tendons: Medial flexor tendons are intact.    Ligaments: Deltoid and spring ligamentous complexes are grossly  intact.    LATERAL COMPARTMENT    Tendons: Peroneal tendons are grossly intact with mild tenosynovial  fluid.    Ligaments: Lateral ligamentous complex and syndesmotic ligamentous  complex are intact.    POSTERIOR COMPARTMENT    Achilles tendon: Intact.    Plantar fascia: Mild thickening of the proximal central cord of the  plantar fascia with mild adjacent calcaneal bone marrow edema.    ANTERIOR COMPARTMENT    Tendons: Anterior extensor tendons are intact.    JOINTS    Small ankle joint effusion.    GENERAL FINDINGS    Bones: Soft tissue ulceration plantar-lateral to the calcaneus.  Lobular, cranial extending fluid collection in this region measuring  up to 3.2 cm on coronal series 113 image 29. There is mild bone marrow  edema in the  adjacent calcaneus without appreciable T1 marrow signal  change.    Muscles: Diffuse muscular edema and atrophy compatible with  polyneuropathy/microangiopathy.    Tarsal tunnel: Normal.     Sinus tarsi: Normal.     ANCILLARY FINDINGS    Extensive subcutaneous edema 7 trace enhancement bilaterally.      Impression    Impression: Examination degraded by motion.    Soft tissue ulceration lateral to the calcaneus:  *  Mild bone marrow edema within the adjacent lateral calcaneus.  Osteomyelitis is likely if this wound probes to bone.  *  Adjacent subcutaneous fluid collection measuring up to 3.2 cm  suspicious for abscess.  *  Regional cellulitis.    CELSO HINOJOSA MD (Joe)         SYSTEM ID:  [id number]   XR Chest Port 1 View    Narrative    EXAM: XR CHEST PORT 1 VIEW  LOCATION: [facility]  DATE: 3/4/2025    INDICATION: mild tachypnea and decreased O2 sats, 8 lbs weight gain in last 24 hours with upcoming surgery   evaluate for fluid or other etiology for respiratory change  COMPARISON: None.      Impression    IMPRESSION: Shallow inspiration and portable technique accentuate the cardiac silhouette which is likely borderline or mildly enlarged. Bilateral interstitial prominence suspicious for edema given clinical history. No significant consolidative opacity.   No significant pleural effusion. No pneumothorax.   XR Chest Port 1 View    Narrative    EXAM: XR CHEST PORT 1 VIEW  LOCATION: [facility]  DATE: 3/7/2025    INDICATION: RN placed PICC   verify tip placement  COMPARISON: None.      Impression    IMPRESSION: Left PICC line tip in SVC. Lungs clear.       Discharge Medications   Current Discharge Medication List        START taking these medications    Details   insulin glargine (LANTUS PEN) 100 UNIT/ML pen Inject 15 Units subcutaneously 2 times daily. Max 30U/day  Qty: 9 mL, Refills: 0    Comments: If Lantus is not covered by insurance, may  substitute Basaglar or Semglee or other insulin glargine product per insurance preference at same dose and frequency.    Associated Diagnoses: Diabetes mellitus with osteomyelitis (H)      Insulin Lispro-aabc, 1 U Dial, (LYUMJEV KWIKPEN) 100 UNIT/ML SOPN Inject 10 Units subcutaneously 3 times daily. Inject 30 min before eating a meal Max: 30U/day  Qty: 15 mL, Refills: 0    Associated Diagnoses: Diabetes mellitus with osteomyelitis (H)      insulin pen needle (32G X 4 MM) 32G X 4 MM miscellaneous Use 5 pen needles daily or as directed.  Qty: 200 each, Refills: 0    Comments: Please substitute with the appropriate needle if this needle does not work for patient's pens or insurance  Associated Diagnoses: Diabetes mellitus with osteomyelitis (H)           CONTINUE these medications which have NOT CHANGED    Details   aspirin (ASA) 81 MG tablet Take 81 mg by mouth as needed for fever or pain.    Associated Diagnoses: Type 2 diabetes mellitus with hyperglycemia, without long-term current use of insulin (H)      atorvastatin (LIPITOR) 20 MG tablet TAKE 1 TABLET(20 MG) BY MOUTH DAILY  Qty: 90 tablet, Refills: 3    Associated Diagnoses: Type 2 diabetes mellitus with hyperglycemia, without long-term current use of insulin (H)      !! blood glucose (NO BRAND SPECIFIED) test strip Use to test blood sugars 4 times daily or as directed due to infection and hyperglycemia  Qty: 200 strip, Refills: 1    Associated Diagnoses: Type 2 diabetes mellitus with hyperglycemia, without long-term current use of insulin (H)      Blood Glucose Monitoring Suppl (GLUCOMETER ELITE CLASSIC) KIT 1 Device daily.  Qty: 1 kit, Refills: 0    Comments: Please do glucometer that insurance will cover and supplies.  Associated Diagnoses: Type 2 diabetes, HbA1c goal < 7% (H)      Continuous Blood Gluc Sensor (FREESTYLE PIERRE 2 SENSOR) MISC 1 each every 14 days 1 each every 14 days. Change every 14 days.  Qty: 2 each, Refills: 5    Associated Diagnoses: Type  "2 diabetes mellitus with hyperglycemia, without long-term current use of insulin (H)      Emergency Supply Kit, Central, Patient use for emergency only. Contents: 3 sodium chloride 0.9% flushes, 1 dressing kit, 1 microclave ext set 14\", 4 nitrile gloves (med), 6 alcohol prep pads, 1 bacitracin, 1 syringe (10 cc 20 G 1\"). Call [phone] to reorder.  Qty: [id number] kit, Refills: 0    Associated Diagnoses: Osteomyelitis (H)      !! glucose blood VI test strips (ASCENSIA AUTODISC VI,ONE TOUCH ULTRA TEST VI) strip by In Vitro route. Test as directed  Qty: 1 Box, Refills: 12    Associated Diagnoses: Type 2 diabetes, HbA1c goal < 7% (H)      nafcillin 12 g in sodium chloride 0.9 % 240 mL via SMARTeZ pump Infuse 12 g over 24 hours into the vein every 24 hours for 13 days. Remove from fridge 12 hours prior to use  Qty: [id number] mL, Refills: 0    Associated Diagnoses: Osteomyelitis (H)      sodium chloride, PF, 0.9% PF flush Inject 10 mLs into the vein as needed for line flush. Flush IV before and after medication administration as directed and/or at least every 24 hours.  Qty: [id number] mL, Refills: 0    Associated Diagnoses: Osteomyelitis (H)       !! - Potential duplicate medications found. Please discuss with provider.        STOP taking these medications       glipiZIDE (GLUCOTROL XL) 10 MG 24 hr tablet Comments:   Reason for Stopping:         metFORMIN (GLUCOPHAGE XR) 500 MG 24 hr tablet Comments:   Reason for Stopping:             Allergies   Allergies   Allergen Reactions    Seasonal Allergies      "

## 2025-03-08 NOTE — TELEPHONE ENCOUNTER
Reason for Call:  Appointment Request    Patient requesting this type of appt:  Hospital/ED Follow-Up     Requested provider: Rancho Painting    Reason patient unable to be scheduled: Not within requested timeframe    When does patient want to be seen/preferred time: 3-7 days    Comments: Hospital F/U needs to be seen within 7 days discharged 03/08/25    Could we send this information to you in [facility] or would you prefer to receive a phone call?:   Patient would prefer a phone call   Okay to leave a detailed message?: Yes at Cell number on file:    Telephone Information:   Mobile [phone]       Call taken on 3/8/2025 at 8:47 AM by Alysha De Leon

## 2025-03-09 LAB — BACTERIA BLD CULT: NO GROWTH

## 2025-03-09 PROCEDURE — S9500 HIT ANTIBIOTIC Q24H DIEM: HCPCS

## 2025-03-09 NOTE — PROGRESS NOTES
Nursing Visit Note:  Nurse visit today for SOC, elastomeric ABX teach, PICC line care for Luke ESTRADA Jarrett.     present during visit today: Not Applicable.    Intravenous Access:  PICC.    Education Provided:     Patient Education focused on Elastomeric pump.     Note: Pt will be starting new HP at 6 pm nightly    Saline administered: 20 (ml)    Supply Check:   Does the patient have all the supplies they need for the next visit?  No, Order placed for aquuard    Next visit plan: Monday for labs, Thursday for Labs and CLC    Farnaz Laird, RN 3/8/2025

## 2025-03-10 ENCOUNTER — HOSPITAL ENCOUNTER (OUTPATIENT)
Dept: WOUND CARE | Facility: CLINIC | Age: 60
Discharge: HOME | End: 2025-03-10
Attending: INTERNAL MEDICINE | Admitting: INTERNAL MEDICINE
Payer: COMMERCIAL

## 2025-03-10 ENCOUNTER — PATIENT OUTREACH (OUTPATIENT)
Dept: CARE COORDINATION | Facility: CLINIC | Age: 60
End: 2025-03-10

## 2025-03-10 PROCEDURE — A4245 ALCOHOL WIPES PER BOX: HCPCS

## 2025-03-10 PROCEDURE — 97602 WOUND(S) CARE NON-SELECTIVE: CPT

## 2025-03-10 PROCEDURE — G0463 HOSPITAL OUTPT CLINIC VISIT: HCPCS | Mod: 25

## 2025-03-10 PROCEDURE — A4452 WATERPROOF TAPE: HCPCS

## 2025-03-10 PROCEDURE — A9270 NON-COVERED ITEM OR SERVICE: HCPCS

## 2025-03-10 PROCEDURE — S9500 HIT ANTIBIOTIC Q24H DIEM: HCPCS

## 2025-03-10 ASSESSMENT — ACTIVITIES OF DAILY LIVING (ADL): DEPENDENT_IADLS:: INDEPENDENT

## 2025-03-10 NOTE — PROGRESS NOTES
Clinic Care Coordination Contact  Transitions of Care Outreach  Chief Complaint   Patient presents with    Clinic Care Coordination - Post Hospital     RN CC- post hospital follow up       Most Recent Admission Date: 3/2/2025   Most Recent Admission Diagnosis: Elevated C-reactive protein (CRP) - R79.82  Elevated erythrocyte sedimentation rate - R70.0  Diabetic foot infection (H) - E11.628, L08.9  Elevated blood ketone body level - R79.89  Diabetic infection of left foot (H) - E11.628, L08.9     Most Recent Discharge Date: 3/8/2025   Most Recent Discharge Diagnosis: Diabetic foot infection (H) - E11.628, L08.9  Elevated erythrocyte sedimentation rate - R70.0  Elevated C-reactive protein (CRP) - R79.82  Elevated blood ketone body level - R79.89  Diabetes mellitus with osteomyelitis (H) - E11.69, M86.9  Open wound of left heel, initial encounter - S91.302A  Open wound of left heel, subsequent encounter - S91.302D  Other acute osteomyelitis of left foot (H) - M86.172     Transitions of Care Assessment    Discharge Assessment  How are you doing now that you are home?: per patient report, things are going really well. saw wound care today and has another appointment wednesday. has follow up with PCP 3/17/2025  How are your symptoms? (Red Flag symptoms escalate to triage hotline per guidelines): Improved  Do you know how to contact your clinic care team if you have future questions or changes to your health status? : Yes  Does the patient have their discharge instructions? : Yes  Does the patient have questions regarding their discharge instructions? : No  Were you started on any new medications or were there changes to any of your previous medications? : Yes  Does the patient have all of their medications?: Yes  Do you have questions regarding any of your medications? : No  Do you have all of your needed medical supplies or equipment (DME)?  (i.e. oxygen tank, CPAP, cane, etc.): Yes         Post-op (Clinicians Only)  Did  the patient have surgery or a procedure: Yes  Fever: No  Chills: No    Follow up Plan     Discharge Follow-Up  Discharge follow up appointment scheduled in alignment with recommended follow up timeframe or Transitions of Risk Category? (Low = within 30 days; Moderate= within 14 days; High= within 7 days): Yes  Discharge Follow Up Appointment Date: 03/17/25  Discharge Follow Up Appointment Scheduled with?: Primary Care Provider    Future Appointments   Date Time Provider Department Center   3/12/2025  8:00 AM Luciano Lopez RN [facility]   3/17/2025  8:40 AM Rancho Painting MD [facility]   3/17/2025  9:00 AM Luciano Lopez RN [facility] [place]   3/20/2025  3:15 PM Darryl Huff DPM [facility] [place] [facility]   4/1/2025  3:15 PM Darryl Huff DPM [facility]       Outpatient Plan as outlined on AVS reviewed with patient.    For any urgent concerns, please contact our 24 hour nurse triage line: [phone] ([phone])       Patient declines care coordination at this time.    Ayana Laird RN

## 2025-03-10 NOTE — PROGRESS NOTES
[facility]ound [facility] Ostomy [facility]    Start of Care in [facility] Wound Clinic: 3/5/2025, inpatient   Referring Provider: Dr. Darryl Huff   Primary Care Provider: Rancho Painting   Wound Location: Left Lateral calcaneus  Surgery: Debridement left calcaneus with bone biopsy, 3/4/25 Darryl Huff DPM      Wound Clinic Visit:  Initial clinic visit today 3/10/25 post hospital discharge.    Reason for Visit:  Follow up on left heel wound, left foot, evaluate current plan of care.     Subjective:  On arrival today 3/10 with this Lisa for his initial post op wo nurse clinic assessment, they update the wound history below.     Wound History:  Pt presented to the ED here at [facility] on 3/2/25 and was admitted, discharge home 3/8.  Patient reports having dry, peeling skin to the lateral calcaneus, used this device to help remove the non-viable tissue (Amope').  At the time the area was dry with no excess moisture.  On 3/2/25 noted to have increase redness and moist skin and concerns for infection was evaluated at [facility] ER, later was admitted to Hospital for IV antibiotics.  MRI -  3/3/25 showed Soft tissue ulceration lateral to the calcaneus,  Mild bone marrow edema within the adjacent lateral calcaneus. Osteomyelitis is likely if this wound probes to bone. Adjacent subcutaneous fluid collection measuring up to 3.2 cm, suspicious for abscess.  Regional cellulitis. Podiatry was consulted 3/3, pt was taken to OR on 3/4  for surgical debridement of left ankle wound/abscess in addition to bone biopsy.  While inpatient the [facility] nurse team coordinated with GHANSHYAM for assessments and teaching.  [facility] saw pt on Fri 3/7 and set up plan for wound cares twice daily at discharge by pt's wife and scheduled for Wound and Ostomy clinic appointment for the following Monday 3/10.  On 3/7 [facility] HECTOR Lopez and Dr Daria MORRISSEY discussed via phone options for wound cares and follow up post hospital discharge.  GHANSHYAM  questioned potential wound vac use, at that time I felt the two small open wounds would benefit from a vac but the surrounding tissue was too fluctuant to apply a vac without considerable risk for periwound skin and tissue breakdown.  - At today's initial Wound and Ostomy clinic visit 3/10 pt reports he was discharged home on Saturday 3/8 with IV antibiotics via left arm PICC managed by [facility].  (Nafcillin 12 g in sodium chloride 0.9 % 240 mL via SMARTeZ pump Infuse 12 g over 24 hours once daily for 13 days).  Pt's wife Lisa has been performing twice daily wound cares as listed below since discharge, no new concerns, no odor noted.    Past Medical History:  Patient Active Problem List   Diagnosis    Chronic rhinitis    Type 2 diabetes mellitus with hyperglycemia (H)    Hyperlipidemia LDL goal <100    Venous lake on Right chest    Congenital nevus of Right upper back    Diabetic infection of left foot (H)    SIRS (systemic inflammatory response syndrome) (H)    Diabetic peripheral neuropathy (H)    Hyponatremia    Hypochloremia    Platelet dysfunction due to aspirin (H)    Open wound of left heel    Left leg swelling    Elevated C-reactive protein (CRP)    Elevated erythrocyte sedimentation rate    Diabetic foot infection (H)    Elevated blood ketone body level    Osteomyelitis (H)                 Tobacco Use:     Tobacco Use      Smoking status: Never      Smokeless tobacco: Never     Diabetic: Type 2  HgbA1C:   Hemoglobin A1C   Date Value Ref Range Status   03/02/2025 16.8 (H) <5.7 % Final     Comment:     Normal <5.7%   Prediabetes 5.7-6.4%    Diabetes 6.5% or higher     Note: Adopted from ADA consensus guidelines.   04/21/2021 8.9 (H) 0 - 5.6 % Final     Comment:     Normal <5.7% Prediabetes 5.7-6.4%  Diabetes 6.5% or higher - adopted from ADA   consensus guidelines.  Results confirmed by repeat test     Checks Blood Glucose?:  Three times daily.  Average Readings: range from 122 - 175 since  "returning home.     Personal/social history:  Prior to hospitalization was working full time, owns [employer], more office side. Lives with wife Lisa who is an experienced RN here at [facility].  [facility] managing IV meds and PICC line, coming twice weekly Mon/Thurs.  No home care services in place for wound cares      Objective:   Mobility: NWB to left foot/Calcaneous.    Current treatment plan: filling two individual wound bed openings with 1/4\" Nu Gauze packing, 1 box of 4 x 4 bulk gauze, secured with rolled gauze roll and ace bandage.  Changing twice daily.  Last changed: By pt's wife Lisa last evening.      Wound #1 Left Lateral Calcaneus - Surgical wound, Surgical debridement, DOS 3/4/25  Stage/tissue depth: Full Thickness   Total area of concerning damage and fluctuance 10 cm L x 6.5 cm W  - Size of the two open wounds and well approximated incision between 3 cm L x 3 cm L x 1 cm D  - Two individual open aspect:  - Proximal posterior of two total size 1.3 cm L x 1.5 cm W x 1 cm D, size of the hole itself 1.1 cm L x 0.8 cm W x 1 cm D.    - Distal anterior of two total size 1.2 cm L x 1.8 cm W x 1 cm D, size of the hole itself 0.7 cm L x 1.7 cm W x 1 cm D  Tunneling: none  Undermining:  Proximal and distal wound communicate via undermining  Wound bed type/amount: In two open wounds approximately 80% slough and 20% red tissue with no granular buds present; not fluctuant  Wound bed of the area of concerning fluctuance approximately 20 % the open aspects described above, 5 % macerated skin, 5 % moist denuded dermal tissue and 70 % fragile skin and scar with no longer any boggy texture with palpation, dry. k  Wound Edges:  Closed with necrotic tissue  Periwound: Edema further out from fluctuant area and wound, erythema with some increased warmth to touch.  Drainage: Large amount of Serous and Sanguineous drainage.   Odor: none  Pain: Neuropathy, no sensation. No pain      Photo's from " today's initial Wound and Ostomy clinic visit 3/10/25.  Note first photo taken prior to removal of the loose outer periwound peeling loose nonviable dermal tissue.      Photo's from inpatient woc nurse follow up 3/7/25.      Photo's from initial inpatient visit 3/5/25.        Photo's from ED 3/2/25.     Dorsalis Pedal Pulse: 0/4 palpable: NA doppler: NA phasic  Hair growth: Absent from knee distally  Capillary Refill: 3 seconds  Feet/toes color: deep pink with erythema as listed in assessment   Nail  R Leg: Edema . Ankle circumference  cm. Calf circumference  cm. - Not assessed  L Leg: Edema. Ankle circumference 37 cm. Calf circumference 40.5 cm.     Mobility: NWB to foot/heel  Current offloading/footwear: Gripper sock while inpatient.   Sensation: Absent at the foot      Other callousing/areas of concern:  Plantar heel, plantar at base off the 4th toe (was debrided in OR 3/4/25) and plantar great toe fissures.  Today all dry with callous present, no open tissue, no drainage.      Photo's from today's initial visit to the Wound and Ostomy clinic 3/10/25.    Photo from initial inpatient visit 3/5/25.     Diet: DM. Low carb.   Pt is following DM diet at home.     Discussed: etiology of wound (DM neuropathy ), pathophysiology and patient specific goals for wound healing.   Education: plan of care change today 3/10.     Assessment:  Today the left heel wounds and surrounding skin are a little improved.  The two open wounds are slowly debriding and inside appear stable, the edges are a little too moist any some maceration present which is why I measured the openings and their total size based on present damage.  The skin between the two open wounds is more stable and is now dry and not denuded.  The periwound skin immediatly around the open wounds no longer boggy and is less fluctuant than when assessed three days ago.     Barriers to wound healing:   Poor nutrition: inadequate supply of protein, carbohydrates, fatty  acids, and trace elements essential for all phases of wound healing- Educated that high protein diet is optimal for wound healing, goal is 100-120 g per day. Provided verbal list of foods rich in protein.   Reduced Blood Supply: inadequate perfusion to heal wound- No know ABIs   Medication: none  Chemotherapy: suppresses the immune system and inflammatory response-NA  Radiotherapy: increases production of free radical which damage cells-NA  Psychological stress: none noted  Obesity: decreases tissue perfusion- Appropriate weight and BMI   Infection: prolongs inflammatory phase, uses vital nutrients, impairs epithelialization and releases toxins - Currently on IV antibiotics via picc line for osteomyelitis and soft tissue infection.  Underlying Disease: poorly controlled diabetes mellitis, educated on diet correction and following with PCP for optimal management.   Maceration: reduces wound tensile strength and inhibits epithelial migration- present but plan of care adjusted to address  Patient compliance- Agrees to initial assessment   Unrelieved pressure- order for NWB to the left foot, has knee scooter being delivered to home.  Immobility- altered, but not immobile.   Substance abuse: NA     Plan:   We will change the plan of care products and process as listed below.  This change to provide a stronger antimicrobial to the wounds themselves and periwound skin, to protect and dry the periwound skin better and to promote chemical and mechanical debriding of the slough within the wound beds.  Will plan to have the pt seen twice weekly.  This week twice in the Wound and Ostomy clinic.  Next week Woc will see once and DPM scheduled later in the week.  Twice daily changes recommended till follow up this week Wednesday.  Pt is being compliant with Non weight bearing to the left foot.  I will check with Dr Huff concerning use of Fracture boot for off loading, pt will be getting knee scooter delivered soon per update  today.     Interventions to Barriers:  As listed above.     Discussed/Education provided: plan of care with rationale;     Topical care to Left Lateral Calcaneous.  1 Remove the old bandage and cleanse with wound cleanser, soap and water or saline, dry well with gauze.  2 Take the plain packing strip gauze, cut long piece about 10 cm and wet with Vashe, pack the strip into the two holes separately.    - Take a 4x4 Cover Sponge, wet with Vashe and place over the wounds and the surrounding skin.  - Let the Vashe sit for approximately 5 mintues and remove.  3 Air dr or pat dry with gauze.    4 Cut two small pieces of the Mesalt gauze, dampen each with saline, just damp not wet and pack gently into the two holes separately.  5 Paint the skin around the wounds with the Povidone iodine swabs and let air dry.  6 Cover the area with two Cover Sponges and secure with one full roll of Kerlix gauze.  7 Apply ace wrap just snug but no compression.  8 Change the bandage twice daily.    Pt is unable but his wife is able to perform cares/has been caring for wound.    Additional recommendations: Keep left foot elevated the majority of every hour while awake.    The following discharge instructions were reviewed with and sent home with the patient:  See AVS    The following supplies were sent home with the patient:  Remains of the Vashe and MicroKlenz used today plus a few extra Mesalt pads.    Return visit: 3/13/25    Verbal, written, & demonstrative education provided.  Face to face time: approximately 65 minutes  Procedure: less than one minute application of saline dampened Mesalt gauze to the wound beds to promote chemical and mechanical debridement of slough.    Luciano Lopez RN, [facility]  [phone]

## 2025-03-10 NOTE — PATIENT INSTRUCTIONS
Today we will change the plan of care for your left heel wound.    Wounds looks ok, cleaning out slowly so a little deeper.  The skin around the wounds is improved well, more stable.    New plan of care continue twice daily dressing changes  1 Remove the old bandage and cleanse with wound cleanser, soap and water or saline, dry well with gauze.  2 Take the plain packing strip gauze, cut long piece about 10 cm and wet with Vashe, pack the strip into the two holes separately.    - Take a 4x4 Cover Sponge, wet with Vashe and place over the wounds and the surrounding skin.  - Let the Vashe sit for approximately 5 mintues and remove.  3 Air dr or pat dry with gauze.    4 Cut two small pieces of the Mesalt gauze, dampen each with saline, just damp not wet and pack gently into the two holes separately.  5 Paint the skin around the wounds with the Povidone iodine swabs and let air dry.  6 Cover the area with two Cover Sponges and secure with one full roll of Kerlix gauze.  7 Apply ace wrap just snug but no compression.  8 Change the bandage twice daily.    Continue to be non weight bearing on the left foot.  I will see you again on Wednesday this week at 8 am 3/12.    Call with any concerns or questions [phone].    Luciano Lopez RN [email or web address]

## 2025-03-11 ENCOUNTER — HOME INFUSION BILLING (OUTPATIENT)
Dept: HOME HEALTH SERVICES | Facility: HOME HEALTH | Age: 60
End: 2025-03-11
Payer: COMMERCIAL

## 2025-03-11 ENCOUNTER — LAB REQUISITION (OUTPATIENT)
Dept: LAB | Facility: CLINIC | Age: 60
End: 2025-03-11
Payer: COMMERCIAL

## 2025-03-11 ENCOUNTER — HOME CARE VISIT (OUTPATIENT)
Dept: HOME HEALTH SERVICES | Facility: HOME HEALTH | Age: 60
End: 2025-03-11
Payer: COMMERCIAL

## 2025-03-11 VITALS
HEART RATE: 73 BPM | WEIGHT: 220 LBS | BODY MASS INDEX: 27.5 KG/M2 | DIASTOLIC BLOOD PRESSURE: 78 MMHG | OXYGEN SATURATION: 98 % | RESPIRATION RATE: 16 BRPM | TEMPERATURE: 97.9 F | SYSTOLIC BLOOD PRESSURE: 118 MMHG

## 2025-03-11 DIAGNOSIS — M86.9 OSTEOMYELITIS, UNSPECIFIED (H): ICD-10-CM

## 2025-03-11 LAB
ALBUMIN SERPL BCG-MCNC: 3.4 G/DL (ref 3.5–5.2)
ALP SERPL-CCNC: 77 U/L (ref 40–150)
ALT SERPL W P-5'-P-CCNC: 10 U/L (ref 0–70)
ANION GAP SERPL CALCULATED.3IONS-SCNC: 12 MMOL/L (ref 7–15)
AST SERPL W P-5'-P-CCNC: 13 U/L (ref 0–45)
BACTERIA TISS BX CULT: NORMAL
BASOPHILS # BLD AUTO: 0.1 10E3/UL (ref 0–0.2)
BASOPHILS NFR BLD AUTO: 1 %
BILIRUB SERPL-MCNC: 0.3 MG/DL
BUN SERPL-MCNC: 19.4 MG/DL (ref 6–20)
CALCIUM SERPL-MCNC: 9.4 MG/DL (ref 8.8–10.4)
CHLORIDE SERPL-SCNC: 100 MMOL/L (ref 98–107)
CREAT SERPL-MCNC: 1.05 MG/DL (ref 0.67–1.17)
EGFRCR SERPLBLD CKD-EPI 2021: 82 ML/MIN/1.73M2
EOSINOPHIL # BLD AUTO: 0.3 10E3/UL (ref 0–0.7)
EOSINOPHIL NFR BLD AUTO: 3 %
ERYTHROCYTE [DISTWIDTH] IN BLOOD BY AUTOMATED COUNT: 13 % (ref 10–15)
GLUCOSE SERPL-MCNC: 214 MG/DL (ref 70–99)
HCO3 SERPL-SCNC: 26 MMOL/L (ref 22–29)
HCT VFR BLD AUTO: 38.3 % (ref 40–53)
HGB BLD-MCNC: 12.6 G/DL (ref 13.3–17.7)
IMM GRANULOCYTES # BLD: 0.1 10E3/UL
IMM GRANULOCYTES NFR BLD: 1 %
LYMPHOCYTES # BLD AUTO: 1.5 10E3/UL (ref 0.8–5.3)
LYMPHOCYTES NFR BLD AUTO: 16 %
MCH RBC QN AUTO: 27.9 PG (ref 26.5–33)
MCHC RBC AUTO-ENTMCNC: 32.9 G/DL (ref 31.5–36.5)
MCV RBC AUTO: 85 FL (ref 78–100)
MONOCYTES # BLD AUTO: 1 10E3/UL (ref 0–1.3)
MONOCYTES NFR BLD AUTO: 11 %
NEUTROPHILS # BLD AUTO: 6.8 10E3/UL (ref 1.6–8.3)
NEUTROPHILS NFR BLD AUTO: 70 %
NRBC # BLD AUTO: 0 10E3/UL
NRBC BLD AUTO-RTO: 0 /100
PLATELET # BLD AUTO: 376 10E3/UL (ref 150–450)
POTASSIUM SERPL-SCNC: 3.8 MMOL/L (ref 3.4–5.3)
PROT SERPL-MCNC: 7 G/DL (ref 6.4–8.3)
RBC # BLD AUTO: 4.52 10E6/UL (ref 4.4–5.9)
SODIUM SERPL-SCNC: 138 MMOL/L (ref 135–145)
WBC # BLD AUTO: 9.8 10E3/UL (ref 4–11)

## 2025-03-11 PROCEDURE — 82040 ASSAY OF SERUM ALBUMIN: CPT | Performed by: FAMILY MEDICINE

## 2025-03-11 PROCEDURE — 85048 AUTOMATED LEUKOCYTE COUNT: CPT | Performed by: FAMILY MEDICINE

## 2025-03-11 PROCEDURE — S9500 HIT ANTIBIOTIC Q24H DIEM: HCPCS

## 2025-03-11 PROCEDURE — 99601 HOME NFS VISIT <2 HRS: CPT

## 2025-03-11 PROCEDURE — 85014 HEMATOCRIT: CPT | Performed by: FAMILY MEDICINE

## 2025-03-11 PROCEDURE — 85004 AUTOMATED DIFF WBC COUNT: CPT | Performed by: FAMILY MEDICINE

## 2025-03-11 PROCEDURE — 84155 ASSAY OF PROTEIN SERUM: CPT | Performed by: FAMILY MEDICINE

## 2025-03-11 PROCEDURE — A9270 NON-COVERED ITEM OR SERVICE: HCPCS

## 2025-03-11 NOTE — PROGRESS NOTES
Nursing Visit Note:  Nurse visit today for GA, CLC and labs for Luke Farias.     present during visit today: Not Applicable.    Intravenous Access:  PICC.    Lab-Only Nursing Note    Labs obtained via PICC    Time Specimen drawn: 2:55pm    Last dose (if applicable): No    Facility sent to: [facility] Tracking number: [id number]    Note: Alert and oriented x3. VSS. PICC intact and free from S/S of infection. CLC completed. Labs obtained. L ankle wound care in clinic. No new health concerns.     Saline administered: 30 (ml)    Supply Check:   Does the patient have all the supplies they need for the next visit?  Yes    Next visit plan: GA, CLC and labs on Tuesday 3/18    Jason Kang, RN 3/11/2025

## 2025-03-12 ENCOUNTER — HOSPITAL ENCOUNTER (OUTPATIENT)
Dept: WOUND CARE | Facility: CLINIC | Age: 60
Discharge: HOME | End: 2025-03-12
Attending: INTERNAL MEDICINE
Payer: COMMERCIAL

## 2025-03-12 ENCOUNTER — TELEPHONE (OUTPATIENT)
Dept: ENDOCRINOLOGY | Facility: CLINIC | Age: 60
End: 2025-03-12
Payer: COMMERCIAL

## 2025-03-12 PROCEDURE — S9500 HIT ANTIBIOTIC Q24H DIEM: HCPCS

## 2025-03-12 PROCEDURE — 97602 WOUND(S) CARE NON-SELECTIVE: CPT

## 2025-03-12 NOTE — PROGRESS NOTES
[facility] Wound and Ostomy Clinic    Start of Care in [facility] Wound Clinic: 3/5/2025, inpatient   Referring Provider: Dr. Darryl Huff   Primary Care Provider: [employer]   Wound Location: Left Lateral calcaneus  Surgery: Debridement left calcaneus with bone biopsy, 3/4/25 Darryl Huff DPM      Wound Clinic Visit:  Initial clinic visit today 3/10/25 post hospital discharge.    Reason for Visit:  Follow up on left heel wound, left foot, evaluate current plan of care.     Subjective:  On arrival today 3/10 with this Lisa for his initial post op wo nurse clinic assessment, they update the wound history below.     Wound History:  Pt presented to the ED here at [facility] on 3/2/25 and was admitted, discharge home 3/8.  Patient reports having dry, peeling skin to the lateral calcaneus, used this device to help remove the non-viable tissue (Amope').  At the time the area was dry with no excess moisture.  On 3/2/25 noted to have increase redness and moist skin and concerns for infection was evaluated at [facility] ER, later was admitted to Hospital for IV antibiotics.  MRI -  3/3/25 showed Soft tissue ulceration lateral to the calcaneus,  Mild bone marrow edema within the adjacent lateral calcaneus. Osteomyelitis is likely if this wound probes to bone. Adjacent subcutaneous fluid collection measuring up to 3.2 cm, suspicious for abscess.  Regional cellulitis. Podiatry was consulted 3/3, pt was taken to OR on 3/4  for surgical debridement of left ankle wound/abscess in addition to bone biopsy.  While inpatient the [facility] nurse team coordinated with GHANSHYAM for assessments and teaching.  [facility] saw pt on Fri 3/7 and set up plan for wound cares twice daily at discharge by pt's wife and scheduled for Wound and Ostomy clinic appointment for the following Monday 3/10.  On 3/7 [facility] HECTOR Lopez and Dr Daria MORRISSEY discussed via phone options for wound cares and follow up post hospital discharge.  GHANSHYAM  questioned potential wound vac use, at that time I felt the two small open wounds would benefit from a vac but the surrounding tissue was too fluctuant to apply a vac without considerable risk for periwound skin and tissue breakdown.  - At today's initial Wound and Ostomy clinic visit 3/10 pt reports he was discharged home on Saturday 3/8 with IV antibiotics via left arm PICC managed by [facility].  (Nafcillin 12 g in sodium chloride 0.9 % 240 mL via SMARTeZ pump Infuse 12 g over 24 hours once daily for 13 days).  Pt's wife Lisa has been performing twice daily wound cares as listed below since discharge, no new concerns, no odor noted.    Past Medical History:  Patient Active Problem List   Diagnosis    Chronic rhinitis    Type 2 diabetes mellitus with hyperglycemia (H)    Hyperlipidemia LDL goal <100    Venous lake on Right chest    Congenital nevus of Right upper back    Diabetic infection of left foot (H)    SIRS (systemic inflammatory response syndrome) (H)    Diabetic peripheral neuropathy (H)    Hyponatremia    Hypochloremia    Platelet dysfunction due to aspirin (H)    Open wound of left heel    Left leg swelling    Elevated C-reactive protein (CRP)    Elevated erythrocyte sedimentation rate    Diabetic foot infection (H)    Elevated blood ketone body level    Osteomyelitis (H)                 Tobacco Use:     Tobacco Use      Smoking status: Never      Smokeless tobacco: Never     Diabetic: Type 2  HgbA1C:   Hemoglobin A1C   Date Value Ref Range Status   03/02/2025 16.8 (H) <5.7 % Final     Comment:     Normal <5.7%   Prediabetes 5.7-6.4%    Diabetes 6.5% or higher     Note: Adopted from ADA consensus guidelines.   04/21/2021 8.9 (H) 0 - 5.6 % Final     Comment:     Normal <5.7% Prediabetes 5.7-6.4%  Diabetes 6.5% or higher - adopted from ADA   consensus guidelines.  Results confirmed by repeat test     Checks Blood Glucose?:  Three times daily.  Average Readings: range from 122 - 175 since  "returning home.     Personal/social history:  Prior to hospitalization was working full time, owns [employer], more office side. Lives with wife Lisa who is an experienced RN here at [facility].  [facility] managing IV meds and PICC line, coming twice weekly Mon/Thurs.  No home care services in place for wound cares      Objective:   Mobility: NWB to left foot/Calcaneous.    Current treatment plan: filling two individual wound bed openings with 1/4\" Nu Gauze packing, 1 box of 4 x 4 bulk gauze, secured with rolled gauze roll and ace bandage.  Changing twice daily.  Last changed: By pt's wife Lisa last evening.      Wound #1 Left Lateral Calcaneus - Surgical wound, Surgical debridement, DOS 3/4/25  Stage/tissue depth: Full Thickness   Total area of concerning damage and fluctuance 10 cm L x 6.5 cm W  - Size of the two open wounds and well approximated incision between 3 cm L x 3 cm L x 1 cm D  - Two individual open aspect:  - Proximal posterior of two total size 1.3 cm L x 1.5 cm W x 1 cm D, size of the hole itself 1.1 cm L x 0.8 cm W x 1 cm D.    - Distal anterior of two total size 1.2 cm L x 1.8 cm W x 1 cm D, size of the hole itself 0.7 cm L x 1.7 cm W x 1 cm D  Tunneling: none  Undermining:  Proximal and distal wound communicate via undermining  Wound bed type/amount: In two open wounds approximately 80% slough and 20% red tissue with no granular buds present; not fluctuant  Wound bed of the area of concerning fluctuance approximately 20 % the open aspects described above, 5 % macerated skin, 5 % moist denuded dermal tissue and 70 % fragile skin and scar with no longer any boggy texture with palpation, dry. k  Wound Edges:  Closed with necrotic tissue  Periwound: Edema further out from fluctuant area and wound, erythema with some increased warmth to touch.  Drainage: Large amount of Serous and Sanguineous drainage.   Odor: none  Pain: Neuropathy, no sensation. No pain      Photo's from " today's initial Wound and Ostomy clinic visit 3/10/25.  Note first photo taken prior to removal of the loose outer periwound peeling loose nonviable dermal tissue.      Photo's from inpatient woc nurse follow up 3/7/25.      Photo's from initial inpatient visit 3/5/25.        Photo's from ED 3/2/25.     Dorsalis Pedal Pulse: 0/4 palpable: NA doppler: NA phasic  Hair growth: Absent from knee distally  Capillary Refill: 3 seconds  Feet/toes color: deep pink with erythema as listed in assessment   Nail  R Leg: Edema . Ankle circumference  cm. Calf circumference  cm. - Not assessed  L Leg: Edema. Ankle circumference 37 cm. Calf circumference 40.5 cm.     Mobility: NWB to foot/heel  Current offloading/footwear: Gripper sock while inpatient.   Sensation: Absent at the foot      Other callousing/areas of concern:  Plantar heel, plantar at base off the 4th toe (was debrided in OR 3/4/25) and plantar great toe fissures.  Today all dry with callous present, no open tissue, no drainage.      Photo's from today's initial visit to the Wound and Ostomy clinic 3/10/25.    Photo from initial inpatient visit 3/5/25.     Diet: DM. Low carb.   Pt is following DM diet at home.     Discussed: etiology of wound (DM neuropathy ), pathophysiology and patient specific goals for wound healing.   Education: plan of care change today 3/10.     Assessment:  Today the left heel wounds and surrounding skin are a little improved.  The two open wounds are slowly debriding and inside appear stable, the edges are a little too moist any some maceration present which is why I measured the openings and their total size based on present damage.  The skin between the two open wounds is more stable and is now dry and not denuded.  The periwound skin immediatly around the open wounds no longer boggy and is less fluctuant than when assessed three days ago.     Barriers to wound healing:   Poor nutrition: inadequate supply of protein, carbohydrates, fatty  acids, and trace elements essential for all phases of wound healing- Educated that high protein diet is optimal for wound healing, goal is 100-120 g per day. Provided verbal list of foods rich in protein.   Reduced Blood Supply: inadequate perfusion to heal wound- No know ABIs   Medication: none  Chemotherapy: suppresses the immune system and inflammatory response-NA  Radiotherapy: increases production of free radical which damage cells-NA  Psychological stress: none noted  Obesity: decreases tissue perfusion- Appropriate weight and BMI   Infection: prolongs inflammatory phase, uses vital nutrients, impairs epithelialization and releases toxins - Currently on IV antibiotics via picc line for osteomyelitis and soft tissue infection.  Underlying Disease: poorly controlled diabetes mellitis, educated on diet correction and following with PCP for optimal management.   Maceration: reduces wound tensile strength and inhibits epithelial migration- present but plan of care adjusted to address  Patient compliance- Agrees to initial assessment   Unrelieved pressure- order for NWB to the left foot, has knee scooter being delivered to home.  Immobility- altered, but not immobile.   Substance abuse: NA     Plan:   We will change the plan of care products and process as listed below.  This change to provide a stronger antimicrobial to the wounds themselves and periwound skin, to protect and dry the periwound skin better and to promote chemical and mechanical debriding of the slough within the wound beds.  Will plan to have the pt seen twice weekly.  This week twice in the Wound and Ostomy clinic.  Next week Woc will see once and DPM scheduled later in the week.  Twice daily changes recommended till follow up this week Wednesday.  Pt is being compliant with Non weight bearing to the left foot.  I will check with Dr Huff concerning use of Fracture boot for off loading, pt will be getting knee scooter delivered soon per update  today.     Interventions to Barriers:  As listed above.     Discussed/Education provided: plan of care with rationale;     Topical care to Left Lateral Calcaneous.  1 Remove the old bandage and cleanse with wound cleanser, soap and water or saline, dry well with gauze.  2 Take the plain packing strip gauze, cut long piece about 10 cm and wet with Vashe, pack the strip into the two holes separately.    - Take a 4x4 Cover Sponge, wet with Vashe and place over the wounds and the surrounding skin.  - Let the Vashe sit for approximately 5 mintues and remove.  3 Air dr or pat dry with gauze.    4 Cut two small pieces of the Mesalt gauze, dampen each with saline, just damp not wet and pack gently into the two holes separately.  5 Paint the skin around the wounds with the Povidone iodine swabs and let air dry.  6 Cover the area with two Cover Sponges and secure with one full roll of Kerlix gauze.  7 Apply ace wrap just snug but no compression.  8 Change the bandage twice daily.    Pt is unable but his wife is able to perform cares/has been caring for wound.    Additional recommendations: Keep left foot elevated the majority of every hour while awake.    The following discharge instructions were reviewed with and sent home with the patient:  See AVS    The following supplies were sent home with the patient:  Remains of the Vashe and MicroKlenz used today plus a few extra Mesalt pads.    Return visit: 3/13/25    Verbal, written, & demonstrative education provided.  Face to face time: approximately 65 minutes  Procedure: less than one minute application of saline dampened Mesalt gauze to the wound beds to promote chemical and mechanical debridement of slough.    Luciano Lopez RN, [facility]  [phone]

## 2025-03-12 NOTE — TELEPHONE ENCOUNTER
Left Voicemail (1st Attempt) for the patient to call back and schedule the following:    Appointment type: New Diabetes   Provider: Anyone that sees new diabetes pts   Return date: within 1-2 weeks if possible  Specialty phone number: [phone]  Additional appointment(s) needed:   Additonal Notes: Ashanti LANCASTER Deanne M RN  [facility]  Please help schedule Open DM spot or JOSR within 1-2  weeks in this order:  1) PA  2) Fellow  3) Preferred DM providers  4) Other providers that see new diabetes patients    Examples:  3/13 Hererra in person CSC  4/1 Samuel in person MG   5/5 Samuel in person RI  5/23 Ahmed in person csc  5/23 Tasma virtual csc  5/27 Ahmed virtual csc    Please note that the above appointment(s) will require manual scheduling as they are marked as JOSR and will not appear using auto search. Do not schedule the patient if another patient has already been scheduled in the requested appointment slot.     Daniela Burns on 3/12/2025 at 11:24 AM

## 2025-03-13 ENCOUNTER — LAB REQUISITION (OUTPATIENT)
Dept: LAB | Facility: CLINIC | Age: 60
End: 2025-03-13
Payer: COMMERCIAL

## 2025-03-13 ENCOUNTER — HOME CARE VISIT (OUTPATIENT)
Dept: HOME HEALTH SERVICES | Facility: HOME HEALTH | Age: 60
End: 2025-03-13
Payer: COMMERCIAL

## 2025-03-13 VITALS
DIASTOLIC BLOOD PRESSURE: 74 MMHG | SYSTOLIC BLOOD PRESSURE: 122 MMHG | OXYGEN SATURATION: 99 % | TEMPERATURE: 97.8 F | RESPIRATION RATE: 16 BRPM | HEART RATE: 68 BPM

## 2025-03-13 DIAGNOSIS — M86.9 OSTEOMYELITIS, UNSPECIFIED (H): ICD-10-CM

## 2025-03-13 DIAGNOSIS — E11.65 TYPE 2 DIABETES MELLITUS WITH HYPERGLYCEMIA, WITHOUT LONG-TERM CURRENT USE OF INSULIN (H): ICD-10-CM

## 2025-03-13 LAB
ALBUMIN SERPL BCG-MCNC: 3.4 G/DL (ref 3.5–5.2)
ALP SERPL-CCNC: 73 U/L (ref 40–150)
ALT SERPL W P-5'-P-CCNC: 10 U/L (ref 0–70)
ANION GAP SERPL CALCULATED.3IONS-SCNC: 13 MMOL/L (ref 7–15)
AST SERPL W P-5'-P-CCNC: 12 U/L (ref 0–45)
BACTERIA BONE ANAEROBE+AEROBE CULT: NORMAL
BASOPHILS # BLD AUTO: 0.1 10E3/UL (ref 0–0.2)
BASOPHILS NFR BLD AUTO: 1 %
BILIRUB SERPL-MCNC: 0.3 MG/DL
BUN SERPL-MCNC: 16.6 MG/DL (ref 6–20)
CALCIUM SERPL-MCNC: 9.5 MG/DL (ref 8.8–10.4)
CHLORIDE SERPL-SCNC: 101 MMOL/L (ref 98–107)
CREAT SERPL-MCNC: 0.9 MG/DL (ref 0.67–1.17)
EGFRCR SERPLBLD CKD-EPI 2021: >90 ML/MIN/1.73M2
EOSINOPHIL # BLD AUTO: 0.2 10E3/UL (ref 0–0.7)
EOSINOPHIL NFR BLD AUTO: 3 %
ERYTHROCYTE [DISTWIDTH] IN BLOOD BY AUTOMATED COUNT: 13.1 % (ref 10–15)
GLUCOSE SERPL-MCNC: 226 MG/DL (ref 70–99)
HCO3 SERPL-SCNC: 26 MMOL/L (ref 22–29)
HCT VFR BLD AUTO: 39.5 % (ref 40–53)
HGB BLD-MCNC: 13 G/DL (ref 13.3–17.7)
IMM GRANULOCYTES # BLD: 0.1 10E3/UL
IMM GRANULOCYTES NFR BLD: 1 %
LYMPHOCYTES # BLD AUTO: 1.3 10E3/UL (ref 0.8–5.3)
LYMPHOCYTES NFR BLD AUTO: 16 %
MCH RBC QN AUTO: 28 PG (ref 26.5–33)
MCHC RBC AUTO-ENTMCNC: 32.9 G/DL (ref 31.5–36.5)
MCV RBC AUTO: 85 FL (ref 78–100)
MONOCYTES # BLD AUTO: 0.9 10E3/UL (ref 0–1.3)
MONOCYTES NFR BLD AUTO: 11 %
NEUTROPHILS # BLD AUTO: 5.6 10E3/UL (ref 1.6–8.3)
NEUTROPHILS NFR BLD AUTO: 69 %
NRBC # BLD AUTO: 0 10E3/UL
NRBC BLD AUTO-RTO: 0 /100
PLATELET # BLD AUTO: 346 10E3/UL (ref 150–450)
POTASSIUM SERPL-SCNC: 4 MMOL/L (ref 3.4–5.3)
PROT SERPL-MCNC: 7 G/DL (ref 6.4–8.3)
RBC # BLD AUTO: 4.64 10E6/UL (ref 4.4–5.9)
SODIUM SERPL-SCNC: 140 MMOL/L (ref 135–145)
WBC # BLD AUTO: 8.1 10E3/UL (ref 4–11)

## 2025-03-13 PROCEDURE — 82947 ASSAY GLUCOSE BLOOD QUANT: CPT | Performed by: INTERNAL MEDICINE

## 2025-03-13 PROCEDURE — S9500 HIT ANTIBIOTIC Q24H DIEM: HCPCS

## 2025-03-13 PROCEDURE — 85048 AUTOMATED LEUKOCYTE COUNT: CPT | Performed by: INTERNAL MEDICINE

## 2025-03-13 PROCEDURE — 84460 ALANINE AMINO (ALT) (SGPT): CPT | Performed by: INTERNAL MEDICINE

## 2025-03-13 PROCEDURE — 82310 ASSAY OF CALCIUM: CPT | Performed by: INTERNAL MEDICINE

## 2025-03-13 PROCEDURE — 85004 AUTOMATED DIFF WBC COUNT: CPT | Performed by: INTERNAL MEDICINE

## 2025-03-13 RX ORDER — METFORMIN HYDROCHLORIDE 500 MG/1
1000 TABLET, EXTENDED RELEASE ORAL 2 TIMES DAILY WITH MEALS
Qty: 180 TABLET | Refills: 1 | Status: SHIPPED | OUTPATIENT
Start: 2025-03-13

## 2025-03-13 RX ORDER — ATORVASTATIN CALCIUM 20 MG/1
20 TABLET, FILM COATED ORAL DAILY
Qty: 90 TABLET | Refills: 1 | Status: SHIPPED | OUTPATIENT
Start: 2025-03-13

## 2025-03-13 NOTE — PROGRESS NOTES
Nursing Visit Note:  Nurse visit today for lab only for Luke Farias.     present during visit today: Not Applicable.    Intravenous Access:  PICC.    Lab-Only Nursing Note    Labs obtained via PICC    Time Specimen drawn: 1135    Last dose (if applicable): No    Facility sent to: [facility] Tracking number: [id number]    Note: CLC not due dressing CDI. Patient denies pain, using knee scooter for ambulation. No recent falls. No new concerns from patient.    Saline administered: 30 (ml)    Supply Check:   Does the patient have all the supplies they need for the next visit?  Yes    Next visit plan: Monday     Alondra Peterson RN 3/13/2025

## 2025-03-14 ENCOUNTER — HOME INFUSION (OUTPATIENT)
Dept: HOME HEALTH SERVICES | Facility: HOME HEALTH | Age: 60
End: 2025-03-14
Payer: COMMERCIAL

## 2025-03-14 PROCEDURE — S9500 HIT ANTIBIOTIC Q24H DIEM: HCPCS

## 2025-03-15 PROCEDURE — S9500 HIT ANTIBIOTIC Q24H DIEM: HCPCS

## 2025-03-16 PROCEDURE — S9500 HIT ANTIBIOTIC Q24H DIEM: HCPCS

## 2025-03-17 ENCOUNTER — OFFICE VISIT (OUTPATIENT)
Dept: INTERNAL MEDICINE | Facility: CLINIC | Age: 60
End: 2025-03-17
Payer: COMMERCIAL

## 2025-03-17 ENCOUNTER — HOSPITAL ENCOUNTER (OUTPATIENT)
Dept: WOUND CARE | Facility: CLINIC | Age: 60
Discharge: HOME | End: 2025-03-17
Attending: INTERNAL MEDICINE | Admitting: INTERNAL MEDICINE
Payer: COMMERCIAL

## 2025-03-17 ENCOUNTER — LAB REQUISITION (OUTPATIENT)
Dept: LAB | Facility: CLINIC | Age: 60
End: 2025-03-17
Payer: COMMERCIAL

## 2025-03-17 ENCOUNTER — HOME CARE VISIT (OUTPATIENT)
Dept: HOME HEALTH SERVICES | Facility: HOME HEALTH | Age: 60
End: 2025-03-17
Payer: COMMERCIAL

## 2025-03-17 VITALS
BODY MASS INDEX: 28.35 KG/M2 | TEMPERATURE: 97.9 F | HEIGHT: 75 IN | OXYGEN SATURATION: 97 % | RESPIRATION RATE: 20 BRPM | DIASTOLIC BLOOD PRESSURE: 80 MMHG | WEIGHT: 228 LBS | SYSTOLIC BLOOD PRESSURE: 136 MMHG | HEART RATE: 76 BPM

## 2025-03-17 VITALS
OXYGEN SATURATION: 98 % | RESPIRATION RATE: 18 BRPM | SYSTOLIC BLOOD PRESSURE: 122 MMHG | WEIGHT: 226 LBS | HEART RATE: 72 BPM | BODY MASS INDEX: 28.25 KG/M2 | DIASTOLIC BLOOD PRESSURE: 60 MMHG | TEMPERATURE: 97.7 F

## 2025-03-17 DIAGNOSIS — E11.69 DIABETES MELLITUS WITH OSTEOMYELITIS (H): ICD-10-CM

## 2025-03-17 DIAGNOSIS — M86.9 DIABETES MELLITUS WITH OSTEOMYELITIS (H): ICD-10-CM

## 2025-03-17 DIAGNOSIS — R60.0 FLUID RETENTION IN LEGS: ICD-10-CM

## 2025-03-17 DIAGNOSIS — E11.65 TYPE 2 DIABETES MELLITUS WITH HYPERGLYCEMIA, WITHOUT LONG-TERM CURRENT USE OF INSULIN (H): ICD-10-CM

## 2025-03-17 DIAGNOSIS — E11.65 TYPE 2 DIABETES MELLITUS WITH HYPERGLYCEMIA, WITH LONG-TERM CURRENT USE OF INSULIN (H): ICD-10-CM

## 2025-03-17 DIAGNOSIS — L08.9 DIABETIC INFECTION OF LEFT FOOT (H): Primary | ICD-10-CM

## 2025-03-17 DIAGNOSIS — M86.9 OSTEOMYELITIS, UNSPECIFIED (H): ICD-10-CM

## 2025-03-17 DIAGNOSIS — E11.628 DIABETIC INFECTION OF LEFT FOOT (H): Primary | ICD-10-CM

## 2025-03-17 DIAGNOSIS — Z79.4 TYPE 2 DIABETES MELLITUS WITH HYPERGLYCEMIA, WITH LONG-TERM CURRENT USE OF INSULIN (H): ICD-10-CM

## 2025-03-17 PROBLEM — T39.015A PLATELET DYSFUNCTION DUE TO ASPIRIN (H): Status: RESOLVED | Noted: 2025-03-02 | Resolved: 2025-03-17

## 2025-03-17 PROBLEM — D69.1 PLATELET DYSFUNCTION DUE TO ASPIRIN (H): Status: RESOLVED | Noted: 2025-03-02 | Resolved: 2025-03-17

## 2025-03-17 LAB
ALBUMIN SERPL BCG-MCNC: 3.6 G/DL (ref 3.5–5.2)
ALP SERPL-CCNC: 61 U/L (ref 40–150)
ALT SERPL W P-5'-P-CCNC: 10 U/L (ref 0–70)
ANION GAP SERPL CALCULATED.3IONS-SCNC: 11 MMOL/L (ref 7–15)
AST SERPL W P-5'-P-CCNC: 12 U/L (ref 0–45)
BASOPHILS # BLD AUTO: 0.1 10E3/UL (ref 0–0.2)
BASOPHILS NFR BLD AUTO: 1 %
BILIRUB SERPL-MCNC: 0.2 MG/DL
BUN SERPL-MCNC: 18.6 MG/DL (ref 6–20)
CALCIUM SERPL-MCNC: 9.2 MG/DL (ref 8.8–10.4)
CHLORIDE SERPL-SCNC: 102 MMOL/L (ref 98–107)
CHOLEST SERPL-MCNC: 162 MG/DL
CREAT SERPL-MCNC: 0.89 MG/DL (ref 0.67–1.17)
CREAT UR-MCNC: 57.1 MG/DL
EGFRCR SERPLBLD CKD-EPI 2021: >90 ML/MIN/1.73M2
EOSINOPHIL # BLD AUTO: 0.2 10E3/UL (ref 0–0.7)
EOSINOPHIL NFR BLD AUTO: 3 %
ERYTHROCYTE [DISTWIDTH] IN BLOOD BY AUTOMATED COUNT: 13.3 % (ref 10–15)
FASTING STATUS PATIENT QL REPORTED: NO
GLUCOSE SERPL-MCNC: 133 MG/DL (ref 70–99)
HCO3 SERPL-SCNC: 27 MMOL/L (ref 22–29)
HCT VFR BLD AUTO: 37.2 % (ref 40–53)
HDLC SERPL-MCNC: 43 MG/DL
HGB BLD-MCNC: 12.4 G/DL (ref 13.3–17.7)
IMM GRANULOCYTES # BLD: 0 10E3/UL
IMM GRANULOCYTES NFR BLD: 0 %
LDLC SERPL CALC-MCNC: 91 MG/DL
LYMPHOCYTES # BLD AUTO: 1.6 10E3/UL (ref 0.8–5.3)
LYMPHOCYTES NFR BLD AUTO: 20 %
MCH RBC QN AUTO: 28.5 PG (ref 26.5–33)
MCHC RBC AUTO-ENTMCNC: 33.3 G/DL (ref 31.5–36.5)
MCV RBC AUTO: 86 FL (ref 78–100)
MICROALBUMIN UR-MCNC: <12 MG/L
MICROALBUMIN/CREAT UR: NORMAL MG/G{CREAT}
MONOCYTES # BLD AUTO: 0.7 10E3/UL (ref 0–1.3)
MONOCYTES NFR BLD AUTO: 8 %
NEUTROPHILS # BLD AUTO: 5.4 10E3/UL (ref 1.6–8.3)
NEUTROPHILS NFR BLD AUTO: 68 %
NONHDLC SERPL-MCNC: 119 MG/DL
NRBC # BLD AUTO: 0 10E3/UL
NRBC BLD AUTO-RTO: 0 /100
PLATELET # BLD AUTO: 297 10E3/UL (ref 150–450)
POTASSIUM SERPL-SCNC: 3.8 MMOL/L (ref 3.4–5.3)
PROT SERPL-MCNC: 6.8 G/DL (ref 6.4–8.3)
RBC # BLD AUTO: 4.35 10E6/UL (ref 4.4–5.9)
SODIUM SERPL-SCNC: 140 MMOL/L (ref 135–145)
TRIGL SERPL-MCNC: 140 MG/DL
WBC # BLD AUTO: 8 10E3/UL (ref 4–11)

## 2025-03-17 PROCEDURE — 99495 TRANSJ CARE MGMT MOD F2F 14D: CPT | Performed by: INTERNAL MEDICINE

## 2025-03-17 PROCEDURE — 97602 WOUND(S) CARE NON-SELECTIVE: CPT

## 2025-03-17 PROCEDURE — 85004 AUTOMATED DIFF WBC COUNT: CPT | Performed by: INTERNAL MEDICINE

## 2025-03-17 PROCEDURE — 84132 ASSAY OF SERUM POTASSIUM: CPT | Performed by: INTERNAL MEDICINE

## 2025-03-17 PROCEDURE — 82043 UR ALBUMIN QUANTITATIVE: CPT | Performed by: INTERNAL MEDICINE

## 2025-03-17 PROCEDURE — 3079F DIAST BP 80-89 MM HG: CPT | Performed by: INTERNAL MEDICINE

## 2025-03-17 PROCEDURE — 85018 HEMOGLOBIN: CPT | Performed by: INTERNAL MEDICINE

## 2025-03-17 PROCEDURE — 3075F SYST BP GE 130 - 139MM HG: CPT | Performed by: INTERNAL MEDICINE

## 2025-03-17 PROCEDURE — S9500 HIT ANTIBIOTIC Q24H DIEM: HCPCS

## 2025-03-17 PROCEDURE — 1111F DSCHRG MED/CURRENT MED MERGE: CPT | Performed by: INTERNAL MEDICINE

## 2025-03-17 PROCEDURE — 82947 ASSAY GLUCOSE BLOOD QUANT: CPT | Performed by: INTERNAL MEDICINE

## 2025-03-17 PROCEDURE — 1126F AMNT PAIN NOTED NONE PRSNT: CPT | Performed by: INTERNAL MEDICINE

## 2025-03-17 PROCEDURE — 84460 ALANINE AMINO (ALT) (SGPT): CPT | Performed by: INTERNAL MEDICINE

## 2025-03-17 PROCEDURE — 82570 ASSAY OF URINE CREATININE: CPT | Performed by: INTERNAL MEDICINE

## 2025-03-17 PROCEDURE — 36415 COLL VENOUS BLD VENIPUNCTURE: CPT | Performed by: INTERNAL MEDICINE

## 2025-03-17 PROCEDURE — 85041 AUTOMATED RBC COUNT: CPT | Performed by: INTERNAL MEDICINE

## 2025-03-17 PROCEDURE — 80061 LIPID PANEL: CPT | Performed by: INTERNAL MEDICINE

## 2025-03-17 PROCEDURE — 99601 HOME NFS VISIT <2 HRS: CPT

## 2025-03-17 RX ORDER — INSULIN LISPRO-AABC 100 [IU]/ML
1 INJECTION, SOLUTION SUBCUTANEOUS 3 TIMES DAILY
COMMUNITY
Start: 2025-03-17

## 2025-03-17 RX ORDER — FUROSEMIDE 20 MG/1
20 TABLET ORAL DAILY
Qty: 10 TABLET | Refills: 0 | Status: SHIPPED | OUTPATIENT
Start: 2025-03-17

## 2025-03-17 ASSESSMENT — PAIN SCALES - GENERAL: PAINLEVEL_OUTOF10: NO PAIN (0)

## 2025-03-17 NOTE — PROGRESS NOTES
Nursing Visit Note:  Nurse visit today for CLC, labs for Luke Farias.     present during visit today: Not Applicable.    Intravenous Access:  PICC.    N/A    Note: Luke is alert and oriented, in NAD. Feeling good overall, minimal pain. Saw wound specialist earlier today, making good progress on healing. No issues with infusions. CLC completed per protocol, labs drawn. No further concerns today.     Saline administered: 30 (ml)    Supply Check:   Does the patient have all the supplies they need for the next visit?  Yes    Next visit plan: 3/20    Emely Maharaj, RN 3/17/2025

## 2025-03-17 NOTE — PROGRESS NOTES
[facility]    Start of Care in [facility] Wound Clinic: 3/5/2025, inpatient   Referring Provider: Dr. Darryl Huff   Primary Care Provider: Rancho Painting   Wound Location: Left Lateral calcaneus  Surgery: Debridement left calcaneus with bone biopsy, 3/4/25 Darryl Huff DPM      Wound Clinic Visit:  Scheduled follow up.    Reason for Visit:  Follow up on left heel wound, left foot, evaluate current plan of care.     Subjective:  On arrival today 3/17 alone, pt reports the following: Saw PCP prior to our appointment today, he will be starting short course of oral diuretics for LE edema bilaterally.  The left foot wound appears to be doing ok, no troubles with wound cares, no troubles with PICC line or IV antibiotic infusions.  Blood sugars ok but a few high's out of the recent typical averages.  Follows up with DPM Thursday this week.     Wound History:  Pt presented to the ED here at [facility] on 3/2/25 and was admitted, discharge home 3/8.  Patient reports having dry, peeling skin to the lateral calcaneus, used this device to help remove the non-viable tissue (Amope').  At the time the area was dry with no excess moisture.  On 3/2/25 noted to have increase redness and moist skin and concerns for infection was evaluated at [facility] ER, later was admitted to Hospital for IV antibiotics.  MRI -  3/3/25 showed Soft tissue ulceration lateral to the calcaneus,  Mild bone marrow edema within the adjacent lateral calcaneus. Osteomyelitis is likely if this wound probes to bone. Adjacent subcutaneous fluid collection measuring up to 3.2 cm, suspicious for abscess.  Regional cellulitis. Podiatry was consulted 3/3, pt was taken to OR on 3/4  for surgical debridement of left ankle wound/abscess in addition to bone biopsy.  While inpatient the [facility] nurse team coordinated with DPM for assessments and teaching.  [facility] saw pt on Fri 3/7 and set up plan for wound cares twice daily at  discharge by pt's wife and scheduled for Wound and Ostomy clinic appointment for the following Monday 3/10.  On 3/7 woc HECTOR Lopez and Dr Daria MORRISSEY discussed via phone options for wound cares and follow up post hospital discharge.  GHANSHYAM questioned potential wound vac use, at that time I felt the two small open wounds would benefit from a vac but the surrounding tissue was too fluctuant to apply a vac without considerable risk for periwound skin and tissue breakdown.  Pt discharged home Saturday 3/8 with IV antibiotics via left arm PICC managed by  [facility].  (Nafcillin 12 g in sodium chloride 0.9 % 240 mL via SMARTeZ pump Infuse 12 g over 24 hours once daily for 13 days).  Pt's wife Lisa  performing twice daily wound cares as directed at time of discharge, no new concerns, no odor noted.  Received knee scooter.    Past Medical History:  Patient Active Problem List   Diagnosis    Chronic rhinitis    Type 2 diabetes mellitus with hyperglycemia (H)    Hyperlipidemia LDL goal <100    Venous lake on Right chest    Congenital nevus of Right upper back    Diabetic infection of left foot (H)    SIRS (systemic inflammatory response syndrome) (H)    Diabetic peripheral neuropathy (H)    Hyponatremia    Hypochloremia    Open wound of left heel    Left leg swelling    Elevated C-reactive protein (CRP)    Elevated erythrocyte sedimentation rate    Diabetic foot infection (H)    Elevated blood ketone body level    Osteomyelitis (H)                 Tobacco Use:     Tobacco Use      Smoking status: Never      Smokeless tobacco: Never     Diabetic: Type 2  HgbA1C:   Hemoglobin A1C   Date Value Ref Range Status   03/02/2025 16.8 (H) <5.7 % Final     Comment:     Normal <5.7%   Prediabetes 5.7-6.4%    Diabetes 6.5% or higher     Note: Adopted from ADA consensus guidelines.   04/21/2021 8.9 (H) 0 - 5.6 % Final     Comment:     Normal <5.7% Prediabetes 5.7-6.4%  Diabetes 6.5% or higher - adopted from ADA   consensus  guidelines.  Results confirmed by repeat test     Checks Blood Glucose?:  Three times daily.  Average Readings: past few days most between 130 - 140, had a few in the high 90's but also a few in the 260's and 180's though infrequent.     Personal/social history:  Prior to hospitalization was working full time, owns [employer], more office side. Lives with wife Lisa who is an experienced RN here at [facility].  [facility] managing IV meds and PICC line, coming twice weekly Mon/Thurs.  No home care services in place for wound cares      Objective:   Current treatment plan: Cares started at initial Wound and Ostomy clinic hospital follow up appointment 3/10/25, and done by pt's wife between visits.  - Remove the old bandage and cleanse with wound cleanser, soap and water or saline, dry well with gauze.  - Plain packing strip gauze wet with Vashe, packed into two holes, 4x4 Cover Sponge wet with Vashe placed over the wounds and the surrounding skin, let sit approximately 5 minutes, remove, air dr or pat dry with gauze.    - Cut two small pieces of the Mesalt gauze, dampen each with saline, just damp not wet and pack gently into the two holes separately.  - Paint the skin around the wounds with the Povidone iodine swabs and let air dry.  - Cover the area with two Cover Sponges and secure with one full roll of Kerlix gauze.  - Apply ace wrap just snug but no compression.  - Change the bandage twice daily.  Last changed: By pt's wife Lisa last evening.      Wound #1 Left Lateral Calcaneus - Surgical wound, Surgical debridement, DOS 3/4/25  Stage/tissue depth: Full Thickness   Total area of concerning damage and fluctuance 6 cm L x 5 cm W  - Size of the two open wounds and well approximated incision between 3.1 cm L x 3 cm L x 1 cm D  - Two individual open aspect:  - Proximal posterior of two total size 1.3 cm L x 1 cm W x 1 cm D, size of the hole itself 1.1 cm L x 0.8 cm W x 1 cm D.    - Distal  anterior of two total size 1.8 cm L x 2 cm W x 1 cm D, size of the hole itself 0.7 cm L x 1.6 cm W x 1 cm D  Tunneling: none  Undermining:  Proximal and distal wound communicate via undermining  Wound bed type/amount: In two open wounds approximately 80% slough and 20% red tissue with no granular buds present, two sutures visible; not fluctuant  Wound bed of the area of concerning fluctuance approximately 20 % the open aspects described above, 10 % dry callus and nonviable dermal tissue and 70 % intact skin and scar tissue, dry.  Wound Edges:  Closed with slough tissue  Periwound: Edema further out from fluctuant area and wound, erythema paling with no increased warmth to touch.  Drainage:  Moderate amounts serous and serosanguinous drainage.   Odor: none  Pain: Neuropathy, no sensation. No pain    Photo's from today's visit 3/17/25.      Photo's from 3/12/25.      Photo's from initial Wound and Ostomy clinic visit 3/10/25.      Photo's from initial inpatient visit 3/5/25.        Photo's from ED 3/2/25.     Dorsalis Pedal Pulse: 0/4 palpable: NA doppler: NA phasic  Hair growth: Absent from knee distally  Capillary Refill: 3 seconds  Feet/toes color: deep pink with erythema as listed in assessment   Nail  R Leg: Edema nonpitting firm leg and ankle. Ankle circumference  Not measured. Calf circumference Not measured.   L Leg: Edema nonpitting. Ankle circumference 36.5 cm. Calf circumference 41 cm.     Mobility: NWB to foot/heel  Current offloading/footwear: Gripper sock while inpatient.   Sensation: Absent at the foot      Other callousing/areas of concern:  Plantar heel, plantar at base off the 4th toe (was debrided in OR 3/4/25) and plantar great toe fissures.  Main fissure off the 4th toe is stable but very dry, improved, no open tissue..  Left heel is improved, less areas of concern, remains very dry.  Great toe fissure remains present, no visible open tissue, dry but very dry all around, no open tissue.    Photo's  from today's visit 3/17/25.      Photo's from initial visit to the Wound and Ostomy clinic 3/10/25.    Photo from initial inpatient visit 3/5/25.     Diet: DM. Low carb.   Pt is following DM diet at home.     Assessment:  The left heel wound is improved overall but the inner wound beds are not debriding much yet.  All around the wounds the wound area of concern is more stable and appears soft tissue infection is resolving well.  Drainage has been well contained.  Open two wound are very slow in debriding, the slough is pale and moist and loosening but mostly still present.    Foot fissures are stable and improving.     Barriers to wound healing:   Poor nutrition: inadequate supply of protein, carbohydrates, fatty acids, and trace elements essential for all phases of wound healing - Educated that high protein diet is optimal for wound healing, goal is 100-120 g per day. Provided verbal list of foods rich in protein.   Reduced Blood Supply: inadequate perfusion to heal wound - No know ABIs   Medication: none  Chemotherapy: suppresses the immune system and inflammatory response -NA  Radiotherapy: increases production of free radical which damage cells -NA  Psychological stress: none noted  Obesity: decreases tissue perfusion- Appropriate weight and BMI   Infection: prolongs inflammatory phase, uses vital nutrients, impairs epithelialization and releases toxins - Currently on IV antibiotics via picc line for osteomyelitis and soft tissue infection.  Antimicrobial wound cleanser and primary dressing in use.  Underlying Disease: poorly controlled diabetes mellitis, educated on diet correction and following with PCP for optimal management.   Maceration: reduces wound tensile strength and inhibits epithelial migration- present but plan of care adjusted to address  Patient compliance- Agrees to initial assessment   Unrelieved pressure- order for NWB to the left foot, has knee scooter and is using, will be determined at first  DPM post hospital follow up if fracture boot will be ordered.  Immobility- altered, but not immobile.   Substance abuse: NA     Plan:   We will continue with the same plan of care as listed below but we will change from the Mesalt pads to the Mesalt ribbon gauze, using approximately 3.5 inch full thickness dampened with saline for the two open wounds.  Pt will see DPM Thursday this week, woc will update DPM on wound status and current plan of care.     Interventions to Barriers:  As listed above.     Discussed/Education provided: plan of care with rationale;     Topical care to Left Lateral Calcaneous.  Cares performed in clinic today and to be performed as directed between appointments.   - Remove the old bandage and cleanse with wound cleanser, soap and water or saline, dry well with gauze.  - Plain packing strip gauze wet with Vashe, packed into two holes, 4x4 Cover Sponge wet with Vashe placed over the wounds and the surrounding skin, let sit approximately 5 minutes, remove, air dr or pat dry with gauze.    - Cut two small pieces of the Mesalt ribbon gauze, each about 3.5 inches long, dampen each with saline, just damp not wet and pack gently into the two holes separately.  - Paint the skin around the wounds with the Povidone iodine swabs and let air dry.  - Cover the area with two Cover Sponges and secure with one full roll of Kerlix gauze.  - Apply ace wrap just snug but no compression.  - Change the bandage twice daily.    Pt is unable but his wife is able to perform cares/has been caring for wound.    Additional recommendations: Keep left foot elevated the majority of every hour while awake.    The following discharge instructions were reviewed with and sent home with the patient:  Declined AVS today    The following supplies were sent home with the patient:  Remains of the Mesalt ribbon gauze opened and not used up.    Return visit: Wound and Ostomy clinic 3/25/25    Verbal, written, & demonstrative  education provided.  Face to face time: approximately 35 minutes  Procedure: less than one minute application of saline dampened Mesalt ribbon gauze to the wound beds to promote chemical and mechanical debridement of slough.    Luciano Lopez RN, CWOCN  [phone]

## 2025-03-17 NOTE — PROGRESS NOTES
Assessment & Plan   Problem List Items Addressed This Visit       Type 2 diabetes mellitus with hyperglycemia (H)    Relevant Medications    blood glucose (NO BRAND SPECIFIED) test strip    Insulin Lispro-aabc, 1 U Dial, (LYUMJEV KWIKPEN) 100 UNIT/ML SOPN    Other Relevant Orders    Lipid panel reflex to direct LDL Fasting    Albumin Random Urine Quantitative with Creat Ratio    Adult Diabetes Education  Referral    Adult Diabetes Education  Referral    Diabetic infection of left foot (H) - Primary    Relevant Medications    blood glucose (NO BRAND SPECIFIED) test strip    Insulin Lispro-aabc, 1 U Dial, (LYUMJEV KWIKPEN) 100 UNIT/ML SOPN    Other Relevant Orders    Adult Infectious Disease  Referral     Other Visit Diagnoses       Fluid retention in legs        Relevant Medications    furosemide (LASIX) 20 MG tablet    Diabetes mellitus with osteomyelitis (H)        Relevant Medications    blood glucose (NO BRAND SPECIFIED) test strip    Insulin Lispro-aabc, 1 U Dial, (LYUMJEV KWIKPEN) 100 UNIT/ML SOPN           Luke is here for post hospital follow-up for an abscess and osteomyelitis in the left foot with uncontrolled diabetes. Overall, Luke is improving. He has been using the long acting Lantus at 15 U BID and 1 carb/unit with sliding scale insulin for short acting coverage. Sugars have been averaging 140's-150's. Plan moving forward are to increase the Lantus to 18 Units in the morning and 15 U in the evening. No change to the short acting regiment except to take short acting insulin before meals instead of after.  Referral to diabetic education ordered. Hope is to receive a continuous glucose monitor. Plan to follow up in late April or early May for a check-in on diabetic control.     Really needs consistent diabetic control with two major infections.    Concern for fluid retention in his lower extremities.  He was on Lasix while in the hospital due to fluid retention without side  effect.  Experiencing similar symptoms today.  Increase of 8 pounds since discharge from the hospital.  Prescribed Lasix for 3 days to help with fluid retention.  Advised to keep track of his weight with the same measurements each day. If symptoms persist or worsen advised to reach out via MyChart or be seen in clinic.        MED REC REQUIRED  Post Medication Reconciliation Status: discharge medications reconciled and changed, per note/orders  CONSULTATION/REFERRAL to Infectious disease for follow-up and diabetic education.  FUTURE APPOINTMENTS:       - Follow-up visit at the end of April or the beginning of May for diabetic follow-up.      Darlene Butler is a 59 year old, presenting for the following health issues:  Hospital F/U (Doing better)      3/17/2025     8:19 AM   Additional Questions   Accompanied by Lisa-wife     HPI    The patient presents today for follow-up after being hospitalized for 6 days in the beginning of March.  He was hospitalized for an infection with abscess in his left foot with possible osteomyelitis.  He is currently on IV nafcillin and recently started insulin posthospitalization.  He feels that he has not experienced any hypo or hyperglycemia since being hospitalized.  Fasting blood sugars have ranged from the high 90s to 260s.  He has been following 1 carb unit with sliding scale insulin for short acting insulin.  Also using 15 units twice daily of Lantus for long-acting coverage.. Luke and his wife are also both concern for fluid retention.  Luke has noted some edema in his right foot.  He did receive some Lasix in the hospital without side effect.  He denies shortness of breath, chest pain, headache, lightheadedness, or dizziness.  No other concerns today.        3/10/2025   Post Discharge Outreach   How are you doing now that you are home? per patient report, things are going really well. saw wound care today and has another appointment wednesday. has follow up with PCP  "3/17/2025   How are your symptoms? (Red Flag symptoms escalate to triage hotline per guidelines) Improved   Does the patient have their discharge instructions?  Yes   Does the patient have questions regarding their discharge instructions?  No   Were you started on any new medications or were there changes to any of your previous medications?  Yes   Does the patient have all of their medications? Yes   Do you have questions regarding any of your medications?  No   Do you have all of your needed medical supplies or equipment (DME)?  (i.e. oxygen tank, CPAP, cane, etc.) Yes   Discharge Follow Up Appointment Date 3/17/2025   Discharge Follow Up Appointment Scheduled with? Primary Care Provider       Hospital Follow-up Visit:    Hospital/Nursing Home/IP Rehab Facility: [facility]  Date of Admission: 3/2/25  Date of Discharge: 3/8/25  Reason(s) for Admission: Left foot  Was the patient in the ICU or did the patient experience delirium during hospitalization?  Yes     Do you have any other stressors you would like to discuss with your provider? No    Problems taking medications regularly:  None  Medication changes since discharge: None  Problems adhering to non-medication therapy:  None    Summary of hospitalization:  [facility] discharge summary reviewed  Diagnostic Tests/Treatments reviewed.  Follow up needed: none  Other Healthcare Providers Involved in Patient s Care:         Specialist appointment - Podiatry, endocrine, infectious disease.  Update since discharge: improved.         Plan of care communicated with patient and family                 Review of Systems  Constitutional, neuro, ENT, endocrine, pulmonary, cardiac, gastrointestinal, genitourinary, musculoskeletal, integument and psychiatric systems are negative, except as otherwise noted.      Objective    /80   Pulse 76   Temp 97.9  F (36.6  C) (Temporal)   Resp 20   Ht 1.905 m (6' 3\")   Wt 103.4 kg (228 lb)  "  SpO2 97%   BMI 28.50 kg/m    Body mass index is 28.5 kg/m .  Physical Exam   GENERAL: alert and no distress  RESP: lungs clear to auscultation - no rales, rhonchi or wheezes  CV: regular rate and rhythm, normal S1 S2, no S3 or S4, no murmur, click or rub, 1+ peripheral edema in the right ankle to mid calf.     Asad Best, MS3  Medical Student      I was present with the medical student who participated in the service and in the documentation of the note. I have verified the history and personally performed the physical exam and medical decision making. I reviewed the note in detail and edited it appropriately. I agree with the assessment and plan of care as documented in the note.      Signed Electronically by: Rancho Painting MD

## 2025-03-18 ENCOUNTER — HOME INFUSION BILLING (OUTPATIENT)
Dept: HOME HEALTH SERVICES | Facility: HOME HEALTH | Age: 60
End: 2025-03-18
Payer: COMMERCIAL

## 2025-03-18 PROCEDURE — S9500 HIT ANTIBIOTIC Q24H DIEM: HCPCS

## 2025-03-18 PROCEDURE — A9270 NON-COVERED ITEM OR SERVICE: HCPCS

## 2025-03-19 PROCEDURE — S9500 HIT ANTIBIOTIC Q24H DIEM: HCPCS

## 2025-03-20 ENCOUNTER — TELEPHONE (OUTPATIENT)
Dept: INFECTIOUS DISEASES | Facility: CLINIC | Age: 60
End: 2025-03-20

## 2025-03-20 ENCOUNTER — OFFICE VISIT (OUTPATIENT)
Dept: PODIATRY | Facility: CLINIC | Age: 60
End: 2025-03-20
Payer: COMMERCIAL

## 2025-03-20 ENCOUNTER — LAB REQUISITION (OUTPATIENT)
Dept: LAB | Facility: CLINIC | Age: 60
End: 2025-03-20
Payer: COMMERCIAL

## 2025-03-20 ENCOUNTER — HOME CARE VISIT (OUTPATIENT)
Dept: HOME HEALTH SERVICES | Facility: HOME HEALTH | Age: 60
End: 2025-03-20
Payer: COMMERCIAL

## 2025-03-20 VITALS
OXYGEN SATURATION: 96 % | TEMPERATURE: 97.3 F | DIASTOLIC BLOOD PRESSURE: 76 MMHG | HEART RATE: 77 BPM | SYSTOLIC BLOOD PRESSURE: 128 MMHG

## 2025-03-20 VITALS — BODY MASS INDEX: 29.26 KG/M2 | WEIGHT: 228 LBS | HEIGHT: 74 IN | TEMPERATURE: 97.8 F

## 2025-03-20 DIAGNOSIS — R65.10 SIRS (SYSTEMIC INFLAMMATORY RESPONSE SYNDROME) (H): ICD-10-CM

## 2025-03-20 DIAGNOSIS — Z79.2 LONG TERM (CURRENT) USE OF ANTIBIOTICS: ICD-10-CM

## 2025-03-20 DIAGNOSIS — L08.9 DIABETIC INFECTION OF LEFT FOOT (H): Primary | ICD-10-CM

## 2025-03-20 DIAGNOSIS — E11.65 TYPE 2 DIABETES MELLITUS WITH HYPERGLYCEMIA, WITHOUT LONG-TERM CURRENT USE OF INSULIN (H): Primary | ICD-10-CM

## 2025-03-20 DIAGNOSIS — E11.42 DIABETIC PERIPHERAL NEUROPATHY (H): ICD-10-CM

## 2025-03-20 DIAGNOSIS — S91.302D OPEN WOUND OF LEFT HEEL, SUBSEQUENT ENCOUNTER: ICD-10-CM

## 2025-03-20 DIAGNOSIS — E11.628 DIABETIC INFECTION OF LEFT FOOT (H): Primary | ICD-10-CM

## 2025-03-20 DIAGNOSIS — M86.9 OSTEOMYELITIS, UNSPECIFIED (H): ICD-10-CM

## 2025-03-20 LAB
ALBUMIN SERPL BCG-MCNC: 3.6 G/DL (ref 3.5–5.2)
ALP SERPL-CCNC: 58 U/L (ref 40–150)
ALT SERPL W P-5'-P-CCNC: 7 U/L (ref 0–70)
ANION GAP SERPL CALCULATED.3IONS-SCNC: 10 MMOL/L (ref 7–15)
AST SERPL W P-5'-P-CCNC: 16 U/L (ref 0–45)
BACTERIA BONE ANAEROBE+AEROBE CULT: NORMAL
BASOPHILS # BLD AUTO: 0.1 10E3/UL (ref 0–0.2)
BASOPHILS NFR BLD AUTO: 1 %
BILIRUB SERPL-MCNC: 0.3 MG/DL
BUN SERPL-MCNC: 16.2 MG/DL (ref 6–20)
CALCIUM SERPL-MCNC: 9 MG/DL (ref 8.8–10.4)
CHLORIDE SERPL-SCNC: 102 MMOL/L (ref 98–107)
CREAT SERPL-MCNC: 0.86 MG/DL (ref 0.67–1.17)
EGFRCR SERPLBLD CKD-EPI 2021: >90 ML/MIN/1.73M2
EOSINOPHIL # BLD AUTO: 0.3 10E3/UL (ref 0–0.7)
EOSINOPHIL NFR BLD AUTO: 4 %
ERYTHROCYTE [DISTWIDTH] IN BLOOD BY AUTOMATED COUNT: 13.9 % (ref 10–15)
GLUCOSE SERPL-MCNC: 278 MG/DL (ref 70–99)
HCO3 SERPL-SCNC: 27 MMOL/L (ref 22–29)
HCT VFR BLD AUTO: 37.6 % (ref 40–53)
HGB BLD-MCNC: 12.4 G/DL (ref 13.3–17.7)
IMM GRANULOCYTES # BLD: 0 10E3/UL
IMM GRANULOCYTES NFR BLD: 0 %
LYMPHOCYTES # BLD AUTO: 1.1 10E3/UL (ref 0.8–5.3)
LYMPHOCYTES NFR BLD AUTO: 16 %
MCH RBC QN AUTO: 28.2 PG (ref 26.5–33)
MCHC RBC AUTO-ENTMCNC: 33 G/DL (ref 31.5–36.5)
MCV RBC AUTO: 86 FL (ref 78–100)
MONOCYTES # BLD AUTO: 0.6 10E3/UL (ref 0–1.3)
MONOCYTES NFR BLD AUTO: 9 %
NEUTROPHILS # BLD AUTO: 4.8 10E3/UL (ref 1.6–8.3)
NEUTROPHILS NFR BLD AUTO: 70 %
NRBC # BLD AUTO: 0 10E3/UL
NRBC BLD AUTO-RTO: 0 /100
PLATELET # BLD AUTO: 277 10E3/UL (ref 150–450)
POTASSIUM SERPL-SCNC: 4 MMOL/L (ref 3.4–5.3)
PROT SERPL-MCNC: 6.8 G/DL (ref 6.4–8.3)
RBC # BLD AUTO: 4.39 10E6/UL (ref 4.4–5.9)
SODIUM SERPL-SCNC: 139 MMOL/L (ref 135–145)
WBC # BLD AUTO: 6.8 10E3/UL (ref 4–11)

## 2025-03-20 PROCEDURE — S9500 HIT ANTIBIOTIC Q24H DIEM: HCPCS

## 2025-03-20 PROCEDURE — 85025 COMPLETE CBC W/AUTO DIFF WBC: CPT | Performed by: INTERNAL MEDICINE

## 2025-03-20 PROCEDURE — 80053 COMPREHEN METABOLIC PANEL: CPT | Performed by: INTERNAL MEDICINE

## 2025-03-20 ASSESSMENT — PAIN SCALES - GENERAL: PAINLEVEL_OUTOF10: NO PAIN (0)

## 2025-03-20 NOTE — PROGRESS NOTES
WOUND CARE     Patient seen per the order of Dr. Huff.     Wound 1:  Location: Left heel      Action & Treatment order per doctor: Previous dressing was removed prior to seeing patient. Wound(s) cleansed with  Vashe . Two small pieces of the Mesalt ribbon gauze, dampened with saline were packed gently into the two holes separately. The skin around the wounds was painted with the Povidone iodine swabs and let air dry. The area was covered with two Cover Sponges and secured with one full roll of Kerlix gauze. Applied ACE wrap.     Patient verbalized understanding of treatment plan.     Follow up: 3/25/25 with NICHOLE Villegas RN. 4/1/25 with Dr. Huff.      Pat Jarquin, RN   [facility]

## 2025-03-20 NOTE — TELEPHONE ENCOUNTER
Patient discharged from [facility] on 3/8, on outpatient IV antibiotic therapy.  Nafcillin 12gm every 24 hrs 3/8-3/22, followed by [facility]. PICC placed 3/7- left basilic.       Patient is being seen in ID tomorrow. OPAT order placed today by Dr. Frazier.  Plan noted below from Dr. Colon with Tele-Medicine Consult.    NOY StoutN, RN  RN Care Coordinator Infectious Disease Clinic      ID Problem List:  1. Left ankle abscess and osteomyelitis of left calcaneous, due to methicillin sensitive Staphylococcus aureus (MSSA).    2. Infected diabetic foot wound over left heel, likely initiated by using an electric ada on a callous on his heel.      Recommendations:  Discontinue IV vancomycin and IV piperacillin-tazobactam   Start IV nafcillin 2 grams IV q 4 hours for osteomyelitis of the calcaneous and associated soft tissue abscess due to MSSA.   Plan for home IV antibiotic therapy. Would complete a 3 week course of IV nafcillin and then if wound and foot is responding well could transition over to oral antibiotics to complete a total 6 week antibiotic treatment course.   Patient will need a PICC line and a referral for home infusion therapy.   Check 2 x a week labs with CMP and CBC with diff/plts while on home IV antibiotics. Arrange follow-up in the ID clinic at [facility] in 1-2 weeks.         Gardenia Colon MD  ID staff physician [facility]  ID Tele-Medicine consult service  On Vocera

## 2025-03-20 NOTE — PROGRESS NOTES
Nursing Visit Note:  Nurse visit today for lab draw  for Luke Farias.     present during visit today: Not Applicable.    Intravenous Access:  Labs drawn without difficulty. and PICC.    Lab-Only Nursing Note    Labs obtained via PICC    Time Specimen drawn: 0840    Last dose (if applicable): No    Facility sent to: [facility] Tracking number: [id number]    Note: Pt reports feeling well.  He denies any changes or concerns since his last RN visit.  Labs drawn without difficulty today.    Saline administered: 40ml (ml)    Supply Check:   Does the patient have all the supplies they need for the next visit?  Yes    Next visit plan: 3/24/25 for CLC/labs    Faby Interiano, RN 3/20/2025

## 2025-03-20 NOTE — LETTER
"3/20/2025      Luke Farias  [address] [facility] [place] [address]      Dear Colleague,    Thank you for referring your patient, Luke Farias, to the [facility]. Please see a copy of my visit note below.    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    Subjective: Patient reports for followup of DOS 3/4/2025 - Surgical debridement left calcaneus and left ankle, with bone biopsy left calcaneus procedure.  Follows with wound care Mesalt and packing  Is on IV nafcillin 24 hour pump.  Eating with appetite.  BG is .  Mert adjusted insulin  Sees DM educator in a couple months and Endocrine consult.     EXAM:  No apparent distress, patient is relaxed and comfortable.   Vitals: Temp 97.8  F (36.6  C) (Temporal)   Ht 1.88 m (6' 2\")   Wt 103.4 kg (228 lb)   BMI 29.27 kg/m    Vasc:  DP and PT pulses palpable bilateral, CFT immediate to all digits and surrounding the surgical site.  Neuro:  Light touch sensation intact about the digits. There is minimal to no appreciable numbness around the surgical incision with examination.  Derm: The incision is well approximated and dry. Sutures are intact. Mild edema as expected. There is no surrounding erythema, heat, drainage or other signs of infection or hematoma.  Musc: Adequate reduction of deformity identified. No complications.    Hemoglobin A1C (%)   Date Value   03/02/2025 16.8 (H)   04/02/2024 13.8 (H)   04/21/2021 8.9 (H)   01/14/2020 10.4 (H)   11/02/2018 6.1 (H)   07/18/2018 11.3 (H)   10/01/2012 8.2 (H)   06/08/2011 6.3 (H)     Creatinine (mg/dL)   Date Value   03/20/2025 0.86   03/17/2025 0.89   03/13/2025 0.90   03/11/2025 1.05   03/07/2025 0.76   03/06/2025 0.81 "   04/21/2021 0.70   11/02/2018 0.72   07/18/2018 0.68   10/01/2012 0.83   01/21/2011 0.76     Assessment:      ICD-10-CM    1. Type 2 diabetes mellitus with hyperglycemia, without long-term current use of insulin (H)  E11.65       2. SIRS (systemic inflammatory response syndrome) (H)  R65.10       3. Diabetic peripheral neuropathy (H)  E11.42 Ankle/Foot Bracing Supplies Order Walking Boot; Left; Pneumatic; Tall      4. Open wound of left heel, subsequent encounter  S91.302D Ankle/Foot Bracing Supplies Order Walking Boot; Left; Pneumatic; Tall        Plan:    3/20/2025  Is on nafcillin 24 hour pump IV.  Eating with appetite.  BG is .  Mert adjusted insulin  Sees DM educator in a couple months and Endocrine consult.     Packing was removed.  Sharp dissection was performed minimally today and the sutures were removed.  Was cleansed with wound wash and packed with Mesalt  Dispensed a fracture boot for transfers otherwise can be removed  Considerable edema remains.  Wound healing is very delayed.  No visible malodor or heat noted.    Will consider repeat imaging as guidded by ID.    Follow-up in 3 weeks.    Darryl Huff DPM      WOUND CARE     Patient seen per the order of Dr. Huff.     Wound 1:  Location: Left heel      Action & Treatment order per doctor: Previous dressing was removed prior to seeing patient. Wound(s) cleansed with  Vashe . Two small pieces of the Mesalt ribbon gauze, dampened with saline were packed gently into the two holes separately. The skin around the wounds was painted with the Povidone iodine swabs and let air dry. The area was covered with two Cover Sponges and secured with one full roll of Kerlix gauze. Applied ACE wrap.     Patient verbalized understanding of treatment plan.     Follow up: 3/25/25 with NICHOLE Villegas RN. 4/1/25 with Dr. Huff.      Pat Jarquin, HECTOR   [facility]      Again, thank you for allowing me to participate in the care  of your patient.        Sincerely,        Darryl Huff DPM    Electronically signed

## 2025-03-20 NOTE — PROGRESS NOTES
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    Subjective: Patient reports for followup of DOS 3/4/2025 - Surgical debridement left calcaneus and left ankle, with bone biopsy left calcaneus procedure.  Follows with wound care Mesalt and packing  Is on IV nafcillin 24 hour pump.  Eating with appetite.  BG is .  Mert adjusted insulin  Sees DM educator in a couple months and Endocrine consult.     EXAM:  No apparent distress, patient is relaxed and comfortable.   Vitals: Temp 97.8  F (36.6  C) (Temporal)   Ht 1.88 m (6' 2\")   Wt 103.4 kg (228 lb)   BMI 29.27 kg/m    Vasc:  DP and PT pulses palpable bilateral, CFT immediate to all digits and surrounding the surgical site.  Neuro:  Light touch sensation intact about the digits. There is minimal to no appreciable numbness around the surgical incision with examination.  Derm: The incision is well approximated and dry. Sutures are intact. Mild edema as expected. There is no surrounding erythema, heat, drainage or other signs of infection or hematoma.  Musc: Adequate reduction of deformity identified. No complications.    Hemoglobin A1C (%)   Date Value   03/02/2025 16.8 (H)   04/02/2024 13.8 (H)   04/21/2021 8.9 (H)   01/14/2020 10.4 (H)   11/02/2018 6.1 (H)   07/18/2018 11.3 (H)   10/01/2012 8.2 (H)   06/08/2011 6.3 (H)     Creatinine (mg/dL)   Date Value   03/20/2025 0.86   03/17/2025 0.89   03/13/2025 0.90   03/11/2025 1.05   03/07/2025 0.76   03/06/2025 0.81   04/21/2021 0.70   11/02/2018 0.72   07/18/2018 0.68   10/01/2012 0.83   01/21/2011 0.76     Assessment:      ICD-10-CM    1. Type 2 diabetes mellitus with hyperglycemia, without long-term current use of insulin (H)  E11.65       2. SIRS " (systemic inflammatory response syndrome) (H)  R65.10       3. Diabetic peripheral neuropathy (H)  E11.42 Ankle/Foot Bracing Supplies Order Walking Boot; Left; Pneumatic; Tall      4. Open wound of left heel, subsequent encounter  S91.302D Ankle/Foot Bracing Supplies Order Walking Boot; Left; Pneumatic; Tall        Plan:    3/20/2025  Is on nafcillin 24 hour pump IV.  Eating with appetite.  BG is .  Mert adjusted insulin  Sees DM educator in a couple months and Endocrine consult.     Packing was removed.  Sharp dissection was performed minimally today and the sutures were removed.  Was cleansed with wound wash and packed with Mesalt  Dispensed a fracture boot for transfers otherwise can be removed  Considerable edema remains.  Wound healing is very delayed.  No visible malodor or heat noted.    Will consider repeat imaging as guidded by ID.    Follow-up in 3 weeks.    Darryl Huff DPM

## 2025-03-20 NOTE — TELEPHONE ENCOUNTER
Patient was seen by Tele-ID service and iv antibiotic plan was recommended. Please refer details of OPAT plan under recommendations per ID note (Dr. Colon) on 3/7/25.     Order placed - Enroll patient in OPAT.     Patient has scheduled follow up with ID clinic on 3/21/25 at [facility] location.     Dasia Frazier MD on 3/20/2025 at 10:55 AM  Infectious Diseases Physician

## 2025-03-21 PROCEDURE — S9500 HIT ANTIBIOTIC Q24H DIEM: HCPCS

## 2025-03-22 PROCEDURE — S9500 HIT ANTIBIOTIC Q24H DIEM: HCPCS

## 2025-03-23 ENCOUNTER — HOME CARE VISIT (OUTPATIENT)
Dept: HOME HEALTH SERVICES | Facility: HOME HEALTH | Age: 60
End: 2025-03-23
Payer: COMMERCIAL

## 2025-03-23 VITALS
RESPIRATION RATE: 18 BRPM | SYSTOLIC BLOOD PRESSURE: 120 MMHG | DIASTOLIC BLOOD PRESSURE: 80 MMHG | TEMPERATURE: 98 F | OXYGEN SATURATION: 97 % | HEART RATE: 83 BPM

## 2025-03-23 PROCEDURE — 99601 HOME NFS VISIT <2 HRS: CPT

## 2025-03-23 NOTE — PROGRESS NOTES
Discharge Date from \[facility]\"" Nursin2025    Will patient remain open to Pharmacy Only?: No    Reason for Discharge: Goals Met and Therapy Completed    Additional Comments: patient alert and oriented for todays visit. patient has no questions or concerns     Discharge Disposition: Therapy Discontinued/No further Skilled Nursing    If Transferred to Agency, Name of Agency: N/A    Brief Summary of Admission to [facility]:   Reason for Admission: IV antibiotics   Treatment Provided: IV antibiotics, dressing changes, labs   Significant Findings: none  Discharge Instructions: keep dressing on picc line site for 24 hours. no heavy living with left arm for 24 hours

## 2025-03-24 ENCOUNTER — TELEPHONE (OUTPATIENT)
Dept: INFECTIOUS DISEASES | Facility: CLINIC | Age: 60
End: 2025-03-24
Payer: COMMERCIAL

## 2025-03-24 NOTE — TELEPHONE ENCOUNTER
OPAT therapy complete, episode resolved.    Gardenia Salinas, BSN, RN  RN Care Coordinator Infectious Disease Clinic

## 2025-03-25 ENCOUNTER — HOSPITAL ENCOUNTER (OUTPATIENT)
Dept: WOUND CARE | Facility: CLINIC | Age: 60
Discharge: HOME | End: 2025-03-25
Attending: INTERNAL MEDICINE
Payer: COMMERCIAL

## 2025-03-25 PROCEDURE — 97602 WOUND(S) CARE NON-SELECTIVE: CPT

## 2025-03-25 NOTE — PROGRESS NOTES
[facility] Ostomy [facility]    Start of Care in [facility] Wound Clinic: 3/5/2025, inpatient   Referring Provider: Dr. Darryl Huff   Primary Care Provider: [employer]   Wound Location: Left Lateral calcaneus  Surgery: Debridement left calcaneus with bone biopsy, 3/4/25 Darryl Huff DPM      Wound Clinic Visit:  Scheduled follow up.    Reason for Visit:  Follow up on left heel wound, left foot, evaluate current plan of care.     Subjective:  On arrival today 3/17 alone, pt reports the following: Saw PCP prior to our appointment today, he will be starting short course of oral diuretics for LE edema bilaterally.  The left foot wound appears to be doing ok, no troubles with wound cares, no troubles with PICC line or IV antibiotic infusions.  Blood sugars ok but a few high's out of the recent typical averages.  Follows up with DPM Thursday this week.     Wound History:  Pt presented to the ED here at [facility] on 3/2/25 and was admitted, discharge home 3/8.  Patient reports having dry, peeling skin to the lateral calcaneus, used this device to help remove the non-viable tissue (Amope').  At the time the area was dry with no excess moisture.  On 3/2/25 noted to have increase redness and moist skin and concerns for infection was evaluated at [facility] ER, later was admitted to Hospital for IV antibiotics.  MRI -  3/3/25 showed Soft tissue ulceration lateral to the calcaneus,  Mild bone marrow edema within the adjacent lateral calcaneus. Osteomyelitis is likely if this wound probes to bone. Adjacent subcutaneous fluid collection measuring up to 3.2 cm, suspicious for abscess.  Regional cellulitis. Podiatry was consulted 3/3, pt was taken to OR on 3/4  for surgical debridement of left ankle wound/abscess in addition to bone biopsy.  While inpatient the [facility] nurse team coordinated with DPM for assessments and teaching.  [facility] saw pt on Fri 3/7 and set up plan for wound cares twice daily at  discharge by pt's wife and scheduled for Wound and Ostomy clinic appointment for the following Monday 3/10.  On 3/7 woc HECTOR Lopez and Dr Daria MRORISSEY discussed via phone options for wound cares and follow up post hospital discharge.  GHANSHYAM questioned potential wound vac use, at that time I felt the two small open wounds would benefit from a vac but the surrounding tissue was too fluctuant to apply a vac without considerable risk for periwound skin and tissue breakdown.  Pt discharged home Saturday 3/8 with IV antibiotics via left arm PICC managed by  [facility].  (Nafcillin 12 g in sodium chloride 0.9 % 240 mL via SMARTeZ pump Infuse 12 g over 24 hours once daily for 13 days).  Pt's wife Lisa  performing twice daily wound cares as directed at time of discharge, no new concerns, no odor noted.  Received knee scooter.    Past Medical History:  Patient Active Problem List   Diagnosis    Chronic rhinitis    Type 2 diabetes mellitus with hyperglycemia (H)    Hyperlipidemia LDL goal <100    Venous lake on Right chest    Congenital nevus of Right upper back    Diabetic infection of left foot (H)    SIRS (systemic inflammatory response syndrome) (H)    Diabetic peripheral neuropathy (H)    Hyponatremia    Hypochloremia    Open wound of left heel    Left leg swelling    Elevated C-reactive protein (CRP)    Elevated erythrocyte sedimentation rate    Diabetic foot infection (H)    Elevated blood ketone body level    Osteomyelitis (H)                 Tobacco Use:     Tobacco Use      Smoking status: Never      Smokeless tobacco: Never     Diabetic: Type 2  HgbA1C:   Hemoglobin A1C   Date Value Ref Range Status   03/02/2025 16.8 (H) <5.7 % Final     Comment:     Normal <5.7%   Prediabetes 5.7-6.4%    Diabetes 6.5% or higher     Note: Adopted from ADA consensus guidelines.   04/21/2021 8.9 (H) 0 - 5.6 % Final     Comment:     Normal <5.7% Prediabetes 5.7-6.4%  Diabetes 6.5% or higher - adopted from ADA   consensus  guidelines.  Results confirmed by repeat test     Checks Blood Glucose?:  Three times daily.  Average Readings: past few days most between 130 - 140, had a few in the high 90's but also a few in the 260's and 180's though infrequent.     Personal/social history:  Prior to hospitalization was working full time, owns [employer], more office side. Lives with wife Lisa who is an experienced RN here at [facility].  [facility] managing IV meds and PICC line, coming twice weekly Mon/Thurs.  No home care services in place for wound cares      Objective:   Current treatment plan: Cares started at initial Wound and Ostomy clinic hospital follow up appointment 3/10/25, and done by pt's wife between visits.  - Remove the old bandage and cleanse with wound cleanser, soap and water or saline, dry well with gauze.  - Plain packing strip gauze wet with Vashe, packed into two holes, 4x4 Cover Sponge wet with Vashe placed over the wounds and the surrounding skin, let sit approximately 5 minutes, remove, air dr or pat dry with gauze.    - Cut two small pieces of the Mesalt gauze, dampen each with saline, just damp not wet and pack gently into the two holes separately.  - Paint the skin around the wounds with the Povidone iodine swabs and let air dry.  - Cover the area with two Cover Sponges and secure with one full roll of Kerlix gauze.  - Apply ace wrap just snug but no compression.  - Change the bandage twice daily.  Last changed: By pt's wife Lisa last evening.      Wound #1 Left Lateral Calcaneus - Surgical wound, Surgical debridement, DOS 3/4/25  Stage/tissue depth: Full Thickness   Total area of concerning damage and fluctuance 6 cm L x 5 cm W  - Size of the two open wounds and well approximated incision between 3.1 cm L x 3 cm L x 1 cm D  - Two individual open aspect:  - Proximal posterior of two total size 1.3 cm L x 1 cm W x 1 cm D, size of the hole itself 1.1 cm L x 0.8 cm W x 1 cm D.    - Distal  anterior of two total size 1.8 cm L x 2 cm W x 1 cm D, size of the hole itself 0.7 cm L x 1.6 cm W x 1 cm D  Tunneling: none  Undermining:  Proximal and distal wound communicate via undermining  Wound bed type/amount: In two open wounds approximately 80% slough and 20% red tissue with no granular buds present, two sutures visible; not fluctuant  Wound bed of the area of concerning fluctuance approximately 20 % the open aspects described above, 10 % dry callus and nonviable dermal tissue and 70 % intact skin and scar tissue, dry.  Wound Edges:  Closed with slough tissue  Periwound: Edema further out from fluctuant area and wound, erythema paling with no increased warmth to touch.  Drainage:  Moderate amounts serous and serosanguinous drainage.   Odor: none  Pain: Neuropathy, no sensation. No pain        Photo's from today's visit 3/17/25.      Photo's from 3/12/25.      Photo's from initial Wound and Ostomy clinic visit 3/10/25.      Photo's from initial inpatient visit 3/5/25.        Photo's from ED 3/2/25.     Dorsalis Pedal Pulse: 0/4 palpable: NA doppler: NA phasic  Hair growth: Absent from knee distally  Capillary Refill: 3 seconds  Feet/toes color: deep pink with erythema as listed in assessment   Nail  R Leg: Edema nonpitting firm leg and ankle. Ankle circumference  Not measured. Calf circumference Not measured.   L Leg: Edema nonpitting. Ankle circumference 36.5 cm. Calf circumference 41 cm.     Mobility: NWB to foot/heel  Current offloading/footwear: Gripper sock while inpatient.   Sensation: Absent at the foot      Other callousing/areas of concern:  Plantar heel, plantar at base off the 4th toe (was debrided in OR 3/4/25) and plantar great toe fissures.  Main fissure off the 4th toe is stable but very dry, improved, no open tissue..  Left heel is improved, less areas of concern, remains very dry.  Great toe fissure remains present, no visible open tissue, dry but very dry all around, no open  tissue.    Photo's from today's visit 3/17/25.      Photo's from initial visit to the Wound and Ostomy clinic 3/10/25.    Photo from initial inpatient visit 3/5/25.     Diet: DM. Low carb.   Pt is following DM diet at home.     Assessment:  The left heel wound is improved overall but the inner wound beds are not debriding much yet.  All around the wounds the wound area of concern is more stable and appears soft tissue infection is resolving well.  Drainage has been well contained.  Open two wound are very slow in debriding, the slough is pale and moist and loosening but mostly still present.    Foot fissures are stable and improving.     Barriers to wound healing:   Poor nutrition: inadequate supply of protein, carbohydrates, fatty acids, and trace elements essential for all phases of wound healing - Educated that high protein diet is optimal for wound healing, goal is 100-120 g per day. Provided verbal list of foods rich in protein.   Reduced Blood Supply: inadequate perfusion to heal wound - No know ABIs   Medication: none  Chemotherapy: suppresses the immune system and inflammatory response -NA  Radiotherapy: increases production of free radical which damage cells -NA  Psychological stress: none noted  Obesity: decreases tissue perfusion- Appropriate weight and BMI   Infection: prolongs inflammatory phase, uses vital nutrients, impairs epithelialization and releases toxins - Currently on IV antibiotics via picc line for osteomyelitis and soft tissue infection.  Antimicrobial wound cleanser and primary dressing in use.  Underlying Disease: poorly controlled diabetes mellitis, educated on diet correction and following with PCP for optimal management.   Maceration: reduces wound tensile strength and inhibits epithelial migration- present but plan of care adjusted to address  Patient compliance- Agrees to initial assessment   Unrelieved pressure- order for NWB to the left foot, has knee scooter and is using, will be  determined at first DPM post hospital follow up if fracture boot will be ordered.  Immobility- altered, but not immobile.   Substance abuse: NA     Plan:   We will continue with the same plan of care as listed below but we will change from the Mesalt pads to the Mesalt ribbon gauze, using approximately 3.5 inch full thickness dampened with saline for the two open wounds.  Pt will see DPM Thursday this week, woc will update DPM on wound status and current plan of care.     Interventions to Barriers:  As listed above.     Discussed/Education provided: plan of care with rationale;     Topical care to Left Lateral Calcaneous.  Cares performed in clinic today and to be performed as directed between appointments.   - Remove the old bandage and cleanse with wound cleanser, soap and water or saline, dry well with gauze.  - Plain packing strip gauze wet with Vashe, packed into two holes, 4x4 Cover Sponge wet with Vashe placed over the wounds and the surrounding skin, let sit approximately 5 minutes, remove, air dr or pat dry with gauze.    - Cut two small pieces of the Mesalt ribbon gauze, each about 3.5 inches long, dampen each with saline, just damp not wet and pack gently into the two holes separately.  - Paint the skin around the wounds with the Povidone iodine swabs and let air dry.  - Cover the area with two Cover Sponges and secure with one full roll of Kerlix gauze.  - Apply ace wrap just snug but no compression.  - Change the bandage twice daily.    Pt is unable but his wife is able to perform cares/has been caring for wound.    Additional recommendations: Keep left foot elevated the majority of every hour while awake.    The following discharge instructions were reviewed with and sent home with the patient:  Declined AVS today    The following supplies were sent home with the patient:  Remains of the Mesalt ribbon gauze opened and not used up.    Return visit: Wound and Ostomy clinic 3/25/25    Verbal, written, &  demonstrative education provided.  Face to face time: approximately 35 minutes  Procedure: less than one minute application of saline dampened Mesalt ribbon gauze to the wound beds to promote chemical and mechanical debridement of slough.    Luciano Lopez RN, CWOCN  [phone]

## 2025-03-27 ENCOUNTER — VIRTUAL VISIT (OUTPATIENT)
Dept: EDUCATION SERVICES | Facility: CLINIC | Age: 60
End: 2025-03-27
Attending: INTERNAL MEDICINE
Payer: COMMERCIAL

## 2025-03-27 DIAGNOSIS — E11.65 TYPE 2 DIABETES MELLITUS WITH HYPERGLYCEMIA, WITHOUT LONG-TERM CURRENT USE OF INSULIN (H): ICD-10-CM

## 2025-03-27 DIAGNOSIS — Z79.4 TYPE 2 DIABETES MELLITUS WITH HYPERGLYCEMIA, WITH LONG-TERM CURRENT USE OF INSULIN (H): ICD-10-CM

## 2025-03-27 DIAGNOSIS — E11.65 TYPE 2 DIABETES MELLITUS WITH HYPERGLYCEMIA, WITH LONG-TERM CURRENT USE OF INSULIN (H): ICD-10-CM

## 2025-03-27 LAB — BACTERIA BONE ANAEROBE+AEROBE CULT: NORMAL

## 2025-03-27 RX ORDER — HYDROCHLOROTHIAZIDE 12.5 MG/1
CAPSULE ORAL
Qty: 6 EACH | Refills: 1 | Status: SHIPPED | OUTPATIENT
Start: 2025-03-27

## 2025-03-27 ASSESSMENT — PAIN SCALES - GENERAL: PAINLEVEL_OUTOF10: NO PAIN (0)

## 2025-03-27 NOTE — PROGRESS NOTES
Virtual Visit Details    Type of service:  Video Visit   Start 9:30am  End: 10:30  Total time 60 minutes  Originating Location (pt. Location): Home    Distant Location (provider location):  Off-site  Platform used for Video Visit: Chitra    Diabetes Self-Management Education & Support    Luke NATALIE Farias presents today for education related to Type 2 diabetes    Patient is being treated with:    Was on metformin and glipizide. Tried Trulicity for awhile but that resulted in abscess so was stopped. All orals were stopped and insulin was started in the hospital recently.   He is accompanied by spouse    Year of diagnosis: 2010   Referring provider:  Mert   Living Situation: with spouse     PATIENT CONCERNS/REASON FOR REFERRAL   Patient has some specific questions today.      ASSESSMENT:    Taking Medication:     Current Diabetes Management per Patient:  Taking diabetes medications? yes:     Diabetes Medication(s)       Insulin       insulin glargine (LANTUS PEN) 100 UNIT/ML pen 18 units AM and 15 units PM Max 30U/day     Insulin Lispro-aabc, 1 U Dial, (LYUMJEV KWIKPEN) 100 UNIT/ML SOPN Inject 1 Units subcutaneously 3 times daily. Per carb unit plus sliding scale Inject 30 min before eating a meal Max: 30U/day            Monitoring    Reports improvement to his glucose.  He would like to get a sensor. This will be much more convenient for him and also provide him with more understanding of his glucose for better control.     Patient's most recent   Lab Results   Component Value Date    A1C 16.8 03/02/2025    A1C 8.9 04/21/2021      Patient's A1C goal: <7.0    Activity: Has a foot wound currently (he reports it is healing) but of course is limiting activity at this time. He is looking forward to being able to be more active again.     Healthy Eating:   Patient is counting his carbohydrates. He seems to be doing a really good job with that per our discussion today.      Problem Solving:      Patient is at risk of  hypoglycemia?: YES  Hospitalizations for hyper or hypoglycemia: Yes (Please explain): recently hospitalized for foot wound but also had elevated glucose and A1C    Healthy Coping and Stress Management:   Admits some non-compliance/denial regarding glucose management. Very motivated at this time to improve his diabetes control.       EDUCATION and INSTRUCTION PROVIDED AT THIS VISIT:    Patient recently started on insulin in the hospital. He is on MDI. Carb counting 1 unit for each 15 grams of carbohydrate. Asks about a correction scale. Discussed how correction scale is used before meals, no more than every four hours. Using 1800 rule his correction/sensitivity is 1/50/150. We reviewed insulin injection technique and mechanism of action, action time, storage, and how long a pen lasts at room temp. Education provided on hypoglycemia guidelines. Handouts provided: Hypoglycemia guidelines and Guide to Carb counting. We reviewed carb counting today and discussed diet basics like balancing meals with carb/pro/veg/healthy fats/fiber.   Discussed monitoring options and patient would like to try CGM. Order placed today.     Patient-stated goal written and given to Luke Farias.  Verbalized and demonstrated understanding of instructions.     PLAN:  No changes to current plan.   Reinforced plan and changes patient has made to better manage his glucose.  CGM ordered.  See patient instructions  AVS printed and given to patient    FOLLOW-UP:    4/17  Scheduled with Dr. Irwin in May    Time spent with patient at today's visit was 60 minutes.        Any diabetes medication initiation or dose changes were made via the [facility] Standing Orders per the patient's referring provider. A copy of this encounter was shared with the provider.

## 2025-03-27 NOTE — NURSING NOTE
Current patient location: [id number] [place] [id number]    Is the patient currently in the state of [place]? YES    Visit mode: VIDEO    If the visit is dropped, the patient can be reconnected by:VIDEO VISIT: Send to e-mail at: [email or web address]    Will anyone else be joining the visit? NO  (If patient encounters technical issues they should call [phone]    Are changes needed to the allergy or medication list? No    Are refills needed on medications prescribed by this physician? NO    Rooming Documentation:  Questionnaire(s) completed    Reason for visit: Diabetes Education    Suzy LEUNG

## 2025-04-01 ENCOUNTER — MYC REFILL (OUTPATIENT)
Dept: INTERNAL MEDICINE | Facility: CLINIC | Age: 60
End: 2025-04-01

## 2025-04-01 ENCOUNTER — OFFICE VISIT (OUTPATIENT)
Dept: PODIATRY | Facility: CLINIC | Age: 60
End: 2025-04-01
Payer: COMMERCIAL

## 2025-04-01 VITALS — BODY MASS INDEX: 29.26 KG/M2 | WEIGHT: 228 LBS | HEIGHT: 74 IN | TEMPERATURE: 98 F

## 2025-04-01 DIAGNOSIS — E11.42 DIABETIC PERIPHERAL NEUROPATHY (H): ICD-10-CM

## 2025-04-01 DIAGNOSIS — E11.65 TYPE 2 DIABETES MELLITUS WITH HYPERGLYCEMIA, WITHOUT LONG-TERM CURRENT USE OF INSULIN (H): Primary | ICD-10-CM

## 2025-04-01 DIAGNOSIS — E11.69 DIABETES MELLITUS WITH OSTEOMYELITIS (H): ICD-10-CM

## 2025-04-01 DIAGNOSIS — S91.302D OPEN WOUND OF LEFT HEEL, SUBSEQUENT ENCOUNTER: ICD-10-CM

## 2025-04-01 DIAGNOSIS — M86.9 DIABETES MELLITUS WITH OSTEOMYELITIS (H): ICD-10-CM

## 2025-04-01 RX ORDER — INSULIN LISPRO-AABC 100 [IU]/ML
1 INJECTION, SOLUTION SUBCUTANEOUS 3 TIMES DAILY
Qty: 15 ML | Refills: 3 | Status: SHIPPED | OUTPATIENT
Start: 2025-04-01

## 2025-04-01 RX ORDER — INSULIN GLARGINE 100 [IU]/ML
INJECTION, SOLUTION SUBCUTANEOUS
Qty: 9 ML | Refills: 3 | Status: SHIPPED | OUTPATIENT
Start: 2025-04-01

## 2025-04-01 ASSESSMENT — PAIN SCALES - GENERAL: PAINLEVEL_OUTOF10: NO PAIN (0)

## 2025-04-01 NOTE — PROGRESS NOTES
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    Subjective: Patient reports for followup of DOS 3/4/2025 - Surgical debridement left calcaneus and left ankle, with bone biopsy left calcaneus procedure.  Follows with wound care Mesalt and packing  Was on IV nafcillin 24 hour pump.  Eating with appetite.  BG has been 70-2 40 Painting adjusted insulin since discharge  Sees DM educator in a couple months and Endocrine consult late May.     EXAM:  No apparent distress, patient is relaxed and comfortable.   Vitals: Temp 98  F (36.7  C) (Temporal)   Ht 1.88 m (6' 2\")   Wt 103.4 kg (228 lb)   BMI 29.27 kg/m    Vasc:  DP and PT pulses palpable bilateral, no erythema or heat.  No hair growth bilateral lower extremities.  Temperature is warm to warm proximal to distal.  Neuro:  Light touch sensation intact about the digits.  Complete loss of protective threshold bilateral limbs 0/10 applications of a 5.07 monofilament.  Plantar response equal intact bilateral.  Derm: Mildly diminished texture turgor tone about the integument.  The wound measures about 35 mm x 8 mm in greatest dimensions full-thickness as deep as 6 mm.  Musc: Adequate reduction of deformity identified. No complications.    Hemoglobin A1C (%)   Date Value   03/02/2025 16.8 (H)   04/02/2024 13.8 (H)   04/21/2021 8.9 (H)   01/14/2020 10.4 (H)   11/02/2018 6.1 (H)   07/18/2018 11.3 (H)   10/01/2012 8.2 (H)   06/08/2011 6.3 (H)     Creatinine (mg/dL)   Date Value   03/20/2025 0.86   03/17/2025 0.89   03/13/2025 0.90   03/11/2025 1.05   03/07/2025 0.76   03/06/2025 0.81   04/21/2021 0.70   11/02/2018 0.72   07/18/2018 0.68   10/01/2012 0.83   01/21/2011 0.76     Photo taken 4/1/25: "       Assessment:      ICD-10-CM    1. Type 2 diabetes mellitus with hyperglycemia, without long-term current use of insulin (H)  E11.65       2. Diabetic peripheral neuropathy (H)  E11.42       3. Open wound of left heel, subsequent encounter  S91.302D         Plan:    3/20/2025  Is on nafcillin 24 hour pump IV.  Eating with appetite.  BG is .  Mert adjusted insulin  Sees DM educator in a couple months and Endocrine consult.     Packing was removed.  Sharp dissection was performed minimally today and the sutures were removed.  Was cleansed with wound wash and packed with Mesalt  Dispensed a fracture boot for transfers otherwise can be removed  Considerable edema remains.  Wound healing is very delayed.  No visible malodor or heat noted.    Will consider repeat imaging as guidded by ID.    Follow-up in 3 weeks.    4/1/2025  Started oral antibiotic Zyvox per infectious disease and will complete this for 23 more days  Is using Mesalt in the wound after saline wound wash.  Recommend packing this very lightly even just laying it in the wound as a wick then covering with gauze and a gauze roll and gentle compression.  Can be weightbearing for very short distances in a fracture boot.  Follow-up again with me in about a month and continue to follow-up with wound care.  We discussed utilizing a wound VAC however the patient prefers not to as he would need assistance probably with dressing changes.    Darryl Huff DPM

## 2025-04-01 NOTE — PROGRESS NOTES
WOUND CARE     Patient seen per the order of Dr. Huff.     Wound 1:  Location: Left heel     Action & Treatment order per doctor: Previous dressing was removed prior to seeing patient. Wound(s) cleansed with  Vashe . Wound was packed very lightly with Mesalt ribbon gauze dampened with saline. Povidone was painted around wound edges. Covered the area with two Cover Sponges and secured with one full roll of Kerlix gauze. ACE wrap was applied with minimal compression to secure dressing.      Patient verbalized understanding of treatment plan.     Follow up: 4/8/25 with NICHOLE Villegas RN. 4/22/25 with Dr. Huff.       Pat Jarquin, RN   [facility]

## 2025-04-01 NOTE — LETTER
"4/1/2025      Luke Farias  [address] [address] [place] [id number]      Dear Colleague,    Thank you for referring your patient, Luke Farias, to the [facility]. Please see a copy of my visit note below.    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    Subjective: Patient reports for followup of DOS 3/4/2025 - Surgical debridement left calcaneus and left ankle, with bone biopsy left calcaneus procedure.  Follows with wound care Mesalt and packing  Was on IV nafcillin 24 hour pump.  Eating with appetite.  BG has been 70-2 40 Painting adjusted insulin since discharge  Sees DM educator in a couple months and Endocrine consult late May.     EXAM:  No apparent distress, patient is relaxed and comfortable.   Vitals: Temp 98  F (36.7  C) (Temporal)   Ht 1.88 m (6' 2\")   Wt 103.4 kg (228 lb)   BMI 29.27 kg/m    Vasc:  DP and PT pulses palpable bilateral, no erythema or heat.  No hair growth bilateral lower extremities.  Temperature is warm to warm proximal to distal.  Neuro:  Light touch sensation intact about the digits.  Complete loss of protective threshold bilateral limbs 0/10 applications of a 5.07 monofilament.  Plantar response equal intact bilateral.  Derm: Mildly diminished texture turgor tone about the integument.  The wound measures about 35 mm x 8 mm in greatest dimensions full-thickness as deep as 6 mm.  Musc: Adequate reduction of deformity identified. No complications.    Hemoglobin A1C (%)   Date Value   03/02/2025 16.8 (H)   04/02/2024 13.8 (H)   04/21/2021 8.9 (H)   01/14/2020 10.4 (H)   11/02/2018 6.1 (H)   07/18/2018 11.3 (H)   10/01/2012 8.2 (H)   06/08/2011 6.3 (H)     Creatinine (mg/dL) "   Date Value   03/20/2025 0.86   03/17/2025 0.89   03/13/2025 0.90   03/11/2025 1.05   03/07/2025 0.76   03/06/2025 0.81   04/21/2021 0.70   11/02/2018 0.72   07/18/2018 0.68   10/01/2012 0.83   01/21/2011 0.76     Photo taken 4/1/25:       Assessment:      ICD-10-CM    1. Type 2 diabetes mellitus with hyperglycemia, without long-term current use of insulin (H)  E11.65       2. Diabetic peripheral neuropathy (H)  E11.42       3. Open wound of left heel, subsequent encounter  S91.302D         Plan:    3/20/2025  Is on nafcillin 24 hour pump IV.  Eating with appetite.  BG is .  Painting adjusted insulin  Sees DM educator in a couple months and Endocrine consult.     Packing was removed.  Sharp dissection was performed minimally today and the sutures were removed.  Was cleansed with wound wash and packed with Mesalt  Dispensed a fracture boot for transfers otherwise can be removed  Considerable edema remains.  Wound healing is very delayed.  No visible malodor or heat noted.    Will consider repeat imaging as guidded by ID.    Follow-up in 3 weeks.    4/1/2025  Started oral antibiotic Zyvox per infectious disease and will complete this for 23 more days  Is using Mesalt in the wound after saline wound wash.  Recommend packing this very lightly even just laying it in the wound as a wick then covering with gauze and a gauze roll and gentle compression.  Can be weightbearing for very short distances in a fracture boot.  Follow-up again with me in about a month and continue to follow-up with wound care.  We discussed utilizing a wound VAC however the patient prefers not to as he would need assistance probably with dressing changes.    Darryl Huff DPM      WOUND CARE     Patient seen per the order of Dr. Huff.     Wound 1:  Location: Left heel     Action & Treatment order per doctor: Previous dressing was removed prior to seeing patient. Wound(s) cleansed with  Vashe . Wound was packed very lightly with Mesalt ribbon  gauze dampened with saline. Povidone was painted around wound edges. Covered the area with two Cover Sponges and secured with one full roll of Kerlix gauze. ACE wrap was applied with minimal compression to secure dressing.      Patient verbalized understanding of treatment plan.     Follow up: 4/8/25 with NICHOLE Vlilegas RN. 4/22/25 with Dr. Huff.       Pat Jarquin RN   [facility]      Again, thank you for allowing me to participate in the care of your patient.        Sincerely,        Darryl Huff DPM    Electronically signed

## 2025-04-02 LAB — BACTERIA BONE ANAEROBE+AEROBE CULT: NO GROWTH

## 2025-04-02 NOTE — PATIENT INSTRUCTIONS
Mealtime Correction Scale:  Do Not give Correction Insulin if Pre-Meal BG less than 150.   If blood glucose is: Add extra Humalog/Novolog   150 - 199 1 unit   200 - 249 2 units   250 - 299 3 units   300 - 349 4 units   350 - 399 5 units   400 - 449 6 units   450 and greater 7 units      Bedtime Correction Scale:  Do Not give Bedtime Correction Insulin if BG less than 200.   If blood glucose is: Add extra Humalog/Novolog   200 - 249 1 unit   250 - 299 2 units   300 - 349 3 units   350 - 399 4 units   400 and greater 5 units     Correction doses need to be spaced at least four hours apart.        Parts of your Bianca Sensor                                         Cell phone ranjit and sensor.      1. Plan to wear your Bianca sensor for 15-days.  The ranjit will tell you when your sensor session is ending.  A one month supply is two sensors.    2. There is a one-hour warm up period each time you insert a new sensor.  You will not get any glucose data during this time.    3. When your glucose result is on the screen you will see a pencil in the upper right corner of the screen.  If you press the pencil it will take you to a screen where you can check boxes for insulin and or food.  This will allow your healthcare team to see when the food and/or insulin go in.     4. Ranjit users can hook to our clinic portal using the code: [id number].  You cannot hook to our portal if you use a reader but you can upload your reader to [email or web address].      5. If you have any issues with keeping the Bianca sensor on, please let us know.  There are several products that can be used to help it stay in place. This tends to be more of an issue during the summer months.     6.  If you have a skin reaction related to the Bianca tape, let us know.  There are some products that can be used under that can reduce the reaction.    7.  Important:  The Bianca sensor is reading the glucose in the interstitial fluid.  Your blood glucose meter is reading the glucose in the  "bloodstream.  These two values often mirror each other.  However, after you eat glucose goes to your bloodstream first and then takes an additional 20-30 minutes to get out to the interstitial fluid where the sensor can read it.  This means after treating a low (with food) it will take the sensor longer to recognize that you have recovered.  Do not eat until the sensor value comes back up, this will cause you to rebound high.  Do a fingerstick 15 minutes after treating a low to check recovery.    8. Compression lows: If you lay on the sensor you may prevent the interstitial fluid from getting to the sensor.  The sensor may read this as a low blood sugar.  The glucose will typically come right back up if you reposition yourself.  If you are unsure if you are low, do a fingerstick.    9. You will get some notice when your sensor session is ending.  There will be messages that give you a countdown until your sensor ends.  When you see the \"change sensor now\" message remove the old sensor.  It peels off like a Band-Aid and insert a new sensor.      10. Bianca sensors are typically covered by your pharmacy benefit.  There are often coupons available on-line for people who do not have coverage.  Medicare covers them as durable medical equipment and they must be ordered through a company who can bill that way.  [facility] or [email or web address] or [employer] are examples of companies who can bill this way.    Bianca Voucher for possible discount:    BIN: [id number],   Group: [id number],   Static ID: [id number],    Expiration Date: December 31, 2025      "

## 2025-04-08 ENCOUNTER — HOSPITAL ENCOUNTER (OUTPATIENT)
Dept: WOUND CARE | Facility: CLINIC | Age: 60
Discharge: HOME | End: 2025-04-08
Attending: INTERNAL MEDICINE | Admitting: INTERNAL MEDICINE
Payer: COMMERCIAL

## 2025-04-08 PROCEDURE — 97602 WOUND(S) CARE NON-SELECTIVE: CPT

## 2025-04-08 NOTE — PATIENT INSTRUCTIONS
We will change the plan of care for the wound to try to get the slough tissue to debride.  Stop the use of the Mesalt.  Continue to cleanse normally and use the Vashe on plain packing strip gauze to soak for 5 minutes.  (I used 1 inch today for the soak but you can use any size).  Paint the wound edges with the Povidone iodine and let dry.  After Vashe allow the wound to dry some for a minute then using a cotton tipped applicator apply small amount of the Triad paste to the wound bed, upper and lower most areas need it the most.  Take about 6 inches of the 1/4 inch plain packing strip gauze dampen with saline.  Press into the wound, start at the top fill that about 3/4 full then lay a strip down the middle and pack a smaller amount in to the bottom then go back up and finish the upper part.  Cover with the 3x3 inch gauze, Kerlix roll gauze and tape and the ace wrap.  You can now do the bandage changes just once a day.    I will see you next Tuesday the 15th at 8 am, call with any concerns [phone].    Luciano Lopez Rn [email or web address]

## 2025-04-08 NOTE — PROGRESS NOTES
[facility] Wound and Ostomy Clinic    Start of Care in [facility] Wound Clinic: 3/5/2025, inpatient   Referring Provider: Dr. Darryl Huff   Primary Care Provider: [employer]   Wound Location: Left Lateral calcaneus  Surgery: Debridement left calcaneus with bone biopsy, 3/4/25 Darryl Huff DPM      Wound Clinic Visit:  Scheduled follow up.    Reason for Visit:  Follow up on left heel wound, left foot, evaluate current plan of care.     Subjective:  On arrival today 4/8 alone, the pt reports the following: Saw Dr Huff as scheduled 4/1/25, see updated information in Wound History.  Remains on oral antibiotics Blood sugars still mostly in the mid 100's but will have unexpected highs at times, no significant lows.        Wound History:  Pt presented to the ED here at [facility] on 3/2/25 and was admitted, discharge home 3/8.  Patient reports having dry, peeling skin to the lateral calcaneus, used this device to help remove the non-viable tissue (Amope').  At the time the area was dry with no excess moisture.  On 3/2/25 noted to have increase redness and moist skin and concerns for infection was evaluated at [facility] ER, later was admitted to Hospital for IV antibiotics.  MRI -  3/3/25 showed Soft tissue ulceration lateral to the calcaneus,  Mild bone marrow edema within the adjacent lateral calcaneus. Osteomyelitis is likely if this wound probes to bone. Adjacent subcutaneous fluid collection measuring up to 3.2 cm, suspicious for abscess.  Regional cellulitis. Podiatry was consulted 3/3, pt was taken to OR on 3/4  for surgical debridement of left ankle wound/abscess in addition to bone biopsy.  While inpatient the [facility] nurse team coordinated with GHANSHYAM for assessments and teaching.  [facility] saw pt on Fri 3/7 and set up plan for wound cares twice daily at discharge by pt's wife and scheduled for Wound and Ostomy clinic appointment for the following Monday 3/10.  On 3/7 [facility] HECTOR Lopez and  Dr Huff DPM discussed via phone options for wound cares and follow up post hospital discharge.  DPM questioned potential wound vac use, at that time I felt the two small open wounds would benefit from a vac but the surrounding tissue was too fluctuant to apply a vac without considerable risk for periwound skin and tissue breakdown.  Pt discharged home Saturday 3/8 with IV antibiotics via left arm PICC managed by [facility].  (Nafcillin 12 g in sodium chloride 0.9 % 240 mL via SMARTeZ pump Infuse 12 g over 24 hours once daily for 13 days).  Pt's wife Lisa  performing twice daily wound cares as directed at time of discharge, no new concerns, no odor noted.  Received knee scooter.  - Dr Huff 3/20, removed sutures, sharp dissection was performed minimally, dispensed fracture boot for transfers otherwise can be removed.  - PICC line removed 3/23, on oral antibiotics Zyvox.    - Tissue  the two open wounds dehisced in Wound and Ostomy clinic 3/25.  - Dr Huff 4/1, Recommended packing very lightly even just laying it in the wound as a wick then covering with gauze and a gauze roll and gentle compression.  Can be weightbearing for very short distances in a fracture boot.  Follow-up again with me in about a month and continue to follow-up with wound care.  We discussed utilizing a wound VAC however the patient prefers not to as he would need assistance probably with dressing changes.    Past Medical History:  Patient Active Problem List   Diagnosis    Chronic rhinitis    Type 2 diabetes mellitus with hyperglycemia (H)    Hyperlipidemia LDL goal <100    Venous lake on Right chest    Congenital nevus of Right upper back    Diabetic infection of left foot (H)    SIRS (systemic inflammatory response syndrome) (H)    Diabetic peripheral neuropathy (H)    Hyponatremia    Hypochloremia    Open wound of left heel    Left leg swelling    Elevated C-reactive protein (CRP)    Elevated erythrocyte sedimentation rate     Diabetic foot infection (H)    Elevated blood ketone body level    Osteomyelitis (H)                 Tobacco Use:     Tobacco Use      Smoking status: Never      Smokeless tobacco: Never     Diabetic: Type 2  HgbA1C:   Hemoglobin A1C   Date Value Ref Range Status   03/02/2025 16.8 (H) <5.7 % Final     Comment:     Normal <5.7%   Prediabetes 5.7-6.4%    Diabetes 6.5% or higher     Note: Adopted from ADA consensus guidelines.   04/21/2021 8.9 (H) 0 - 5.6 % Final     Comment:     Normal <5.7% Prediabetes 5.7-6.4%  Diabetes 6.5% or higher - adopted from ADA   consensus guidelines.  Results confirmed by repeat test     Checks Blood Glucose?:  Three times daily.  Average Readings: past few days most between 130 - 140, but some high values .     Personal/social history:  Prior to hospitalization was working full time, owns [employer], more office side. Lives with wife Lisa who is an experienced RN here at [facility].  [facility] discharged 3/23, no home care nursing services in place.     Objective:   Current treatment plan: Cares performed in Wound and Ostomy clinic last visit and done by pt's wife since then at home.  - Remove the old bandage and cleanse with wound cleanser, soap and water or saline, dry well with gauze.  - Plain packing strip gauze wet with Vashe, packed into the wound bed and 4x4 Cover Sponge wet with Vashe placed over the wounds and the surrounding skin, let sit approximately 5 minutes, remove, air dr or pat dry with gauze.    - Mesalt ribbon gauze dampened with saline.  - Paint the skin around the wounds with the Povidone iodine swabs and let air dry.  - Cover the area with two Cover Sponges and secure with one full roll of Kerlix gauze.  - Apply ace wrap just snug but no compression.  - Change the bandage twice daily.  Last changed: last evening.      Wound #1 Left Lateral Calcaneus - Surgical wound, Surgical debridement, DOS 3/4/25  Stage/tissue depth: Full Thickness   2.5 cm L x 1.1 cm W x 1.1  cm D  Tunneling: none  Undermining: none now that the intact tissue has dehisced.  Wound bed type/amount: sutures removed 3/20, wound bed is approximately 40 % red clean tissue with no granular buds present and 60 % yellow slough; not fluctuant  Wound Edges:  Closed with hyperkeratotic tissue at the proximal, open to the distal where I removed some of the hyperkeratotic tissue today as the distal edge of the wound has no depth and is ready for scar tissue to advance and close.  Periwound: scant paler erythema, hyperkeratotic tissue present  Drainage:  Moderate amounts serous and serosanguinous drainage.   Odor: none  Pain: Neuropathy, no sensation. No pain    Photo's from today's visit 4/8/25.      Photo's from 3/25/25.      Photo's from initial Wound and Ostomy clinic visit 3/10/25.      Photo's from initial inpatient visit 3/5/25.        Photo's from ED 3/2/25.     Dorsalis Pedal Pulse: 0/4 palpable: NA doppler: NA phasic  Hair growth: Absent from knee distally  Capillary Refill: 3 seconds  Feet/toes color: deep pink with erythema as listed in assessment   Nail  R Leg: Edema nonpitting firm leg and ankle. Ankle circumference  Not measured. Calf circumference Not measured.   L Leg: Edema nonpitting. Ankle circumference 36.5 cm. Calf circumference 37 cm.      Mobility: NWB to foot/heel  Current offloading/footwear: Gripper sock while inpatient.   Sensation: Absent at the foot      Other callousing/areas of concern:  Left Plantar heel, plantar at base off the 4th toe (was debrided in OR 3/4/25) and plantar great toe fissures.    No open fissures this visit, dry skin present but improved, no significant scabs noted.  No photos's taken of the these sites today 4/8/25.      Photo's from 3/25/25.      Photo's from initial visit to the Wound and Ostomy clinic 3/10/25.    Photo from initial inpatient visit 3/5/25.     Diet: DM. Low carb.   Pt is following DM diet at home.     Assessment:  The left lateral heel wound is  slightly improved in size but the wound remains with significant amounts of slough.  Periwound is stable.  Is starting to redevelop callous/hyperkeratotic tissue.  Not so concerning where the wound has depth but at the distal edge where wound bed has no depth, the dry tissue had closed the wound edge and was removed today.  No outward signs of infection noted today.    Foot fissures are mostly resolved but dry skin and callous remain, see above for details.    Barriers to wound healing:   Poor nutrition: inadequate supply of protein, carbohydrates, fatty acids, and trace elements essential for all phases of wound healing - Educated that high protein diet is optimal for wound healing, goal is 100-120 g per day. Provided verbal list of foods rich in protein.   Reduced Blood Supply: inadequate perfusion to heal wound - No know ABIs   Medication: none  Chemotherapy: suppresses the immune system and inflammatory response -NA  Radiotherapy: increases production of free radical which damage cells -NA  Psychological stress: none noted  Obesity: decreases tissue perfusion- Appropriate weight and BMI   Infection: prolongs inflammatory phase, uses vital nutrients, impairs epithelialization and releases toxins - IV antibiotics discontinued 3/23, pt is on oral antibiotics.  Antimicrobial wound cleanser and primary dressing in use.  Underlying Disease: poorly controlled diabetes mellitis, educated on diet correction and following with PCP for optimal management.   Maceration: reduces wound tensile strength and inhibits epithelial migration- present but plan of care adjusted to address  Patient compliance- Agrees to initial assessment   Unrelieved pressure- order for NWB to the left foot, has knee scooter and is using, fracture boot not dispensed at this time as of 3/25.  Immobility- altered, but not immobile.   Substance abuse: NA     Plan:   We will change the plan of care for the wound to try to get the slough tissue to  debride.  Pt instructed in printed AVS today on the following:  - Stop the use of the Mesalt.  - Continue to cleanse normally and use the Vashe on plain packing strip gauze to soak for 5 minutes, can be performed with any size plain packing strip gauze.  - Continue to paint the wound edges with the Povidone iodine and let dry.  - Will start the use of a small amount of Triad paste to the wound bed, thin layer, only needs to be applied to the sloughy tissue.  - Use about 6 inches of the 1/4 inch plain packing strip gauze dampen with saline, press into the wound, start at the top fill that about 3/4 full then lay a strip down the middle and pack a smaller amount in to the bottom then go back up and finish the upper.  - Can reduce the frequency of dressing changes to once a day at this time but monitor for drainage and change twice daily if the bandage in place is very moist or more so saturated.     Interventions to Barriers:  As listed above.     Discussed/Education provided: plan of care with rationale;     Topical care to Left Lateral Calcaneous.  Cares performed in clinic today and to be performed as directed between appointments.   - Remove the old bandage and cleanse with wound cleanser, soap and water or saline, dry well with gauze.  - Plain packing strip gauze wet with Vashe, packed into the wound bed and 4x4 Cover Sponge wet with Vashe placed over the wounds and the surrounding skin, let sit approximately 5 minutes, remove, air dr or pat dry with gauze.    - Paint the skin around the wounds with the Povidone iodine swabs and let air dry.  - Apply with cotton tipped applicator small amount of Triad paste to the slough tissue in the wound bed.  - Apply saline damp 1/4 inch plain packing strip gauze to the wound bed loosely, used approximately 6 inches today.  - Cover the area with dry gauze pads and secure with one full roll of Kerlix gauze.  - Apply ace wrap just snug but no compression.  - Change the bandage  daily and prn twice daily for drainage control    Pt is unable but his wife is able to perform cares/has been caring for wound.    Additional recommendations: Keep left foot elevated the majority of every hour while awake.    The following discharge instructions were reviewed with and sent home with the patient:  See AVS    The following supplies were sent home with the patient:  Remains of the Triad paste, Vashe and plain packing strip gauze opened today.    Return visit: Wound and Ostomy clinic 4/15.  DPM Dr Huff 4/22/25.    Verbal, written, & demonstrative education provided.  Face to face time: approximately 3 minutes  Procedure: less than one minute application of Triad paste to the wound bed to promote autolytic debriding of slough tissue in the left lateral heel wound bed.     Luciano Lopez RN, CWOCN  [phone]

## 2025-04-15 ENCOUNTER — HOSPITAL ENCOUNTER (OUTPATIENT)
Dept: WOUND CARE | Facility: CLINIC | Age: 60
Discharge: HOME | End: 2025-04-15
Attending: INTERNAL MEDICINE | Admitting: INTERNAL MEDICINE
Payer: COMMERCIAL

## 2025-04-15 PROCEDURE — G0463 HOSPITAL OUTPT CLINIC VISIT: HCPCS

## 2025-04-15 NOTE — PROGRESS NOTES
[facility]    Start of Care in [facility]: 3/5/2025, inpatient   Referring Provider: Dr. Darryl Huff   Primary Care Provider: [employer]   Wound Location: Left Lateral calcaneus  Surgery: Debridement left calcaneus with bone biopsy, 3/4/25 Darryl Huff, GHANSHYAM      Wound Clinic Visit:  Scheduled follow up.    Reason for Visit:  Follow up on left heel wound, left foot, evaluate current plan of care.     Subjective:  On arrival today 4/15 alone, he reports the following: He with his wife's assist are performing the below wound cares started last visit to  clinic with the Triad paste as primary dressing.  Pt states his wife said the wound seemed to start to debride in the first days of use, some minor increase in drainage noted the first few days then slowed back to normal average.  Last of the ordered oral antibiotics he took this am.  He has no follow up with ID but is scheduled to see DPM again next week.  He reports a new [place] tried to remove some callous and caused small skin striping with bleeding.       Wound History:  Pt presented to the ED here at [facility] on 3/2/25 and was admitted, discharge home 3/8.  Patient reports having dry, peeling skin to the lateral calcaneus, used this device to help remove the non-viable tissue (Amope').  At the time the area was dry with no excess moisture.  On 3/2/25 noted to have increase redness and moist skin and concerns for infection was evaluated at [facility], later was admitted to Hospital for IV antibiotics.  MRI -  3/3/25 showed Soft tissue ulceration lateral to the calcaneus,  Mild bone marrow edema within the adjacent lateral calcaneus. Osteomyelitis is likely if this wound probes to bone. Adjacent subcutaneous fluid collection measuring up to 3.2 cm, suspicious for abscess.  Regional cellulitis. Podiatry was consulted 3/3, pt was taken to OR on 3/4  for  surgical debridement of left ankle wound/abscess in addition to bone biopsy.  While inpatient the [facility] nurse team coordinated with DPM for assessments and teaching.  [facility] saw pt on Fri 3/7 and set up plan for wound cares twice daily at discharge by pt's wife and scheduled for Wound and Ostomy clinic appointment for the following Monday 3/10.  On 3/7 [facility] HECTOR Lopez and Dr Huff DPM discussed via phone options for wound cares and follow up post hospital discharge.  DPM questioned potential wound vac use, at that time I felt the two small open wounds would benefit from a vac but the surrounding tissue was too fluctuant to apply a vac without considerable risk for periwound skin and tissue breakdown.  Pt discharged home Saturday 3/8 with IV antibiotics via left arm PICC managed by [facility].  (Nafcillin 12 g in sodium chloride 0.9 % 240 mL via SMARTeZ pump Infuse 12 g over 24 hours once daily for 13 days).  Pt's wife Lisa  performing twice daily wound cares as directed at time of discharge, no new concerns, no odor noted.  Received knee scooter.  - Dr Huff 3/20, removed sutures, sharp dissection was performed minimally, dispensed fracture boot for transfers otherwise can be removed.  - PICC line removed 3/23, on oral antibiotics Zyvox.    - Tissue  the two open wounds dehisced in Wound and Ostomy clinic 3/25.  - Dr Huff 4/1, Recommended packing very lightly even just laying it in the wound as a wick then covering with gauze and a gauze roll and gentle compression.  Can be weightbearing for very short distances in a fracture boot.  Follow-up again with me in about a month and continue to follow-up with wound care.  We discussed utilizing a wound VAC however the patient prefers not to as he would need assistance probably with dressing changes.    Past Medical History:  Patient Active Problem List   Diagnosis    Chronic rhinitis    Type 2 diabetes mellitus with hyperglycemia (H)    Hyperlipidemia  LDL goal <100    Venous lake on Right chest    Congenital nevus of Right upper back    Diabetic infection of left foot (H)    SIRS (systemic inflammatory response syndrome) (H)    Diabetic peripheral neuropathy (H)    Hyponatremia    Hypochloremia    Open wound of left heel    Left leg swelling    Elevated C-reactive protein (CRP)    Elevated erythrocyte sedimentation rate    Diabetic foot infection (H)    Elevated blood ketone body level    Osteomyelitis (H)                 Tobacco Use:     Tobacco Use      Smoking status: Never      Smokeless tobacco: Never     Diabetic: Type 2  HgbA1C:   Hemoglobin A1C   Date Value Ref Range Status   03/02/2025 16.8 (H) <5.7 % Final     Comment:     Normal <5.7%   Prediabetes 5.7-6.4%    Diabetes 6.5% or higher     Note: Adopted from ADA consensus guidelines.   04/21/2021 8.9 (H) 0 - 5.6 % Final     Comment:     Normal <5.7% Prediabetes 5.7-6.4%  Diabetes 6.5% or higher - adopted from ADA   consensus guidelines.  Results confirmed by repeat test     Checks Blood Glucose?:  Three times daily.  Average Readings: past few days most between 130 - 140, but some high values .     Personal/social history:  Prior to hospitalization was working full time, owns [employer], more office side. Lives with wife Lisa who is an experienced RN here at [facility].  [facility] discharged 3/23, no home care nursing services in place.     Objective:   Current treatment plan: Cares performed in Wound and Ostomy clinic last visit and done by pt's wife since then at home.  - Remove the old bandage and cleanse with wound cleanser, soap and water or saline, dry well with gauze.  - Plain packing strip gauze wet with Vashe, packed into the wound bed and 4x4 Cover Sponge wet with Vashe placed over the wounds and the surrounding skin, let sit approximately 5 minutes, remove, air dr or pat dry with gauze.    - Paint the skin around the wounds with the Povidone iodine swabs and let air dry.  - Apply with cotton  tipped applicator small amount of Triad paste to the slough tissue in the wound bed.  - Apply saline damp 1/4 inch plain packing strip gauze to the wound bed loosely, used approximately 6 inches last visit.  - Cover the area with dry gauze pads and secure with one full roll of Kerlix gauze.  - Apply ace wrap just snug but no compression.  - Change the bandage daily and prn twice daily for drainage control  Last changed: last evening.      Wound #1 Left Lateral Calcaneus - Surgical wound, Surgical debridement, DOS 3/4/25  Stage/tissue depth: Full Thickness   2.5 cm L x 0.9 cm W x 1.1 cm D  Tunneling: none  Undermining: none now that the intact tissue has dehisced.  Wound bed type/amount: wound bed is approximately 10 % granular tissue, 50 % red clean tissue with no granular buds present and 40 % yellow slough; not fluctuant  Wound Edges:  Approximately 80 % closed with hyperkeratotic tissue, open only in small area along the posterior wound edge.  Periwound: scant pale erythema, hyperkeratotic tissue present  Drainage:  Moderate amounts serous and serosanguinous drainage.   Odor: none  Pain: Neuropathy, no sensation. No pain    Photo's from today's visit 4/15/25.      Photo's from 4/8/25.      Photo's from initial Wound and Ostomy clinic visit 3/10/25.      Photo's from initial inpatient visit 3/5/25.        Photo's from ED 3/2/25.     Dorsalis Pedal Pulse: 0/4 palpable: NA doppler: NA phasic  Hair growth: Absent from knee distally  Capillary Refill: 3 seconds  Feet/toes color: deep pink with erythema as listed in assessment   Nail  R Leg: Edema nonpitting firm leg and ankle. Ankle circumference  Not measured. Calf circumference Not measured.   L Leg: Edema nonpitting. Ankle circumference 36.5 cm. Calf circumference 37 cm.      Mobility: NWB to foot/heel  Current offloading/footwear: Gripper sock while inpatient.   Sensation: Absent at the foot      Other callousing/areas of concern:  New area noted today 4/15, skin  tear related to pt's attempt to remove some callous of the Left plantar foot, distal aspect near the base of the 3rd MTH, 1 cm L x 1 cm W x 0 cm D, clean red denuded dermal tissue, small amounts sanguinous drainage.  No photo taken today 4/15 of the new site, c nurse error.    Left Plantar heel, plantar at base off the 4th toe (was debrided in OR 3/4/25) and plantar great toe fissures.    No open fissures this visit, dry skin present but improved, no significant scabs noted, improving well.  No photos's taken of the these sites today 4/15/25.      Photo's from 3/25/25.      Photo's from initial visit to the Wound and Ostomy clinic 3/10/25.    Photo from initial inpatient visit 3/5/25.         Diet: DM. Low carb.   Pt is following DM diet at home.     Assessment:  The new skin tear to the left plantar foot is clean but noted for some bleeding where callous tore into intact skin.  No signs of infection.  Left foot fissures are mostly resolved but dry skin and callous remain, see above for details.    The left lateral heel wound is improved in the tissues within he wound bed but no other significant changes.  The Triad paste is helping and there is minimal depth at the distal end, 0.5 cm.  Mid vertical depth is 0.8 cm with the proximal most aspect having the full 1.1 cm of depth.  The Triad does not appear to be causing an excessive about of drainage nor holding in too much moisture with the wound itself but will need to continue to monitor.     Barriers to wound healing:   Poor nutrition: inadequate supply of protein, carbohydrates, fatty acids, and trace elements essential for all phases of wound healing - Educated that high protein diet is optimal for wound healing, goal is 100-120 g per day. Provided verbal list of foods rich in protein.   Reduced Blood Supply: inadequate perfusion to heal wound - No know ABIs   Medication: none  Chemotherapy: suppresses the immune system and inflammatory response  -NA  Radiotherapy: increases production of free radical which damage cells -NA  Psychological stress: none noted  Obesity: decreases tissue perfusion- Appropriate weight and BMI   Infection: prolongs inflammatory phase, uses vital nutrients, impairs epithelialization and releases toxins - IV antibiotics discontinued 3/23, pt is on oral antibiotics.  Antimicrobial wound cleanser and primary dressing in use.  Underlying Disease: poorly controlled diabetes mellitis, educated on diet correction and following with PCP for optimal management.   Maceration: reduces wound tensile strength and inhibits epithelial migration- present but plan of care adjusted to address  Patient compliance- Agrees to initial assessment   Unrelieved pressure- order for NWB to the left foot, has knee scooter and is using, fracture boot not dispensed at this time as of 3/25.  Immobility- altered, but not immobile.   Substance abuse: NA     Plan:   We will continue with the current plan of care as listed below.  The Triad use needs to be monitor to make sure there is not an excessive amount of moisture present to the wound or periwound skin.    We used today 20 cm approximately 8 inches of the Plain packing strip gauze today, more than last visit as inner wound is debriding and there is slightly more open space within.    The packing was not tight within and will allow room for new tissue growth.    Pt will see DPM next week and will follow up with woc nurse in two weeks.      Interventions to Barriers:  As listed above.     Discussed/Education provided: plan of care with rationale;     Topical care to Left Lateral Calcaneous.  Cares performed in clinic today and to be performed as directed between appointments.   - Remove the old bandage and cleanse with wound cleanser, soap and water or saline, dry well with gauze.  - Plain packing strip gauze wet with Vashe, packed into the wound bed and 4x4 Cover Sponge wet with Vashe placed over the wounds  and the surrounding skin, let sit approximately 5 minutes, remove, air dr or pat dry with gauze.    - Paint the skin around the wounds with the Povidone iodine swabs and let air dry.  - Apply with cotton tipped applicator small amount of Triad paste to the slough tissue in the wound bed.  - Apply saline damp 1/4 inch plain packing strip gauze to the wound bed loosely, used approximately 8 inches (20 cm) today.  - Cover the area with dry gauze pads and secure with one full roll of Kerlix gauze.  - Apply ace wrap just snug but no compression.  - Change the bandage daily and prn twice daily for drainage control    Pt is unable but his wife is able to perform cares/has been caring for wound.    Additional recommendations: Keep left foot elevated the majority of every hour while awake.    The following discharge instructions were reviewed with and sent home with the patient:  Declined AVS    The following supplies were sent home with the patient:  None today    Return visit: DPM Dr Huff 4/22.  Wound and Ostomy clinic 4/29/25.      Verbal, written, & demonstrative education provided.  Face to face time: approximately 30 minutes  Procedure: less than one minute application of Triad paste to the wound bed to promote autolytic debriding of slough tissue in the left lateral heel wound bed.     Luciano Lopez RN, CWOCN  [phone]

## 2025-04-17 ENCOUNTER — VIRTUAL VISIT (OUTPATIENT)
Dept: EDUCATION SERVICES | Facility: CLINIC | Age: 60
End: 2025-04-17
Payer: COMMERCIAL

## 2025-04-17 DIAGNOSIS — E11.65 TYPE 2 DIABETES MELLITUS WITH HYPERGLYCEMIA, WITHOUT LONG-TERM CURRENT USE OF INSULIN (H): Primary | ICD-10-CM

## 2025-04-17 PROCEDURE — 1126F AMNT PAIN NOTED NONE PRSNT: CPT | Mod: 95 | Performed by: NUTRITIONIST

## 2025-04-17 PROCEDURE — G0108 DIAB MANAGE TRN  PER INDIV: HCPCS | Mod: 95 | Performed by: NUTRITIONIST

## 2025-04-17 NOTE — PROGRESS NOTES
Virtual Visit Details    Type of service:  Video Visit   Start 10:30  End 11:34  Time spent 1 hour 4 minutes    Originating Location (pt. Location): Home    Distant Location (provider location):  Off-site  Platform used for Video Visit: Chitra    Diabetes Self-Management Education & Support    Luke NATALIE Farias presents today for education related to Type 2 diabetes    Patient is being treated with:    Was on metformin and glipizide. Tried Trulicity for awhile but that resulted in abscess so was stopped. All orals were stopped and insulin was started in the hospital recently.     Year of diagnosis: 2010   Referring provider:  Mert   Living Situation: with spouse     PATIENT CONCERNS/REASON FOR REFERRAL       ASSESSMENT:    Taking Medication:     Current Diabetes Management per Patient:  Taking diabetes medications? yes:     Diabetes Medication(s)       Insulin       insulin glargine (LANTUS SOLOSTAR) 100 UNIT/ML pen INJECT 15 UNITS UNDER THE SKIN TWICE DAILY     Insulin Lispro-aabc, 1 U Dial, (LYUMJEV KWIKPEN) 100 UNIT/ML SOPN Inject 1 Units subcutaneously 3 times daily. Per carb unit plus sliding scale Inject 30 min before eating a meal Max: 30U/day            Monitoring          Patient's most recent   Lab Results   Component Value Date    A1C 16.8 03/02/2025    A1C 8.9 04/21/2021      Patient's A1C goal: <7.0    Activity: Has a foot wound currently (he reports it is healing) but of course is limiting activity at this time. He is looking forward to being able to be more active again.     Healthy Eating:   Patient is counting his carbohydrates.     Problem Solving:      Patient is at risk of hypoglycemia?: YES  Hospitalizations for hyper or hypoglycemia: Yes (Please explain): recently hospitalized for foot wound but also had elevated glucose and A1C    Healthy Coping and Stress Management:   Admits some non-compliance/denial regarding glucose management in the past. Very motivated at this time to improve his diabetes  control.       EDUCATION and INSTRUCTION PROVIDED AT THIS VISIT:    Patient recently started on insulin in the hospital. He is on MDI. Carb counting 1 unit for each 15 grams of carbohydrate. Correction 1/50/150/200. Also taking lantus 15 units in the am and 18 units in the pm. He may be a good candidate for insulin pump in the future. Wants to stick with MDI for now. He is doing well. Significant improvement to his glucose. Liking the sensor. Having some sensitivity at insulin injection sites. We discussed cold insulin may sting more, proper technique for injection, changing needle which he is, letting alcohol dry completely before injection. He reports no bruising and is rotating the injection site.      Patient-stated goal written and given to Luke Farias.  Verbalized and demonstrated understanding of instructions.     PLAN:  No changes to current plan.   Reinforced plan and changes patient has made to better manage his glucose.    See patient instructions  AVS printed and given to patient    FOLLOW-UP:    Scheduled with Dr. Irwin in May  With me after that as needed        Any diabetes medication initiation or dose changes were made via the [facility] Standing Orders per the patient's referring provider. A copy of this encounter was shared with the provider.

## 2025-04-17 NOTE — NURSING NOTE
Current patient location: [address] [place] [id number]    Is the patient currently in the state of [place]? YES    Visit mode: VIDEO    If the visit is dropped, the patient can be reconnected by:VIDEO VISIT: Send to e-mail at: [email or web address]    Will anyone else be joining the visit? NO  (If patient encounters technical issues they should call [phone]    Are changes needed to the allergy or medication list? No    Are refills needed on medications prescribed by this physician? NO    Rooming Documentation:  Questionnaire(s) not done per department protocol    Reason for visit: Diabetes Education    Shelby Kocher VVF

## 2025-04-22 ENCOUNTER — OFFICE VISIT (OUTPATIENT)
Dept: PODIATRY | Facility: CLINIC | Age: 60
End: 2025-04-22
Payer: COMMERCIAL

## 2025-04-22 VITALS — WEIGHT: 228 LBS | TEMPERATURE: 98.3 F | HEIGHT: 74 IN | BODY MASS INDEX: 29.26 KG/M2

## 2025-04-22 DIAGNOSIS — E11.65 TYPE 2 DIABETES MELLITUS WITH HYPERGLYCEMIA, WITHOUT LONG-TERM CURRENT USE OF INSULIN (H): Primary | ICD-10-CM

## 2025-04-22 DIAGNOSIS — S91.302D OPEN WOUND OF LEFT HEEL, SUBSEQUENT ENCOUNTER: ICD-10-CM

## 2025-04-22 PROCEDURE — 1126F AMNT PAIN NOTED NONE PRSNT: CPT | Performed by: PODIATRIST

## 2025-04-22 PROCEDURE — 99213 OFFICE O/P EST LOW 20 MIN: CPT | Performed by: PODIATRIST

## 2025-04-22 ASSESSMENT — PAIN SCALES - GENERAL: PAINLEVEL_OUTOF10: NO PAIN (0)

## 2025-04-22 NOTE — LETTER
"4/22/2025      Luke Farias  [address] [facility] [id number]      Dear Colleague,    Thank you for referring your patient, Luke Farias, to the [facility]. Please see a copy of my visit note below.    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    Subjective: Patient reports for followup of DOS 3/4/2025 - Surgical debridement left calcaneus and left ankle, with bone biopsy left calcaneus procedure.  Follows with wound care Mesalt and packing  Was on IV nafcillin 24 hour pump.  Eating with appetite.  BG has been  Painting adjusted insulin since discharge  Sees DM educator twice since discharge and Endocrine consult late May.   COMPLETED ABX 4/14/25 per infectious disease  Met DM edu twice.   Has CGM now. And better control and understanding of blood glucose but still getting very highs off and on.    EXAM:  No apparent distress, patient is relaxed and comfortable.   Vitals: Temp 98.3  F (36.8  C) (Temporal)   Ht 1.88 m (6' 2\")   Wt 103.4 kg (228 lb)   BMI 29.27 kg/m    Vasc:  DP and PT pulses palpable bilateral, no erythema or heat.  No hair growth bilateral lower extremities.  Temperature is warm to warm proximal to distal.  No erythema or heat   neuro: Plantar response equal intact bilateral.  Complete loss of protective threshold bilateral limbs 0/10 applications of a 5.07 monofilament.  Plantar response equal intact bilateral.  Derm: Mildly diminished texture turgor tone about the integument.  The wound measures about 28 mm x 5 mm in greatest dimensions full-thickness as deep as 5 mm.  Musc: Adequate reduction of deformity identified. No " complications.  Still considerable edema through the lateral left foot    Labs:   3/21/25 CRP and WBC normal      Hemoglobin A1C (%)   Date Value   03/02/2025 16.8 (H)   04/02/2024 13.8 (H)   04/21/2021 8.9 (H)   01/14/2020 10.4 (H)   11/02/2018 6.1 (H)   07/18/2018 11.3 (H)   10/01/2012 8.2 (H)   06/08/2011 6.3 (H)     Creatinine (mg/dL)   Date Value   03/20/2025 0.86   03/17/2025 0.89   03/13/2025 0.90   03/11/2025 1.05   03/07/2025 0.76   03/06/2025 0.81   04/21/2021 0.70   11/02/2018 0.72   07/18/2018 0.68   10/01/2012 0.83   01/21/2011 0.76     Photo taken 4/1/25:       Picture 4/22/2025:        imaging:  MRI 3/3/25 preop with abscess lateral calc      Assessment:      ICD-10-CM    1. Type 2 diabetes mellitus with hyperglycemia, without long-term current use of insulin (H)  E11.65       2. Open wound of left heel, subsequent encounter  S91.302D           Plan:    3/20/2025  Is on nafcillin 24 hour pump IV.  Eating with appetite.  BG is .  Mert adjusted insulin  Sees DM educator in a couple months and Endocrine consult.     Packing was removed.  Sharp dissection was performed minimally today and the sutures were removed.  Was cleansed with wound wash and packed with Mesalt  Dispensed a fracture boot for transfers otherwise can be removed  Considerable edema remains.  Wound healing is very delayed.  No visible malodor or heat noted.    Will consider repeat imaging as guidded by ID.    Follow-up in 3 weeks.    4/1/2025  Started oral antibiotic Zyvox per infectious disease and will complete this for 23 more days  Is using Mesalt in the wound after saline wound wash.  Recommend packing this very lightly even just laying it in the wound as a wick then covering with gauze and a gauze roll and gentle compression.  Can be weightbearing for very short distances in a fracture boot.  Follow-up again with me in about a month and continue to follow-up with wound care.  We discussed utilizing a wound VAC however the  patient prefers not to as he would need assistance probably with dressing changes.    4/22/2025  Debrided wound to bleeding base with a 15 blade scalpel to and including subcutaneous tissue but not tendon muscle or bone  Recommend compression during day with ACE or stockinet.  Stay in boot for WB.     Can be on a stationary bike   May try two strips of packing or alginate packed distal and proximal but not central.  Encourage compression  Encouraged much improved blood glucose which has improved considerably and we will continue to monitor this with CGM.  Recommend A1c below 7.5.  Will repeat imaging WBC CRP with any increased erythema heat edema or malodor.  Continue WOC RN and send communication to their team  RTC 3-4 weeks      Darryl Huff DPM      WOUND CARE     Patient seen per the order of Dr. Huff.     Wound 1:  Location: Left heel     Action & Treatment order per doctor: Previous dressing was removed prior to seeing patient. Wound(s) cleansed with  Vashe . Applied betadine to wound edges and Triad to the wound bed. One piece of plain 1/4 packing strip were inserted in each end of patient's wound (2 pieces total) to act as dirk. Covered with gauze and secured with kerlix and ACE wrap.      Patient verbalized understanding of treatment plan.     Follow up: 4/29/25 with NICHOLE Villegas RN. 5/20/25 with Dr. Huff.       Pat Jarquin RN   [facility]      Again, thank you for allowing me to participate in the care of your patient.        Sincerely,        Darryl Huff DPM    Electronically signed

## 2025-04-22 NOTE — PROGRESS NOTES
WOUND CARE     Patient seen per the order of Dr. Huff.     Wound 1:  Location: Left heel     Action & Treatment order per doctor: Previous dressing was removed prior to seeing patient. Wound(s) cleansed with  Vashe . Applied betadine to wound edges and Triad to the wound bed. One piece of plain 1/4 packing strip were inserted in each end of patient's wound (2 pieces total) to act as dirk. Covered with gauze and secured with kerlix and ACE wrap.      Patient verbalized understanding of treatment plan.     Follow up: 4/29/25 with NICHOLE Villegas RN. 5/20/25 with Dr. Huff.       Pat Jarquin, RN   [facility]

## 2025-04-22 NOTE — NURSING NOTE
Chief Complaint   Patient presents with    Surgical Followup     Surgical debridement left calcaneus and left ankle, with bone biopsy left calcaneus; LOV 4-1-25

## 2025-04-22 NOTE — PROGRESS NOTES
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    Subjective: Patient reports for followup of DOS 3/4/2025 - Surgical debridement left calcaneus and left ankle, with bone biopsy left calcaneus procedure.  Follows with wound care Mesalt and packing  Was on IV nafcillin 24 hour pump.  Eating with appetite.  BG has been  Painting adjusted insulin since discharge  Sees DM educator twice since discharge and Endocrine consult late May.   COMPLETED ABX 4/14/25 per infectious disease  Met DM edu twice.   Has CGM now. And better control and understanding of blood glucose but still getting very highs off and on.    EXAM:  No apparent distress, patient is relaxed and comfortable.   Vitals: Temp 98.3  F (36.8  C) (Temporal)   Ht 1.88 m (6' 2\")   Wt 103.4 kg (228 lb)   BMI 29.27 kg/m    Vasc:  DP and PT pulses palpable bilateral, no erythema or heat.  No hair growth bilateral lower extremities.  Temperature is warm to warm proximal to distal.  No erythema or heat   neuro: Plantar response equal intact bilateral.  Complete loss of protective threshold bilateral limbs 0/10 applications of a 5.07 monofilament.  Plantar response equal intact bilateral.  Derm: Mildly diminished texture turgor tone about the integument.  The wound measures about 28 mm x 5 mm in greatest dimensions full-thickness as deep as 5 mm.  Musc: Adequate reduction of deformity identified. No complications.  Still considerable edema through the lateral left foot    Labs:   3/21/25 CRP and WBC normal      Hemoglobin A1C (%)   Date Value   03/02/2025 16.8 (H)   04/02/2024 13.8 (H)   04/21/2021 8.9 (H)   01/14/2020 10.4 (H)   11/02/2018 " 6.1 (H)   07/18/2018 11.3 (H)   10/01/2012 8.2 (H)   06/08/2011 6.3 (H)     Creatinine (mg/dL)   Date Value   03/20/2025 0.86   03/17/2025 0.89   03/13/2025 0.90   03/11/2025 1.05   03/07/2025 0.76   03/06/2025 0.81   04/21/2021 0.70   11/02/2018 0.72   07/18/2018 0.68   10/01/2012 0.83   01/21/2011 0.76     Photo taken 4/1/25:       Picture 4/22/2025:        imaging:  MRI 3/3/25 preop with abscess lateral calc      Assessment:      ICD-10-CM    1. Type 2 diabetes mellitus with hyperglycemia, without long-term current use of insulin (H)  E11.65       2. Open wound of left heel, subsequent encounter  S91.302D           Plan:    3/20/2025  Is on nafcillin 24 hour pump IV.  Eating with appetite.  BG is .  Mert adjusted insulin  Sees DM educator in a couple months and Endocrine consult.     Packing was removed.  Sharp dissection was performed minimally today and the sutures were removed.  Was cleansed with wound wash and packed with Mesalt  Dispensed a fracture boot for transfers otherwise can be removed  Considerable edema remains.  Wound healing is very delayed.  No visible malodor or heat noted.    Will consider repeat imaging as guidded by ID.    Follow-up in 3 weeks.    4/1/2025  Started oral antibiotic Zyvox per infectious disease and will complete this for 23 more days  Is using Mesalt in the wound after saline wound wash.  Recommend packing this very lightly even just laying it in the wound as a wick then covering with gauze and a gauze roll and gentle compression.  Can be weightbearing for very short distances in a fracture boot.  Follow-up again with me in about a month and continue to follow-up with wound care.  We discussed utilizing a wound VAC however the patient prefers not to as he would need assistance probably with dressing changes.    4/22/2025  Debrided wound to bleeding base with a 15 blade scalpel to and including subcutaneous tissue but not tendon muscle or bone  Recommend compression  during day with ACE or stockinet.  Stay in boot for WB.     Can be on a stationary bike   May try two strips of packing or alginate packed distal and proximal but not central.  Encourage compression  Encouraged much improved blood glucose which has improved considerably and we will continue to monitor this with CGM.  Recommend A1c below 7.5.  Will repeat imaging WBC CRP with any increased erythema heat edema or malodor.  Continue WOC RN and send communication to their team  RTC 3-4 weeks      Darryl Huff DPM

## 2025-04-29 ENCOUNTER — HOSPITAL ENCOUNTER (OUTPATIENT)
Dept: WOUND CARE | Facility: CLINIC | Age: 60
Discharge: HOME | End: 2025-04-29
Attending: INTERNAL MEDICINE
Payer: COMMERCIAL

## 2025-04-29 PROCEDURE — G0463 HOSPITAL OUTPT CLINIC VISIT: HCPCS

## 2025-04-29 NOTE — PROGRESS NOTES
[facility]    Start of Care in [facility]: 3/5/2025, inpatient   Referring Provider: Dr. Darryl Huff   Primary Care Provider: [employer]   Wound Location: Left Lateral calcaneus  Surgery: Debridement left calcaneus with bone biopsy, 3/4/25 Darryl Huff, GHANSHYAM      Wound Clinic Visit:  Scheduled follow up.    Reason for Visit:  Follow up on left heel wound, left foot, evaluate current plan of care.     Subjective:  On arrival today 4/15 alone, he reports the following: He with his wife's assist are performing the below wound cares started last visit to  clinic with the Triad paste as primary dressing.  Pt states his wife said the wound seemed to start to debride in the first days of use, some minor increase in drainage noted the first few days then slowed back to normal average.  Last of the ordered oral antibiotics he took this am.  He has no follow up with ID but is scheduled to see DPM again next week.  He reports a new cut occurred to his left foot this [place] on the [place], tried to remove some callous and caused small skin striping with bleeding.       Wound History:  Pt presented to the ED here at [facility] on 3/2/25 and was admitted, discharge home 3/8.  Patient reports having dry, peeling skin to the lateral calcaneus, used this device to help remove the non-viable tissue (Amope').  At the time the area was dry with no excess moisture.  On 3/2/25 noted to have increase redness and moist skin and concerns for infection was evaluated at [facility] ER, later was admitted to Hospital for IV antibiotics.  MRI -  3/3/25 showed Soft tissue ulceration lateral to the calcaneus,  Mild bone marrow edema within the adjacent lateral calcaneus. Osteomyelitis is likely if this wound probes to bone. Adjacent subcutaneous fluid collection measuring up to 3.2 cm, suspicious for abscess.  Regional cellulitis. Podiatry was consulted 3/3, pt was taken to OR on 3/4  for  surgical debridement of left ankle wound/abscess in addition to bone biopsy.  While inpatient the [facility] nurse team coordinated with DPM for assessments and teaching.  [facility] saw pt on Fri 3/7 and set up plan for wound cares twice daily at discharge by pt's wife and scheduled for Wound and Ostomy clinic appointment for the following Monday 3/10.  On 3/7 [facility] HECTOR Lopez and Dr Huff DPM discussed via phone options for wound cares and follow up post hospital discharge.  DPM questioned potential wound vac use, at that time I felt the two small open wounds would benefit from a vac but the surrounding tissue was too fluctuant to apply a vac without considerable risk for periwound skin and tissue breakdown.  Pt discharged home Saturday 3/8 with IV antibiotics via left arm PICC managed by [facility].  (Nafcillin 12 g in sodium chloride 0.9 % 240 mL via SMARTeZ pump Infuse 12 g over 24 hours once daily for 13 days).  Pt's wife Lisa  performing twice daily wound cares as directed at time of discharge, no new concerns, no odor noted.  Received knee scooter.  - Dr Huff 3/20, removed sutures, sharp dissection was performed minimally, dispensed fracture boot for transfers otherwise can be removed.  - PICC line removed 3/23, on oral antibiotics Zyvox.    - Tissue  the two open wounds dehisced in Wound and Ostomy clinic 3/25.  - Dr Huff 4/1, Recommended packing very lightly even just laying it in the wound as a wick then covering with gauze and a gauze roll and gentle compression.  Can be weightbearing for very short distances in a fracture boot.  Follow-up again with me in about a month and continue to follow-up with wound care.  We discussed utilizing a wound VAC however the patient prefers not to as he would need assistance probably with dressing changes.    Past Medical History:  Patient Active Problem List   Diagnosis    Chronic rhinitis    Type 2 diabetes mellitus with hyperglycemia (H)    Hyperlipidemia  LDL goal <100    Venous lake on Right chest    Congenital nevus of Right upper back    Diabetic infection of left foot (H)    SIRS (systemic inflammatory response syndrome) (H)    Diabetic peripheral neuropathy (H)    Hyponatremia    Hypochloremia    Open wound of left heel    Left leg swelling    Elevated C-reactive protein (CRP)    Elevated erythrocyte sedimentation rate    Diabetic foot infection (H)    Elevated blood ketone body level    Osteomyelitis (H)                 Tobacco Use:     Tobacco Use      Smoking status: Never      Smokeless tobacco: Never     Diabetic: Type 2  HgbA1C:   Hemoglobin A1C   Date Value Ref Range Status   03/02/2025 16.8 (H) <5.7 % Final     Comment:     Normal <5.7%   Prediabetes 5.7-6.4%    Diabetes 6.5% or higher     Note: Adopted from ADA consensus guidelines.   04/21/2021 8.9 (H) 0 - 5.6 % Final     Comment:     Normal <5.7% Prediabetes 5.7-6.4%  Diabetes 6.5% or higher - adopted from ADA   consensus guidelines.  Results confirmed by repeat test     Checks Blood Glucose?:  Three times daily.  Average Readings: past few days most between 130 - 140, but some high values .     Personal/social history:  Prior to hospitalization was working full time, owns [employer], more office side. Lives with wife Lisa who is an experienced RN here at [facility].  [facility] discharged 3/23, no home care nursing services in place.     Objective:   Current treatment plan: Cares performed in Wound and Ostomy clinic last visit and done by pt's wife since then at home.  - Remove the old bandage and cleanse with wound cleanser, soap and water or saline, dry well with gauze.  - Plain packing strip gauze wet with Vashe, packed into the wound bed and 4x4 Cover Sponge wet with Vashe placed over the wounds and the surrounding skin, let sit approximately 5 minutes, remove, air dr or pat dry with gauze.    - Paint the skin around the wounds with the Povidone iodine swabs and let air dry.  - Apply with cotton  tipped applicator small amount of Triad paste to the slough tissue in the wound bed.  - Apply saline damp 1/4 inch plain packing strip gauze to the wound bed loosely, used approximately 6 inches last visit.  - Cover the area with dry gauze pads and secure with one full roll of Kerlix gauze.  - Apply ace wrap just snug but no compression.  - Change the bandage daily and prn twice daily for drainage control  Last changed: last evening.      Wound #1 Left Lateral Calcaneus - Surgical wound, Surgical debridement, DOS 3/4/25  Stage/tissue depth: Full Thickness   2.5 cm L x 0.9 cm W x 1.1 cm D  Tunneling: none  Undermining: none now that the intact tissue has dehisced.  Wound bed type/amount: wound bed is approximately 10 % granular tissue, 50 % red clean tissue with no granular buds present and 40 % yellow slough; not fluctuant  Wound Edges:  Approximately 80 % closed with hyperkeratotic tissue, open only in small area along the posterior wound edge.  Periwound: scant pale erythema, hyperkeratotic tissue present  Drainage:  Moderate amounts serous and serosanguinous drainage.   Odor: none  Pain: Neuropathy, no sensation. No pain    Photo's from today's visit 4/15/25.      Photo's from 4/8/25.      Photo's from initial Wound and Ostomy clinic visit 3/10/25.      Photo's from initial inpatient visit 3/5/25.        Photo's from ED 3/2/25.     Dorsalis Pedal Pulse: 0/4 palpable: NA doppler: NA phasic  Hair growth: Absent from knee distally  Capillary Refill: 3 seconds  Feet/toes color: deep pink with erythema as listed in assessment   Nail  R Leg: Edema nonpitting firm leg and ankle. Ankle circumference  Not measured. Calf circumference Not measured.   L Leg: Edema nonpitting. Ankle circumference 36.5 cm. Calf circumference 37 cm.      Mobility: NWB to foot/heel  Current offloading/footwear: Gripper sock while inpatient.   Sensation: Absent at the foot      Other callousing/areas of concern:  New area noted today 4/15, skin  tear related to pt's attempt to remove some callous of the Left plantar foot, distal aspect near the base of the 3rd MTH, 1 cm L x 1 cm W x 0 cm D, clean red denuded dermal tissue, small amounts sanguinous drainage.  No photo taken today 4/15 of the new site, c nurse error.    Left Plantar heel, plantar at base off the 4th toe (was debrided in OR 3/4/25) and plantar great toe fissures.    No open fissures this visit, dry skin present but improved, no significant scabs noted, improving well.  No photos's taken of the these sites today 4/15/25.      Photo's from 3/25/25.      Photo's from initial visit to the Wound and Ostomy clinic 3/10/25.    Photo from initial inpatient visit 3/5/25.         Diet: DM. Low carb.   Pt is following DM diet at home.     Assessment:  The new skin tear to the left plantar foot is clean but noted for some bleeding where callous tore into intact skin.  No signs of infection.  Left foot fissures are mostly resolved but dry skin and callous remain, see above for details.    The left lateral heel wound is improved in the tissues within he wound bed but no other significant changes.  The Triad paste is helping and there is minimal depth at the distal end, 0.5 cm.  Mid vertical depth is 0.8 cm with the proximal most aspect having the full 1.1 cm of depth.  The Triad does not appear to be causing an excessive about of drainage nor holding in too much moisture with the wound itself but will need to continue to monitor.     Barriers to wound healing:   Poor nutrition: inadequate supply of protein, carbohydrates, fatty acids, and trace elements essential for all phases of wound healing - Educated that high protein diet is optimal for wound healing, goal is 100-120 g per day. Provided verbal list of foods rich in protein.   Reduced Blood Supply: inadequate perfusion to heal wound - No know ABIs   Medication: none  Chemotherapy: suppresses the immune system and inflammatory response  -NA  Radiotherapy: increases production of free radical which damage cells -NA  Psychological stress: none noted  Obesity: decreases tissue perfusion- Appropriate weight and BMI   Infection: prolongs inflammatory phase, uses vital nutrients, impairs epithelialization and releases toxins - IV antibiotics discontinued 3/23, pt is on oral antibiotics.  Antimicrobial wound cleanser and primary dressing in use.  Underlying Disease: poorly controlled diabetes mellitis, educated on diet correction and following with PCP for optimal management.   Maceration: reduces wound tensile strength and inhibits epithelial migration- present but plan of care adjusted to address  Patient compliance- Agrees to initial assessment   Unrelieved pressure- order for NWB to the left foot, has knee scooter and is using, fracture boot not dispensed at this time as of 3/25.  Immobility- altered, but not immobile.   Substance abuse: NA     Plan:   We will continue with the current plan of care as listed below.  The Triad use needs to be monitor to make sure there is not an excessive amount of moisture present to the wound or periwound skin.    We used today 20 cm approximately 8 inches of the Plain packing strip gauze today, more than last visit as inner wound is debriding and there is slightly more open space within.    The packing was not tight within and will allow room for new tissue growth.    Pt will see DPM next week and will follow up with woc nurse in two weeks.      Interventions to Barriers:  As listed above.     Discussed/Education provided: plan of care with rationale;     Topical care to Left Lateral Calcaneous.  Cares performed in clinic today and to be performed as directed between appointments.   - Remove the old bandage and cleanse with wound cleanser, soap and water or saline, dry well with gauze.  - Plain packing strip gauze wet with Vashe, packed into the wound bed and 4x4 Cover Sponge wet with Vashe placed over the wounds  and the surrounding skin, let sit approximately 5 minutes, remove, air dr or pat dry with gauze.    - Paint the skin around the wounds with the Povidone iodine swabs and let air dry.  - Apply with cotton tipped applicator small amount of Triad paste to the slough tissue in the wound bed.  - Apply saline damp 1/4 inch plain packing strip gauze to the wound bed loosely, used approximately 8 inches (20 cm) today.  - Cover the area with dry gauze pads and secure with one full roll of Kerlix gauze.  - Apply ace wrap just snug but no compression.  - Change the bandage daily and prn twice daily for drainage control    Pt is unable but his wife is able to perform cares/has been caring for wound.    Additional recommendations: Keep left foot elevated the majority of every hour while awake.    The following discharge instructions were reviewed with and sent home with the patient:  Declined AVS    The following supplies were sent home with the patient:  None today    Return visit: DPM Dr Huff 4/22.  Wound and Ostomy clinic 4/29/25.      Verbal, written, & demonstrative education provided.  Face to face time: approximately 30 minutes  Procedure: less than one minute application of Triad paste to the wound bed to promote autolytic debriding of slough tissue in the left lateral heel wound bed.     Luciano Lopez RN, CWOCN  [phone]

## 2025-04-30 LAB
ACID FAST STAIN (ARUP): NORMAL
ACID FAST STAIN (ARUP): NORMAL

## 2025-05-09 ENCOUNTER — HOSPITAL ENCOUNTER (OUTPATIENT)
Dept: WOUND CARE | Facility: CLINIC | Age: 60
Discharge: HOME | End: 2025-05-09
Attending: INTERNAL MEDICINE | Admitting: INTERNAL MEDICINE
Payer: COMMERCIAL

## 2025-05-09 PROCEDURE — G0463 HOSPITAL OUTPT CLINIC VISIT: HCPCS

## 2025-05-09 NOTE — PROGRESS NOTES
[facility]    Start of Care in [facility]: 3/5/2025, inpatient   Referring Provider: Dr. Darryl Huff   Primary Care Provider: [employer]   Wound Location: Left Lateral calcaneus  Surgery: Debridement left calcaneus with bone biopsy, 3/4/25 Darryl Huff DPM      Wound Clinic Visit:  Scheduled follow up.    Reason for Visit:  Follow up on left heel wound, left foot, evaluate current plan of care.     Subjective:  On arrival today 5/9/25 alone, he reports following: He with his wife's assist are performing the below wound cares.  No new issue with the left heel wound, is debriding but some yellow tissue left.  Minimal drainage, no signs of infection.  He has been wearing his fracture boot but today is not using knee scooter.       Wound History:  Pt presented to the ED here at [facility] on 3/2/25 and was admitted, discharge home 3/8.  Patient reports having dry, peeling skin to the lateral calcaneus, used this device to help remove the non-viable tissue (Amope').  At the time the area was dry with no excess moisture.  On 3/2/25 noted to have increase redness and moist skin and concerns for infection was evaluated at [facility] ER, later was admitted to Hospital for IV antibiotics.  MRI -  3/3/25 showed Soft tissue ulceration lateral to the calcaneus,  Mild bone marrow edema within the adjacent lateral calcaneus. Osteomyelitis is likely if this wound probes to bone. Adjacent subcutaneous fluid collection measuring up to 3.2 cm, suspicious for abscess.  Regional cellulitis. Podiatry was consulted 3/3, pt was taken to OR on 3/4  for surgical debridement of left ankle wound/abscess in addition to bone biopsy.  While inpatient the [facility] nurse team coordinated with DPTONIA for assessments and teaching.  [facility] saw pt on Fri 3/7 and set up plan for wound cares twice daily at discharge by pt's wife and scheduled for Wound and Ostomy clinic appointment for the  following Monday 3/10.  On 3/7 woc HECTOR Lopez and Dr Huff DPM discussed via phone options for wound cares and follow up post hospital discharge.  DPM questioned potential wound vac use, at that time I felt the two small open wounds would benefit from a vac but the surrounding tissue was too fluctuant to apply a vac without considerable risk for periwound skin and tissue breakdown.  Pt discharged home Saturday 3/8 with IV antibiotics via left arm PICC managed by  [facility].  (Nafcillin 12 g in sodium chloride 0.9 % 240 mL via SMARTeZ pump Infuse 12 g over 24 hours once daily for 13 days).  Pt's wife Lisa  performing twice daily wound cares as directed at time of discharge, no new concerns, no odor noted.  Received knee scooter.  - Dr Huff 3/20, removed sutures, sharp dissection was performed minimally, dispensed fracture boot for transfers otherwise can be removed.  - PICC line removed 3/23, on oral antibiotics Zyvox.    - Tissue  the two open wounds dehisced in Wound and Ostomy clinic 3/25.  - Dr Huff 4/1, Recommended packing very lightly even just laying it in the wound as a wick then covering with gauze and a gauze roll and gentle compression.  Can be weightbearing for very short distances in a fracture boot.  Follow-up again with me in about a month and continue to follow-up with wound care.  We discussed utilizing a wound VAC however the patient prefers not to as he would need assistance probably with dressing changes.    Past Medical History:  Patient Active Problem List   Diagnosis    Chronic rhinitis    Type 2 diabetes mellitus with hyperglycemia (H)    Hyperlipidemia LDL goal <100    Venous lake on Right chest    Congenital nevus of Right upper back    Diabetic infection of left foot (H)    SIRS (systemic inflammatory response syndrome) (H)    Diabetic peripheral neuropathy (H)    Hyponatremia    Hypochloremia    Open wound of left heel    Left leg swelling    Elevated C-reactive  protein (CRP)    Elevated erythrocyte sedimentation rate    Diabetic foot infection (H)    Elevated blood ketone body level    Osteomyelitis (H)                 Tobacco Use:     Tobacco Use      Smoking status: Never      Smokeless tobacco: Never     Diabetic: Type 2  HgbA1C:   Hemoglobin A1C   Date Value Ref Range Status   03/02/2025 16.8 (H) <5.7 % Final     Comment:     Normal <5.7%   Prediabetes 5.7-6.4%    Diabetes 6.5% or higher     Note: Adopted from ADA consensus guidelines.   04/21/2021 8.9 (H) 0 - 5.6 % Final     Comment:     Normal <5.7% Prediabetes 5.7-6.4%  Diabetes 6.5% or higher - adopted from ADA   consensus guidelines.  Results confirmed by repeat test     Checks Blood Glucose?:  Three times daily.  Average Readings: past few days most between 100 - 160.     Personal/social history:  Prior to hospitalization was working full time, owns [employer], more office side. Lives with wife Lisa who is an experienced RN here at [facility].  [facility] discharged 3/23, no home care nursing services in place.     Objective:   Current treatment plan: Cares performed in Wound and Ostomy clinic last visit and done by pt's wife since then at home.  Left lateral heel:  - Remove the old bandage and cleanse with wound cleanser, soap and water or saline, dry well with gauze.  - Plain packing strip gauze wet with Vashe, packed into the wound bed and 4x4 Cover Sponge wet with Vashe placed over the wounds and the surrounding skin, let sit approximately 5 minutes, remove, air dr or pat dry with gauze.    - Paint the skin around the wounds with the Povidone iodine swabs and let air dry.  - Fill the wound bed with Triad paste, using tongue depressor to press into the wound bed and to remove excess from periwound skin  - Cover the area with dry gauze pads and secure with Kerlix gauze or stretch gauze.  - Apply ace wrap with small amount of compression.  - Change the bandage daily and prn twice daily for drainage  control  Last changed: last evening.      Wound #1 Left Lateral Calcaneus - Surgical wound, Surgical debridement, DOS 3/4/25  Stage/tissue depth: Full Thickness   2.1 cm L x 0.4 cm W x 0.9 cm D   - (note on depth, proximal most is 0.6 cm D, mid vertical 0.5 cm D and distal most 0.2 cm D).  Tunneling: none  Undermining: none now that the intact tissue has dehisced.  Wound bed type/amount: wound bed is approximately 50 % granular tissue, 25 % red clean tissue with no granular buds present and 25 % yellow slough; not fluctuant  Wound Edges:  Approximately 50% closed with hyperkeratotic tissue  Periwound: scant pale erythema, hyperkeratotic tissue present  Drainage:  small amounts serosanguinous drainage.   Odor: none  Pain: Neuropathy, no sensation. No pain    Photo's from today's visit 5/9/25.      Photo's from 4/29/25.  Photo to the far left is with woc nurse spreading and holding the wound edges apart.      Photo's from 4/15/25.      Photo's from initial Wound and Ostomy clinic visit 3/10/25.      Photo's from initial inpatient visit 3/5/25.        Photo's from ED 3/2/25.     Dorsalis Pedal Pulse: 0/4 palpable: NA doppler: NA phasic  Hair growth: Absent from knee distally  Capillary Refill: 3 seconds  Feet/toes color: deep pink with erythema as listed in assessment   Nail  R Leg: Edema nonpitting firm leg and ankle. Ankle circumference  Not measured. Calf circumference Not measured.   L Leg: Edema nonpitting. Ankle circumference 36.5 cm. Calf circumference 37 cm.  - Not measured this visit 5/9.     Mobility: NWB to foot/heel  Current offloading/footwear: Gripper sock while inpatient.   Sensation: Absent at the foot      Other callousing/areas of concern:  New area noted today 4/15, skin tear related to pt's attempt to remove some callous of the Left plantar foot, distal aspect near the base of the 3rd MTH, 1 cm L x 1 cm W x 0 cm D, clean red denuded dermal tissue, small amounts sanguinous drainage.  No photo taken  today 4/15 of the new site, c nurse error.    Left Plantar heel, plantar at base off the 4th toe (was debrided in OR 3/4/25) and plantar great toe fissures.    Resolved  No photos's taken of the these sites today 5/9/25.      Photo's from 3/25/25.      Photo's from initial visit to the Wound and Ostomy clinic 3/10/25.    Photo from initial inpatient visit 3/5/25.    Diet: DM. Low carb.   Pt is following DM diet at home.     Assessment:  The left lateral heel wound needed irrigation with saline and syringe with blunt removal of built up Triad paste to see wound bed fully.  The wound continues to slowly improve but the proximal most aspect continues to have slough covering the wound bed.  Outer measurements are about the same, the depth is filling in in all areas.  Inferior aspect is near flush with the wound edge and is all granular tissue, the mid wound is mix of granular and clean nongranular tissue with scant slough.     Barriers to wound healing:   Poor nutrition: inadequate supply of protein, carbohydrates, fatty acids, and trace elements essential for all phases of wound healing - Educated that high protein diet is optimal for wound healing, goal is 100-120 g per day. Provided verbal list of foods rich in protein.   Reduced Blood Supply: inadequate perfusion to heal wound - No know ABIs   Medication: none  Chemotherapy: suppresses the immune system and inflammatory response -NA  Radiotherapy: increases production of free radical which damage cells -NA  Psychological stress: none noted  Obesity: decreases tissue perfusion- Appropriate weight and BMI   Infection: prolongs inflammatory phase, uses vital nutrients, impairs epithelialization and releases toxins - IV antibiotics discontinued 3/23, pt is on oral antibiotics.  Antimicrobial wound cleanser in use.  New topical antimicrobial started 5/9.  Underlying Disease: poorly controlled diabetes mellitis, educated on diet correction and following with PCP for  optimal management.   Maceration: reduces wound tensile strength and inhibits epithelial migration- present but plan of care adjusted to address  Patient compliance- Agrees to initial assessment   Unrelieved pressure- order for NWB to the left foot, has knee scooter and is using, fracture boot  is compliant with use since ordered by DPM.  Immobility- altered, but not immobile.   Substance abuse: NA     Plan:   We will use the Povidone iodine as we will change to a different primary dressing, Medihoney.  Drainage with the Medihoney may be higher, and the product itself is more moist and has potential to make wound edge and periwound skin too moist.  The povidone iodine will protect and keep dry.  Will plan for daily cares to continue promoting autolytic debridement of the remaining slough     Interventions to Barriers:  As listed above.     Discussed/Education provided: plan of care with rationale;     Topical care to Left Lateral Calcaneous.  Cares performed in clinic today and to be performed as directed between appointments.   - Remove the old bandage and cleanse with wound cleanser, soap and water or saline, dry well with gauze.  - Plain packing strip gauze wet with Vashe, packed into the wound bed and 4x4 Cover Sponge wet with Vashe placed over the wounds and the surrounding skin, let sit approximately 5 minutes, remove, air dr or pat dry with gauze.    - Paint the skin around the wounds with the Povidone iodine swabs and let air dry.  - Fill the wound bed with Medihoney gel.  - Cover the area with dry gauze pads and secure with Kerlix gauze or stretch gauze.  - Apply ace wrap with small amount of compression.  - Change the bandage daily and prn twice daily for drainage control    Pt is unable but his wife is able to perform cares/has been caring for wound.    Additional recommendations: Keep left foot elevated the majority of every hour while awake.    The following discharge instructions were reviewed with and  sent home with the patient:  Declined AVS    The following supplies were sent home with the patient:  Remains of the Medihoney gel opened today.    Return visit: Wound and Ostomy clinic 5/15.  DPM Dr Huff 5/20.  Diabetic Ed 5/23/25.      Verbal, written, & demonstrative education provided.  Face to face time: approximately 30 minutes  Procedure: JAROCHO Lopez RN, CWOCN  [phone]

## 2025-05-12 DIAGNOSIS — M86.9 DIABETES MELLITUS WITH OSTEOMYELITIS (H): ICD-10-CM

## 2025-05-12 DIAGNOSIS — E11.69 DIABETES MELLITUS WITH OSTEOMYELITIS (H): ICD-10-CM

## 2025-05-12 RX ORDER — PEN NEEDLE, DIABETIC 32GX 5/32"
NEEDLE, DISPOSABLE MISCELLANEOUS
Qty: 200 EACH | Refills: 3 | Status: SHIPPED | OUTPATIENT
Start: 2025-05-12

## 2025-05-12 NOTE — TELEPHONE ENCOUNTER
Medication passed protocol, however, refill RN could not approve because last prescribed at discharge from hospital. Provider, please approve or deny the prescription.

## 2025-05-15 ENCOUNTER — HOSPITAL ENCOUNTER (OUTPATIENT)
Dept: WOUND CARE | Facility: CLINIC | Age: 60
End: 2025-05-15
Attending: INTERNAL MEDICINE
Payer: COMMERCIAL

## 2025-05-15 PROCEDURE — G0463 HOSPITAL OUTPT CLINIC VISIT: HCPCS

## 2025-05-15 NOTE — PROGRESS NOTES
[facility]    Start of Care in [facility]: 3/5/2025, inpatient   Referring Provider: Dr. Darryl Huff   Primary Care Provider: [employer]   Wound Location: Left Lateral calcaneus  Surgery: Debridement left calcaneus with bone biopsy, 3/4/25 Darryl Huff DPM      Wound Clinic Visit:  Scheduled follow up.    Reason for Visit:  Follow up on left heel wound, left foot, evaluate current plan of care.     Subjective:  On arrival today 515/25 alone, he reports following: He with his wife's assist are performing the below wound cares.  He has had no new issues with the change to using Medihoney gel as primary dressing, no increased pain or drainage.  Feels the wound is making progress but to wife appeared a little more open last two dressing changes.  Pt reports he worked form home last few days and was off his feet, except for trips to the bathroom or kitchen and did not wear his fracture boot during at those times.     Wound History:  Pt presented to the ED here at [facility] on 3/2/25 and was admitted, discharge home 3/8.  Patient reports having dry, peeling skin to the lateral calcaneus, used this device to help remove the non-viable tissue (Amope').  At the time the area was dry with no excess moisture.  On 3/2/25 noted to have increase redness and moist skin and concerns for infection was evaluated at [facility] ER, later was admitted to Hospital for IV antibiotics.  MRI -  3/3/25 showed Soft tissue ulceration lateral to the calcaneus,  Mild bone marrow edema within the adjacent lateral calcaneus. Osteomyelitis is likely if this wound probes to bone. Adjacent subcutaneous fluid collection measuring up to 3.2 cm, suspicious for abscess.  Regional cellulitis. Podiatry was consulted 3/3, pt was taken to OR on 3/4  for surgical debridement of left ankle wound/abscess in addition to bone biopsy.  While inpatient the [facility] nurse team coordinated with GHANSHYAM for  assessments and teaching.  Wo saw pt on Fri 3/7 and set up plan for wound cares twice daily at discharge by pt's wife and scheduled for Wound and Ostomy clinic appointment for the following Monday 3/10.  On 3/7 woc HECTOR Lopez and Dr Huff DPTONIA discussed via phone options for wound cares and follow up post hospital discharge.  DPM questioned potential wound vac use, at that time I felt the two small open wounds would benefit from a vac but the surrounding tissue was too fluctuant to apply a vac without considerable risk for periwound skin and tissue breakdown.  Pt discharged home Saturday 3/8 with IV antibiotics via left arm PICC managed by [facility].  (Nafcillin 12 g in sodium chloride 0.9 % 240 mL via SMARTeZ pump Infuse 12 g over 24 hours once daily for 13 days).  Pt's wife Lisa  performing twice daily wound cares as directed at time of discharge, no new concerns, no odor noted.  Received knee scooter.  - Dr Huff 3/20, removed sutures, sharp dissection was performed minimally, dispensed fracture boot for transfers otherwise can be removed.  - PICC line removed 3/23, on oral antibiotics Zyvox.    - Tissue  the two open wounds dehisced in Wound and Ostomy clinic 3/25.  - Dr Huff 4/1, Recommended packing very lightly even just laying it in the wound as a wick then covering with gauze and a gauze roll and gentle compression.  Can be weightbearing for very short distances in a fracture boot.  Follow-up again with me in about a month and continue to follow-up with wound care.  We discussed utilizing a wound VAC however the patient prefers not to as he would need assistance probably with dressing changes.    Past Medical History:  Patient Active Problem List   Diagnosis    Chronic rhinitis    Type 2 diabetes mellitus with hyperglycemia (H)    Hyperlipidemia LDL goal <100    Venous lake on Right chest    Congenital nevus of Right upper back    Diabetic infection of left foot (H)    SIRS  (systemic inflammatory response syndrome) (H)    Diabetic peripheral neuropathy (H)    Hyponatremia    Hypochloremia    Open wound of left heel    Left leg swelling    Elevated C-reactive protein (CRP)    Elevated erythrocyte sedimentation rate    Diabetic foot infection (H)    Elevated blood ketone body level    Osteomyelitis (H)                 Tobacco Use:     Tobacco Use      Smoking status: Never      Smokeless tobacco: Never     Diabetic: Type 2  HgbA1C:   Hemoglobin A1C   Date Value Ref Range Status   03/02/2025 16.8 (H) <5.7 % Final     Comment:     Normal <5.7%   Prediabetes 5.7-6.4%    Diabetes 6.5% or higher     Note: Adopted from ADA consensus guidelines.   04/21/2021 8.9 (H) 0 - 5.6 % Final     Comment:     Normal <5.7% Prediabetes 5.7-6.4%  Diabetes 6.5% or higher - adopted from ADA   consensus guidelines.  Results confirmed by repeat test     Checks Blood Glucose?:  Three times daily.  Average Readings: past few days most between 100 - 160.  - Not assessed this visit, [facility] nurse error.     Personal/social history:  Prior to hospitalization was working full time, owns [employer], more office side. Lives with wife Lisa who is an experienced RN here at [facility].  [facility] discharged 3/23, no home care nursing services in place.     Objective:   Current treatment plan: Cares performed in Wound and Ostomy clinic last visit and done by pt's wife since then at home.  Left lateral heel:  - Remove the old bandage and cleanse with wound cleanser, soap and water or saline, dry well with gauze.  - Plain packing strip gauze wet with Vashe, packed into the wound bed and 4x4 Cover Sponge wet with Vashe placed over the wounds and the surrounding skin, let sit approximately 5 minutes, remove, air dr or pat dry with gauze.    - Paint the skin around the wounds with the Povidone iodine swabs and let air dry.  - Fill the wound bed with Medihoney gel.  - Cover the area with dry gauze pads and secure with Kerlix gauze  or stretch gauze.  - Apply ace wrap with small amount of compression.  - Change the bandage daily and prn twice daily for drainage control  Last changed: last evening.      Wound #1 Left Lateral Calcaneus - Surgical wound, Surgical debridement, DOS 3/4/25  Stage/tissue depth: Full Thickness   1.8 cm L x 0.3 cm W x 0.5 cm D   - (note on depth, proximal most is 0.5 cm D, mid vertical 0.4 cm D and distal most 0.1 cm D).  Tunneling: none  Undermining: none now that the intact tissue has dehisced.  Wound bed type/amount: wound bed is approximately 50 % granular tissue, 35 % red clean tissue with no granular buds present and 15 % yellow slough; not fluctuant  Wound Edges:  Approximately 20 % closed with hyperkeratotic tissue  Periwound: scant pale erythema  Drainage:  small amounts serosanguinous drainage.   Odor: none  Pain: Neuropathy, no sensation. No pain    Photo's from today's visit 5/15 after removal of some of the periwound callous/wound gel and hyperkeratotic tissue build up.      Photo's from 5/9/25.  Changed to use of Medihoney gel as primary dressing.      Photo's from initial Wound and Ostomy clinic visit 3/10/25.      Photo's from initial inpatient visit 3/5/25.        Photo's from ED 3/2/25.     Dorsalis Pedal Pulse: 0/4 palpable: NA doppler: NA phasic  Hair growth: Absent from knee distally  Capillary Refill: 3 seconds  Feet/toes color: deep pink with erythema as listed in assessment   Nail  R Leg: Edema nonpitting firm leg and ankle. Ankle circumference  Not measured. Calf circumference Not measured.   L Leg: Edema nonpitting. Ankle circumference 36.5 cm. Calf circumference 37 cm.  - Not measured this visit 5/15.     Mobility: NWB to foot/heel  Current offloading/footwear: Gripper sock while inpatient.   Sensation: Absent at the foot      Other callousing/areas of concern:  New area noted today 4/15, skin tear related to pt's attempt to remove some callous of the Left plantar foot, distal aspect near the  base of the 3rd MTH, 1 cm L x 1 cm W x 0 cm D, clean red denuded dermal tissue, small amounts sanguinous drainage.  No photo taken today 4/15 of the [place], [facility] nurse error.    Left Plantar heel, plantar at base off the 4th toe (was debrided in OR 3/4/25) and plantar great toe fissures.    Resolved    Photo's from today's visit 5/15/25.      Photo's from initial visit to the Wound and Ostomy clinic 3/10/25.    Photo from initial inpatient visit 3/5/25.    Diet: DM. Low carb.   Pt is following DM diet at home.     Assessment:  Pt arrives today in clinic with fracture boot and appropriate dressing in place on the left LE, lateral heel wound.  Dressing removed, wound cleansed.      Noted the periwound callous, wound gel and hyperkeratotic tissue build up present but loose.  With use of Sween 24 and wooden end of a cotton tipped applicator able to remove most today.    Wound rinsed with saline and cleansed inside with saline damp cotton tipped applicator.     Wound continues to progress well, smaller in all aspects today, with removal of most of the build up of gel, callous and hyperkeratotic tissue should be able to get distal end to close soon.  Proximal most aspect continues to have some slough but less this visit and depth is less in the proximal, mid and distal aspects of the wound bed.    Barriers to wound healing:   Poor nutrition: inadequate supply of protein, carbohydrates, fatty acids, and trace elements essential for all phases of wound healing - Educated that high protein diet is optimal for wound healing, goal is 100-120 g per day. Provided verbal list of foods rich in protein.   Reduced Blood Supply: inadequate perfusion to heal wound - No know ABIs   Medication: none  Chemotherapy: suppresses the immune system and inflammatory response -NA  Radiotherapy: increases production of free radical which damage cells -NA  Psychological stress: none noted  Obesity: decreases tissue perfusion- Appropriate weight  and BMI   Infection: prolongs inflammatory phase, uses vital nutrients, impairs epithelialization and releases toxins - IV antibiotics discontinued 3/23, pt is on oral antibiotics.  Antimicrobial wound cleanser in use.  New topical antimicrobial started 5/9.  Underlying Disease: poorly controlled diabetes mellitis, educated on diet correction and following with PCP for optimal management.   Maceration: reduces wound tensile strength and inhibits epithelial migration- present but plan of care adjusted to address  Patient compliance- Agrees to initial assessment   Unrelieved pressure- order for NWB to the left foot, has knee scooter and is using, fracture boot  is compliant with use since ordered by DPM.  Immobility- altered, but not immobile.   Substance abuse: NA     Plan:   We will continue with the plan of care listed below.  Pt is set to see Dr Huff next week and will see me again in two weeks.     Interventions to Barriers:  As listed above.     Discussed/Education provided: plan of care with rationale;     Topical care to Left Lateral Calcaneous.  Cares performed in clinic today and to be performed as directed between appointments.   - Remove the old bandage and cleanse with wound cleanser, soap and water or saline, dry well with gauze.  - Plain packing strip gauze wet with Vashe, packed into the wound bed and 4x4 Cover Sponge wet with Vashe placed over the wounds and the surrounding skin, let sit approximately 5 minutes, remove, air dr or pat dry with gauze.    - Paint the skin around the wounds with the Povidone iodine swabs and let air dry.  - Fill the wound bed with Medihoney gel.  - Cover the area with dry gauze pads and secure with Kerlix gauze or stretch gauze.  - Apply ace wrap with small amount of compression.  - Change the bandage daily and prn twice daily for drainage control    Pt is unable but his wife is able to perform cares/has been caring for wound.    Additional recommendations: Keep left  foot elevated the majority of every hour while awake.    The following discharge instructions were reviewed with and sent home with the patient:  Declined AVS    The following supplies were sent home with the patient:  None this visit    Return visit: DPM Dr Huff 5/20.  Diabetic Ed 5/23/25.  Wound and Ostomy clinic 5/29/25.    Verbal, written, & demonstrative education provided.  Face to face time: approximately 25 minutes  Procedure: JAROCHO Lopez RN, CWOCN  [phone]

## 2025-05-20 ENCOUNTER — OFFICE VISIT (OUTPATIENT)
Dept: PODIATRY | Facility: CLINIC | Age: 60
End: 2025-05-20
Payer: COMMERCIAL

## 2025-05-20 ENCOUNTER — ANCILLARY PROCEDURE (OUTPATIENT)
Dept: GENERAL RADIOLOGY | Facility: CLINIC | Age: 60
End: 2025-05-20
Attending: PODIATRIST
Payer: COMMERCIAL

## 2025-05-20 VITALS
BODY MASS INDEX: 29.26 KG/M2 | HEIGHT: 74 IN | TEMPERATURE: 97.2 F | SYSTOLIC BLOOD PRESSURE: 124 MMHG | WEIGHT: 228 LBS | DIASTOLIC BLOOD PRESSURE: 62 MMHG

## 2025-05-20 DIAGNOSIS — E11.65 TYPE 2 DIABETES MELLITUS WITH HYPERGLYCEMIA, WITHOUT LONG-TERM CURRENT USE OF INSULIN (H): ICD-10-CM

## 2025-05-20 DIAGNOSIS — S91.302D OPEN WOUND OF LEFT HEEL, SUBSEQUENT ENCOUNTER: ICD-10-CM

## 2025-05-20 DIAGNOSIS — R65.10 SIRS (SYSTEMIC INFLAMMATORY RESPONSE SYNDROME) (H): ICD-10-CM

## 2025-05-20 DIAGNOSIS — E11.42 DIABETIC PERIPHERAL NEUROPATHY (H): ICD-10-CM

## 2025-05-20 DIAGNOSIS — E11.65 TYPE 2 DIABETES MELLITUS WITH HYPERGLYCEMIA, WITHOUT LONG-TERM CURRENT USE OF INSULIN (H): Primary | ICD-10-CM

## 2025-05-20 PROCEDURE — 73630 X-RAY EXAM OF FOOT: CPT | Mod: TC | Performed by: STUDENT IN AN ORGANIZED HEALTH CARE EDUCATION/TRAINING PROGRAM

## 2025-05-20 PROCEDURE — 3078F DIAST BP <80 MM HG: CPT | Performed by: PODIATRIST

## 2025-05-20 PROCEDURE — 1126F AMNT PAIN NOTED NONE PRSNT: CPT | Performed by: PODIATRIST

## 2025-05-20 PROCEDURE — 99213 OFFICE O/P EST LOW 20 MIN: CPT | Performed by: PODIATRIST

## 2025-05-20 PROCEDURE — 3074F SYST BP LT 130 MM HG: CPT | Performed by: PODIATRIST

## 2025-05-20 ASSESSMENT — PAIN SCALES - GENERAL: PAINLEVEL_OUTOF10: NO PAIN (0)

## 2025-05-20 NOTE — PROGRESS NOTES
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    Subjective: Patient reports for followup of DOS 3/4/2025 - Surgical debridement left calcaneus and left ankle, with bone biopsy left calcaneus procedure.  Follows with wound care Mesalt and packing  Was on IV nafcillin 24 hour pump.  Eating with appetite.  BG has been  Painting adjusted insulin since discharge  Sees DM educator twice since discharge and Endocrine consult late May.   COMPLETED ABX 4/14/25 per infectious disease  Met DM edu twice.   Has CGM now. And better control and understanding of blood glucose but still getting very highs off and on.    EXAM:  No apparent distress, patient is relaxed and comfortable.   Vitals: /62   Temp 97.2  F (36.2  C) (Temporal)   Ht 1.88 m (6' 2\")   Wt 103.4 kg (228 lb)   BMI 29.27 kg/m    Vasc:  DP and PT pulses palpable bilateral, no erythema or heat.  No hair growth bilateral lower extremities.  Temperature is warm to warm proximal to distal.  No erythema or heat   neuro: Plantar response equal intact bilateral.  Complete loss of protective threshold bilateral limbs 0/10 applications of a 5.07 monofilament.  Plantar response equal intact bilateral.  Derm: Mildly diminished texture turgor tone about the integument.  The wound measures about 28 mm x 5 mm in greatest dimensions full-thickness as deep as 5 mm.  Musc: Adequate reduction of deformity identified. No complications.  Still considerable edema through the lateral left foot    Labs:   3/21/25 CRP and WBC normal      Hemoglobin A1C (%)   Date Value   03/02/2025 16.8 (H)   04/02/2024 13.8 (H)   04/21/2021 8.9 (H)   01/14/2020 10.4 (H)   11/02/2018 6.1 (H)   07/18/2018 11.3 (H) "   10/01/2012 8.2 (H)   06/08/2011 6.3 (H)     Creatinine (mg/dL)   Date Value   03/20/2025 0.86   03/17/2025 0.89   03/13/2025 0.90   03/11/2025 1.05   03/07/2025 0.76   03/06/2025 0.81   04/21/2021 0.70   11/02/2018 0.72   07/18/2018 0.68   10/01/2012 0.83   01/21/2011 0.76     Photo taken 4/1/25:       Picture 4/22/2025:        imaging:  MRI 3/3/25 preop with abscess lateral calc      Assessment:      ICD-10-CM    1. Type 2 diabetes mellitus with hyperglycemia, without long-term current use of insulin (H)  E11.65 XR Foot Left G/E 3 Views      2. Open wound of left heel, subsequent encounter  S91.302D XR Foot Left G/E 3 Views      3. Diabetic peripheral neuropathy (H)  E11.42 XR Foot Left G/E 3 Views      4. SIRS (systemic inflammatory response syndrome) (H)  R65.10 XR Foot Left G/E 3 Views          Plan:    3/20/2025  Is on nafcillin 24 hour pump IV.  Eating with appetite.  BG is .  Mert adjusted insulin  Sees DM educator in a couple months and Endocrine consult.     Packing was removed.  Sharp dissection was performed minimally today and the sutures were removed.  Was cleansed with wound wash and packed with Mesalt  Dispensed a fracture boot for transfers otherwise can be removed  Considerable edema remains.  Wound healing is very delayed.  No visible malodor or heat noted.    Will consider repeat imaging as guidded by ID.    Follow-up in 3 weeks.    4/1/2025  Started oral antibiotic Zyvox per infectious disease and will complete this for 23 more days  Is using Mesalt in the wound after saline wound wash.  Recommend packing this very lightly even just laying it in the wound as a wick then covering with gauze and a gauze roll and gentle compression.  Can be weightbearing for very short distances in a fracture boot.  Follow-up again with me in about a month and continue to follow-up with wound care.  We discussed utilizing a wound VAC however the patient prefers not to as he would need assistance probably  with dressing changes.    4/22/2025  Debrided wound to bleeding base with a 15 blade scalpel to and including subcutaneous tissue but not tendon muscle or bone  Recommend compression during day with ACE or stockinet.  Stay in boot for WB.     Can be on a stationary bike   May try two strips of packing or alginate packed distal and proximal but not central.  Encourage compression  Encouraged much improved blood glucose which has improved considerably and we will continue to monitor this with CGM.  Recommend A1c below 7.5.  Will repeat imaging WBC CRP with any increased erythema heat edema or malodor.  Continue WOC RN and send communication to their team  RTC 3-4 weeks    5/20/2025  Has been weight lifting and using medihoney and WOC RN every other week.   Today I debrided with a 15 blade scalpel to and including subcutaneous tissue and all wound edges.  The entire laceration wound measures about 15 mm in length.  The distalmost portion of this is very well-approximated but still open.  It does not probe to bone.  Therefore today I placed 2 stitches and the most distal portion of it to reapproximate this firmly but left the most proximal portion of the previous wound open to drain and continue granulation.  This will help stabilize the wound edges and hopefully close this wound shortly and will not trap anything in.  Follow-up in 2 weeks.  Continue wound care otherwise as he was doing.      Darryl Huff DPM

## 2025-05-20 NOTE — LETTER
"5/20/2025      Luke Farias  [address] [address] [id number]      Dear Colleague,    Thank you for referring your patient, Luke Farias, to the [facility]. Please see a copy of my visit note below.    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    Subjective: Patient reports for followup of DOS 3/4/2025 - Surgical debridement left calcaneus and left ankle, with bone biopsy left calcaneus procedure.  Follows with wound care Mesalt and packing  Was on IV nafcillin 24 hour pump.  Eating with appetite.  BG has been  Painting adjusted insulin since discharge  Sees DM educator twice since discharge and Endocrine consult late May.   COMPLETED ABX 4/14/25 per infectious disease  Met DM edu twice.   Has CGM now. And better control and understanding of blood glucose but still getting very highs off and on.    EXAM:  No apparent distress, patient is relaxed and comfortable.   Vitals: /62   Temp 97.2  F (36.2  C) (Temporal)   Ht 1.88 m (6' 2\")   Wt 103.4 kg (228 lb)   BMI 29.27 kg/m    Vasc:  DP and PT pulses palpable bilateral, no erythema or heat.  No hair growth bilateral lower extremities.  Temperature is warm to warm proximal to distal.  No erythema or heat   neuro: Plantar response equal intact bilateral.  Complete loss of protective threshold bilateral limbs 0/10 applications of a 5.07 monofilament.  Plantar response equal intact bilateral.  Derm: Mildly diminished texture turgor tone about the integument.  The wound measures about 28 mm x 5 mm in greatest dimensions full-thickness as deep as 5 mm.  Musc: Adequate reduction of deformity identified. No complications.  Still considerable edema " through the lateral left foot    Labs:   3/21/25 CRP and WBC normal      Hemoglobin A1C (%)   Date Value   03/02/2025 16.8 (H)   04/02/2024 13.8 (H)   04/21/2021 8.9 (H)   01/14/2020 10.4 (H)   11/02/2018 6.1 (H)   07/18/2018 11.3 (H)   10/01/2012 8.2 (H)   06/08/2011 6.3 (H)     Creatinine (mg/dL)   Date Value   03/20/2025 0.86   03/17/2025 0.89   03/13/2025 0.90   03/11/2025 1.05   03/07/2025 0.76   03/06/2025 0.81   04/21/2021 0.70   11/02/2018 0.72   07/18/2018 0.68   10/01/2012 0.83   01/21/2011 0.76     Photo taken 4/1/25:       Picture 4/22/2025:        imaging:  MRI 3/3/25 preop with abscess lateral calc      Assessment:      ICD-10-CM    1. Type 2 diabetes mellitus with hyperglycemia, without long-term current use of insulin (H)  E11.65 XR Foot Left G/E 3 Views      2. Open wound of left heel, subsequent encounter  S91.302D XR Foot Left G/E 3 Views      3. Diabetic peripheral neuropathy (H)  E11.42 XR Foot Left G/E 3 Views      4. SIRS (systemic inflammatory response syndrome) (H)  R65.10 XR Foot Left G/E 3 Views          Plan:    3/20/2025  Is on nafcillin 24 hour pump IV.  Eating with appetite.  BG is .  Mert adjusted insulin  Sees DM educator in a couple months and Endocrine consult.     Packing was removed.  Sharp dissection was performed minimally today and the sutures were removed.  Was cleansed with wound wash and packed with Mesalt  Dispensed a fracture boot for transfers otherwise can be removed  Considerable edema remains.  Wound healing is very delayed.  No visible malodor or heat noted.    Will consider repeat imaging as guidded by ID.    Follow-up in 3 weeks.    4/1/2025  Started oral antibiotic Zyvox per infectious disease and will complete this for 23 more days  Is using Mesalt in the wound after saline wound wash.  Recommend packing this very lightly even just laying it in the wound as a wick then covering with gauze and a gauze roll and gentle compression.  Can be weightbearing for  very short distances in a fracture boot.  Follow-up again with me in about a month and continue to follow-up with wound care.  We discussed utilizing a wound VAC however the patient prefers not to as he would need assistance probably with dressing changes.    4/22/2025  Debrided wound to bleeding base with a 15 blade scalpel to and including subcutaneous tissue but not tendon muscle or bone  Recommend compression during day with ACE or stockinet.  Stay in boot for WB.     Can be on a stationary bike   May try two strips of packing or alginate packed distal and proximal but not central.  Encourage compression  Encouraged much improved blood glucose which has improved considerably and we will continue to monitor this with CGM.  Recommend A1c below 7.5.  Will repeat imaging WBC CRP with any increased erythema heat edema or malodor.  Continue WOC RN and send communication to their team  RTC 3-4 weeks    5/20/2025  Has been weight lifting and using medihoney and WOC RN every other week.   Today I debrided with a 15 blade scalpel to and including subcutaneous tissue and all wound edges.  The entire laceration wound measures about 15 mm in length.  The distalmost portion of this is very well-approximated but still open.  It does not probe to bone.  Therefore today I placed 2 stitches and the most distal portion of it to reapproximate this firmly but left the most proximal portion of the previous wound open to drain and continue granulation.  This will help stabilize the wound edges and hopefully close this wound shortly and will not trap anything in.  Follow-up in 2 weeks.  Continue wound care otherwise as he was doing.      Darryl Huff DPM            Again, thank you for allowing me to participate in the care of your patient.        Sincerely,        Darryl Huff DPM    Electronically signed

## 2025-05-26 ENCOUNTER — PATIENT OUTREACH (OUTPATIENT)
Dept: CARE COORDINATION | Facility: CLINIC | Age: 60
End: 2025-05-26
Payer: COMMERCIAL

## 2025-05-28 ENCOUNTER — PATIENT OUTREACH (OUTPATIENT)
Dept: CARE COORDINATION | Facility: CLINIC | Age: 60
End: 2025-05-28
Payer: COMMERCIAL

## 2025-05-29 ENCOUNTER — LAB (OUTPATIENT)
Dept: LAB | Facility: CLINIC | Age: 60
End: 2025-05-29
Payer: COMMERCIAL

## 2025-05-29 ENCOUNTER — HOSPITAL ENCOUNTER (OUTPATIENT)
Dept: WOUND CARE | Facility: CLINIC | Age: 60
Discharge: HOME | End: 2025-05-29
Attending: INTERNAL MEDICINE | Admitting: INTERNAL MEDICINE
Payer: COMMERCIAL

## 2025-05-29 DIAGNOSIS — E11.65 TYPE 2 DIABETES MELLITUS WITH HYPERGLYCEMIA, UNSPECIFIED WHETHER LONG TERM INSULIN USE (H): ICD-10-CM

## 2025-05-29 LAB
EST. AVERAGE GLUCOSE BLD GHB EST-MCNC: 163 MG/DL
HBA1C MFR BLD: 7.3 %

## 2025-05-29 PROCEDURE — G0463 HOSPITAL OUTPT CLINIC VISIT: HCPCS

## 2025-05-29 NOTE — PROGRESS NOTES
[facility]    Start of Care in [facility]: 3/5/2025, inpatient   Referring Provider: Dr. Darryl Huff   Primary Care Provider: [employer]   Wound Location: Left Lateral calcaneus  Surgery: Debridement left calcaneus with bone biopsy, 3/4/25 Darryl Huff DPM      Wound Clinic Visit:  Scheduled follow up.    Reason for Visit:  Follow up on left heel wound, left foot, evaluate current plan of care.     Subjective:  On arrival today 5/29/25 alone, he reports following: Pt was seen by DPTONIA Huff on 5/20 and provider sutured closed the left lateral heel wound.  He has continued with his wife's assist to do the wound cares as listed below though no wound bed to fill.  He is scheduled to return to see Dr Huff on 6/3 for suture removal.  He continues to wear the fracture boot.  Blood sugars continue to be high and he is scheduled for a follow up with the diabetic educator.      Wound History:  Pt presented to the ED here at [facility] on 3/2/25 and was admitted, discharge home 3/8.  Patient reports having dry, peeling skin to the lateral calcaneus, used this device to help remove the non-viable tissue (Amope').  At the time the area was dry with no excess moisture.  On 3/2/25 noted to have increase redness and moist skin and concerns for infection was evaluated at [facility] ER, later was admitted to Hospital for IV antibiotics.  MRI -  3/3/25 showed Soft tissue ulceration lateral to the calcaneus,  Mild bone marrow edema within the adjacent lateral calcaneus. Osteomyelitis is likely if this wound probes to bone. Adjacent subcutaneous fluid collection measuring up to 3.2 cm, suspicious for abscess.  Regional cellulitis. Podiatry was consulted 3/3, pt was taken to OR on 3/4  for surgical debridement of left ankle wound/abscess in addition to bone biopsy.  While inpatient the [facility] nurse team coordinated with DPTONIA for assessments and teaching.  [facility] saw pt on  Fri 3/7 and set up plan for wound cares twice daily at discharge by pt's wife and scheduled for Wound and Ostomy clinic appointment for the following Monday 3/10.  On 3/7 woc HECTOR Lopez and Dr Huff DPTONIA discussed via phone options for wound cares and follow up post hospital discharge.  GHANSHYAM questioned potential wound vac use, at that time I felt the two small open wounds would benefit from a vac but the surrounding tissue was too fluctuant to apply a vac without considerable risk for periwound skin and tissue breakdown.  Pt discharged home Saturday 3/8 with IV antibiotics via left arm PICC managed by  [facility].  (Nafcillin 12 g in sodium chloride 0.9 % 240 mL via SMARTeZ pump Infuse 12 g over 24 hours once daily for 13 days).  Pt's wife Lisa  performing twice daily wound cares as directed at time of discharge, no new concerns, no odor noted.  Received knee scooter.  - Dr Huff 3/20, removed sutures, sharp dissection was performed minimally, dispensed fracture boot for transfers otherwise can be removed.  - PICC line removed 3/23, on oral antibiotics Zyvox.    - Tissue  the two open wounds dehisced in Wound and Ostomy clinic 3/25.  - Dr Huff 4/1, Recommended packing very lightly even just laying it in the wound as a wick then covering with gauze and a gauze roll and gentle compression.  Can be weightbearing for very short distances in a fracture boot.  Follow-up again with me in about a month and continue to follow-up with wound care.  We discussed utilizing a wound VAC however the patient prefers not to as he would need assistance probably with dressing changes.    Past Medical History:  Patient Active Problem List   Diagnosis    Chronic rhinitis    Type 2 diabetes mellitus with hyperglycemia (H)    Hyperlipidemia LDL goal <100    Venous lake on Right chest    Congenital nevus of Right upper back    Diabetic infection of left foot (H)    SIRS (systemic inflammatory response syndrome) (H)     Diabetic peripheral neuropathy (H)    Hyponatremia    Hypochloremia    Open wound of left heel    Left leg swelling    Elevated C-reactive protein (CRP)    Elevated erythrocyte sedimentation rate    Diabetic foot infection (H)    Elevated blood ketone body level    Osteomyelitis (H)                 Tobacco Use:     Tobacco Use      Smoking status: Never      Smokeless tobacco: Never     Diabetic: Type 2  HgbA1C:   Hemoglobin A1C   Date Value Ref Range Status   03/02/2025 16.8 (H) <5.7 % Final     Comment:     Normal <5.7%   Prediabetes 5.7-6.4%    Diabetes 6.5% or higher     Note: Adopted from ADA consensus guidelines.   04/21/2021 8.9 (H) 0 - 5.6 % Final     Comment:     Normal <5.7% Prediabetes 5.7-6.4%  Diabetes 6.5% or higher - adopted from ADA   consensus guidelines.  Results confirmed by repeat test     Checks Blood Glucose?:  Three times daily.  Average Readings: Most in the 170's and some in the 200 range.  A1C to be redrawn today.       Personal/social history:  Prior to hospitalization was working full time, owns [employer], more office side. Lives with wife Lisa who is an experienced RN here at [facility].  [facility] discharged 3/23, no home care nursing services in place.     Objective:   Current treatment plan: Cares performed in Wound and Ostomy clinic last visit and done by pt's wife since then at home.  Left lateral heel:  - Remove the old bandage and cleanse with wound cleanser, soap and water or saline, dry well with gauze.  - Plain packing strip gauze wet with Vashe, packed into the wound bed and 4x4 Cover Sponge wet with Vashe placed over the wounds and the surrounding skin, let sit approximately 5 minutes, remove, air dr or pat dry with gauze.    - Paint the skin around the wounds with the Povidone iodine swabs and let air dry.  - Fill the wound bed with Medihoney gel.  - Cover the area with dry gauze pads and secure with Kerlix gauze or stretch gauze.  - Apply ace wrap with small amount of  compression.  - Change the bandage daily and prn twice daily for drainage control  Last changed: last evening.      Wound #1 Left Lateral Calcaneus - Surgical wound, Surgical debridement, DOS 3/4/25  Stage/tissue depth: Full Thickness   1.2 cm L x 0.1 cm W x 0.1 cm D   Tunneling: none  Undermining: none now that the intact tissue has dehisced.  Wound bed type/amount: 90 % two sutures present edges well approximated and 10 % proximal most aspect small open area of 0.4 cm L x 0.1 cm W x 0.1 cm D, wound bed within is 100 % granular tissue as able to visualize; not fluctuant  Wound Edges:  open where there is small open aspect at the most proximal  Periwound: scant pale erythema  Drainage:  none noted today on dressing removed and pt reports no recent drainage  Odor: none  Pain: Neuropathy, no sensation. No pain    Photo's from today's visit 5/29/25.      Photo's from 5/15 after removal of some of the periwound callous/wound gel and hyperkeratotic tissue build up.      Photo's from 5/9/25.  Changed to use of Medihoney gel as primary dressing.      Photo's from initial Wound and Ostomy clinic visit 3/10/25.      Photo's from initial inpatient visit 3/5/25.        Photo's from ED 3/2/25.     Dorsalis Pedal Pulse: 0/4 palpable: NA doppler: NA phasic  Hair growth: Absent from knee distally  Capillary Refill: 3 seconds  Feet/toes color: deep pink with erythema as listed in assessment   Nail  R Leg: Edema nonpitting firm leg and ankle. Ankle circumference  Not measured. Calf circumference Not measured.   L Leg: Edema nonpitting. Ankle circumference 36.5 cm. Calf circumference 37 cm.  - Not measured this visit 5/29.     Mobility: NWB to foot/heel  Current offloading/footwear: Gripper sock while inpatient.   Sensation: Absent at the foot      Other callousing/areas of concern:  None currently, past fissures and tears to the left plantar foot and great toe resolved.     Diet: DM. Low carb.   Pt is following DM diet at home.      Assessment:  Pt arrives today in clinic with fracture boot and appropriate dressing in place on the left LE, lateral heel wound.  Dressing removed, wound cleansed.    The wound is mostly well approximated with no open aspect aside from small area with 1 mm of depth at the most proximal aspect.  No periwound concerns noted.    Barriers to wound healing:   Poor nutrition: inadequate supply of protein, carbohydrates, fatty acids, and trace elements essential for all phases of wound healing - Educated that high protein diet is optimal for wound healing, goal is 100-120 g per day. Provided verbal list of foods rich in protein.   Reduced Blood Supply: inadequate perfusion to heal wound - No know ABIs   Medication: none  Chemotherapy: suppresses the immune system and inflammatory response -NA  Radiotherapy: increases production of free radical which damage cells -NA  Psychological stress: none noted  Obesity: decreases tissue perfusion- Appropriate weight and BMI   Infection: prolongs inflammatory phase, uses vital nutrients, impairs epithelialization and releases toxins - Pt has completed all antiboitics oral and IV ordered.  Use topical wound cleanser, Vashe ongoing.  Underlying Disease: poorly controlled diabetes mellitis, educated on diet correction and following with PCP for optimal management.   Maceration: reduces wound tensile strength and inhibits epithelial migration- present but plan of care adjusted to address  Patient compliance- Agrees to initial assessment   Unrelieved pressure- order for NWB to the left foot, has knee scooter and is using, fracture boot  is compliant with use since ordered by DPM.  Immobility- altered, but not immobile.   Substance abuse: NA     Plan:   We will change a plan for the cares as below.  Stopping the Medihoney for now.    I also instructe him on the following:  after he see's Dr Huff and sutures are removed, it any part of the incision opens, fill the open wound with a small  dab of the Medihoney gel and continue with the same additional cares as listed below.  IF no open wound when you see Dr Huff ok to continue with the same cares above with no Medihoney.      Interventions to Barriers:  As listed above.     Discussed/Education provided: plan of care with rationale;     Topical care to Left Lateral Calcaneous.  Cares performed in clinic today and to be performed as directed between appointments.   - Remove the old bandage and cleanse with wound cleanser, soap and water or saline, dry well with gauze.  - Rinse the wound site with Vashe via small syringe with no needle.  - Paint the wound site with Povidone iodine.    - Cover the area with dry gauze pads and secure with Kerlix gauze or stretch gauze.  - Apply ace wrap with small amount of compression.  - Change the bandage daily.    Pt is unable but his wife is able to perform cares/has been caring for wound.    Additional recommendations: Keep left foot elevated the majority of every hour while awake.    The following discharge instructions were reviewed with and sent home with the patient:  See AVS    The following supplies were sent home with the patient:  None this visit, extra syringes, stretch gauze.    Return visit: DPM Dr Huff 6/3.  Wound and Ostomy clinic 6/13/25.    Verbal, written, & demonstrative education provided.  Face to face time: approximately 25 minutes  Procedure: JAROCHO Lopez RN, CWOCN  [phone]

## 2025-05-29 NOTE — PATIENT INSTRUCTIONS
Today the wound looks really good and we will change a plan for the cares as below.  Stop the Medihoney for now.    Cleanse with normal cleaning.  Then rinse with a small syringe with Vashe to flush the small opening of the incision well.  Dry well.    Paint the wound with povidone iodine or betadine and let dry.  Cover with dry gauze and secure with the stretch gauze.  Continue to change daily.    Ace wrap is optional when sitting or limited activity.  When your up on your feet for longer times I would recommend keeping the ace wrap on to keep any swelling in check.    I will see you the week after you see Dr Huff.  If all looks good with the wound you can push that appointment back a week or two.    After you see Daria and he removes the sutures, it any part of the incision opens, will it with a small dab of the Medihoney gel and continue with the same additional cares as listed above.    IF no open wound when you see Dr Huff ok to continue with the same cares above with no Medihoney.    Luciano Lopez Rn cwocn  [phone].

## 2025-05-31 ENCOUNTER — RESULTS FOLLOW-UP (OUTPATIENT)
Dept: ENDOCRINOLOGY | Facility: CLINIC | Age: 60
End: 2025-05-31

## 2025-06-03 ENCOUNTER — OFFICE VISIT (OUTPATIENT)
Dept: PODIATRY | Facility: CLINIC | Age: 60
End: 2025-06-03
Payer: COMMERCIAL

## 2025-06-03 VITALS — TEMPERATURE: 97.7 F | HEIGHT: 74 IN | BODY MASS INDEX: 30.29 KG/M2 | WEIGHT: 236 LBS

## 2025-06-03 DIAGNOSIS — E11.65 TYPE 2 DIABETES MELLITUS WITH HYPERGLYCEMIA, WITHOUT LONG-TERM CURRENT USE OF INSULIN (H): Primary | ICD-10-CM

## 2025-06-03 DIAGNOSIS — S91.302D OPEN WOUND OF LEFT HEEL, SUBSEQUENT ENCOUNTER: ICD-10-CM

## 2025-06-03 DIAGNOSIS — E11.42 DIABETIC PERIPHERAL NEUROPATHY (H): ICD-10-CM

## 2025-06-03 PROCEDURE — 99213 OFFICE O/P EST LOW 20 MIN: CPT | Performed by: PODIATRIST

## 2025-06-03 PROCEDURE — 1126F AMNT PAIN NOTED NONE PRSNT: CPT | Performed by: PODIATRIST

## 2025-06-03 ASSESSMENT — PAIN SCALES - GENERAL: PAINLEVEL_OUTOF10: NO PAIN (0)

## 2025-06-03 NOTE — LETTER
"6/3/2025      Luke Farias  [address] [id number]      Dear Colleague,    Thank you for referring your patient, Luke Farias, to the [facility]. Please see a copy of my visit note below.    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    Subjective: Patient reports for followup of DOS 3/4/2025 - Surgical debridement left calcaneus and left ankle, with bone biopsy left calcaneus procedure.  Follows with WOC RN Wily and packing  Was on IV nafcillin 24 hour pump.  Eating with appetite.  BG has been  Painting adjusted insulin since discharge  Sees DM educator twice since discharge and Endocrine consult with Dr. BLOUNT, late May.   COMPLETED ABX 4/14/25 per infectious disease  Has CGM now. And better control and understanding of blood glucose but still getting very highs off and on.     Since last visit there has been adjustment with increase metformin and endocrine would like to see him get more active back to normal activities then adjust the insulin further.    EXAM:  No apparent distress, patient is relaxed and comfortable.   Vitals: Temp 97.7  F (36.5  C) (Temporal)   Ht 1.88 m (6' 2\")   Wt 107 kg (236 lb)   BMI 30.30 kg/m    Vasc:  DP and PT pulses palpable bilateral, no erythema or heat.  No hair growth bilateral lower extremities.  Temperature is warm to warm proximal to distal.  No erythema or heat   Neuro: Plantar response equal intact bilateral.  Complete loss of protective threshold bilateral limbs 0/10 applications of a 5.07 monofilament.  Plantar response equal intact bilateral.  Derm: Mildly diminished texture turgor tone about the integument.  The wound measured about 28 mm x 5 mm in greatest dimensions " full-thickness as deep as 5 mm.  However today sutures remain intact in the distal two thirds of the wound is completely closed and well-approximated.  No erythema noted.  The most proximal portion of the wound about 5-6 mm remains very subtly open by 2 or 4 mm in width and depth.  No erythema heat or edema.  Nothing can be expressed from it minimal if any induration is noted surrounding this.  No pain with compression of the calcaneus.  No heat or erythema noted.  Musc: Adequate reduction of deformity identified. No complications.  Still considerable edema through the lateral left foot    Labs:   3/21/25 CRP and WBC normal      Hemoglobin A1C (%)   Date Value   05/29/2025 7.3 (H)   03/02/2025 16.8 (H)   04/02/2024 13.8 (H)   04/21/2021 8.9 (H)   01/14/2020 10.4 (H)   11/02/2018 6.1 (H)   07/18/2018 11.3 (H)   10/01/2012 8.2 (H)   06/08/2011 6.3 (H)     Creatinine (mg/dL)   Date Value   03/20/2025 0.86   03/17/2025 0.89   03/13/2025 0.90   03/11/2025 1.05   03/07/2025 0.76   03/06/2025 0.81   04/21/2021 0.70   11/02/2018 0.72   07/18/2018 0.68   10/01/2012 0.83   01/21/2011 0.76     Photo taken 4/1/25:       Picture 4/22/2025:      Photo taken 6/3/25:     imaging:  MRI 3/3/25 preop with abscess lateral calc    Assessment:      ICD-10-CM    1. Type 2 diabetes mellitus with hyperglycemia, without long-term current use of insulin (H)  E11.65       2. Open wound of left heel, subsequent encounter  S91.302D       3. Diabetic peripheral neuropathy (H)  E11.42         Plan:    3/20/2025  Is on nafcillin 24 hour pump IV.  Eating with appetite.  BG is .  Mert adjusted insulin  Sees DM educator in a couple months and Endocrine consult.     Packing was removed.  Sharp dissection was performed minimally today and the sutures were removed.  Was cleansed with wound wash and packed with Mesalt  Dispensed a fracture boot for transfers otherwise can be removed  Considerable edema remains.  Wound healing is very delayed.  No  visible malodor or heat noted.    Will consider repeat imaging as guidded by ID.    Follow-up in 3 weeks.    4/1/2025  Started oral antibiotic Zyvox per infectious disease and will complete this for 23 more days  Is using Mesalt in the wound after saline wound wash.  Recommend packing this very lightly even just laying it in the wound as a wick then covering with gauze and a gauze roll and gentle compression.  Can be weightbearing for very short distances in a fracture boot.  Follow-up again with me in about a month and continue to follow-up with wound care.  We discussed utilizing a wound VAC however the patient prefers not to as he would need assistance probably with dressing changes.    4/22/2025  Debrided wound to bleeding base with a 15 blade scalpel to and including subcutaneous tissue but not tendon muscle or bone  Recommend compression during day with ACE or stockinet.  Stay in boot for WB.     Can be on a stationary bike   May try two strips of packing or alginate packed distal and proximal but not central.  Encourage compression  Encouraged much improved blood glucose which has improved considerably and we will continue to monitor this with CGM.  Recommend A1c below 7.5.  Will repeat imaging WBC CRP with any increased erythema heat edema or malodor.  Continue WOC RN and send communication to their team  RTC 3-4 weeks    5/20/2025  Has been weight lifting and using medihoney and WOC RN every other week.   Today I debrided with a 15 blade scalpel to and including subcutaneous tissue and all wound edges.  The entire laceration wound measures about 15 mm in length.  The distalmost portion of this is very well-approximated but still open.  It does not probe to bone.  Therefore today I placed 2 stitches and the most distal portion of it to reapproximate this firmly but left the most proximal portion of the previous wound open to drain and continue granulation.  This will help stabilize the wound edges and  hopefully close this wound shortly and will not trap anything in.  Follow-up in 2 weeks.  Continue wound care otherwise as he was doing.    6/3/2025  The most proximal suture was left in to keep this intact.  See him again in 7-10 days to remove this.  He has return to regular bathing but no soaking or long periods of submersion.  Continue Mesalt or Iodosorb and a simple dry dressing or Band-Aid and keep it clean and covered.  He can increase his activities further to be ready to come out of the boot next week when remove the stitch.    Endocrinology consult work and effort is much appreciated and recognized with his medical understanding and engagement.  That will be kolb in the success of this limb.      Darryl Huff DPM            Again, thank you for allowing me to participate in the care of your patient.        Sincerely,        Darryl Huff DPM    Electronically signed

## 2025-06-03 NOTE — PROGRESS NOTES
"Chief Complaint   Patient presents with    WOUND CARE     (14w) WB w/tall gray fx boot , no fevers; DOS 3/4/2025 - Surgical debridement left calcaneus and left ankle, with bone biopsy left calcaneus; XR L foot 5/20/2025; LOV 5/20/2025    Diabetes     Type 2     Works self employed and works from home.    Subjective: Patient reports for followup of DOS 3/4/2025 - Surgical debridement left calcaneus and left ankle, with bone biopsy left calcaneus procedure.  Follows with WOC RN Wily and packing  Was on IV nafcillin 24 hour pump.  Eating with appetite.  BG has been  Painting adjusted insulin since discharge  Sees DM educator twice since discharge and Endocrine consult with Dr. BLOUNT, late May.   COMPLETED ABX 4/14/25 per infectious disease  Has CGM now. And better control and understanding of blood glucose but still getting very highs off and on.     Since last visit there has been adjustment with increase metformin and endocrine would like to see him get more active back to normal activities then adjust the insulin further.    EXAM:  No apparent distress, patient is relaxed and comfortable.   Vitals: Temp 97.7  F (36.5  C) (Temporal)   Ht 1.88 m (6' 2\")   Wt 107 kg (236 lb)   BMI 30.30 kg/m    Vasc:  DP and PT pulses palpable bilateral, no erythema or heat.  No hair growth bilateral lower extremities.  Temperature is warm to warm proximal to distal.  No erythema or heat   Neuro: Plantar response equal intact bilateral.  Complete loss of protective threshold bilateral limbs 0/10 applications of a 5.07 monofilament.  Plantar response equal intact bilateral.  Derm: Mildly diminished texture turgor tone about the integument.  The wound measured about 28 mm x 5 mm in greatest dimensions full-thickness as deep as 5 mm.  However today sutures remain intact in the distal two thirds of the wound is completely closed and well-approximated.  No erythema noted.  The most proximal portion of the wound about 5-6 mm remains " very subtly open by 2 or 4 mm in width and depth.  No erythema heat or edema.  Nothing can be expressed from it minimal if any induration is noted surrounding this.  No pain with compression of the calcaneus.  No heat or erythema noted.  Musc: Adequate reduction of deformity identified. No complications.  Still considerable edema through the lateral left foot    Labs:   3/21/25 CRP and WBC normal      Hemoglobin A1C (%)   Date Value   05/29/2025 7.3 (H)   03/02/2025 16.8 (H)   04/02/2024 13.8 (H)   04/21/2021 8.9 (H)   01/14/2020 10.4 (H)   11/02/2018 6.1 (H)   07/18/2018 11.3 (H)   10/01/2012 8.2 (H)   06/08/2011 6.3 (H)     Creatinine (mg/dL)   Date Value   03/20/2025 0.86   03/17/2025 0.89   03/13/2025 0.90   03/11/2025 1.05   03/07/2025 0.76   03/06/2025 0.81   04/21/2021 0.70   11/02/2018 0.72   07/18/2018 0.68   10/01/2012 0.83   01/21/2011 0.76     Photo taken 4/1/25:       Picture 4/22/2025:      Photo taken 6/3/25:     imaging:  MRI 3/3/25 preop with abscess lateral calc    Assessment:      ICD-10-CM    1. Type 2 diabetes mellitus with hyperglycemia, without long-term current use of insulin (H)  E11.65       2. Open wound of left heel, subsequent encounter  S91.302D       3. Diabetic peripheral neuropathy (H)  E11.42         Plan:    3/20/2025  Is on nafcillin 24 hour pump IV.  Eating with appetite.  BG is .  Mert adjusted insulin  Sees DM educator in a couple months and Endocrine consult.     Packing was removed.  Sharp dissection was performed minimally today and the sutures were removed.  Was cleansed with wound wash and packed with Mesalt  Dispensed a fracture boot for transfers otherwise can be removed  Considerable edema remains.  Wound healing is very delayed.  No visible malodor or heat noted.    Will consider repeat imaging as guidded by ID.    Follow-up in 3 weeks.    4/1/2025  Started oral antibiotic Zyvox per infectious disease and will complete this for 23 more days  Is using Mesalt in  the wound after saline wound wash.  Recommend packing this very lightly even just laying it in the wound as a wick then covering with gauze and a gauze roll and gentle compression.  Can be weightbearing for very short distances in a fracture boot.  Follow-up again with me in about a month and continue to follow-up with wound care.  We discussed utilizing a wound VAC however the patient prefers not to as he would need assistance probably with dressing changes.    4/22/2025  Debrided wound to bleeding base with a 15 blade scalpel to and including subcutaneous tissue but not tendon muscle or bone  Recommend compression during day with ACE or stockinet.  Stay in boot for WB.     Can be on a stationary bike   May try two strips of packing or alginate packed distal and proximal but not central.  Encourage compression  Encouraged much improved blood glucose which has improved considerably and we will continue to monitor this with CGM.  Recommend A1c below 7.5.  Will repeat imaging WBC CRP with any increased erythema heat edema or malodor.  Continue WOC RN and send communication to their team  RTC 3-4 weeks    5/20/2025  Has been weight lifting and using medihoney and WOC RN every other week.   Today I debrided with a 15 blade scalpel to and including subcutaneous tissue and all wound edges.  The entire laceration wound measures about 15 mm in length.  The distalmost portion of this is very well-approximated but still open.  It does not probe to bone.  Therefore today I placed 2 stitches and the most distal portion of it to reapproximate this firmly but left the most proximal portion of the previous wound open to drain and continue granulation.  This will help stabilize the wound edges and hopefully close this wound shortly and will not trap anything in.  Follow-up in 2 weeks.  Continue wound care otherwise as he was doing.    6/3/2025  The most proximal suture was left in to keep this intact.  See him again in 7-10 days to  remove this.  He has return to regular bathing but no soaking or long periods of submersion.  Continue Mesalt or Iodosorb and a simple dry dressing or Band-Aid and keep it clean and covered.  He can increase his activities further to be ready to come out of the boot next week when remove the stitch.    Endocrinology consult work and effort is much appreciated and recognized with his medical understanding and engagement.  That will be kolb in the success of this limb.      Darryl Huff DPM

## 2025-06-10 ENCOUNTER — OFFICE VISIT (OUTPATIENT)
Dept: PODIATRY | Facility: CLINIC | Age: 60
End: 2025-06-10
Payer: COMMERCIAL

## 2025-06-10 VITALS — BODY MASS INDEX: 30.54 KG/M2 | HEIGHT: 74 IN | WEIGHT: 238 LBS | TEMPERATURE: 97.8 F

## 2025-06-10 DIAGNOSIS — S91.302D OPEN WOUND OF LEFT HEEL, SUBSEQUENT ENCOUNTER: ICD-10-CM

## 2025-06-10 DIAGNOSIS — E11.65 TYPE 2 DIABETES MELLITUS WITH HYPERGLYCEMIA, WITHOUT LONG-TERM CURRENT USE OF INSULIN (H): Primary | ICD-10-CM

## 2025-06-10 DIAGNOSIS — E11.42 DIABETIC PERIPHERAL NEUROPATHY (H): ICD-10-CM

## 2025-06-10 DIAGNOSIS — Q66.6 PES VALGUS OF LEFT FOOT: ICD-10-CM

## 2025-06-10 PROCEDURE — 1126F AMNT PAIN NOTED NONE PRSNT: CPT | Performed by: PODIATRIST

## 2025-06-10 PROCEDURE — 99213 OFFICE O/P EST LOW 20 MIN: CPT | Performed by: PODIATRIST

## 2025-06-10 ASSESSMENT — PAIN SCALES - GENERAL: PAINLEVEL_OUTOF10: NO PAIN (0)

## 2025-06-10 NOTE — LETTER
"6/10/2025      Luke Farias  [address] [address] [id number]      Dear Colleague,    Thank you for referring your patient, Luke Farias, to the [facility]. Please see a copy of my visit note below.    Chief Complaint   Patient presents with     WOUND CARE     Suture removal, WB w/tall gray fx boot; DOS 3/4/2025 - Surgical debridement left calcaneus and left ankle, with bone biopsy left calcaneus; XR L foot 5/20/2025; LOV 6/3/2025     Diabetes     Type 2     Works self employed and works from home.    Subjective: Patient reports for followup of DOS 3/4/2025 - Surgical debridement left calcaneus and left ankle, with bone biopsy left calcaneus procedure.  Follows with WOC RN Wily and packing  Was on IV nafcillin 24 hour pump.  Eating with appetite.  BG has been  Painting adjusted insulin since discharge  Sees DM educator twice since discharge and Endocrine consult with Dr. BLOUNT, late May.   COMPLETED ABX 4/14/25 per infectious disease  Has CGM now. And better control and understanding of blood glucose but still getting very highs off and on.     Since last visit there has been adjustment with increase metformin and endocrine would like to see him get more active back to normal activities then adjust the insulin further.    EXAM:  No apparent distress, patient is relaxed and comfortable.   Vitals: Temp 97.8  F (36.6  C) (Temporal)   Ht 1.88 m (6' 2\")   Wt 108 kg (238 lb)   BMI 30.56 kg/m    Vasc:  DP and PT pulses palpable bilateral, no erythema or heat.  No hair growth bilateral lower extremities.  Temperature is warm to warm proximal to distal.  No erythema or heat   Neuro: Plantar response equal intact bilateral.  Complete loss of protective threshold bilateral limbs 0/10 applications of a 5.07 monofilament.  Plantar response equal intact bilateral.  Derm: Mildly diminished texture turgor tone about the integument.  The wound measured about 28 mm x 5 mm in greatest dimensions " full-thickness as deep as 5 mm.  However today sutures remain intact in the distal two thirds of the wound is completely closed and well-approximated.  No erythema noted.  The most proximal portion of the wound about 5-6 mm remains very subtly open by 2 or 4 mm in width and depth.  No erythema heat or edema.  Nothing can be expressed from it minimal if any induration is noted surrounding this.  No pain with compression of the calcaneus.  No heat or erythema noted.  Musc: Adequate reduction of deformity identified. No complications.  Still considerable edema through the lateral left foot    Labs:   3/21/25 CRP and WBC normal      Hemoglobin A1C (%)   Date Value   05/29/2025 7.3 (H)   03/02/2025 16.8 (H)   04/02/2024 13.8 (H)   04/21/2021 8.9 (H)   01/14/2020 10.4 (H)   11/02/2018 6.1 (H)   07/18/2018 11.3 (H)   10/01/2012 8.2 (H)   06/08/2011 6.3 (H)     Creatinine (mg/dL)   Date Value   03/20/2025 0.86   03/17/2025 0.89   03/13/2025 0.90   03/11/2025 1.05   03/07/2025 0.76   03/06/2025 0.81   04/21/2021 0.70   11/02/2018 0.72   07/18/2018 0.68   10/01/2012 0.83   01/21/2011 0.76     Photo taken 4/1/25:       Picture 4/22/2025:      Photo taken 6/3/25:     imaging:  MRI 3/3/25 preop with abscess lateral calc    Assessment:    No diagnosis found.    Plan:    3/20/2025  Is on nafcillin 24 hour pump IV.  Eating with appetite.  BG is .  Mert adjusted insulin  Sees DM educator in a couple months and Endocrine consult.     Packing was removed.  Sharp dissection was performed minimally today and the sutures were removed.  Was cleansed with wound wash and packed with Mesalt  Dispensed a fracture boot for transfers otherwise can be removed  Considerable edema remains.  Wound healing is very delayed.  No visible malodor or heat noted.    Will consider repeat imaging as guidded by ID.    Follow-up in 3 weeks.    4/1/2025  Started oral antibiotic Zyvox per infectious disease and will complete this for 23 more days  Is  using Mesalt in the wound after saline wound wash.  Recommend packing this very lightly even just laying it in the wound as a wick then covering with gauze and a gauze roll and gentle compression.  Can be weightbearing for very short distances in a fracture boot.  Follow-up again with me in about a month and continue to follow-up with wound care.  We discussed utilizing a wound VAC however the patient prefers not to as he would need assistance probably with dressing changes.    4/22/2025  Debrided wound to bleeding base with a 15 blade scalpel to and including subcutaneous tissue but not tendon muscle or bone  Recommend compression during day with ACE or stockinet.  Stay in boot for WB.     Can be on a stationary bike   May try two strips of packing or alginate packed distal and proximal but not central.  Encourage compression  Encouraged much improved blood glucose which has improved considerably and we will continue to monitor this with CGM.  Recommend A1c below 7.5.  Will repeat imaging WBC CRP with any increased erythema heat edema or malodor.  Continue WOC RN and send communication to their team  RTC 3-4 weeks    5/20/2025  Has been weight lifting and using medihoney and WOC RN every other week.   Today I debrided with a 15 blade scalpel to and including subcutaneous tissue and all wound edges.  The entire laceration wound measures about 15 mm in length.  The distalmost portion of this is very well-approximated but still open.  It does not probe to bone.  Therefore today I placed 2 stitches and the most distal portion of it to reapproximate this firmly but left the most proximal portion of the previous wound open to drain and continue granulation.  This will help stabilize the wound edges and hopefully close this wound shortly and will not trap anything in.  Follow-up in 2 weeks.  Continue wound care otherwise as he was doing.    6/3/2025  The most proximal suture was left in to keep this intact.  See him again  in 7-10 days to remove this.  He has return to regular bathing but no soaking or long periods of submersion.  Continue Mesalt or Iodosorb and a simple dry dressing or Band-Aid and keep it clean and covered.  He can increase his activities further to be ready to come out of the boot next week when remove the stitch.    Endocrinology consult work and effort is much appreciated and recognized with his medical understanding and engagement.  That will be kolb in the success of this limb.    6/10/2025        Darryl Huff DPM            Again, thank you for allowing me to participate in the care of your patient.        Sincerely,        Darryl Huff DPM    Electronically signed

## 2025-06-10 NOTE — PROGRESS NOTES
"Chief Complaint   Patient presents with    WOUND CARE     Suture removal, WB w/tall gray fx boot; DOS 3/4/2025 - Surgical debridement left calcaneus and left ankle, with bone biopsy left calcaneus; XR L foot 5/20/2025; LOV 6/3/2025    Diabetes     Type 2     Works self employed and works from home.    Subjective: Patient reports for followup of DOS 3/4/2025 - Surgical debridement left calcaneus and left ankle, with bone biopsy left calcaneus procedure.  Follows with WOC RN Wily and packing  Was on IV nafcillin 24 hour pump.  Eating with appetite.  BG has been  Painting adjusted insulin since discharge  Sees DM educator twice since discharge and Endocrine consult with Dr. BLOUNT, late May.   COMPLETED ABX 4/14/25 per infectious disease  Has CGM now. And better control and understanding of blood glucose but still getting very highs off and on.     Since last visit there has been adjustment with increase metformin and endocrine would like to see him get more active back to normal activities then adjust the insulin further.    EXAM:  No apparent distress, patient is relaxed and comfortable.   Vitals: Temp 97.8  F (36.6  C) (Temporal)   Ht 1.88 m (6' 2\")   Wt 108 kg (238 lb)   BMI 30.56 kg/m    Vasc:  DP and PT pulses palpable bilateral, no erythema or heat.  No hair growth bilateral lower extremities.  Temperature is warm to warm proximal to distal.  No erythema or heat   Neuro: Plantar response equal intact bilateral.  Complete loss of protective threshold bilateral limbs 0/10 applications of a 5.07 monofilament.  Plantar response equal intact bilateral.  Derm: Mildly diminished texture turgor tone about the integument.  The wound measured about 28 mm x 5 mm in greatest dimensions full-thickness as deep as 5 mm.  However today sutures remain intact in the distal two thirds of the wound is completely closed and well-approximated.  No erythema noted.  The most proximal portion of the wound about 5-6 mm remains very " subtly open by 2 or 4 mm in width and depth.  No erythema heat or edema.  Nothing can be expressed from it minimal if any induration is noted surrounding this.  No pain with compression of the calcaneus.  No heat or erythema noted.  Musc: Adequate reduction of deformity identified. No complications.  Still considerable edema through the lateral left foot    Labs:   3/21/25 CRP and WBC normal      Hemoglobin A1C (%)   Date Value   05/29/2025 7.3 (H)   03/02/2025 16.8 (H)   04/02/2024 13.8 (H)   04/21/2021 8.9 (H)   01/14/2020 10.4 (H)   11/02/2018 6.1 (H)   07/18/2018 11.3 (H)   10/01/2012 8.2 (H)   06/08/2011 6.3 (H)     Creatinine (mg/dL)   Date Value   03/20/2025 0.86   03/17/2025 0.89   03/13/2025 0.90   03/11/2025 1.05   03/07/2025 0.76   03/06/2025 0.81   04/21/2021 0.70   11/02/2018 0.72   07/18/2018 0.68   10/01/2012 0.83   01/21/2011 0.76     Photo taken 4/1/25:       Picture 4/22/2025:      Photo taken 6/3/25:     imaging:  MRI 3/3/25 preop with abscess lateral calc    Assessment:    No diagnosis found.    Plan:    3/20/2025  Is on nafcillin 24 hour pump IV.  Eating with appetite.  BG is .  Mert adjusted insulin  Sees DM educator in a couple months and Endocrine consult.     Packing was removed.  Sharp dissection was performed minimally today and the sutures were removed.  Was cleansed with wound wash and packed with Mesalt  Dispensed a fracture boot for transfers otherwise can be removed  Considerable edema remains.  Wound healing is very delayed.  No visible malodor or heat noted.    Will consider repeat imaging as guidded by ID.    Follow-up in 3 weeks.    4/1/2025  Started oral antibiotic Zyvox per infectious disease and will complete this for 23 more days  Is using Mesalt in the wound after saline wound wash.  Recommend packing this very lightly even just laying it in the wound as a wick then covering with gauze and a gauze roll and gentle compression.  Can be weightbearing for very short  distances in a fracture boot.  Follow-up again with me in about a month and continue to follow-up with wound care.  We discussed utilizing a wound VAC however the patient prefers not to as he would need assistance probably with dressing changes.    4/22/2025  Debrided wound to bleeding base with a 15 blade scalpel to and including subcutaneous tissue but not tendon muscle or bone  Recommend compression during day with ACE or stockinet.  Stay in boot for WB.     Can be on a stationary bike   May try two strips of packing or alginate packed distal and proximal but not central.  Encourage compression  Encouraged much improved blood glucose which has improved considerably and we will continue to monitor this with CGM.  Recommend A1c below 7.5.  Will repeat imaging WBC CRP with any increased erythema heat edema or malodor.  Continue WOC RN and send communication to their team  RTC 3-4 weeks    5/20/2025  Has been weight lifting and using medihoney and WOC RN every other week.   Today I debrided with a 15 blade scalpel to and including subcutaneous tissue and all wound edges.  The entire laceration wound measures about 15 mm in length.  The distalmost portion of this is very well-approximated but still open.  It does not probe to bone.  Therefore today I placed 2 stitches and the most distal portion of it to reapproximate this firmly but left the most proximal portion of the previous wound open to drain and continue granulation.  This will help stabilize the wound edges and hopefully close this wound shortly and will not trap anything in.  Follow-up in 2 weeks.  Continue wound care otherwise as he was doing.    6/3/2025  The most proximal suture was left in to keep this intact.  See him again in 7-10 days to remove this.  He has return to regular bathing but no soaking or long periods of submersion.  Continue Mesalt or Iodosorb and a simple dry dressing or Band-Aid and keep it clean and covered.  He can increase his  activities further to be ready to come out of the boot next week when remove the stitch.    Endocrinology consult work and effort is much appreciated and recognized with his medical understanding and engagement.  That will be kolb in the success of this limb.    6/10/2025        Darryl Huff DPM

## 2025-06-13 ENCOUNTER — HOSPITAL ENCOUNTER (OUTPATIENT)
Dept: WOUND CARE | Facility: CLINIC | Age: 60
Discharge: HOME | End: 2025-06-13
Attending: INTERNAL MEDICINE | Admitting: INTERNAL MEDICINE
Payer: COMMERCIAL

## 2025-06-13 PROCEDURE — G0463 HOSPITAL OUTPT CLINIC VISIT: HCPCS

## 2025-06-13 NOTE — PROGRESS NOTES
[facility] Wound and Ostomy Clinic    Start of Care in [facility]: 3/5/2025, inpatient   Referring Provider: Dr. Darryl Huff   Primary Care Provider: [employer]   Wound Location: Left Lateral calcaneus  Surgery: Debridement left calcaneus with bone biopsy, 3/4/25 Darryl Huff DPM      Wound Clinic Visit:  Scheduled follow up.    Reason for Visit:  Follow up on left heel wound, left foot, evaluate current plan of care.     Subjective:  On arrival today 6/13/25 alone, he reports following: Pt was seen by DPM Dr Huff twice since last visit with [facility] nurse and the two sutures removed, distal at first visit and proximal three days ago on 6/10.  The wound opened on the proximal end and there has been small amount of continued drainage.  They have been performing the wound cares as listed below per DPM recommendations.  He and his wife have felt the periwound skin has been too moist since the second suture was removed and he left the wound open to air after cleansing last evening for about 2 hours to dry.  DPM gave ok to stop wearing the fracture boot.       Wound History:  Pt presented to the ED here at [facility] on 3/2/25 and was admitted, discharge home 3/8.  Patient reports having dry, peeling skin to the lateral calcaneus, used this device to help remove the non-viable tissue (Amope').  At the time the area was dry with no excess moisture.  On 3/2/25 noted to have increase redness and moist skin and concerns for infection was evaluated at [facility] ER, later was admitted to Hospital for IV antibiotics.  MRI -  3/3/25 showed Soft tissue ulceration lateral to the calcaneus,  Mild bone marrow edema within the adjacent lateral calcaneus. Osteomyelitis is likely if this wound probes to bone. Adjacent subcutaneous fluid collection measuring up to 3.2 cm, suspicious for abscess.  Regional cellulitis. Podiatry was consulted 3/3, pt was taken to OR on 3/4  for surgical debridement  RX PROGRESS NOTE: Warfarin Anticoagulation Monitoring Initiation Note    Warfarin prescribed for the indication of Atrial fibrillation/atrial flutter.    Target INR is 2.0 - 3.0.    Anticipated duration of therapy is Indefinite.    Patient continued on prior warfarin therapy.  Dose prior to pharmacist inpatient anticoagulation management service consultation was 2 mg Sun/Tue/Thurs, 1.5 mg ROW.    Most Recent Lab Values:  INR (no units)   Date/Time Value   08/10/2018 0633 2.2     HGB (g/dL)   Date/Time Value   08/10/2018 0633 14.4     HCT (%)   Date/Time Value   08/10/2018 0633 43.3     PLT (K/mcL)   Date/Time Value   08/10/2018 0633 219     No results found for: AST  No components found for: ALT  Serum creatinine: 0.76 mg/dL 08/10/18 0633  Estimated creatinine clearance: 53.7 mL/min  OB/Gyn status: Hysterectomy      Medical History:  Weight: Weight: 65 kg (08/10/18 0641)  Age: 75 year old  Patient does not have factors that may warrant deviation from the standard protocol (protocol exception).     No pertinent medical history noted.    The patient's medication and allergy profile was reviewed for possible drug-allergy, drug-drug, and drug-herbal interactions.    Assessment & Plan:  For anticoagulation therapy, will increase warfarin dose to 2 mg and re-evaluate daily.     Pharmacist inpatient anticoagulation management will review/monitor patient daily and order warfarin dose/regimen based on protocol.    Thank you,    Kristen Ko RPH  8/10/2018 4:08 PM   of left ankle wound/abscess in addition to bone biopsy.  While inpatient the [facility] nurse team coordinated with DPM for assessments and teaching.  [facility] saw pt on Fri 3/7 and set up plan for wound cares twice daily at discharge by pt's wife and scheduled for Wound and Ostomy clinic appointment for the following Monday 3/10.  On 3/7 [facility] HECTOR Lopez and Dr Huff DPM discussed via phone options for wound cares and follow up post hospital discharge.  DPM questioned potential wound vac use, at that time I felt the two small open wounds would benefit from a vac but the surrounding tissue was too fluctuant to apply a vac without considerable risk for periwound skin and tissue breakdown.  Pt discharged home Saturday 3/8 with IV antibiotics via left arm PICC managed by  [facility].  (Nafcillin 12 g in sodium chloride 0.9 % 240 mL via SMARTeZ pump Infuse 12 g over 24 hours once daily for 13 days).  Pt's wife Ilsa  performing twice daily wound cares as directed at time of discharge, no new concerns, no odor noted.  Received knee scooter.  - Dr Huff 3/20, removed sutures, sharp dissection was performed minimally, dispensed fracture boot for transfers otherwise can be removed.  - PICC line removed 3/23, on oral antibiotics Zyvox.    - Tissue  the two open wounds dehisced in Wound and Ostomy clinic 3/25.  - Dr Huff 4/1, Recommended packing very lightly even just laying it in the wound as a wick then covering with gauze and a gauze roll and gentle compression.  Can be weightbearing for very short distances in a fracture boot.  Follow-up again with me in about a month and continue to follow-up with wound care.  We discussed utilizing a wound VAC however the patient prefers not to as he would need assistance probably with dressing changes.    Past Medical History:  Patient Active Problem List   Diagnosis    Chronic rhinitis    Type 2 diabetes mellitus with hyperglycemia (H)    Hyperlipidemia LDL goal <100     Venous lake on Right chest    Congenital nevus of Right upper back    Diabetic infection of left foot (H)    SIRS (systemic inflammatory response syndrome) (H)    Diabetic peripheral neuropathy (H)    Hyponatremia    Hypochloremia    Open wound of left heel    Left leg swelling    Elevated C-reactive protein (CRP)    Elevated erythrocyte sedimentation rate    Diabetic foot infection (H)    Elevated blood ketone body level    Osteomyelitis (H)                 Tobacco Use:     Tobacco Use      Smoking status: Never      Smokeless tobacco: Never     Diabetic: Type 2  HgbA1C:   Hemoglobin A1C   Date Value Ref Range Status   05/29/2025 7.3 (H) <5.7 % Final     Comment:     Normal <5.7%   Prediabetes 5.7-6.4%    Diabetes 6.5% or higher     Note: Adopted from ADA consensus guidelines.   04/21/2021 8.9 (H) 0 - 5.6 % Final     Comment:     Normal <5.7% Prediabetes 5.7-6.4%  Diabetes 6.5% or higher - adopted from ADA   consensus guidelines.  Results confirmed by repeat test     Checks Blood Glucose?:  Three times daily.  Average Readings: Most in the 170's and some in the 200 range.  A1C to be redrawn today.       Personal/social history:  Prior to hospitalization was working full time, owns [employer], more office side. Lives with wife Lisa who is an experienced RN here at [facility].  [facility] discharged 3/23, no home care nursing services in place.     Objective:   Current treatment plan: Cares performed in Wound and Ostomy clinic last visit and done by pt's wife since then at home.  Left lateral heel:  - Remove the old bandage and cleanse with wound cleanse, soap and water, or saline, dry well with gauze.   - Paint the skin around the wounds with the Povidone iodine swabs and let air dry.  - Cover the area with dry gauze pads and secure with Kerlix gauze or stretch gauze.  - Apply ace wrap with small amount of compression.  - Change the bandage daily and prn twice daily for drainage control  Last changed: last evening.       Wound #1 Left Lateral Calcaneus - Surgical wound, Surgical debridement, DOS 3/4/25  Stage/tissue depth: Full Thickness   1.2 cm L x 0.5 cm W x 0.5 cm D   Tunneling: none  Undermining: none  Wound bed type/amount: approximately 80 % white slough and 20 % red tissue, no visible granular buds present; not fluctuant  Wound Edges:  open after removal of nonviable macerated skin/scar on the edge,   Periwound: small amounts remaining peeling macerated ski/scar, fragile scar tissue, pale blanchable erythema with no increased warmth to touch  Drainage:  moderate amounts serosanguinous on dressing removed, moderate amounts sanguinous drainage caused with wound cares today.   Odor: none  Pain: Neuropathy, no sensation. No pain    Photo's from today's visit 6/13/25.      Photo's from 5/29/25.      Photo's from 5/9/25.  Changed to use of Medihoney gel as primary dressing.      Photo's from initial Wound and Ostomy clinic visit 3/10/25.      Photo's from initial inpatient visit 3/5/25.        Photo's from ED 3/2/25.     Dorsalis Pedal Pulse: 0/4 palpable: NA doppler: NA phasic  Hair growth: Absent from knee distally  Capillary Refill: 3 seconds  Feet/toes color: deep pink with erythema as listed in assessment   Nail  R Leg: Edema nonpitting firm leg and ankle. Ankle circumference  Not measured. Calf circumference Not measured.   L Leg: Edema nonpitting. Ankle circumference 36.5 cm. Calf circumference 37 cm.  - Not measured this visit 6/13.     Mobility: NWB to foot/heel  Current offloading/footwear: Gripper sock while inpatient.   Sensation: Absent at the foot      Other callousing/areas of concern:  None currently, past fissures and tears to the left plantar foot and great toe resolved.     Diet: DM. Low carb.   Pt is following DM diet at home.     Assessment:  On initial assessment, the wound dressing in place was very moist to wet, peeling macerated skin/scar tissue present all around the wound, wound bed appeared all  slough covered.  With cleansing and dry was able to remove most of the macerated loose skin/scar tissue around the wound, and with inner cleansing of the wound bed was able to remove some slough and caused some bleeding.    The wound shows no signs of infection, is still mostly slough covered with depth of 0.5 at the most proximal and 0.4 cm at the distal.  Some remaining peeling skin/scar tissue present around the edge but the edges are open.    Barriers to wound healing:   Poor nutrition: inadequate supply of protein, carbohydrates, fatty acids, and trace elements essential for all phases of wound healing - Educated that high protein diet is optimal for wound healing, goal is 100-120 g per day. Provided verbal list of foods rich in protein.   Reduced Blood Supply: inadequate perfusion to heal wound - No know ABIs   Medication: none  Chemotherapy: suppresses the immune system and inflammatory response -NA  Radiotherapy: increases production of free radical which damage cells -NA  Psychological stress: none noted  Obesity: decreases tissue perfusion- Appropriate weight and BMI   Infection: prolongs inflammatory phase, uses vital nutrients, impairs epithelialization and releases toxins - Pt has completed all antiboitics oral and IV ordered.  Use topical wound cleanser, Vashe ongoing.  Underlying Disease: poorly controlled diabetes mellitis, educated on diet correction and following with PCP for optimal management.   Maceration: reduces wound tensile strength and inhibits epithelial migration- present but plan of care adjusted to address  Patient compliance- Agrees to initial assessment   Unrelieved pressure- order for NWB to the left foot, has knee scooter and is using, fracture boot  is compliant with use since ordered by DPM.  Immobility- altered, but not immobile.   Substance abuse: NA     Plan:   Today the wound on the left lateral heel needs a new plan of care.  Small depth needs to have some light packing and  wound bed needs to need assist with debriding.    Interventions to Barriers:  As listed above.     Discussed/Education provided: plan of care with rationale;     Topical care to Left Lateral Calcaneous.  Cares performed in clinic today and to be performed as directed between appointments.   - Cleanse with wound cleanser or soap and water and dry well.  - Rinse the wound with Vashe   - Paint the wound edges and skin immediately around the wound with povidone iodine.  Let dry.  - Fill the wound bed with a strip of the Mesalt gauze, dry.  I cut the Mesalt today about 1 cm long and 1/2 - 2/3 it's width.  - Cover with large band aid and change daily.    Pt is unable but his wife is able to perform cares/has been caring for wound.    Additional recommendations: Keep left foot elevated the majority of every hour while awake.    The following discharge instructions were reviewed with and sent home with the patient:  See AVS    The following supplies were sent home with the patient:  Extra syringes, stretch gauze and remains of the Mesalt opened today. .    Return visit: DPTONIA Huff 7/1.  Wound and Ostomy clinic 6/19/25.    Verbal, written, & demonstrative education provided.  Face to face time: approximately 30 minutes  Procedure: JAROCHO Lopez RN, CWOCN  [phone]

## 2025-06-13 NOTE — PATIENT INSTRUCTIONS
Today the wound on your left outer heel needs a new plan of care with products we have used before.    1 Cleanse with wound cleanser or soap and water and dry well.  2 Rinse the wound with Vashe   3 Paint the wound edges and skin immediately around the wound with povidone iodine.  Let dry.  4 Fill the wound bed with a strip of the Mesalt gauze, dry.  5 Cover with large band aid and change daily.    I will see you again next week on Thursday.  Your scheduled with me at 9 but come about 8:30 am and I will get you in asap.    Luciano Lopez Rn cwocn

## 2025-06-19 ENCOUNTER — HOSPITAL ENCOUNTER (OUTPATIENT)
Dept: WOUND CARE | Facility: CLINIC | Age: 60
End: 2025-06-19
Attending: INTERNAL MEDICINE
Payer: COMMERCIAL

## 2025-06-19 ENCOUNTER — VIRTUAL VISIT (OUTPATIENT)
Dept: EDUCATION SERVICES | Facility: CLINIC | Age: 60
End: 2025-06-19
Payer: COMMERCIAL

## 2025-06-19 DIAGNOSIS — E11.65 TYPE 2 DIABETES MELLITUS WITH HYPERGLYCEMIA, WITHOUT LONG-TERM CURRENT USE OF INSULIN (H): ICD-10-CM

## 2025-06-19 DIAGNOSIS — E11.65 TYPE 2 DIABETES MELLITUS WITH HYPERGLYCEMIA, UNSPECIFIED WHETHER LONG TERM INSULIN USE (H): ICD-10-CM

## 2025-06-19 PROCEDURE — G0463 HOSPITAL OUTPT CLINIC VISIT: HCPCS

## 2025-06-19 RX ORDER — HYDROCHLOROTHIAZIDE 12.5 MG/1
CAPSULE ORAL
Qty: 2 EACH | Refills: 5 | Status: SHIPPED | OUTPATIENT
Start: 2025-06-19

## 2025-06-19 NOTE — PROGRESS NOTES
Virtual Visit Details    Type of service:  Video Visit   Joined the call at 6/19/2025, 10:32:57 am.  Left the call at 6/19/2025, 11:14:46 am.  You were on the call for 41 minutes 49 seconds.  Originating Location (pt. Location): Home    Distant Location (provider location):  Off-site  Platform used for Video Visit: Chitra    Diabetes Self-Management Education & Support    Luke ESTRADA Jarrett presents today for education related to Type 2 diabetes    Patient is being treated with:    Was on metformin and glipizide. Tried Trulicity for awhile but that resulted in abscess so was stopped. All orals were stopped and insulin was started in the hospital recently.     Year of diagnosis: 2010   Referring provider:  Mert   Living Situation: with spouse     PATIENT CONCERNS/REASON FOR REFERRAL       ASSESSMENT:    Taking Medication:     Current Diabetes Management per Patient:  Taking diabetes medications? yes:     Diabetes Medication(s)       Biguanides       metFORMIN (GLUCOPHAGE XR) 500 MG 24 hr tablet Start with 500 mg twice a day for 1 month and increase to 1000 mg twice a day thereafter       Insulin       insulin glargine (LANTUS SOLOSTAR) 100 UNIT/ML pen INJECT 15 UNITS UNDER THE SKIN TWICE DAILY     Patient taking differently: INJECT 15 UNITS IN AM 18 UNITS PM UNDER THE SKIN DAILY     Insulin Lispro-aabc, 1 U Dial, (LYUMJEV KWIKPEN) 100 UNIT/ML SOPN Inject 1 Units subcutaneously 3 times daily. Per carb unit plus sliding scale Inject 30 min before eating a meal Max: 30U/day            Monitoring                  Patient's most recent   Lab Results   Component Value Date    A1C 16.8 03/02/2025    A1C 8.9 04/21/2021      Patient's A1C goal: <7.0    Activity: Has a foot wound currently (he reports it is healing) but of course is limiting activity at this time. He is looking forward to being able to be more active again.     Activity level has increased. Walking more now is out of boot.     Healthy Eating:   Patient is  counting his carbohydrates.     Problem Solving:      Patient is at risk of hypoglycemia?: YES  Hospitalizations for hyper or hypoglycemia: Yes (Please explain): recently hospitalized for foot wound but also had elevated glucose and A1C    Healthy Coping and Stress Management:   Admits some non-compliance/denial regarding glucose management in the past. Very motivated at this time to improve his diabetes control.       EDUCATION and INSTRUCTION PROVIDED AT THIS VISIT:    He is on MDI. Carb counting 1 unit for each 15 grams of carbohydrate. Correction 1/50/150/200. Also taking lantus 15 units in the am and 18 units in the pm. He was seen by Dr. Cuenca since I last saw him and she added metformin. He is taking 500mg BID and will increase now to 1000 mg BID. His A1C improved significantly. Not meeting time in range goal of >70% in range.  He is starting to walk more and is out of the boot. We reviewed reports together and notice a trend towards higher glucose around noon/lunch. Sometimes he grazes and sometimes has a more complete meal. May be getting more carb at that time. We discussed possible need for increase to insulin but created plan for lifestyle changes to try first. He had also discussed potentially trying GLP-1 with Bang. He finds follow up helpful when making behavior change goals and progress towards those goals.     Patient-stated goal written and given to Luke Farias.  Verbalized and demonstrated understanding of instructions.     PLAN:  1) Increase metformin as discussed with Dr. Bang Irwin  2) Continue to work on getting walks into your day. Consider walking for 10 minutes after lunch in particular, especially if you have more carb than planned.  3) Take a closer look at carbs at lunch. Try to keep less than 75 grams. Be sure to include protein and some vegetables with lunch.  4) Avoid eating right out of the container (ex. Chip bag). Put snacks onto a plate or in a bowl.     See patient  instructions  AVS printed and given to patient    FOLLOW-UP:    Three weeks      Any diabetes medication initiation or dose changes were made via the [facility] Orders per the patient's referring provider. A copy of this encounter was shared with the provider.

## 2025-06-19 NOTE — NURSING NOTE
Current patient location: [id number] [place] [id number]    Is the patient currently in the state of [place]? YES    Visit mode: VIDEO    If the visit is dropped, the patient can be reconnected by:VIDEO VISIT: Send to e-mail at: [email or web address]    Will anyone else be joining the visit? NO  (If patient encounters technical issues they should call [phone])    Are changes needed to the allergy or medication list? No    Are refills needed on medications prescribed by this physician? NO    Rooming Documentation:  Not applicable    Reason for visit: Diabetes Education    Leti LEUNG

## 2025-06-19 NOTE — PATIENT INSTRUCTIONS
PLAN:  1) Increase metformin as discussed with Dr. Bang Irwin  2) Continue to work on getting walks into your day. Consider walking for 10 minutes after lunch in particular, especially if you have more carb than planned.  3) Take a closer look at carbs at lunch. Try to keep less than 75 grams. Be sure to include protein and some vegetables with lunch.  4) Avoid eating right out of the container (ex. Chip bag). Put snacks onto a plate or in a bowl.

## 2025-06-19 NOTE — PROGRESS NOTES
[facility] Wound and Ostomy [facility]    Start of Care in [facility] Wound Clinic: 3/5/2025, inpatient   Referring Provider: Dr. Darryl Huff   Primary Care Provider: [employer]   Wound Location: Left Lateral calcaneus  Surgery: Debridement left calcaneus with bone biopsy, 3/4/25 Darryl Huff DPM      Wound Clinic Visit:  Scheduled follow up.    Reason for Visit:  Follow up on left heel wound, left foot, evaluate current plan of care.     Subjective:  On arrival today 6/19/25 alone, the pt reports the following:  After last visit with [facility] and change in plan of care as listed below, had a few days of moderate amounts of drainage then has been minimal.  Wife has noted no concerns with the wound plan of care nor the wound itself.  Blood sugars doing well and meeting with diabetic ed soon.       Wound History:  Pt presented to the ED here at [facility] on 3/2/25 and was admitted, discharge home 3/8.  Patient reports having dry, peeling skin to the lateral calcaneus, used this device to help remove the non-viable tissue (Amope').  At the time the area was dry with no excess moisture.  On 3/2/25 noted to have increase redness and moist skin and concerns for infection was evaluated at [facility] ER, later was admitted to Hospital for IV antibiotics.  MRI -  3/3/25 showed Soft tissue ulceration lateral to the calcaneus,  Mild bone marrow edema within the adjacent lateral calcaneus. Osteomyelitis is likely if this wound probes to bone. Adjacent subcutaneous fluid collection measuring up to 3.2 cm, suspicious for abscess.  Regional cellulitis. Podiatry was consulted 3/3, pt was taken to OR on 3/4  for surgical debridement of left ankle wound/abscess in addition to bone biopsy.  While inpatient the [facility] nurse team coordinated with DPTONIA for assessments and teaching.  [facility] saw pt on Fri 3/7 and set up plan for wound cares twice daily at discharge by pt's wife and scheduled for Wound and Ostomy clinic  appointment for the following Monday 3/10.  On 3/7 woc HECTOR Lopez and Dr Huff DPM discussed via phone options for wound cares and follow up post hospital discharge.  DPM questioned potential wound vac use, at that time I felt the two small open wounds would benefit from a vac but the surrounding tissue was too fluctuant to apply a vac without considerable risk for periwound skin and tissue breakdown.  Pt discharged home Saturday 3/8 with IV antibiotics via left arm PICC managed by  [facility].  (Nafcillin 12 g in sodium chloride 0.9 % 240 mL via SMARTeZ pump Infuse 12 g over 24 hours once daily for 13 days).  Pt's wife Lisa  performing twice daily wound cares as directed at time of discharge, no new concerns, no odor noted.  Received knee scooter.  - Dr Huff 3/20, removed sutures, sharp dissection was performed minimally, dispensed fracture boot for transfers otherwise can be removed.  - PICC line removed 3/23, on oral antibiotics Zyvox.    - Tissue  the two open wounds dehisced in Wound and Ostomy clinic 3/25.  - Dr Huff 4/1, Recommended packing very lightly even just laying it in the wound as a wick then covering with gauze and a gauze roll and gentle compression.  Can be weightbearing for very short distances in a fracture boot.  Follow-up again with me in about a month and continue to follow-up with wound care.  We discussed utilizing a wound VAC however the patient prefers not to as he would need assistance probably with dressing changes.    Past Medical History:  Patient Active Problem List   Diagnosis    Chronic rhinitis    Type 2 diabetes mellitus with hyperglycemia (H)    Hyperlipidemia LDL goal <100    Venous lake on Right chest    Congenital nevus of Right upper back    Diabetic infection of left foot (H)    SIRS (systemic inflammatory response syndrome) (H)    Diabetic peripheral neuropathy (H)    Hyponatremia    Hypochloremia    Open wound of left heel    Left leg swelling     Elevated C-reactive protein (CRP)    Elevated erythrocyte sedimentation rate    Diabetic foot infection (H)    Elevated blood ketone body level    Osteomyelitis (H)                 Tobacco Use:     Tobacco Use      Smoking status: Never      Smokeless tobacco: Never     Diabetic: Type 2  HgbA1C:   Hemoglobin A1C   Date Value Ref Range Status   05/29/2025 7.3 (H) <5.7 % Final     Comment:     Normal <5.7%   Prediabetes 5.7-6.4%    Diabetes 6.5% or higher     Note: Adopted from ADA consensus guidelines.   04/21/2021 8.9 (H) 0 - 5.6 % Final     Comment:     Normal <5.7% Prediabetes 5.7-6.4%  Diabetes 6.5% or higher - adopted from ADA   consensus guidelines.  Results confirmed by repeat test     Checks Blood Glucose?:  Three times daily.  Average Readings: Most in the 170's and some in the 200 range.  - Not assessed this visit 6/19.     Personal/social history:  Prior to hospitalization was working full time, owns [employer], more office side. Lives with wife Lisa who is an experienced RN here at [facility].      Objective:   Current treatment plan: Cares performed in Wound and Ostomy clinic last visit and done by pt's wife since then at home.  Left lateral heel:  - Cleanse with wound cleanser or soap and water and dry well.  - Rinse the wound with Vashe   - Paint the wound edges and skin immediately around the wound with povidone iodine.  Let dry.  - Fill the wound bed with a strip of the Mesalt gauze, dry, about 1 cm long and 1/2 - 2/3 it's width.  - Cover with large band aid and change daily.  Last changed: last evening.      Wound #1 Left Lateral Calcaneus - Surgical wound, Surgical debridement, DOS 3/4/25  Stage/tissue depth: Full Thickness   0.7 cm L x 0.2 cm W x 0.4 cm D   Tunneling: none  Undermining: none  Wound bed type/amount: as able to visualize approximately 10 % white slough and 90 % red tissue, no visible granular buds present; not fluctuant  Wound Edges:  open  Periwound: fragile scar tissue,  pale blanchable erythema with no increased warmth to touch  Drainage: small amounts serosanguinous on dressing removed, small amounts sanguinous drainage caused with wound cares today.   Odor: none  Pain: Neuropathy, no sensation. No pain    Photo's from today's visit 6/19/25.      Photo's from 6/13/25.      Photo's from 5/29/25.      Photo's from initial Wound and Ostomy clinic visit 3/10/25.      Photo's from initial inpatient visit 3/5/25.        Photo's from ED 3/2/25.     Dorsalis Pedal Pulse: 0/4 palpable: NA doppler: NA phasic  Hair growth: Absent from knee distally  Capillary Refill: 3 seconds  Feet/toes color: deep pink with erythema as listed in assessment   Nail  R Leg: Edema nonpitting firm leg and ankle. Ankle circumference  Not measured. Calf circumference Not measured.   L Leg: Edema nonpitting. Ankle circumference 36.5 cm. Calf circumference 37 cm.  - Not measured this visit 6/19.     Mobility: NWB to foot/heel  Current offloading/footwear: Gripper sock while inpatient.   Sensation: Absent at the foot      Other callousing/areas of concern:  None currently, past fissures and tears to the left plantar foot and great toe resolved.     Diet: DM. Low carb.   Pt is following DM diet at home.     Assessment:  The left lateral heel wound has improved well.    The Mesalt helped to start debriding the wound and today with the wooden end of a cotton tipped applicator I was able to bluntly debride the majority of slough as able to visualize well.  Wound edges and periwound skin look improved, no longer hypopigmented or macerated.  Edema appears ok and stable, no signs of infection noted.  The Mesalt is no longer needed and we will change primary dressing to a paste with goal to be able to make contact with the entire, now very small, inner wound.    Barriers to wound healing:   Poor nutrition: inadequate supply of protein, carbohydrates, fatty acids, and trace elements essential for all phases of wound healing  - Educated that high protein diet is optimal for wound healing, goal is 100-120 g per day. Provided verbal list of foods rich in protein.   Reduced Blood Supply: inadequate perfusion to heal wound - No know ABIs   Medication: none  Chemotherapy: suppresses the immune system and inflammatory response - NA  Radiotherapy: increases production of free radical which damage cells - NA  Psychological stress: none noted  Obesity: decreases tissue perfusion - Appropriate weight and BMI   Infection: prolongs inflammatory phase, uses vital nutrients, impairs epithelialization and releases toxins - Pt has completed all antiboitics oral and IV ordered.  Use Vashe ongoing, primary dressing currently is an antimicrobial as 6/19..  Underlying Disease: poorly controlled diabetes mellitis, educated on diet correction and following with PCP for optimal management, ongoing visits with diabetic ed.  .   Maceration: reduces wound tensile strength and inhibits epithelial migration- present but plan of care adjusted to address  Patient compliance - Has been compliant with recommendations and cares.   Unrelieved pressure - can weight bear, recommend to limit as able time on feet, in good well fitting new sneakers.  Elevate left LE as much as able.   Immobility- altered, but not immobile.   Substance abuse: NA     Plan:   Today we will change the plan of care as listed below.  Pt will see Dr Huff in just under two weeks and wo nurse the following.    Interventions to Barriers:  As listed above.     Discussed/Education provided: plan of care with rationale;     Topical care to Left Lateral Calcaneous.  Cares performed in clinic today and to be performed as directed between appointments.   - Cleanse with wound cleanser or soap and water and dry well.  - Rinse the wound with Vashe   - Fill wound bed with Iodosorb paste.  - Cover with large band aid and change daily.    Pt is unable but his wife is able to perform cares/has been caring for  wound.    Additional recommendations: Keep left foot elevated the majority of every hour while awake.    The following discharge instructions were reviewed with and sent home with the patient:  Declined AVS today    The following supplies were sent home with the patient:  Remains of the Iodosorb opened today. .    Return visit: DPTONIA Huff 7/1.  Wound and Ostomy clinic 7/8/25.    Verbal, written, & demonstrative education provided.  Face to face time: approximately 20 minutes  Procedure: JAROCHO Lopez RN, CWOCN  [phone]

## 2025-07-01 ENCOUNTER — OFFICE VISIT (OUTPATIENT)
Dept: PODIATRY | Facility: CLINIC | Age: 60
End: 2025-07-01
Payer: COMMERCIAL

## 2025-07-01 VITALS — BODY MASS INDEX: 30.03 KG/M2 | HEIGHT: 74 IN | WEIGHT: 234 LBS

## 2025-07-01 DIAGNOSIS — E11.42 DIABETIC PERIPHERAL NEUROPATHY (H): ICD-10-CM

## 2025-07-01 DIAGNOSIS — S91.302D OPEN WOUND OF LEFT HEEL, SUBSEQUENT ENCOUNTER: Primary | ICD-10-CM

## 2025-07-01 PROCEDURE — 99213 OFFICE O/P EST LOW 20 MIN: CPT | Performed by: PODIATRIST

## 2025-07-01 PROCEDURE — 1126F AMNT PAIN NOTED NONE PRSNT: CPT | Performed by: PODIATRIST

## 2025-07-01 ASSESSMENT — PAIN SCALES - GENERAL: PAINLEVEL_OUTOF10: NO PAIN (0)

## 2025-07-01 NOTE — PROGRESS NOTES
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      Subjective: Patient reports for followup of DOS 3/4/2025 - Surgical debridement left calcaneus and left ankle, with bone biopsy left calcaneus procedure.  Follows with WOC RN applying Iodosorb daily  Was on IV nafcillin 24 hour pump.  Eating with appetite.  BG has been  Painting adjusted insulin since discharge  Sees DM educator twice since discharge and Endocrine consult with Dr. BLOUNT, late May.   COMPLETED ABX 4/14/25 per infectious disease  Has CGM now. And better control and understanding of blood glucose but still getting very highs off and on.     Since last visit there has been adjustment with increase metformin and endocrine would like to see him get more active back to normal activities then adjust the insulin further.    EXAM:  No apparent distress, patient is relaxed and comfortable.   Vitals: Ht 1.88 m (6' 2\")   Wt 106.1 kg (234 lb)   BMI 30.04 kg/m    Vasc:  DP and PT pulses palpable bilateral, no erythema or heat.  No hair growth bilateral lower extremities.  Temperature is warm to warm proximal to distal.  No erythema or heat   Neuro: Plantar response equal intact bilateral.  Complete loss of protective threshold bilateral limbs 0/10 applications of a 5.07 monofilament.  Plantar response equal intact bilateral.  Derm: Mildly diminished texture turgor tone about the integument.  The wound measured about 28 mm x 5 mm in greatest dimensions full-thickness as deep as 5 mm.  However today the wound on the lateral left calcaneus is nearly closed.  There is 1 section possibly as long as 8 mm that is just full-thickness.  Minimal hyperkeratosis noted.  Musc: Adequate reduction of deformity identified. No " complications.  Still considerable edema through the lateral left foot    Hemoglobin A1C (%)   Date Value   05/29/2025 7.3 (H)   03/02/2025 16.8 (H)   04/02/2024 13.8 (H)   04/21/2021 8.9 (H)   01/14/2020 10.4 (H)   11/02/2018 6.1 (H)   07/18/2018 11.3 (H)   10/01/2012 8.2 (H)   06/08/2011 6.3 (H)     Creatinine (mg/dL)   Date Value   03/20/2025 0.86   03/17/2025 0.89   03/13/2025 0.90   03/11/2025 1.05   03/07/2025 0.76   03/06/2025 0.81   04/21/2021 0.70   11/02/2018 0.72   07/18/2018 0.68   10/01/2012 0.83   01/21/2011 0.76     Photo taken 4/1/25:       Picture 4/22/2025:      Photo taken 6/3/25:     imaging:  MRI 3/3/25 preop with abscess lateral calc    Assessment:      ICD-10-CM    1. Open wound of left heel, subsequent encounter  S91.302D       2. Diabetic peripheral neuropathy (H)  E11.42           Plan:      7/1/2025  Minimal debridement.  Iodosorb for another week or so and then likely can go to regular socks and shoes  Still no soaking or submersion until dry for a couple weeks then stop.  Otherwise return to activities as tolerated  Encourage hip stretching bilateral  Encourage very aggressive management of blood glucose continually now.  RTC one month    Darryl Huff DPM

## 2025-07-01 NOTE — LETTER
"7/1/2025      Luke Farias  [address] [address] [facility] [id number]      Dear Colleague,    Thank you for referring your patient, Luke Farias, to the [facility]. Please see a copy of my visit note below.    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      Subjective: Patient reports for followup of DOS 3/4/2025 - Surgical debridement left calcaneus and left ankle, with bone biopsy left calcaneus procedure.  Follows with WOC RN applying Iodosorb daily  Was on IV nafcillin 24 hour pump.  Eating with appetite.  BG has been  Painting adjusted insulin since discharge  Sees DM educator twice since discharge and Endocrine consult with Dr. BLOUNT, late May.   COMPLETED ABX 4/14/25 per infectious disease  Has CGM now. And better control and understanding of blood glucose but still getting very highs off and on.     Since last visit there has been adjustment with increase metformin and endocrine would like to see him get more active back to normal activities then adjust the insulin further.    EXAM:  No apparent distress, patient is relaxed and comfortable.   Vitals: Ht 1.88 m (6' 2\")   Wt 106.1 kg (234 lb)   BMI 30.04 kg/m    Vasc:  DP and PT pulses palpable bilateral, no erythema or heat.  No hair growth bilateral lower extremities.  Temperature is warm to warm proximal to distal.  No erythema or heat   Neuro: Plantar response equal intact bilateral.  Complete loss of protective threshold bilateral limbs 0/10 applications of a 5.07 monofilament.  Plantar response equal intact bilateral.  Derm: Mildly diminished texture turgor tone about the integument.  The wound measured about 28 mm x 5 mm in greatest dimensions full-thickness as deep as 5 " mm.  However today the wound on the lateral left calcaneus is nearly closed.  There is 1 section possibly as long as 8 mm that is just full-thickness.  Minimal hyperkeratosis noted.  Musc: Adequate reduction of deformity identified. No complications.  Still considerable edema through the lateral left foot    Hemoglobin A1C (%)   Date Value   05/29/2025 7.3 (H)   03/02/2025 16.8 (H)   04/02/2024 13.8 (H)   04/21/2021 8.9 (H)   01/14/2020 10.4 (H)   11/02/2018 6.1 (H)   07/18/2018 11.3 (H)   10/01/2012 8.2 (H)   06/08/2011 6.3 (H)     Creatinine (mg/dL)   Date Value   03/20/2025 0.86   03/17/2025 0.89   03/13/2025 0.90   03/11/2025 1.05   03/07/2025 0.76   03/06/2025 0.81   04/21/2021 0.70   11/02/2018 0.72   07/18/2018 0.68   10/01/2012 0.83   01/21/2011 0.76     Photo taken 4/1/25:       Picture 4/22/2025:      Photo taken 6/3/25:     imaging:  MRI 3/3/25 preop with abscess lateral calc    Assessment:      ICD-10-CM    1. Open wound of left heel, subsequent encounter  S91.302D       2. Diabetic peripheral neuropathy (H)  E11.42           Plan:      7/1/2025  Minimal debridement.  Iodosorb for another week or so and then likely can go to regular socks and shoes  Still no soaking or submersion until dry for a couple weeks then stop.  Otherwise return to activities as tolerated  Encourage hip stretching bilateral  Encourage very aggressive management of blood glucose continually now.  RTC one month    Darryl Huff DPM            Again, thank you for allowing me to participate in the care of your patient.        Sincerely,        Darryl Huff DPM    Electronically signed

## 2025-07-08 ENCOUNTER — HOSPITAL ENCOUNTER (OUTPATIENT)
Dept: WOUND CARE | Facility: CLINIC | Age: 60
Discharge: HOME | End: 2025-07-08
Attending: INTERNAL MEDICINE
Payer: COMMERCIAL

## 2025-07-08 PROCEDURE — G0463 HOSPITAL OUTPT CLINIC VISIT: HCPCS

## 2025-07-08 NOTE — PROGRESS NOTES
[facility] Wound and Ostomy Clinic    Start of Care in [facility] Wound Clinic: 3/5/2025, inpatient   Referring Provider: Dr. Darryl Huff   Primary Care Provider: Rancho Painting   Wound Location: Left Lateral calcaneus  Surgery: Debridement left calcaneus with bone biopsy, 3/4/25 Darryl Huff DPM      Wound Clinic Visit:  Scheduled follow up.    Reason for Visit:  Follow up on left heel wound, left foot, evaluate current plan of care.     Subjective:  On arrival today 6/19/25 alone, the pt reports the following:  After last visit with [facility] and change in plan of care as listed below, had a few days of moderate amounts of drainage then has been minimal.  Wife has noted no concerns with the wound plan of care nor the wound itself.  Blood sugars doing well and meeting with diabetic ed soon.       Wound History:  Pt presented to the ED here at [facility] on 3/2/25 and was admitted, discharge home 3/8.  Patient reports having dry, peeling skin to the lateral calcaneus, used this device to help remove the non-viable tissue (Amope').  At the time the area was dry with no excess moisture.  On 3/2/25 noted to have increase redness and moist skin and concerns for infection was evaluated at [facility] ER, later was admitted to Hospital for IV antibiotics.  MRI -  3/3/25 showed Soft tissue ulceration lateral to the calcaneus,  Mild bone marrow edema within the adjacent lateral calcaneus. Osteomyelitis is likely if this wound probes to bone. Adjacent subcutaneous fluid collection measuring up to 3.2 cm, suspicious for abscess.  Regional cellulitis. Podiatry was consulted 3/3, pt was taken to OR on 3/4  for surgical debridement of left ankle wound/abscess in addition to bone biopsy.  While inpatient the [facility] nurse team coordinated with DPTONIA for assessments and teaching.  [facility] saw pt on Fri 3/7 and set up plan for wound cares twice daily at discharge by pt's wife and scheduled for Wound and Ostomy clinic  appointment for the following Monday 3/10.  On 3/7 woc HECTOR Lopez and Dr Huff DPM discussed via phone options for wound cares and follow up post hospital discharge.  DPM questioned potential wound vac use, at that time I felt the two small open wounds would benefit from a vac but the surrounding tissue was too fluctuant to apply a vac without considerable risk for periwound skin and tissue breakdown.  Pt discharged home Saturday 3/8 with IV antibiotics via left arm PICC managed by  [facility].  (Nafcillin 12 g in sodium chloride 0.9 % 240 mL via SMARTeZ pump Infuse 12 g over 24 hours once daily for 13 days).  Pt's wife Lisa  performing twice daily wound cares as directed at time of discharge, no new concerns, no odor noted.  Received knee scooter.  - Dr Huff 3/20, removed sutures, sharp dissection was performed minimally, dispensed fracture boot for transfers otherwise can be removed.  - PICC line removed 3/23, on oral antibiotics Zyvox.    - Tissue  the two open wounds dehisced in Wound and Ostomy clinic 3/25.  - Dr Huff 4/1, Recommended packing very lightly even just laying it in the wound as a wick then covering with gauze and a gauze roll and gentle compression.  Can be weightbearing for very short distances in a fracture boot.  Follow-up again with me in about a month and continue to follow-up with wound care.  We discussed utilizing a wound VAC however the patient prefers not to as he would need assistance probably with dressing changes.    Past Medical History:  Patient Active Problem List   Diagnosis    Chronic rhinitis    Type 2 diabetes mellitus with hyperglycemia (H)    Hyperlipidemia LDL goal <100    Venous lake on Right chest    Congenital nevus of Right upper back    Diabetic infection of left foot (H)    SIRS (systemic inflammatory response syndrome) (H)    Diabetic peripheral neuropathy (H)    Hyponatremia    Hypochloremia    Open wound of left heel    Left leg swelling     Elevated C-reactive protein (CRP)    Elevated erythrocyte sedimentation rate    Diabetic foot infection (H)    Elevated blood ketone body level    Osteomyelitis (H)                 Tobacco Use:     Tobacco Use      Smoking status: Never      Smokeless tobacco: Never     Diabetic: Type 2  HgbA1C:   Hemoglobin A1C   Date Value Ref Range Status   05/29/2025 7.3 (H) <5.7 % Final     Comment:     Normal <5.7%   Prediabetes 5.7-6.4%    Diabetes 6.5% or higher     Note: Adopted from ADA consensus guidelines.   04/21/2021 8.9 (H) 0 - 5.6 % Final     Comment:     Normal <5.7% Prediabetes 5.7-6.4%  Diabetes 6.5% or higher - adopted from ADA   consensus guidelines.  Results confirmed by repeat test     Checks Blood Glucose?:  Three times daily.  Average Readings: Most in the 170's and some in the 200 range.  - Not assessed this visit 6/19.     Personal/social history:  Prior to hospitalization was working full time, owns [employer], more office side. Lives with wife Lisa who is an experienced RN here at [facility].      Objective:   Current treatment plan: Cares performed in Wound and Ostomy clinic last visit and done by pt's wife since then at home.  Left lateral heel:  - Cleanse with wound cleanser or soap and water and dry well.  - Rinse the wound with Vashe   - Paint the wound edges and skin immediately around the wound with povidone iodine.  Let dry.  - Fill the wound bed with a strip of the Mesalt gauze, dry, about 1 cm long and 1/2 - 2/3 it's width.  - Cover with large band aid and change daily.  Last changed: last evening.      Wound #1 Left Lateral Calcaneus - Surgical wound, Surgical debridement, DOS 3/4/25  Stage/tissue depth: Full Thickness   0.7 cm L x 0.2 cm W x 0.4 cm D   Tunneling: none  Undermining: none  Wound bed type/amount: as able to visualize approximately 10 % white slough and 90 % red tissue, no visible granular buds present; not fluctuant  Wound Edges:  open  Periwound: fragile scar tissue,  pale blanchable erythema with no increased warmth to touch  Drainage: small amounts serosanguinous on dressing removed, small amounts sanguinous drainage caused with wound cares today.   Odor: none  Pain: Neuropathy, no sensation. No pain    Photo's from today's visit 6/19/25.      Photo's from 6/13/25.      Photo's from 5/29/25.      Photo's from initial Wound and Ostomy clinic visit 3/10/25.      Photo's from initial inpatient visit 3/5/25.        Photo's from ED 3/2/25.     Dorsalis Pedal Pulse: 0/4 palpable: NA doppler: NA phasic  Hair growth: Absent from knee distally  Capillary Refill: 3 seconds  Feet/toes color: deep pink with erythema as listed in assessment   Nail  R Leg: Edema nonpitting firm leg and ankle. Ankle circumference  Not measured. Calf circumference Not measured.   L Leg: Edema nonpitting. Ankle circumference 36.5 cm. Calf circumference 37 cm.  - Not measured this visit 6/19.     Mobility: NWB to foot/heel  Current offloading/footwear: Gripper sock while inpatient.   Sensation: Absent at the foot      Other callousing/areas of concern:  None currently, past fissures and tears to the left plantar foot and great toe resolved.     Diet: DM. Low carb.   Pt is following DM diet at home.     Assessment:  The left lateral heel wound has improved well.    The Mesalt helped to start debriding the wound and today with the wooden end of a cotton tipped applicator I was able to bluntly debride the majority of slough as able to visualize well.  Wound edges and periwound skin look improved, no longer hypopigmented or macerated.  Edema appears ok and stable, no signs of infection noted.  The Mesalt is no longer needed and we will change primary dressing to a paste with goal to be able to make contact with the entire, now very small, inner wound.    Barriers to wound healing:   Poor nutrition: inadequate supply of protein, carbohydrates, fatty acids, and trace elements essential for all phases of wound healing  - Educated that high protein diet is optimal for wound healing, goal is 100-120 g per day. Provided verbal list of foods rich in protein.   Reduced Blood Supply: inadequate perfusion to heal wound - No know ABIs   Medication: none  Chemotherapy: suppresses the immune system and inflammatory response - NA  Radiotherapy: increases production of free radical which damage cells - NA  Psychological stress: none noted  Obesity: decreases tissue perfusion - Appropriate weight and BMI   Infection: prolongs inflammatory phase, uses vital nutrients, impairs epithelialization and releases toxins - Pt has completed all antiboitics oral and IV ordered.  Use Vashe ongoing, primary dressing currently is an antimicrobial as 6/19..  Underlying Disease: poorly controlled diabetes mellitis, educated on diet correction and following with PCP for optimal management, ongoing visits with diabetic ed.  .   Maceration: reduces wound tensile strength and inhibits epithelial migration- present but plan of care adjusted to address  Patient compliance - Has been compliant with recommendations and cares.   Unrelieved pressure - can weight bear, recommend to limit as able time on feet, in good well fitting new sneakers.  Elevate left LE as much as able.   Immobility- altered, but not immobile.   Substance abuse: NA     Plan:   Today we will change the plan of care as listed below.  Pt will see Dr Huff in just under two weeks and wo nurse the following.    Interventions to Barriers:  As listed above.     Discussed/Education provided: plan of care with rationale;     Topical care to Left Lateral Calcaneous.  Cares performed in clinic today and to be performed as directed between appointments.   - Cleanse with wound cleanser or soap and water and dry well.  - Rinse the wound with Vashe   - Fill wound bed with Iodosorb paste.  - Cover with large band aid and change daily.    Pt is unable but his wife is able to perform cares/has been caring for  wound.    Additional recommendations: Keep left foot elevated the majority of every hour while awake.    The following discharge instructions were reviewed with and sent home with the patient:  Declined AVS today    The following supplies were sent home with the patient:  Remains of the Iodosorb opened today. .    Return visit: DPTONIA Huff 7/1.  Wound and Ostomy clinic 7/8/25.    Verbal, written, & demonstrative education provided.  Face to face time: approximately 20 minutes  Procedure: JAROCHO Lopez RN, CWOCN  [phone]

## 2025-07-16 ENCOUNTER — OFFICE VISIT (OUTPATIENT)
Dept: INTERNAL MEDICINE | Facility: CLINIC | Age: 60
End: 2025-07-16
Payer: COMMERCIAL

## 2025-07-16 VITALS
WEIGHT: 242.9 LBS | SYSTOLIC BLOOD PRESSURE: 125 MMHG | TEMPERATURE: 98.2 F | DIASTOLIC BLOOD PRESSURE: 80 MMHG | RESPIRATION RATE: 15 BRPM | BODY MASS INDEX: 31.17 KG/M2 | HEIGHT: 74 IN | OXYGEN SATURATION: 97 % | HEART RATE: 82 BPM

## 2025-07-16 DIAGNOSIS — I73.9 CLAUDICATION: ICD-10-CM

## 2025-07-16 DIAGNOSIS — R26.89 IMBALANCE: ICD-10-CM

## 2025-07-16 DIAGNOSIS — M25.552 BILATERAL HIP PAIN: ICD-10-CM

## 2025-07-16 DIAGNOSIS — Z79.4 TYPE 2 DIABETES MELLITUS WITH HYPERGLYCEMIA, WITH LONG-TERM CURRENT USE OF INSULIN (H): ICD-10-CM

## 2025-07-16 DIAGNOSIS — M25.551 BILATERAL HIP PAIN: ICD-10-CM

## 2025-07-16 DIAGNOSIS — E11.65 TYPE 2 DIABETES MELLITUS WITH HYPERGLYCEMIA, WITH LONG-TERM CURRENT USE OF INSULIN (H): ICD-10-CM

## 2025-07-16 DIAGNOSIS — R55 SYNCOPE, UNSPECIFIED SYNCOPE TYPE: ICD-10-CM

## 2025-07-16 DIAGNOSIS — R40.20 LOSS OF CONSCIOUSNESS (H): Primary | ICD-10-CM

## 2025-07-16 LAB
ALBUMIN SERPL BCG-MCNC: 4.2 G/DL (ref 3.5–5.2)
ALP SERPL-CCNC: 62 U/L (ref 40–150)
ALT SERPL W P-5'-P-CCNC: 14 U/L (ref 0–70)
ANION GAP SERPL CALCULATED.3IONS-SCNC: 12 MMOL/L (ref 7–15)
AST SERPL W P-5'-P-CCNC: 13 U/L (ref 0–45)
BILIRUB SERPL-MCNC: 0.3 MG/DL
BUN SERPL-MCNC: 13.9 MG/DL (ref 6–20)
CALCIUM SERPL-MCNC: 9.4 MG/DL (ref 8.8–10.4)
CHLORIDE SERPL-SCNC: 102 MMOL/L (ref 98–107)
CREAT SERPL-MCNC: 0.91 MG/DL (ref 0.67–1.17)
EGFRCR SERPLBLD CKD-EPI 2021: >90 ML/MIN/1.73M2
ERYTHROCYTE [DISTWIDTH] IN BLOOD BY AUTOMATED COUNT: 14 % (ref 10–15)
GLUCOSE SERPL-MCNC: 141 MG/DL (ref 70–99)
HCO3 SERPL-SCNC: 28 MMOL/L (ref 22–29)
HCT VFR BLD AUTO: 41.4 % (ref 40–53)
HGB BLD-MCNC: 14.8 G/DL (ref 13.3–17.7)
MCH RBC QN AUTO: 28.2 PG (ref 26.5–33)
MCHC RBC AUTO-ENTMCNC: 35.7 G/DL (ref 31.5–36.5)
MCV RBC AUTO: 79 FL (ref 78–100)
PLATELET # BLD AUTO: 165 10E3/UL (ref 150–450)
POTASSIUM SERPL-SCNC: 3.5 MMOL/L (ref 3.4–5.3)
PROT SERPL-MCNC: 7 G/DL (ref 6.4–8.3)
RBC # BLD AUTO: 5.24 10E6/UL (ref 4.4–5.9)
SODIUM SERPL-SCNC: 142 MMOL/L (ref 135–145)
T4 FREE SERPL-MCNC: 0.91 NG/DL (ref 0.9–1.7)
TSH SERPL DL<=0.005 MIU/L-ACNC: 6.61 UIU/ML (ref 0.3–4.2)
WBC # BLD AUTO: 9.3 10E3/UL (ref 4–11)

## 2025-07-16 PROCEDURE — 84443 ASSAY THYROID STIM HORMONE: CPT | Performed by: INTERNAL MEDICINE

## 2025-07-16 PROCEDURE — 99214 OFFICE O/P EST MOD 30 MIN: CPT | Performed by: INTERNAL MEDICINE

## 2025-07-16 PROCEDURE — 3074F SYST BP LT 130 MM HG: CPT | Performed by: INTERNAL MEDICINE

## 2025-07-16 PROCEDURE — 3079F DIAST BP 80-89 MM HG: CPT | Performed by: INTERNAL MEDICINE

## 2025-07-16 PROCEDURE — G2211 COMPLEX E/M VISIT ADD ON: HCPCS | Performed by: INTERNAL MEDICINE

## 2025-07-16 PROCEDURE — 36415 COLL VENOUS BLD VENIPUNCTURE: CPT | Performed by: INTERNAL MEDICINE

## 2025-07-16 PROCEDURE — 85027 COMPLETE CBC AUTOMATED: CPT | Performed by: INTERNAL MEDICINE

## 2025-07-16 PROCEDURE — 93000 ELECTROCARDIOGRAM COMPLETE: CPT | Performed by: INTERNAL MEDICINE

## 2025-07-16 PROCEDURE — 1126F AMNT PAIN NOTED NONE PRSNT: CPT | Performed by: INTERNAL MEDICINE

## 2025-07-16 PROCEDURE — 84439 ASSAY OF FREE THYROXINE: CPT | Performed by: INTERNAL MEDICINE

## 2025-07-16 PROCEDURE — 80053 COMPREHEN METABOLIC PANEL: CPT | Performed by: INTERNAL MEDICINE

## 2025-07-16 ASSESSMENT — PAIN SCALES - GENERAL: PAINLEVEL_OUTOF10: NO PAIN (0)

## 2025-07-16 NOTE — PROGRESS NOTES
Assessment & Plan   Problem List Items Addressed This Visit       Type 2 diabetes mellitus with hyperglycemia (H)     Other Visit Diagnoses         Loss of consciousness (H)    -  Primary    Relevant Orders    CT Head w/o Contrast      Imbalance          Syncope, unspecified syncope type        Relevant Orders    EKG 12-lead complete w/read - Clinics (Completed)    ZIO PATCH 3-7 DAYS (additional cost to patient)    ZIO PATCH 3-7 DAYS APPLICATION    CBC with platelets    TSH with free T4 reflex    Comprehensive metabolic panel (BMP + Alb, Alk Phos, ALT, AST, Total. Bili, TP)      Bilateral hip pain        Relevant Orders    US Lower Extremity Arterial Duplex Bilateral    CBC with platelets      Claudication        Relevant Orders    US Lower Extremity Arterial Duplex Bilateral    CBC with platelets           Patient who has type 2 diabetes for many years with hyperglycemia and is on insulin.  She also has a freestyle nini system.  His A1c has improved greatly from 16 down to 7.  He did recently have a foot ulceration which took a long time to heal.  He is now having some syncope, imbalance, bilateral hip pain possible claudication.  He is also concerned because his father had hydrocephalus.    Of the syncope and imbalance issues were admitted get a head CT.  He has had a brain MRI 2 years ago which was okay but I would repeat imaging with the new symptoms.    Loss of consciousness concern for cardiac causes we will check a EKG and a Zio patch.  Will check labs today.  Could be related to his sugars being low but his sugar was 79 when checked on the day of the brace system.    Claudication and hip pain concerned that it happens when he walks gets better when he rests he is certainly at risk for vascular disease and we will proceed with a ultrasound of his arteries bilaterally to look for blood flow.      The longitudinal plan of care for the diagnosis(es)/condition(s) as documented were addressed during this visit.  "Due to the added complexity in care, I will continue to support Luke in the subsequent management and with ongoing continuity of care.    Subjective   Luke is a 59 year old, presenting for the following health issues:  Balance concern         7/16/2025     3:15 PM   Additional Questions   Roomed by Princess     History of Present Illness       Reason for visit:  Symptoms  Symptom onset:  More than a month  Symptoms include:  Patient describes feeling off balance/ unsteady. Patient did faint on 07/06/2025  Symptom intensity:  Mild  Symptom progression:  Staying the same  Had these symptoms before:  No  What makes it worse:  Nothing  What makes it better:  Nothing He is missing 1 dose(s) of medications per week.  He is not taking prescribed medications regularly due to remembering to take.      Here with his wife.    One day had LOC and fell sugar was at 79 on nini. No seizure activity.  Was ok when he woke up. Daughter a nurse found him.      Also some balance issues, feels off when walking sometimes.     Dad has a  shunt.         Objective    /80 (BP Location: Left arm, Patient Position: Sitting, Cuff Size: Adult Large)   Pulse 82   Temp 98.2  F (36.8  C) (Temporal)   Resp 15   Ht 1.88 m (6' 2\")   Wt 110.2 kg (242 lb 14.4 oz)   SpO2 97%   BMI 31.19 kg/m    Body mass index is 31.19 kg/m .  Physical Exam   No acute distress  Heart is regular with 1 skipped beat, no murmurs  No carotid bruits  Lungs are clear  Abdomen soft  Hips have no pain with range of motion  Extremities have slight swelling and decreased pulses bilaterally    EKG shows normal sinus rhythm with an incomplete right bundle branch.  Nonspecific ST changes with a rate of 77, previous EKG is similar with an anteroseptal defect    Signed Electronically by: Rancho Painting MD    "

## 2025-07-17 ENCOUNTER — ALLIED HEALTH/NURSE VISIT (OUTPATIENT)
Dept: CARDIOLOGY | Facility: CLINIC | Age: 60
End: 2025-07-17
Attending: INTERNAL MEDICINE
Payer: COMMERCIAL

## 2025-07-17 DIAGNOSIS — R42 LIGHTHEADEDNESS: Primary | ICD-10-CM

## 2025-07-17 NOTE — PROGRESS NOTES
Luke Farias arrived here on 7/17/2025 3:02 PM for 3-7 Days  Zio monitor placement per ordering provider Rancho Painting for the diagnosis lightheadedness.  Patient s skin was prepped per protocol. Dr. Pineda is the supervising MD.  Zio monitor was placed.  Instructions were reviewed with and given to the patient.  Patient verbalized understanding of wear, troubleshooting and monitor return instructions.

## 2025-07-23 ENCOUNTER — HOSPITAL ENCOUNTER (OUTPATIENT)
Dept: WOUND CARE | Facility: CLINIC | Age: 60
Discharge: HOME | End: 2025-07-23
Attending: INTERNAL MEDICINE | Admitting: INTERNAL MEDICINE
Payer: COMMERCIAL

## 2025-07-23 PROCEDURE — G0463 HOSPITAL OUTPT CLINIC VISIT: HCPCS

## 2025-07-23 NOTE — PROGRESS NOTES
[facility]    Start of Care in [facility]ound [facility]: 3/5/2025, inpatient   Referring Provider: Dr. Darryl Huff   Primary Care Provider: [employer]   Wound Location: Left Lateral calcaneus  Surgery: Debridement left calcaneus with bone biopsy, 3/4/25 Darryl Huff DPM      Wound Clinic Visit:  Scheduled follow up.    Reason for Visit:  Follow up on left heel wound, left foot, evaluate current plan of care.     Subjective:  On arrival today 7/23/25 alone, the pt reports the following:  No new concerns with the left lateral heel wound, has been all dry, no drainage, scab or dry thick tissue covering, no pain.  Blood sugars doing well.  No new fainting issues, has cardiac monitor in place current, fatigued but no other noted concerns.      Wound History:  Pt presented to the ED here at [facility] on 3/2/25 and was admitted, discharge home 3/8.  Patient reports having dry, peeling skin to the lateral calcaneus, used this device to help remove the non-viable tissue (Amope').  At the time the area was dry with no excess moisture.  On 3/2/25 noted to have increase redness and moist skin and concerns for infection was evaluated at [facility] ER, later was admitted to Hospital for IV antibiotics.  MRI -  3/3/25 showed Soft tissue ulceration lateral to the calcaneus,  Mild bone marrow edema within the adjacent lateral calcaneus. Osteomyelitis is likely if this wound probes to bone. Adjacent subcutaneous fluid collection measuring up to 3.2 cm, suspicious for abscess.  Regional cellulitis. Podiatry was consulted 3/3, pt was taken to OR on 3/4  for surgical debridement of left ankle wound/abscess in addition to bone biopsy.  While inpatient the [facility] nurse team coordinated with GHANSHYAM for assessments and teaching.  [facility] saw pt on Fri 3/7 and set up plan for wound cares twice daily at discharge by pt's wife and scheduled for Wound and Ostomy clinic appointment for the following  Monday 3/10.  On 3/7 woc HECTOR Lopez and Dr Huff DPM discussed via phone options for wound cares and follow up post hospital discharge.  DPM questioned potential wound vac use, at that time I felt the two small open wounds would benefit from a vac but the surrounding tissue was too fluctuant to apply a vac without considerable risk for periwound skin and tissue breakdown.  Pt discharged home Saturday 3/8 with IV antibiotics via left arm PICC managed by  [facility].  (Nafcillin 12 g in sodium chloride 0.9 % 240 mL via SMARTeZ pump Infuse 12 g over 24 hours once daily for 13 days).  Pt's wife Lisa  performing twice daily wound cares as directed at time of discharge, no new concerns, no odor noted.  Received knee scooter.  - Dr Huff 3/20, removed sutures, sharp dissection was performed minimally, dispensed fracture boot for transfers otherwise can be removed.  - PICC line removed 3/23, on oral antibiotics Zyvox.    - Tissue  the two open wounds dehisced in Wound and Ostomy clinic 3/25.  - Dr Huff 4/1, Recommended packing very lightly even just laying it in the wound as a wick then covering with gauze and a gauze roll and gentle compression.  Can be weightbearing for very short distances in a fracture boot.  Follow-up again with me in about a month and continue to follow-up with wound care.  We discussed utilizing a wound VAC however the patient prefers not to as he would need assistance probably with dressing changes.    Past Medical History:  Patient Active Problem List   Diagnosis    Chronic rhinitis    Type 2 diabetes mellitus with hyperglycemia (H)    Hyperlipidemia LDL goal <100    Venous lake on Right chest    Congenital nevus of Right upper back    Diabetic infection of left foot (H)    SIRS (systemic inflammatory response syndrome) (H)    Diabetic peripheral neuropathy (H)    Hyponatremia    Hypochloremia    Open wound of left heel    Left leg swelling    Elevated C-reactive protein  (CRP)    Elevated erythrocyte sedimentation rate    Diabetic foot infection (H)    Elevated blood ketone body level    Osteomyelitis (H)                 Tobacco Use:     Tobacco Use      Smoking status: Never      Smokeless tobacco: Never     Diabetic: Type 2  HgbA1C:   Hemoglobin A1C   Date Value Ref Range Status   05/29/2025 7.3 (H) <5.7 % Final     Comment:     Normal <5.7%   Prediabetes 5.7-6.4%    Diabetes 6.5% or higher     Note: Adopted from ADA consensus guidelines.   04/21/2021 8.9 (H) 0 - 5.6 % Final     Comment:     Normal <5.7% Prediabetes 5.7-6.4%  Diabetes 6.5% or higher - adopted from ADA   consensus guidelines.  Results confirmed by repeat test     Checks Blood Glucose?:  Three times daily.  Average Readings: 150's.     Personal/social history:  Working full time, owns [employer], more office side. Lives with wife Lisa who is an experienced RN here at [facility].      Objective:   Current treatment plan: Cares performed in Wound and Ostomy clinic last visit and done by pt's wife since then at home.  Left lateral heel:  - Cleanse with wound cleanser or soap and water and dry well.   - Cover with large band aid.  - Change band aid daily and inspect site for any open wound, drainage or new concerns.  Last changed: Removed band aid last night, left open to air for today.      Wound #1 Left Lateral Calcaneus - Surgical wound, Surgical debridement, DOS 3/4/25  Stage/tissue depth: Full Thickness   0.4 cm L x 0.3 cm W x 0 cm D   Tunneling: none  Undermining: none  Wound bed type/amount: No open wound, area measured above is 100 % dry hyperkeratotic tissue along old scar line, rest of the scar closed incision is resolved; not fluctuant  Wound Edges:  NA no open wound  Periwound: dry skin,   Drainage: none  Odor: none  Pain: Neuropathy, no sensation. No pain  Photo from today's visit 7/23/25.      Photo's from 7/8/25.      Photo's from initial Wound and Ostomy clinic visit 3/10/25.      Photo's from  initial inpatient woc consult visit 3/5/25.    Dorsalis Pedal Pulse: 0/4 palpable: NA doppler: NA phasic  Hair growth: Absent from knee distally  Capillary Refill: 3 seconds  Feet/toes color: deep pink with erythema as listed in assessment   Nail  R Leg: Edema nonpitting firm leg and ankle. Ankle circumference  Not measured. Calf circumference Not measured.   L Leg: Edema nonpitting. Ankle circumference 36.5 cm. Calf circumference 37 cm.  - Not measured this visit 7/23.     Mobility: NWB to foot/heel  Current offloading/footwear: Gripper sock while inpatient.   Sensation: Absent at the foot      Other callousing/areas of concern:  None currently, past fissures and tears to the left plantar foot and great toe resolved.     Diet: DM. Low carb.   Pt is following DM diet at home.     Assessment:  The left lateral heel wound is all closed, small are along the incision line, where most recently small separation was noted, has dry semi thick amount of hyperkeratotic tissue, firm to touch, not boggy, unable to express any drainage, no pain.    No concerns of edema or erythema in the area.    Barriers to wound healing:   Poor nutrition: inadequate supply of protein, carbohydrates, fatty acids, and trace elements essential for all phases of wound healing - Educated that high protein diet is optimal for wound healing, goal is 100-120 g per day. Provided verbal list of foods rich in protein.   Reduced Blood Supply: inadequate perfusion to heal wound - No know ABIs   Medication: none  Chemotherapy: suppresses the immune system and inflammatory response - NA  Radiotherapy: increases production of free radical which damage cells - NA  Psychological stress: none noted  Obesity: decreases tissue perfusion - Appropriate weight and BMI   Infection: prolongs inflammatory phase, uses vital nutrients, impairs epithelialization and releases toxins - Pt has completed all antiboitics oral and IV ordered.  Use Vashe ongoing, primary dressing  currently is an antimicrobial as 6/19..  Underlying Disease: poorly controlled diabetes mellitis, educated on diet correction and following with PCP for optimal management, ongoing visits with diabetic ed.  .   Maceration: reduces wound tensile strength and inhibits epithelial migration- present but plan of care adjusted to address  Patient compliance - Has been compliant with recommendations and cares.   Unrelieved pressure - can weight bear, recommend to limit as able time on feet, in good well fitting new sneakers.  Elevate left LE as much as able.   Immobility- altered, but not immobile.   Substance abuse: NA     Plan:   Today we will change the plan of care for the closed site with hyperkeratotic tissue present, with goal to remove the HK tissue gently with use of Sween 24 daily.  Pt instructed to wash the foot and closed wound site with his daily ADL's, dry well, apply Sween 24 daily to the site of HK tissue, gently working the Sween under the edges of the dry tissue to slowly lift with goal of exposing intact normal scar tissue.  If any open site is noted recommend to rinse with Vashe and apply Ioodosorb and contact the wound and ostomy clinic.   If no open tissue but the HK tissue become soft or pt is able to express drainage, pt instructed to rinse with Vashe, cover with band aid and contact wound and ostomy clinic.  Any signs of infection pt to contact DPM or PCP or seek emergent provider assessment.    Continued moisturizing of the site is recommended once the HK tissue comes off to intact scar tissue to limit return of the dryness and HK tissue build up.  Can switch from Sween 24 to his normal Cetaphil moisturizer to the site once the HK tissue is resolved.     Interventions to Barriers:  As listed above.     Discussed/Education provided: plan of care with rationale;     Topical care to Left Lateral Calcaneous.  Cares performed in clinic today and to be performed as directed between appointments.   See  recommendations in Plan.    Pt is unable but his wife is able to perform cares/has been caring for wound.    Additional recommendations: Continue to monitor for swelling and elevate when able.      The following discharge instructions were reviewed with and sent home with the patient:  Declined AVS today    The following supplies were sent home with the patient:  None today    Return visit: None scheduled in the Wound and Ostomy clinic.  Pt has one more follow up with DPM scheduled.    Verbal, written, & demonstrative education provided.  Face to face time: approximately 10 minutes  Procedure: JAROCHO Lopez RN, CWOCN  [phone]

## 2025-07-25 ENCOUNTER — HOSPITAL ENCOUNTER (OUTPATIENT)
Dept: CT IMAGING | Facility: CLINIC | Age: 60
Discharge: HOME | End: 2025-07-25
Attending: INTERNAL MEDICINE
Payer: COMMERCIAL

## 2025-07-25 ENCOUNTER — HOSPITAL ENCOUNTER (OUTPATIENT)
Dept: ULTRASOUND IMAGING | Facility: CLINIC | Age: 60
Discharge: HOME | End: 2025-07-25
Attending: INTERNAL MEDICINE
Payer: COMMERCIAL

## 2025-07-25 DIAGNOSIS — M25.551 BILATERAL HIP PAIN: ICD-10-CM

## 2025-07-25 DIAGNOSIS — I73.9 PVD (PERIPHERAL VASCULAR DISEASE): ICD-10-CM

## 2025-07-25 DIAGNOSIS — M25.552 BILATERAL HIP PAIN: ICD-10-CM

## 2025-07-25 DIAGNOSIS — R40.20 LOSS OF CONSCIOUSNESS (H): ICD-10-CM

## 2025-07-25 DIAGNOSIS — I73.9 CLAUDICATION: ICD-10-CM

## 2025-07-25 PROCEDURE — 93922 UPR/L XTREMITY ART 2 LEVELS: CPT

## 2025-07-25 PROCEDURE — 70450 CT HEAD/BRAIN W/O DYE: CPT

## 2025-07-25 PROCEDURE — 93925 LOWER EXTREMITY STUDY: CPT

## 2025-07-26 DIAGNOSIS — M86.9 DIABETES MELLITUS WITH OSTEOMYELITIS (H): ICD-10-CM

## 2025-07-26 DIAGNOSIS — E11.69 DIABETES MELLITUS WITH OSTEOMYELITIS (H): ICD-10-CM

## 2025-07-28 RX ORDER — INSULIN GLARGINE 100 [IU]/ML
INJECTION, SOLUTION SUBCUTANEOUS
Qty: 9 ML | Refills: 3 | Status: SHIPPED | OUTPATIENT
Start: 2025-07-28

## 2025-07-30 LAB — CV ZIO PRELIM RESULTS: NORMAL

## 2025-08-05 LAB — CV ZIO PRELIM RESULTS: NORMAL

## (undated) DEVICE — CAST PADDING 4" STERILE 9044S

## (undated) DEVICE — KIT ENDO TURNOVER/PROCEDURE CARRY-ON 101822

## (undated) DEVICE — TUBING SUCTION 6"X3/16" N56A

## (undated) DEVICE — SYR 10ML FINGER CONTROL W/O NDL 309695

## (undated) DEVICE — SOL WATER IRRIG 1000ML BOTTLE 07139-09

## (undated) DEVICE — DRSG TELFA 3X8" 1238

## (undated) DEVICE — TRAY PREP DRY SKIN SCRUB 067

## (undated) DEVICE — GLOVE BIOGEL PI SZ 8.0 40880

## (undated) DEVICE — GOWN XLG DISP 9545

## (undated) DEVICE — PAD CHUX UNDERPAD 23X24" 7136

## (undated) DEVICE — CAST PADDING 4" COTTON WEBRIL STERILE 9084S

## (undated) DEVICE — DRSG GAUZE 4X4" TRAY

## (undated) DEVICE — GLOVE BIOGEL PI SZ 6.5 40865

## (undated) DEVICE — SU VICRYL 4-0 PS-2 27" UND J426H

## (undated) DEVICE — PREP CHLORAPREP 26ML TINTED ORANGE  260815

## (undated) DEVICE — SU PROLENE 4-0 PS-2 18" 8682G

## (undated) DEVICE — DRSG KERLIX 4 1/2"X4YDS ROLL 6730

## (undated) DEVICE — PREP POVIDONE IODINE SCRUB 7.5% 4OZ APL82212

## (undated) DEVICE — GLOVE BIOGEL PI ULTRATOUCH G SZ 8.0 42180

## (undated) DEVICE — SPONGE RAY-TEC 4X8" 7318

## (undated) DEVICE — GLOVE UNDER INDICATOR PI SZ 7.0 LF 41670

## (undated) DEVICE — PACK EXTREMITY SOP15EXFSD

## (undated) DEVICE — PREP POVIDONE IODINE SOLUTION 10% 4OZ BOTTLE 29906-004

## (undated) DEVICE — SOL WATER IRRIG 1000ML BOTTLE 2F7114

## (undated) DEVICE — SU VICRYL 3-0 PS-2 27" UND J427H

## (undated) DEVICE — SU VICRYL 0 CT-2 27" UND J270H

## (undated) DEVICE — DRSG ABDOMINAL 07 1/2X8" 7197D

## (undated) DEVICE — ENDO SNARE EXACTO COLD 9MM LOOP 2.4MMX230CM 00711115

## (undated) DEVICE — ENDO TRAP POLYP E-TRAP 00711099

## (undated) DEVICE — KIT ENDO FIRST STEP DISINFECTANT 200ML W/POUCH EP-4

## (undated) DEVICE — NDL ECLIPSE 25GA 1.5"

## (undated) DEVICE — PACKING IODOFORM STRIP 1/4" 7831

## (undated) DEVICE — BNDG ELASTIC 4"X5YDS UNSTERILE 6611-40

## (undated) DEVICE — NEEDLE ECLIPSE 23X1 1/2 RB TW 303304

## (undated) RX ORDER — SIMETHICONE 40MG/0.6ML
SUSPENSION, DROPS(FINAL DOSAGE FORM)(ML) ORAL
Status: DISPENSED
Start: 2019-01-21

## (undated) RX ORDER — FENTANYL CITRATE 50 UG/ML
INJECTION, SOLUTION INTRAMUSCULAR; INTRAVENOUS
Status: DISPENSED
Start: 2019-01-21

## (undated) RX ORDER — LIDOCAINE HYDROCHLORIDE 20 MG/ML
INJECTION, SOLUTION INFILTRATION; PERINEURAL
Status: DISPENSED
Start: 2019-08-19

## (undated) RX ORDER — PROPOFOL 10 MG/ML
INJECTION, EMULSION INTRAVENOUS
Status: DISPENSED
Start: 2019-08-19

## (undated) RX ORDER — FENTANYL CITRATE 50 UG/ML
INJECTION, SOLUTION INTRAMUSCULAR; INTRAVENOUS
Status: DISPENSED
Start: 2025-03-04

## (undated) RX ORDER — BUPIVACAINE HYDROCHLORIDE 5 MG/ML
INJECTION, SOLUTION EPIDURAL; INTRACAUDAL; PERINEURAL
Status: DISPENSED
Start: 2025-03-04

## (undated) RX ORDER — CEFAZOLIN SODIUM 1 G/3ML
INJECTION, POWDER, FOR SOLUTION INTRAMUSCULAR; INTRAVENOUS
Status: DISPENSED
Start: 2025-03-04

## (undated) RX ORDER — SIMETHICONE 40MG/0.6ML
SUSPENSION, DROPS(FINAL DOSAGE FORM)(ML) ORAL
Status: DISPENSED
Start: 2019-08-19

## (undated) RX ORDER — FENTANYL CITRATE 50 UG/ML
INJECTION, SOLUTION INTRAMUSCULAR; INTRAVENOUS
Status: DISPENSED
Start: 2021-11-05